# Patient Record
Sex: FEMALE | Race: WHITE | Employment: OTHER | URBAN - METROPOLITAN AREA
[De-identification: names, ages, dates, MRNs, and addresses within clinical notes are randomized per-mention and may not be internally consistent; named-entity substitution may affect disease eponyms.]

---

## 2019-03-16 ENCOUNTER — HOSPITAL ENCOUNTER (OUTPATIENT)
Dept: CT IMAGING | Age: 79
Discharge: HOME | End: 2019-03-16
Attending: INTERNAL MEDICINE
Payer: MEDICARE

## 2019-03-16 DIAGNOSIS — R91.8 LUNG MASS: ICD-10-CM

## 2019-03-16 PROCEDURE — 71250 CT THORAX DX C-: CPT

## 2019-03-18 PROBLEM — R91.8 LUNG MASS: Status: ACTIVE | Noted: 2019-03-18

## 2019-03-18 PROBLEM — K76.9 LIVER LESION: Status: ACTIVE | Noted: 2019-03-18

## 2019-03-18 PROBLEM — J43.2 CENTRILOBULAR EMPHYSEMA (HCC): Chronic | Status: ACTIVE | Noted: 2019-03-18

## 2019-03-18 PROBLEM — Z87.891 PERSONAL HISTORY OF TOBACCO USE, PRESENTING HAZARDS TO HEALTH: Status: ACTIVE | Noted: 2019-03-18

## 2019-03-18 NOTE — H&P (VIEW-ONLY)
[address] [place]. [address] [facility], [address] [place] [address] 
[phone] Patient Name:  Nelida Vogt YOB: 1940 Office Visit 3/18/2019 CHIEF COMPLAINT:   
Chief Complaint Patient presents with  Lung Mass HISTORY OF PRESENT ILLNESS:   
I had the pleasure of seeing Ms. Aiad Gates in our clinic today. Ms. Julissa Larson is a 66 y.o. female who presents with a history of tobacco abuse and COPD here for new eval of lung mass. Has chronic back pain, particularly first thing in the morning. Makes gait unsteady. 5 days ago fell going to bathroom, hit her head. History of SAH 7 years ago, coiled, so took to hospital.  
 
Smoked 45 years, 1.5 ppd, quit 14 years ago. Still using nicorette. Was a , no other exposures. No history of cancer elsewhere. Complains of fatigue and shortness of breath. Told she has emphysema x years. Not currently on any inhaled medications. Never been hospitalized due to breathing or on supplemental O2. Has some chest discomfort described as tightening in center of chest, mostly during grocery shopping. Goes away when she stops or drinks cold water. Lost weight when she moved here from [place], but gained most of it back. Past Medical History:  
Diagnosis Date  Subarachnoid hemorrhage ([facility] [address] [facility] Aneurysm Problem List  Date Reviewed: 3/18/2019 Codes Class Noted Lung mass ICD-10-CM: R91.8 ICD-9-CM: 786.6  3/18/2019 Personal history of tobacco use, presenting hazards to health ICD-10-CM: U36.857 ICD-9-CM: V15.82  3/18/2019 Liver lesion ICD-10-CM: K76.9 ICD-9-CM: 573.8  3/18/2019 Centrilobular emphysema (HCC) (Chronic) ICD-10-CM: J43.2 ICD-9-CM: 492.8  3/18/2019 History reviewed. No pertinent surgical history. No flowsheet data found. Social History Socioeconomic History  Marital status: SINGLE  
 Spouse name: Not on file  Number of children: Not on file  Years of education: Not on file  Highest education level: Not on file Social Needs  Financial resource strain: Not on file  Food insecurity - worry: Not on file  Food insecurity - inability: Not on file  Transportation needs - medical: Not on file  Transportation needs - non-medical: Not on file Occupational History  Not on file Tobacco Use  Smoking status: Former Smoker Packs/day: 1.50 Years: 45.00 Pack years: 67.50 Types: Cigarettes Last attempt to quit: 2004 Years since quitting: 15.2  Smokeless tobacco: Never Used Substance and Sexual Activity  Alcohol use: Not on file  Drug use: Not on file  Sexual activity: Not on file Other Topics Concern  Not on file Social History Narrative  Not on file History reviewed. No pertinent family history. Allergies Allergen Reactions  Penicillins Unknown (comments)  Percocet [Oxycodone-Acetaminophen] Unknown (comments) Current Outpatient Medications Medication Sig  
 calc-D3-magnes-D6-Sm-Dp-jake 250 mg-400 unit -40 mg-5 mg tab Take 1 Tab by mouth daily.  omega 3-dha-epa-fish oil (FISH OIL) 100-160-1,000 mg cap Take 1 Cap by mouth daily.  MAGNESIUM PO Take 100 mg by mouth daily.  VITAMIN B COMPLEX PO Take 1 Tab by mouth daily.  atorvastatin (LIPITOR) 10 mg tablet Take 10 mg by mouth daily.  cholecalciferol (VITAMIN D3) 1,000 unit tablet Take 1,000 Units by mouth daily.  levothyroxine (SYNTHROID) 75 mcg tablet Take 75 mcg by mouth daily.  losartan (COZAAR) 25 mg tablet Take 25 mg by mouth daily.  therapeutic multivitamin (THERAGRAN) tablet Take 1 Tab by mouth daily.  zolpidem (AMBIEN) 5 mg tablet Take 1 Tab by mouth nightly. No current facility-administered medications for this visit. REVIEW OF SYSTEMS: 
 
Review of Systems Constitutional: Positive for malaise/fatigue. Negative for chills, diaphoresis, fever and weight loss. HENT: Negative for congestion, ear discharge, ear pain, hearing loss, nosebleeds, sinus pain, sore throat and tinnitus. Eyes: Negative for blurred vision, pain and redness. Respiratory: Positive for cough. Negative for hemoptysis, sputum production, shortness of breath and wheezing. Cardiovascular: Negative for chest pain, palpitations, orthopnea, leg swelling and PND. Gastrointestinal: Positive for heartburn. Negative for abdominal pain, blood in stool, constipation, diarrhea, melena, nausea and vomiting. Genitourinary: Negative for dysuria, frequency, hematuria and urgency. Musculoskeletal: Positive for back pain. Negative for joint pain, myalgias and neck pain. Skin: Negative for itching and rash. Neurological: Negative for dizziness, tremors, focal weakness, seizures and headaches. Endo/Heme/Allergies: Positive for environmental allergies. Does not bruise/bleed easily. Psychiatric/Behavioral: Positive for depression. Negative for hallucinations, memory loss and suicidal ideas. The patient is not nervous/anxious. PHYSICAL EXAM: 
Visit Vitals /80 (BP 1 Location: Left arm, BP Patient Position: Sitting) Pulse 86 Temp 98 °F (36.7 °C) (Temporal) Resp 20 Ht 5' 8\" (1.727 m) Wt 149 lb (67.6 kg) SpO2 99% BMI 22.66 kg/m² GENERAL APPEARANCE: 
The patient is normal weight and in no respiratory distress. HEENT: 
PERRL. Conjunctivae unremarkable. Nasal mucosa is without epistaxis, exudate, or polyps. Gums and dentition are unremarkable. There is no oropharyngeal narrowing. NECK/LYMPHATIC: 
Symmetrical with no elevation of jugular venous pulsation. Trachea midline. No thyroid enlargement. No cervical adenopathy. LUNGS: 
Normal respiratory effort with symmetrical lung expansion. Breath sounds are clear to auscultation bilaterally.  
HEART: 
 There is a regular rate and rhythm. No murmur, rub, or gallop. ABDOMEN: 
Soft and non-tender. No hepatosplenomegaly. Bowel sounds are normal.   
NEURO/PSYCH: 
The patient is alert and oriented to person, place, and time. Memory appears intact and mood is normal.  No gross sensorimotor deficits are present. MS/SKIN: 
There is no edema in the lower extremities. No rashes, bruises, lesions, ulcers visible. DIAGNOSTIC TESTS: 
CT of chest:   
 
Results from Hospital Encounter encounter on 03/16/19 CT CHEST WO CONT Impression IMPRESSION:  
 
1. Large spiculated mass in the right lower lobe likely representing primary 
bronchogenic carcinoma versus metastasis. 2.  Scattered bilateral small pulmonary nodules, suspicious for metastases. 3.  Some indeterminate hypoenhancing liver lesions. Elective whole body PET/CT 
and or MRI of the liver may be helpful in further evaluation. 4.  Other incidental findings as above. Results for orders placed during the hospital encounter of 03/16/19 CT CHEST WO CONT Narrative CT THORAX WITHOUT CONTRAST HISTORY: lung mass/5 cm mass in R base on cxr-please have radiology read and 
push to Super D, 78 years Female  5 cm mass seen base right lung on CXR Hx COPD Super D 
 
COMPARISON: Chest radiograph April 12, 2011 TECHNIQUE: Noncontrast axial helical images from the thoracic inlet through 
diaphragm were obtained. Radiation dose reduction techniques were used for this 
study:  Our CT scanners use one or all of the following: Automated exposure 
control, adjustment of the mA and/or kVp according to patient's size, iterative 
reconstruction. FINDINGS: 
Partially visualized thyroid unremarkable. No evidence of significant 
mediastinal, or axillary lymphadenopathy. Mild calcific atherosclerosis of a 
normal caliber aortic arch and descending aorta. Mild biapical scarring with mild bilateral upper lobe predominant panlobular emphysema. A large spiculated 
mass is seen in the right lower lobe measuring 5.0 x 4.3 cm, suspicious for 
primary bronchogenic carcinoma versus metastasis. Small satellite nodules are 
seen. Minimal dependent subsegmental atelectasis bilateral lung bases. No 
evidence of pleural effusion. Scattered small peripheral pulmonary nodules 
measuring up to 4 mm in the left lower lobe and up to 4 mm in the right middle 
lobe are suspicious for pulmonary metastases. Visualized proximal airways 
unremarkable. There appear to be small nonobstructing calculi in the right renal upper pole 
medullary level measuring up to 2 x 3 mm. Simple appearing cysts are seen of 
the hepatic dome. However, there are some hypoenhancing hepatic lesions which 
are indeterminate here. Visualized upper abdominal viscera including the 
adrenal glands are otherwise unremarkable. Visualized osseous structures 
unremarkable. Impression IMPRESSION:  
 
1. Large spiculated mass in the right lower lobe likely representing primary 
bronchogenic carcinoma versus metastasis. 2.  Scattered bilateral small pulmonary nodules, suspicious for metastases. 3.  Some indeterminate hypoenhancing liver lesions. Elective whole body PET/CT 
and or MRI of the liver may be helpful in further evaluation. 4.  Other incidental findings as above. No results found for this or any previous visit. No results found for this or any previous visit. CXR:   
Results for orders placed during the hospital encounter of 04/12/11 XR CHEST PA AND LATERAL  
 
 
 
PET/CT:  
No results found for this or any previous visit. No results found for this or any previous visit. Exercise oximetry:   
 
Spirometry:  
Date:    03/18/2019 FVC    2.77-87% FEV1    1.84-77% FEV1/FVC    66% FEF 25-75%    0.97-55% Bronchodilator Response TLC           
RV           
 DLCO           
 
Echo: 
No results found for this or any previous visit. ASSESSMENT:  (Medical Decision Making) ICD-10-CM ICD-9-CM 1. Lung mass R91.8 786.6 AMB POC SPIROMETRY 2. Liver lesion K76.9 573.8 3. Personal history of tobacco use, presenting hazards to health Z87.891 V15.82   
4. Centrilobular emphysema (HCC) J43.2 492.8 Patient with a history of former tobacco abuse and COPD now found to have a 5 cm right lower lobe lung mass very suspicious for primary bronchogenic carcinoma. CT scan also reveals scattered bilateral pulmonary nodules which are too small to either biopsy or to characterize further. Finally also reveals suspicious liver lesions which may represent sites of metastatic disease. We spent a good deal of time discussing the likely diagnosis of this being a primary lung cancer and the potential status depending on the findings of the liver or other distant sites of disease. PLAN: 
-We will set her up for a PET scan to be performed soon as possible. If there suggestions of metastatic deposits in the liver or elsewhere outside of the chest these will be the first sites to perform biopsy. If however all of the disease seems to be confined to the chest when she would be a good candidate for navigation bronchoscopy. -Follow-up will depend on the findings of the PET scan as outlined above. -COPD with mild obstruction on today's spirometry. Minimal symptoms and does not currently require any inhaled therapies. Will hold on additional treatments for now. 
-We will refer her to medical oncology after bronchoscopy or IR guided biopsy is completed. Follow-up as above Portions of this note were created using voice recognition software. As such, error of speech recognition may have occurred. Orders Placed This Encounter  AMB POC SPIROMETRY Conrad Gorman MD 
Electronically signed

## 2019-03-28 ENCOUNTER — HOSPITAL ENCOUNTER (OUTPATIENT)
Dept: PET IMAGING | Age: 79
Discharge: HOME | End: 2019-03-28
Payer: MEDICARE

## 2019-03-28 DIAGNOSIS — R91.8 LUNG MASS: ICD-10-CM

## 2019-03-28 PROCEDURE — 74011636320 HC RX REV CODE- 636/320: Performed by: INTERNAL MEDICINE

## 2019-03-28 PROCEDURE — 78815 PET IMAGE W/CT SKULL-THIGH: CPT

## 2019-03-28 RX ORDER — SODIUM CHLORIDE 0.9 % (FLUSH) 0.9 %
5-10 SYRINGE (ML) INJECTION
Status: COMPLETED | OUTPATIENT
Start: 2019-03-28 | End: 2019-03-28

## 2019-03-28 RX ADMIN — DIATRIZOATE MEGLUMINE AND DIATRIZOATE SODIUM 10 ML: 660; 100 LIQUID ORAL; RECTAL at 16:11

## 2019-03-28 RX ADMIN — Medication 10 ML: at 16:11

## 2019-04-04 ENCOUNTER — APPOINTMENT (OUTPATIENT)
Dept: GENERAL RADIOLOGY | Age: 79
End: 2019-04-04
Attending: INTERNAL MEDICINE
Payer: MEDICARE

## 2019-04-04 ENCOUNTER — HOSPITAL ENCOUNTER (OUTPATIENT)
Age: 79
Setting detail: OUTPATIENT SURGERY
Discharge: HOME | End: 2019-04-04
Attending: INTERNAL MEDICINE | Admitting: INTERNAL MEDICINE
Payer: MEDICARE

## 2019-04-04 VITALS
WEIGHT: 149 LBS | TEMPERATURE: 97.6 F | RESPIRATION RATE: 23 BRPM | BODY MASS INDEX: 22.58 KG/M2 | HEIGHT: 68 IN | SYSTOLIC BLOOD PRESSURE: 139 MMHG | DIASTOLIC BLOOD PRESSURE: 69 MMHG | OXYGEN SATURATION: 94 % | HEART RATE: 95 BPM

## 2019-04-04 DIAGNOSIS — R91.8 LUNG MASS: ICD-10-CM

## 2019-04-04 DIAGNOSIS — C34.31 MALIGNANT NEOPLASM OF LOWER LOBE OF RIGHT LUNG (HCC): ICD-10-CM

## 2019-04-04 PROCEDURE — 88305 TISSUE EXAM BY PATHOLOGIST: CPT

## 2019-04-04 PROCEDURE — 77030012699 HC VLV SUC CNTRL OCOA -A: Performed by: INTERNAL MEDICINE

## 2019-04-04 PROCEDURE — 31628 BRONCHOSCOPY/LUNG BX EACH: CPT | Performed by: INTERNAL MEDICINE

## 2019-04-04 PROCEDURE — 77030031404 HC PTCH SENS SD DISP SPDM -A: Performed by: INTERNAL MEDICINE

## 2019-04-04 PROCEDURE — 76040000027: Performed by: INTERNAL MEDICINE

## 2019-04-04 PROCEDURE — 74011000250 HC RX REV CODE- 250: Performed by: INTERNAL MEDICINE

## 2019-04-04 PROCEDURE — 88341 IMHCHEM/IMCYTCHM EA ADD ANTB: CPT

## 2019-04-04 PROCEDURE — 31627 NAVIGATIONAL BRONCHOSCOPY: CPT | Performed by: INTERNAL MEDICINE

## 2019-04-04 PROCEDURE — 99153 MOD SED SAME PHYS/QHP EA: CPT | Performed by: INTERNAL MEDICINE

## 2019-04-04 PROCEDURE — 74011250636 HC RX REV CODE- 250/636: Performed by: INTERNAL MEDICINE

## 2019-04-04 PROCEDURE — 31654 BRONCH EBUS IVNTJ PERPH LES: CPT | Performed by: INTERNAL MEDICINE

## 2019-04-04 PROCEDURE — 77030003406 HC NDL ASPIR BIOP OCOA -C: Performed by: INTERNAL MEDICINE

## 2019-04-04 PROCEDURE — 31653 BRONCH EBUS SAMPLNG 3/> NODE: CPT | Performed by: INTERNAL MEDICINE

## 2019-04-04 PROCEDURE — 77030028571 HC CATH ENDOBRNCH DISP BIOP KT SPMD -G1: Performed by: INTERNAL MEDICINE

## 2019-04-04 PROCEDURE — 77030009046 HC CATH BRNCH BLLN OCOA -B: Performed by: INTERNAL MEDICINE

## 2019-04-04 PROCEDURE — 88173 CYTOPATH EVAL FNA REPORT: CPT

## 2019-04-04 PROCEDURE — 99152 MOD SED SAME PHYS/QHP 5/>YRS: CPT | Performed by: INTERNAL MEDICINE

## 2019-04-04 PROCEDURE — 74011250636 HC RX REV CODE- 250/636

## 2019-04-04 PROCEDURE — 88172 CYTP DX EVAL FNA 1ST EA SITE: CPT

## 2019-04-04 PROCEDURE — 88177 CYTP FNA EVAL EA ADDL: CPT

## 2019-04-04 PROCEDURE — 88342 IMHCHEM/IMCYTCHM 1ST ANTB: CPT

## 2019-04-04 RX ORDER — MIDAZOLAM HYDROCHLORIDE 1 MG/ML
.25-5 INJECTION, SOLUTION INTRAMUSCULAR; INTRAVENOUS
Status: DISCONTINUED | OUTPATIENT
Start: 2019-04-04 | End: 2019-04-04 | Stop reason: HOSPADM

## 2019-04-04 RX ORDER — LIDOCAINE HYDROCHLORIDE 20 MG/ML
JELLY TOPICAL ONCE
Status: COMPLETED | OUTPATIENT
Start: 2019-04-04 | End: 2019-04-04

## 2019-04-04 RX ORDER — FENTANYL CITRATE 50 UG/ML
50 INJECTION, SOLUTION INTRAMUSCULAR; INTRAVENOUS
Status: DISCONTINUED | OUTPATIENT
Start: 2019-04-04 | End: 2019-04-04 | Stop reason: HOSPADM

## 2019-04-04 RX ORDER — LIDOCAINE HYDROCHLORIDE 40 MG/ML
SOLUTION TOPICAL ONCE
Status: COMPLETED | OUTPATIENT
Start: 2019-04-04 | End: 2019-04-04

## 2019-04-04 RX ORDER — ONDANSETRON 2 MG/ML
4-8 INJECTION INTRAMUSCULAR; INTRAVENOUS
Status: DISCONTINUED | OUTPATIENT
Start: 2019-04-04 | End: 2019-04-04 | Stop reason: HOSPADM

## 2019-04-04 RX ORDER — FLUMAZENIL 0.1 MG/ML
0.2 INJECTION INTRAVENOUS
Status: DISCONTINUED | OUTPATIENT
Start: 2019-04-04 | End: 2019-04-04 | Stop reason: HOSPADM

## 2019-04-04 RX ORDER — NALOXONE HYDROCHLORIDE 0.4 MG/ML
0.4 INJECTION, SOLUTION INTRAMUSCULAR; INTRAVENOUS; SUBCUTANEOUS
Status: DISCONTINUED | OUTPATIENT
Start: 2019-04-04 | End: 2019-04-04 | Stop reason: HOSPADM

## 2019-04-04 RX ORDER — SODIUM CHLORIDE 9 MG/ML
50 INJECTION, SOLUTION INTRAVENOUS CONTINUOUS
Status: DISCONTINUED | OUTPATIENT
Start: 2019-04-04 | End: 2019-04-04 | Stop reason: HOSPADM

## 2019-04-04 RX ADMIN — MIDAZOLAM HYDROCHLORIDE 0.5 MG: 1 INJECTION, SOLUTION INTRAMUSCULAR; INTRAVENOUS at 10:37

## 2019-04-04 RX ADMIN — LIDOCAINE HYDROCHLORIDE: 40 SOLUTION TOPICAL at 10:20

## 2019-04-04 RX ADMIN — MIDAZOLAM HYDROCHLORIDE 0.5 MG: 1 INJECTION, SOLUTION INTRAMUSCULAR; INTRAVENOUS at 10:50

## 2019-04-04 RX ADMIN — MIDAZOLAM HYDROCHLORIDE 0.5 MG: 1 INJECTION, SOLUTION INTRAMUSCULAR; INTRAVENOUS at 11:01

## 2019-04-04 RX ADMIN — FENTANYL CITRATE 50 MCG: 50 INJECTION, SOLUTION INTRAMUSCULAR; INTRAVENOUS at 10:19

## 2019-04-04 RX ADMIN — FENTANYL CITRATE 50 MCG: 50 INJECTION, SOLUTION INTRAMUSCULAR; INTRAVENOUS at 10:22

## 2019-04-04 RX ADMIN — PROMETHAZINE HYDROCHLORIDE 6.25 MG: 25 INJECTION INTRAMUSCULAR; INTRAVENOUS at 11:07

## 2019-04-04 RX ADMIN — FENTANYL CITRATE 25 MCG: 50 INJECTION, SOLUTION INTRAMUSCULAR; INTRAVENOUS at 10:35

## 2019-04-04 RX ADMIN — FENTANYL CITRATE 25 MCG: 50 INJECTION, SOLUTION INTRAMUSCULAR; INTRAVENOUS at 10:45

## 2019-04-04 RX ADMIN — FENTANYL CITRATE 50 MCG: 50 INJECTION, SOLUTION INTRAMUSCULAR; INTRAVENOUS at 10:59

## 2019-04-04 RX ADMIN — MIDAZOLAM HYDROCHLORIDE 0.5 MG: 1 INJECTION, SOLUTION INTRAMUSCULAR; INTRAVENOUS at 10:39

## 2019-04-04 RX ADMIN — LIDOCAINE HYDROCHLORIDE: 20 JELLY TOPICAL at 10:20

## 2019-04-04 RX ADMIN — MIDAZOLAM HYDROCHLORIDE 2 MG: 1 INJECTION, SOLUTION INTRAMUSCULAR; INTRAVENOUS at 10:19

## 2019-04-04 RX ADMIN — FENTANYL CITRATE 25 MCG: 50 INJECTION, SOLUTION INTRAMUSCULAR; INTRAVENOUS at 11:06

## 2019-04-04 RX ADMIN — FENTANYL CITRATE 25 MCG: 50 INJECTION, SOLUTION INTRAMUSCULAR; INTRAVENOUS at 10:38

## 2019-04-04 RX ADMIN — FENTANYL CITRATE 25 MCG: 50 INJECTION, SOLUTION INTRAMUSCULAR; INTRAVENOUS at 10:49

## 2019-04-04 RX ADMIN — SODIUM CHLORIDE 50 ML/HR: 900 INJECTION, SOLUTION INTRAVENOUS at 09:30

## 2019-04-04 NOTE — PROGRESS NOTES
FAX sent to [facility] for patient to have complete PFTs and Tumor Board. Latrice Raman (tumor navigator) notified that patient needs Oncology referral and Brain MRI.

## 2019-04-04 NOTE — PROCEDURES
PROCEDURE  Bronchoscopy with endobronchial ultrasound guided fine needle aspiration of hilar/mediastinal lymph nodes and airway inspection and EMN aided transbronchial lung biopsy of peripheral lung mass. EQUIPEMENT  Olympus T180 bronchoscope  Super Dimension EMN system  45 deg stearable sheath  Olympus [id number] EBUS scope  UM 17/20 Radial probe  Super D forceps    INDICATION   Peripheral mass suspicious for malignancy /staging of presumed malignancy    IMAGING  CT Chest 3/16/19        POST OP DIAGNOSIS:  Super Dimension EMN system was employed to identify and biopsy the RLL mass seen on PET. 10 transbronchial lung biopsies were performed with touch preparations revealing suspicious for malignancy on JENIFFER. Histology from obtained biopsies is pending. Following EMN procedure, concave EBUS was performed for mediastinal staging. Stations 7, 4R, and 11Rs were biopsied and were negative for malignancy on JENIFFER. Based on imaging and biopsies, pt is a STAGE T3N0M0 (IIB) non-small cell lung cancer. ANESTHESIA  Concious sedation with: Fentanyl 275 mcg IV; Versed 4 mg IV; Lidocaine 200 mg to tracheo-bronchial tree and vocal cords; Phenergan 6.25 mg IV for nausea and vomiting prophylaxis. AIRWAY INSPECTION  After obtaining informed consent, using a bite block, an Olympus Q 180 video bronchoscope was introduced into the trachea through the vocal cords without complication.     RIGHT  LOCATION NORM/ABNORMAL DESCRIPTION   VOCAL CORDS NL    TRACHEA NL    LUCHO NL    RMSB NL    RUL NL    BI NL    RML NL    RLL NL    SUP SEGM RLL NL    MED BASAL NL    ANTERIOR BASAL NL    LATERAL BASAL NL    POSTERIOR BASAL NL            LEFT  LOCATION NORMAL/ABNORMAL TYPE   LMSB NL    GEORGE NL    LINGULA NL    LLL NL    SUPERIOR SEG LLL NL    ALBERT-MEDIAL LLL NL    LATERAL LLL NL    POSTERIOR LLL NL             Navigation    Olympus T 180 bronchoscope was introduced into the airways to identify and biopsy the RLL mass with the aid of Super D EMN system and radial probe US. CT images acquired with Super D protocol were used to plan the pathway to target lesion planned using Super D software. Planning data was then used to navigate to the nodule, with verification of location using radial US. Visibility with radial US was: excellent (location direction in the center of the mass)  10 transbronchial lung biopsies were obtained and evaluated via touch prep and JENIFFER. Touch preps revealed suspicious cells with a lot of crush artifact. Histology from obtained tissue is currently pending. TOUCH PREP BIOPSY# MALIGNANT ATYPIA/SUSPICIOUS DIAGNOSIS   RLL 1 - suspicious -    2 - suspicious -    3 - suspicious -    4 - suspicious -    5 formalin      6 formalin      7 formalin      8 formalin      9 formalin      10 formalin         ADDITIONAL BIOPSIES        EBUS  After completing the airway inspection an Olympus  F EBUS bronchoscope was introduced into the trachea through the vocal chords without complication. The balloon was inflated with saline and a mediastinal inspection commenced:      STATION SIZE IN CM   12R  No target   11R 0.8cm   10R No target   4R 1cm   2R No target   7 1.5cm   2L No target   4L No target   10L No target   11L No target         After identifying targets the following samples were obtained:    STATION PASS# LYMPHOCYTES ATYPIA GRANULOMA DIAGNOSIS   7 1 + - - -    2 + - - -    3 Pauci/blood - - -    4 + - - -   4R 1 Pauci - - -    2 + - - -    3 + - - -    4 + - - -   11Rs 1 blood - - -    2 + - - -    3 + - - -                    EBL: <86AP    Complications: Pt began to obstruct her airway and desaturate with the last EBUS passes. Therefore the procedure was terminated before one last pass was taken from 11Rs. Diagnosis: Likely IIB non small cell lung cancer. Plan: Will obtain cPFT's, MRI brain, refer to medical oncology, and present to tumor board to discuss treatment steps. Roxana Whitaker MD

## 2019-04-04 NOTE — ROUTINE PROCESS
VSS. Pt SPO2 94% on room air. Discharge instructions reviewed with patient and daughter and copy of instructions sent home with patient. Dr. Jose Juan Mckeon spoke with patient and daughter prior to discharge. Questions answered. Discharged via wheelchair, wheeled out by [facility] staff member. IV discontinued prior to discharge. Personal items with patient at discharge: all clothing, glasses, belongings.

## 2019-04-04 NOTE — DISCHARGE INSTRUCTIONS
RESPIRATORY CARE - BRONCHOSCOPY - DISCHARGE INSTRUCTIONS      You received a lot of numbing medication for your throat and nose, and you also received medication to make you sleepy during your procedure. Because of this and because the bronchoscopy may have irritated your airways, we ask that you follow these directions:    1. Do not eat or drink until  12:30 . After that, you may have what you please. You may want to try some liquids first, because your throat may be a little sore. 2. Medication may cause drowsiness for several hours, therefore:  · Do not drive or operate machinery for remainder of the day. · No alcohol today  · Do not make any important or legal decisions for 24 hours  · Do not sign any legal documents for 24 hours    3. You may cough up more mucus than usual and you may see some blood, but this is expected and should subside by the following day. 4. If severe throat irritation, coughing, or bleeding continue, call your doctor. 5.         If you run a fever greater than 102, call [facility] at [phone]. 6.         Dr. Thor Velazquez has asked that you:                A. Call the doctor's office at [phone] for questions or concerns from your procedure today. We will be getting you an oncology referral. The office is also arranging a Brain MRI and complete PFT (Pulmonary Function Test).          Discharge Medications: Resume medications        Instructions given to Ramon Zhang and other family members

## 2019-04-04 NOTE — INTERVAL H&P NOTE
H&P Update:  Reynaldo Smart was seen and examined. History and physical has been reviewed. The patient has been examined.  There have been no significant clinical changes since the completion of the originally dated History and Physical.    Signed By: Mirna Rios MD     April 4, 2019 10:19 AM

## 2019-04-09 ENCOUNTER — HOSPITAL ENCOUNTER (OUTPATIENT)
Dept: GENERAL RADIOLOGY | Age: 79
Discharge: HOME | DRG: 871 | End: 2019-04-09
Payer: MEDICARE

## 2019-04-09 DIAGNOSIS — R06.02 SOB (SHORTNESS OF BREATH): ICD-10-CM

## 2019-04-09 PROCEDURE — 71046 X-RAY EXAM CHEST 2 VIEWS: CPT

## 2019-04-12 ENCOUNTER — APPOINTMENT (OUTPATIENT)
Dept: GENERAL RADIOLOGY | Age: 79
DRG: 871 | End: 2019-04-12
Attending: EMERGENCY MEDICINE
Payer: MEDICARE

## 2019-04-12 ENCOUNTER — HOSPITAL ENCOUNTER (INPATIENT)
Age: 79
LOS: 4 days | Discharge: HOME HEALTH | DRG: 871 | End: 2019-04-16
Attending: EMERGENCY MEDICINE | Admitting: INTERNAL MEDICINE
Payer: MEDICARE

## 2019-04-12 DIAGNOSIS — Z87.891 PERSONAL HISTORY OF TOBACCO USE, PRESENTING HAZARDS TO HEALTH: Chronic | ICD-10-CM

## 2019-04-12 DIAGNOSIS — R65.20 SEVERE SEPSIS WITH ACUTE ORGAN DYSFUNCTION (HCC): ICD-10-CM

## 2019-04-12 DIAGNOSIS — A41.9 SEVERE SEPSIS WITH ACUTE ORGAN DYSFUNCTION (HCC): ICD-10-CM

## 2019-04-12 DIAGNOSIS — J18.9 PNEUMONIA OF RIGHT LOWER LOBE DUE TO INFECTIOUS ORGANISM: Primary | ICD-10-CM

## 2019-04-12 DIAGNOSIS — K76.9 LIVER LESION: ICD-10-CM

## 2019-04-12 DIAGNOSIS — C34.31 MALIGNANT NEOPLASM OF LOWER LOBE OF RIGHT LUNG (HCC): Chronic | ICD-10-CM

## 2019-04-12 DIAGNOSIS — J69.0 ASPIRATION PNEUMONIA OF RIGHT LOWER LOBE, UNSPECIFIED ASPIRATION PNEUMONIA TYPE (HCC): ICD-10-CM

## 2019-04-12 DIAGNOSIS — J43.2 CENTRILOBULAR EMPHYSEMA (HCC): Chronic | ICD-10-CM

## 2019-04-12 DIAGNOSIS — J96.01 ACUTE RESPIRATORY FAILURE WITH HYPOXIA (HCC): ICD-10-CM

## 2019-04-12 DIAGNOSIS — R91.8 LUNG MASS: ICD-10-CM

## 2019-04-12 PROBLEM — E03.9 ACQUIRED HYPOTHYROIDISM: Chronic | Status: ACTIVE | Noted: 2019-04-12

## 2019-04-12 PROBLEM — I10 HTN (HYPERTENSION): Chronic | Status: ACTIVE | Noted: 2019-04-12

## 2019-04-12 PROBLEM — I10 HTN (HYPERTENSION): Status: ACTIVE | Noted: 2019-04-12

## 2019-04-12 PROBLEM — E03.9 ACQUIRED HYPOTHYROIDISM: Status: ACTIVE | Noted: 2019-04-12

## 2019-04-12 LAB
ALBUMIN SERPL-MCNC: 2.5 G/DL (ref 3.2–4.6)
ALBUMIN/GLOB SERPL: 0.6 {RATIO} (ref 1.2–3.5)
ALP SERPL-CCNC: 121 U/L (ref 50–136)
ALT SERPL-CCNC: 54 U/L (ref 12–65)
ANION GAP SERPL CALC-SCNC: 8 MMOL/L (ref 7–16)
APPEARANCE UR: ABNORMAL
AST SERPL-CCNC: 40 U/L (ref 15–37)
ATRIAL RATE: 104 BPM
BACTERIA URNS QL MICRO: 0 /HPF
BASOPHILS # BLD: 0.1 K/UL (ref 0–0.2)
BASOPHILS NFR BLD: 0 % (ref 0–2)
BILIRUB SERPL-MCNC: 1.3 MG/DL (ref 0.2–1.1)
BILIRUB UR QL: ABNORMAL
BNP SERPL-MCNC: 181 PG/ML
BUN SERPL-MCNC: 20 MG/DL (ref 8–23)
CALCIUM SERPL-MCNC: 8.5 MG/DL (ref 8.3–10.4)
CALCULATED P AXIS, ECG09: 72 DEGREES
CALCULATED R AXIS, ECG10: 83 DEGREES
CALCULATED T AXIS, ECG11: 70 DEGREES
CASTS URNS QL MICRO: ABNORMAL /LPF
CHLORIDE SERPL-SCNC: 102 MMOL/L (ref 98–107)
CO2 SERPL-SCNC: 27 MMOL/L (ref 21–32)
COLOR UR: ABNORMAL
CREAT SERPL-MCNC: 0.71 MG/DL (ref 0.6–1)
DIAGNOSIS, 93000: NORMAL
DIFFERENTIAL METHOD BLD: ABNORMAL
EOSINOPHIL # BLD: 0 K/UL (ref 0–0.8)
EOSINOPHIL NFR BLD: 0 % (ref 0.5–7.8)
EPI CELLS #/AREA URNS HPF: ABNORMAL /HPF
ERYTHROCYTE [DISTWIDTH] IN BLOOD BY AUTOMATED COUNT: 13.3 % (ref 11.9–14.6)
GLOBULIN SER CALC-MCNC: 4.3 G/DL (ref 2.3–3.5)
GLUCOSE SERPL-MCNC: 101 MG/DL (ref 65–100)
GLUCOSE UR STRIP.AUTO-MCNC: NEGATIVE MG/DL
HCT VFR BLD AUTO: 40.5 % (ref 35.8–46.3)
HGB BLD-MCNC: 13.1 G/DL (ref 11.7–15.4)
HGB UR QL STRIP: ABNORMAL
IMM GRANULOCYTES # BLD AUTO: 0.2 K/UL (ref 0–0.5)
IMM GRANULOCYTES NFR BLD AUTO: 1 % (ref 0–5)
KETONES UR QL STRIP.AUTO: 15 MG/DL
LACTATE BLD-SCNC: 1.06 MMOL/L (ref 0.5–1.9)
LEUKOCYTE ESTERASE UR QL STRIP.AUTO: ABNORMAL
LYMPHOCYTES # BLD: 0.9 K/UL (ref 0.5–4.6)
LYMPHOCYTES NFR BLD: 5 % (ref 13–44)
MAGNESIUM SERPL-MCNC: 2.2 MG/DL (ref 1.8–2.4)
MCH RBC QN AUTO: 28.8 PG (ref 26.1–32.9)
MCHC RBC AUTO-ENTMCNC: 32.3 G/DL (ref 31.4–35)
MCV RBC AUTO: 89 FL (ref 79.6–97.8)
MONOCYTES # BLD: 1.9 K/UL (ref 0.1–1.3)
MONOCYTES NFR BLD: 11 % (ref 4–12)
NEUTS SEG # BLD: 14.4 K/UL (ref 1.7–8.2)
NEUTS SEG NFR BLD: 83 % (ref 43–78)
NITRITE UR QL STRIP.AUTO: NEGATIVE
NRBC # BLD: 0 K/UL (ref 0–0.2)
P-R INTERVAL, ECG05: 174 MS
PH UR STRIP: 6 [PH] (ref 5–9)
PHOSPHATE SERPL-MCNC: 3.3 MG/DL (ref 2.3–3.7)
PLATELET # BLD AUTO: 359 K/UL (ref 150–450)
PMV BLD AUTO: 9.8 FL (ref 9.4–12.3)
POTASSIUM SERPL-SCNC: 3.6 MMOL/L (ref 3.5–5.1)
PROCALCITONIN SERPL-MCNC: 0.3 NG/ML
PROT SERPL-MCNC: 6.8 G/DL (ref 6.3–8.2)
PROT UR STRIP-MCNC: 100 MG/DL
Q-T INTERVAL, ECG07: 328 MS
QRS DURATION, ECG06: 92 MS
QTC CALCULATION (BEZET), ECG08: 431 MS
RBC # BLD AUTO: 4.55 M/UL (ref 4.05–5.2)
RBC #/AREA URNS HPF: ABNORMAL /HPF
SODIUM SERPL-SCNC: 137 MMOL/L (ref 136–145)
SP GR UR REFRACTOMETRY: 1.02 (ref 1–1.02)
UROBILINOGEN UR QL STRIP.AUTO: 1 EU/DL (ref 0.2–1)
VENTRICULAR RATE, ECG03: 104 BPM
WBC # BLD AUTO: 17.3 K/UL (ref 4.3–11.1)
WBC URNS QL MICRO: ABNORMAL /HPF

## 2019-04-12 PROCEDURE — 74011000250 HC RX REV CODE- 250: Performed by: EMERGENCY MEDICINE

## 2019-04-12 PROCEDURE — 87040 BLOOD CULTURE FOR BACTERIA: CPT

## 2019-04-12 PROCEDURE — 74011250637 HC RX REV CODE- 250/637: Performed by: INTERNAL MEDICINE

## 2019-04-12 PROCEDURE — 80053 COMPREHEN METABOLIC PANEL: CPT

## 2019-04-12 PROCEDURE — 84100 ASSAY OF PHOSPHORUS: CPT

## 2019-04-12 PROCEDURE — 94760 N-INVAS EAR/PLS OXIMETRY 1: CPT

## 2019-04-12 PROCEDURE — 74011000258 HC RX REV CODE- 258: Performed by: INTERNAL MEDICINE

## 2019-04-12 PROCEDURE — 83880 ASSAY OF NATRIURETIC PEPTIDE: CPT

## 2019-04-12 PROCEDURE — 85025 COMPLETE CBC W/AUTO DIFF WBC: CPT

## 2019-04-12 PROCEDURE — 83605 ASSAY OF LACTIC ACID: CPT

## 2019-04-12 PROCEDURE — 94640 AIRWAY INHALATION TREATMENT: CPT

## 2019-04-12 PROCEDURE — 77030020263 HC SOL INJ SOD CL0.9% LFCR 1000ML

## 2019-04-12 PROCEDURE — 96365 THER/PROPH/DIAG IV INF INIT: CPT | Performed by: EMERGENCY MEDICINE

## 2019-04-12 PROCEDURE — 74011000250 HC RX REV CODE- 250: Performed by: INTERNAL MEDICINE

## 2019-04-12 PROCEDURE — 65270000029 HC RM PRIVATE

## 2019-04-12 PROCEDURE — 74011250636 HC RX REV CODE- 250/636: Performed by: EMERGENCY MEDICINE

## 2019-04-12 PROCEDURE — 99223 1ST HOSP IP/OBS HIGH 75: CPT | Performed by: INTERNAL MEDICINE

## 2019-04-12 PROCEDURE — 36415 COLL VENOUS BLD VENIPUNCTURE: CPT

## 2019-04-12 PROCEDURE — 83735 ASSAY OF MAGNESIUM: CPT

## 2019-04-12 PROCEDURE — 84145 PROCALCITONIN (PCT): CPT

## 2019-04-12 PROCEDURE — 71045 X-RAY EXAM CHEST 1 VIEW: CPT

## 2019-04-12 PROCEDURE — 74011250636 HC RX REV CODE- 250/636: Performed by: INTERNAL MEDICINE

## 2019-04-12 PROCEDURE — 99285 EMERGENCY DEPT VISIT HI MDM: CPT | Performed by: EMERGENCY MEDICINE

## 2019-04-12 PROCEDURE — 81001 URINALYSIS AUTO W/SCOPE: CPT

## 2019-04-12 PROCEDURE — 93005 ELECTROCARDIOGRAM TRACING: CPT | Performed by: EMERGENCY MEDICINE

## 2019-04-12 PROCEDURE — 74011250637 HC RX REV CODE- 250/637: Performed by: EMERGENCY MEDICINE

## 2019-04-12 RX ORDER — METRONIDAZOLE 500 MG/100ML
500 INJECTION, SOLUTION INTRAVENOUS EVERY 12 HOURS
Status: DISCONTINUED | OUTPATIENT
Start: 2019-04-12 | End: 2019-04-15 | Stop reason: DRUGHIGH

## 2019-04-12 RX ORDER — GUAIFENESIN 600 MG/1
600 TABLET, EXTENDED RELEASE ORAL EVERY 12 HOURS
Status: DISCONTINUED | OUTPATIENT
Start: 2019-04-12 | End: 2019-04-16 | Stop reason: HOSPADM

## 2019-04-12 RX ORDER — DIPHENHYDRAMINE HCL 25 MG
25 CAPSULE ORAL
Status: COMPLETED | OUTPATIENT
Start: 2019-04-12 | End: 2019-04-12

## 2019-04-12 RX ORDER — SODIUM CHLORIDE 0.9 % (FLUSH) 0.9 %
5-40 SYRINGE (ML) INJECTION EVERY 8 HOURS
Status: DISCONTINUED | OUTPATIENT
Start: 2019-04-12 | End: 2019-04-16 | Stop reason: HOSPADM

## 2019-04-12 RX ORDER — ALBUTEROL SULFATE 0.83 MG/ML
2.5 SOLUTION RESPIRATORY (INHALATION)
Status: DISCONTINUED | OUTPATIENT
Start: 2019-04-12 | End: 2019-04-13

## 2019-04-12 RX ORDER — HEPARIN SODIUM 5000 [USP'U]/ML
5000 INJECTION, SOLUTION INTRAVENOUS; SUBCUTANEOUS EVERY 12 HOURS
Status: DISCONTINUED | OUTPATIENT
Start: 2019-04-12 | End: 2019-04-16 | Stop reason: HOSPADM

## 2019-04-12 RX ORDER — SODIUM CHLORIDE 0.9 % (FLUSH) 0.9 %
5-40 SYRINGE (ML) INJECTION AS NEEDED
Status: DISCONTINUED | OUTPATIENT
Start: 2019-04-12 | End: 2019-04-16 | Stop reason: HOSPADM

## 2019-04-12 RX ORDER — ZOLPIDEM TARTRATE 5 MG/1
5 TABLET ORAL
Status: DISCONTINUED | OUTPATIENT
Start: 2019-04-12 | End: 2019-04-16 | Stop reason: HOSPADM

## 2019-04-12 RX ORDER — LEVOFLOXACIN 5 MG/ML
750 INJECTION, SOLUTION INTRAVENOUS
Status: COMPLETED | OUTPATIENT
Start: 2019-04-12 | End: 2019-04-12

## 2019-04-12 RX ORDER — IPRATROPIUM BROMIDE AND ALBUTEROL SULFATE 2.5; .5 MG/3ML; MG/3ML
3 SOLUTION RESPIRATORY (INHALATION)
Status: COMPLETED | OUTPATIENT
Start: 2019-04-12 | End: 2019-04-12

## 2019-04-12 RX ORDER — LEVOTHYROXINE SODIUM 75 UG/1
75 TABLET ORAL DAILY
Status: DISCONTINUED | OUTPATIENT
Start: 2019-04-13 | End: 2019-04-16 | Stop reason: HOSPADM

## 2019-04-12 RX ORDER — TRAMADOL HYDROCHLORIDE 50 MG/1
50 TABLET ORAL
Status: DISCONTINUED | OUTPATIENT
Start: 2019-04-12 | End: 2019-04-16 | Stop reason: HOSPADM

## 2019-04-12 RX ORDER — SODIUM CHLORIDE 9 MG/ML
125 INJECTION, SOLUTION INTRAVENOUS CONTINUOUS
Status: DISCONTINUED | OUTPATIENT
Start: 2019-04-12 | End: 2019-04-15

## 2019-04-12 RX ORDER — LOSARTAN POTASSIUM 25 MG/1
25 TABLET ORAL DAILY
Status: DISCONTINUED | OUTPATIENT
Start: 2019-04-13 | End: 2019-04-16 | Stop reason: HOSPADM

## 2019-04-12 RX ORDER — ATORVASTATIN CALCIUM 10 MG/1
10 TABLET, FILM COATED ORAL DAILY
Status: DISCONTINUED | OUTPATIENT
Start: 2019-04-13 | End: 2019-04-16 | Stop reason: HOSPADM

## 2019-04-12 RX ORDER — ALBUTEROL SULFATE 0.83 MG/ML
2.5 SOLUTION RESPIRATORY (INHALATION)
Status: DISCONTINUED | OUTPATIENT
Start: 2019-04-12 | End: 2019-04-16 | Stop reason: HOSPADM

## 2019-04-12 RX ADMIN — DIPHENHYDRAMINE HYDROCHLORIDE 25 MG: 25 CAPSULE ORAL at 11:58

## 2019-04-12 RX ADMIN — TRAMADOL HYDROCHLORIDE 50 MG: 50 TABLET, COATED ORAL at 22:19

## 2019-04-12 RX ADMIN — METRONIDAZOLE 500 MG: 500 INJECTION, SOLUTION INTRAVENOUS at 20:42

## 2019-04-12 RX ADMIN — HEPARIN SODIUM 5000 UNITS: 5000 INJECTION INTRAVENOUS; SUBCUTANEOUS at 17:05

## 2019-04-12 RX ADMIN — CEFTRIAXONE SODIUM 1 G: 1 INJECTION, POWDER, FOR SOLUTION INTRAMUSCULAR; INTRAVENOUS at 17:05

## 2019-04-12 RX ADMIN — Medication 10 ML: at 22:00

## 2019-04-12 RX ADMIN — Medication 10 ML: at 17:18

## 2019-04-12 RX ADMIN — ALBUTEROL SULFATE 2.5 MG: 2.5 SOLUTION RESPIRATORY (INHALATION) at 20:59

## 2019-04-12 RX ADMIN — IPRATROPIUM BROMIDE AND ALBUTEROL SULFATE 3 ML: .5; 3 SOLUTION RESPIRATORY (INHALATION) at 11:08

## 2019-04-12 RX ADMIN — ZOLPIDEM TARTRATE 5 MG: 5 TABLET ORAL at 22:19

## 2019-04-12 RX ADMIN — GUAIFENESIN 600 MG: 600 TABLET, EXTENDED RELEASE ORAL at 20:42

## 2019-04-12 RX ADMIN — LEVOFLOXACIN 750 MG: 5 INJECTION, SOLUTION INTRAVENOUS at 11:46

## 2019-04-12 RX ADMIN — SODIUM CHLORIDE 125 ML/HR: 900 INJECTION, SOLUTION INTRAVENOUS at 17:00

## 2019-04-12 NOTE — H&P
HOSPITALIST H&P 
 
 
NAME:  Ruby Cranker Age:  66 y.o. 
:   1940 MRN:   [id number] PCP: Nasim Durham MD 
 
Attending MD: Manuel Geller DO Treatment Team: Attending Provider: Wendy Galvin MD; Primary Nurse: Destiney Espinal RN 
 
HPI:  
 
Ruby Cranker is a 66year old CF with a PMH of HTN, smoking, and RLL lung mass for which she had a bronchoscopy on 19. Afterwards she developed a cough and some vomiting. A few days afterward the patient was SOB and had a fever so she was seen in the office of [facility] on 19 and she was diagnosed with aspiration pneumonia and started on Clindamycin and steroids. Since that time, the patient has continued to cough, feel short of breath, and have continued malaise so her daughter brought her to the ER. In the ER she was satting 86% on room air and needed 4LNC to improve. CXR confirmed RLL infiltrate. Complete ROS done and is as stated in HPI or otherwise negative Past Medical History:  
Diagnosis Date  Cancer ([facility] Lung cancer  Chronic obstructive pulmonary disease ([facility]  Hypertension  Subarachnoid hemorrhage ([facility] Past Surgical History:  
Procedure Laterality Date  HX OTHER SURGICAL    
 coil in brain  HX WRIST FRACTURE TX    
 bilat wrist  
  
 
Prior to Admission Medications Prescriptions Last Dose Informant Patient Reported? Taking? MAGNESIUM PO   Yes No  
Sig: Take 100 mg by mouth daily. VITAMIN B COMPLEX PO   Yes No  
Sig: Take 1 Tab by mouth daily. ascorbic acid, vitamin C, (VITAMIN C) 500 mg tablet   Yes No  
Sig: Take  by mouth. atorvastatin (LIPITOR) 10 mg tablet   Yes No  
Sig: Take 10 mg by mouth daily. calc-D3-magnes-L9-Th-Ea-jake 250 mg-400 unit -40 mg-5 mg tab   Yes No  
Sig: Take 1 Tab by mouth daily. cholecalciferol (VITAMIN D3) 1,000 unit tablet   Yes No  
Sig: Take 1,000 Units by mouth daily. clindamycin (CLEOCIN) 300 mg capsule   No No  
Sig: Take 1 Cap by mouth three (3) times daily. levothyroxine (SYNTHROID) 75 mcg tablet   Yes No  
Sig: Take 75 mcg by mouth daily. losartan (COZAAR) 25 mg tablet   Yes No  
Sig: Take 25 mg by mouth daily. omega 3-dha-epa-fish oil (FISH OIL) 100-160-1,000 mg cap   Yes No  
Sig: Take 1 Cap by mouth daily. predniSONE (DELTASONE) 10 mg tablet   No No  
Sig: Take 4 tabs x 3 days then 3 tabs x 3 days then 2 tabs x 3 days then 1 tab x 3 days then stop. therapeutic multivitamin [facility]) tablet   Yes No  
Sig: Take 1 Tab by mouth daily. zolpidem (AMBIEN) 5 mg tablet   Yes No  
Sig: Take 1 Tab by mouth nightly. Facility-Administered Medications: None Allergies Allergen Reactions  Penicillins Unknown (comments)  Percocet [Oxycodone-Acetaminophen] Unknown (comments) Social History Tobacco Use  Smoking status: Former Smoker Packs/day: 1.50 Years: 45.00 Pack years: 67.50 Types: Cigarettes Last attempt to quit:  Years since quitting: 15.2  Smokeless tobacco: Never Used Substance Use Topics  Alcohol use: Not Currently History reviewed. No pertinent family history. Objective:  
 
 
Visit Vitals Pulse 98 Temp 98.8 °F (37.1 °C) Resp 23 Ht 5' 8\" (1.727 m) Wt 59 kg (130 lb) SpO2 94% BMI 19.77 kg/m² Temp (24hrs), Av.8 °F (37.1 °C), Min:98.8 °F (37.1 °C), Max:98.8 °F (37.1 °C) Oxygen Therapy O2 Sat (%): 94 % (19 1115) Pulse via Oximetry: 100 beats per minute (19 1115) O2 Device: Nasal cannula (19 1206) O2 Flow Rate (L/min): 3 l/min (19 1206) Physical Exam: 
 
General:    Alert, cooperative, no distress, appears stated age. Eyes:   PERRLA EOMI Anicteric Head:   Normocephalic, without obvious abnormality, atraumatic. ENT:  Nares normal. No drainage. Lungs:   End exp wheeze + crackles on right lower side, left side clear Heart:   Tachy, reg rhythm,  no murmur, rub or gallop. No JVD. Abdomen:   Soft, non-tender. Not distended. Bowel sounds normal.  
MSK:  No edema. No clubbing or cyanosis. No deformities/lesions. Skin:     Texture, turgor normal. No rashes or lesions. No Jaundice. Neurologic: Alert and oriented x 3, no focal deficits Psychiatry:      No depression/anxiety. Mood congruent for context. Heme/Lymph/Immune: No echymoses or overt signs of bleeding. Lab/Data Review: 
Recent Results (from the past 24 hour(s)) EKG, 12 LEAD, INITIAL Collection Time: 04/12/19 10:48 AM  
Result Value Ref Range Ventricular Rate 104 BPM  
 Atrial Rate 104 BPM  
 P-R Interval 174 ms QRS Duration 92 ms Q-T Interval 328 ms QTC Calculation (Bezet) 431 ms Calculated P Axis 72 degrees Calculated R Axis 83 degrees Calculated T Axis 70 degrees Diagnosis Sinus tachycardia with occasional Premature atrial complexes Cannot rule out Septal infarct , age undetermined Abnormal ECG No previous ECGs available Confirmed by Jose Harper ([id number]) on 4/12/2019 11:17:26 AM 
  
CBC WITH AUTOMATED DIFF Collection Time: 04/12/19 10:55 AM  
Result Value Ref Range WBC 17.3 (H) 4.3 - 11.1 K/uL  
 RBC 4.55 4.05 - 5.2 M/uL  
 HGB 13.1 11.7 - 15.4 g/dL HCT 40.5 35.8 - 46.3 % MCV 89.0 79.6 - 97.8 FL  
 MCH 28.8 26.1 - 32.9 PG  
 MCHC 32.3 31.4 - 35.0 g/dL  
 RDW 13.3 11.9 - 14.6 % PLATELET 932 394 - 265 K/uL MPV 9.8 9.4 - 12.3 FL ABSOLUTE NRBC 0.00 0.0 - 0.2 K/uL  
 DF AUTOMATED NEUTROPHILS 83 (H) 43 - 78 % LYMPHOCYTES 5 (L) 13 - 44 % MONOCYTES 11 4.0 - 12.0 % EOSINOPHILS 0 (L) 0.5 - 7.8 % BASOPHILS 0 0.0 - 2.0 % IMMATURE GRANULOCYTES 1 0.0 - 5.0 %  
 ABS. NEUTROPHILS 14.4 (H) 1.7 - 8.2 K/UL  
 ABS. LYMPHOCYTES 0.9 0.5 - 4.6 K/UL  
 ABS. MONOCYTES 1.9 (H) 0.1 - 1.3 K/UL  
 ABS. EOSINOPHILS 0.0 0.0 - 0.8 K/UL  
 ABS. BASOPHILS 0.1 0.0 - 0.2 K/UL  
 ABS. IMM. GRANS. 0.2 0.0 - 0.5 K/UL METABOLIC PANEL, COMPREHENSIVE Collection Time: 04/12/19 10:55 AM  
Result Value Ref Range Sodium 137 136 - 145 mmol/L Potassium 3.6 3.5 - 5.1 mmol/L Chloride 102 98 - 107 mmol/L  
 CO2 27 21 - 32 mmol/L Anion gap 8 7 - 16 mmol/L Glucose 101 (H) 65 - 100 mg/dL BUN 20 8 - 23 MG/DL Creatinine 0.71 0.6 - 1.0 MG/DL  
 GFR est AA >60 >60 ml/min/1.73m2 GFR est non-AA >60 >60 ml/min/1.73m2 Calcium 8.5 8.3 - 10.4 MG/DL Bilirubin, total 1.3 (H) 0.2 - 1.1 MG/DL  
 ALT (SGPT) 54 12 - 65 U/L  
 AST (SGOT) 40 (H) 15 - 37 U/L Alk. phosphatase 121 50 - 136 U/L Protein, total 6.8 6.3 - 8.2 g/dL Albumin 2.5 (L) 3.2 - 4.6 g/dL Globulin 4.3 (H) 2.3 - 3.5 g/dL A-G Ratio 0.6 (L) 1.2 - 3.5 BNP Collection Time: 04/12/19 10:55 AM  
Result Value Ref Range  (H) 0 pg/mL PROCALCITONIN Collection Time: 04/12/19 10:55 AM  
Result Value Ref Range Procalcitonin 0.3 ng/mL POC LACTIC ACID Collection Time: 04/12/19 11:00 AM  
Result Value Ref Range Lactic Acid (POC) 1.06 0.5 - 1.9 mmol/L Imaging: Xr Chest Yemi Pitcher Result Date: 4/12/2019 IMPRESSION: Opacity in the lower right chest again seen, they findings but there may be some superimposed surrounding infiltrate but full inspiration PA and lateral imaging should be considered. Cultures: All Micro Results Procedure Component Value Units Date/Time CULTURE, BLOOD [[id number]] Collected:  04/12/19 1055 Order Status:  Completed Specimen:  Blood Updated:  04/12/19 1114 CULTURE, BLOOD [[id number]] Collected:  04/12/19 1055 Order Status:  Completed Specimen:  Blood Updated:  04/12/19 1114 Assessment/Plan:  
 
Principal Problem: 
  Acute respiratory failure with hypoxia ([address] (4/12/2019) - Due to #2 
- Start Albuterol TID + PRN 
- Wean oxygen as appropriate Active Problems: 
  Aspiration pneumonia ([facility] (4/12/2019) - Due to bronchoscopy - Had been on Clindamycin x 3 days as outpatient with no improvement 
- Start Ceftriaxone + Flagyl (PCN allergy) - Start Albuterol TID + PRN 
- Start Mucinex - Pulmonary consult Severe sepsis with acute organ dysfunction (HCC) (4/12/2019) 
-  + WBC 17.3 + pneumonia with resp failure - Due to #2 
- Given Levaquin in ER 
- Start Ceftriaxone + Flagyl (PCN allergy) - Start normal saline - Blood cultures checked in ER 
- UA normal 
- Lactic normal 
- Procal 0.3 Malignant neoplasm of lower lobe of right lung ([address] (4/4/2019) - S/P bronch on 4/4/19 
- Has follow up with Oncology as outpatient HTN (hypertension) (4/12/2019) - Stable - Continue Cozaar Acquired hypothyroidism (4/12/2019) 
- Continue Levothyroxine Personal history of tobacco use, presenting hazards to health (3/18/2019) Code Status: FULL CODE 
DVT Prophylaxis: Heparin Anticipated discharge: 3 days Jude Wilson DO 
2:04 PM

## 2019-04-12 NOTE — PROGRESS NOTES
Problem: Falls - Risk of 
Goal: *Absence of Falls Description Document Anil Song Fall Risk and appropriate interventions in the flowsheet. Outcome: Progressing Towards Goal 
  
Problem: Patient Education: Go to Patient Education Activity Goal: Patient/Family Education Outcome: Progressing Towards Goal 
  
Problem: Pain Goal: *Control of Pain Outcome: Progressing Towards Goal 
  
Problem: Pneumonia: Day 1 Goal: Off Pathway (Use only if patient is Off Pathway) Outcome: Progressing Towards Goal 
Goal: Activity/Safety Outcome: Progressing Towards Goal 
Goal: Consults, if ordered Outcome: Progressing Towards Goal 
Goal: Diagnostic Test/Procedures Outcome: Progressing Towards Goal 
Goal: Nutrition/Diet Outcome: Progressing Towards Goal 
Goal: Medications Outcome: Progressing Towards Goal 
Goal: Respiratory Outcome: Progressing Towards Goal 
Goal: Treatments/Interventions/Procedures Outcome: Progressing Towards Goal 
Goal: Psychosocial 
Outcome: Progressing Towards Goal 
Goal: *Oxygen saturation within defined limits Outcome: Progressing Towards Goal 
Goal: *Influenza vaccine administered (October-March) Outcome: Progressing Towards Goal 
Goal: *Pneumoccocal vaccine administered Outcome: Progressing Towards Goal 
Goal: *Hemodynamically stable Outcome: Progressing Towards Goal 
Goal: *Demonstrates progressive activity Outcome: Progressing Towards Goal 
Goal: *Tolerating diet Outcome: Progressing Towards Goal

## 2019-04-12 NOTE — CONSULTS
PULMONARY/CCM CONSULT :  4/12/2019    Date of Admission:  4/12/2019    The patient's chart has been reviewed and the chart has been discussed with nursing staff. Subjective: This patient has been seen and evaluated at the request of  .     Patient is a 66 y.o.  female presents with fever and dyspnea. Pt is a 67 yo  female with a history of emphysema, tobacco abuse, and malignant RLL lung mass with liver mets. Pt underwent navigational bronch with EBUS on 4/4/19 by Dr. Margaux Bay. She was seen by us in 4/9. She is seen for sick work in. She is unsure of wheezing. She was supposed to have CPFTs but given fever, these were cancelled. Patient reports that she is scheduled for CPFTs next week and will then be put on for thoracic conference on 4/17/19. She was given steroids and antibiotics. Her daughter called on 4/11 and she no longer had a fever. She was out of prednisone and she was given a prescription for an extended dose.     Today, she presents with hypoxemia with PNA despite recent antibiotics and prednisone. She is not wheezing. She is not on home oxygen but is requiring 2-3 lpm to keep her sats around 90%. We were asked to see her for PNA with hypoxemia. Past Medical History:   Diagnosis Date    Cancer [facility])     Lung cancer    Chronic obstructive pulmonary disease ([facility]     Hypertension     Subarachnoid hemorrhage ([facility]       Past Surgical History:   Procedure Laterality Date    HX OTHER SURGICAL      coil in brain    HX WRIST FRACTURE TX      bilat wrist      Social History     Tobacco Use    Smoking status: Former Smoker     Packs/day: 1.50     Years: 45.00     Pack years: 67.50     Types: Cigarettes     Last attempt to quit: 2004     Years since quitting: 15.2    Smokeless tobacco: Never Used   Substance Use Topics    Alcohol use: Not Currently      History reviewed. No pertinent family history.    Allergies   Allergen Reactions    Penicillins Unknown (comments)    Percocet [Oxycodone-Acetaminophen] Unknown (comments)      Prior to Admission Medications   Prescriptions Last Dose Informant Patient Reported? Taking? MAGNESIUM PO   Yes No   Sig: Take 100 mg by mouth daily. VITAMIN B COMPLEX PO   Yes No   Sig: Take 1 Tab by mouth daily. ascorbic acid, vitamin C, (VITAMIN C) 500 mg tablet   Yes No   Sig: Take  by mouth. atorvastatin (LIPITOR) 10 mg tablet   Yes No   Sig: Take 10 mg by mouth daily. calc-D3-magnes-Q0-Zr-Qg-jake 250 mg-400 unit -40 mg-5 mg tab   Yes No   Sig: Take 1 Tab by mouth daily. cholecalciferol (VITAMIN D3) 1,000 unit tablet   Yes No   Sig: Take 1,000 Units by mouth daily. clindamycin (CLEOCIN) 300 mg capsule   No No   Sig: Take 1 Cap by mouth three (3) times daily. levothyroxine (SYNTHROID) 75 mcg tablet   Yes No   Sig: Take 75 mcg by mouth daily. losartan (COZAAR) 25 mg tablet   Yes No   Sig: Take 25 mg by mouth daily. omega 3-dha-epa-fish oil (FISH OIL) 100-160-1,000 mg cap   Yes No   Sig: Take 1 Cap by mouth daily. predniSONE (DELTASONE) 10 mg tablet   No No   Sig: Take 4 tabs x 3 days then 3 tabs x 3 days then 2 tabs x 3 days then 1 tab x 3 days then stop. therapeutic multivitamin [facility]) tablet   Yes No   Sig: Take 1 Tab by mouth daily. zolpidem (AMBIEN) 5 mg tablet   Yes No   Sig: Take 1 Tab by mouth nightly. Facility-Administered Medications: None       MEDS SCHEDULED:    No current facility-administered medications for this encounter. Current Outpatient Medications   Medication Sig    ascorbic acid, vitamin C, (VITAMIN C) 500 mg tablet Take  by mouth.  predniSONE (DELTASONE) 10 mg tablet Take 4 tabs x 3 days then 3 tabs x 3 days then 2 tabs x 3 days then 1 tab x 3 days then stop.  clindamycin (CLEOCIN) 300 mg capsule Take 1 Cap by mouth three (3) times daily.  calc-D3-magnes-I8-Bo-Lo-jake 250 mg-400 unit -40 mg-5 mg tab Take 1 Tab by mouth daily.     omega 3-dha-epa-fish oil (FISH OIL) 100-160-1,000 mg cap Take 1 Cap by mouth daily.  MAGNESIUM PO Take 100 mg by mouth daily.  VITAMIN B COMPLEX PO Take 1 Tab by mouth daily.  atorvastatin (LIPITOR) 10 mg tablet Take 10 mg by mouth daily.  cholecalciferol (VITAMIN D3) 1,000 unit tablet Take 1,000 Units by mouth daily.  levothyroxine (SYNTHROID) 75 mcg tablet Take 75 mcg by mouth daily.  losartan (COZAAR) 25 mg tablet Take 25 mg by mouth daily.  therapeutic multivitamin (THERAGRAN) tablet Take 1 Tab by mouth daily.  zolpidem (AMBIEN) 5 mg tablet Take 1 Tab by mouth nightly. Review of Systems  Constitutional: positive for fevers, chills, sweats and fatigue  Respiratory: positive for cough, sputum or dyspnea on exertion  Cardiovascular: negative for chest pain, chest pressure/discomfort, palpitations, irregular heart beats, near-syncope, syncope  Gastrointestinal: positive for nausea and vomiting    Objective:     Vitals:    04/12/19 1040 04/12/19 1110 04/12/19 1115   Pulse: (!) 115  98   Resp: 26  23   Temp: 98.8 °F (37.1 °C)     SpO2: 91% 93% 94%   Weight: 130 lb (59 kg)     Height: 5' 8\" (1.727 m)       No intake/output data recorded. No intake/output data recorded. PHYSICAL EXAM     Physical Exam:   General:  Alert, cooperative, no acute distress, appears stated age. Eyes:  Conjunctivae/corneas clear. Nose: Nares patent and moist.     Mouth/Throat: Lips, mucosa, and tongue pink and intact. Neck: Supple, symmetrical.   Respiratory:   CTA to auscultation bilaterally on 3 lpm, intermittent productive cough   Cardiovascular:  Regular rate and rhythm, S1, S2, no murmur, click, rub or gallop. GI:   Abdomen soft, non-tender. Bowel sounds active X 4 Q. Musculoskeletal: Extremities symmetrical, atraumatic, no cyanosis, no edema. Pulses: 2+ and symmetric all extremities.    Skin: Skin color, texture, turgor normal. No rashes or lesions       Neurologic: 2+ strength bilateral upper and lower extremities, sensation throughout appropriate. Alert and oriented. Activity:limited   Nutrition: Regular    CHEST X-RAYS:      CULTURES:ordered    LABS    Recent Labs     04/12/19  1055   WBC 17.3*   HGB 13.1   HCT 40.5        Recent Labs     04/12/19  1055      K 3.6      *   CO2 27   BUN 20   CREA 0.71         Assessment:     Hospital Problems  Date Reviewed: 4/9/2019          Codes Class Noted POA    Aspiration pneumonia ([facility] ICD-10-CM: J69.0  ICD-9-CM: 507.0  4/12/2019 Yes        * (Principal) Acute respiratory failure with hypoxia (HCC) ICD-10-CM: J96.01  ICD-9-CM: 518.81  4/12/2019 Yes        HTN (hypertension) ICD-10-CM: I10  ICD-9-CM: 401.9  4/12/2019 Yes        Acquired hypothyroidism ICD-10-CM: E03.9  ICD-9-CM: 244.9  4/12/2019 Yes        Severe sepsis with acute organ dysfunction [facility]) ICD-10-CM: A41.9, R65.20  ICD-9-CM: 038.9, 995.92  4/12/2019 Yes        Malignant neoplasm of lower lobe of right lung [facility]) ICD-10-CM: C34.31  ICD-9-CM: 162.5  4/4/2019 Yes        Personal history of tobacco use, presenting hazards to health ICD-10-CM: Z87.891  ICD-9-CM: V15.82  3/18/2019 Yes              Plan:     Continue rocephin and flagyl  Oxygen  If fails to improve or remains hypoxic, consider CTA    Shoaib Chapin NP-TREY    More than 50% of time documented was spent in face-to-face contact with the patient and in the care of the patient on the floor/unit where the patient is located. Lungs:  Clear bilaterally, rhonchorous cough when sat up  Heart:  RRR with no Murmur/Rubs/Gallops    Additional Comments:  Clinically has pneumonia. Agree with antibiotic changes. If not improving will request CTA and broaden antibiotics. Request sputum culture given failed Clinda and has significant allergies. I have spoken with and examined the patient. I agree with the above assessment and plan as documented.     Papo Beck MD

## 2019-04-12 NOTE — PROGRESS NOTES
04/12/19 1619 Dual Skin Pressure Injury Assessment Dual Skin Pressure Injury Assessment WDL Second Care Provider (Based on [address] [phone] Skin Integumentary Skin Integumentary (WDL) WDL Wound Prevention and Protection Methods Orientation of Wound Prevention Posterior Location of Wound Prevention Sacrum/Coccyx Dressing Present  No  
Wound Offloading (Prevention Methods) Bed, pressure redistribution/air;Bed, pressure reduction mattress;Pillows;Repositioning;Turning

## 2019-04-12 NOTE — ED NOTES
Patient complains of itching at IV site with Levaquin IV. No visible signs of redness, hives, increased SOB, difficulties breathing, swelling, etc.  MD notified. PO Benadryl 25 mg ordered by MD and given. Will continue to monitor.

## 2019-04-12 NOTE — ROUTINE PROCESS
TRANSFER - OUT REPORT: 
 
Verbal report given to Asha Fitch RN on Carie Diaz  being transferred to room 633 for routine progression of care Report consisted of patients Situation, Background, Assessment and  
Recommendations(SBAR). Information from the following report(s) ED Summary was reviewed with the receiving nurse. Lines:  
Peripheral IV 04/12/19 Right Hand (Active) Site Assessment Clean, dry, & intact 4/12/2019 11:17 AM  
Phlebitis Assessment 0 4/12/2019 11:17 AM  
Infiltration Assessment 0 4/12/2019 11:17 AM  
Dressing Status Clean, dry, & intact 4/12/2019 11:17 AM  
Dressing Type 4 X 4 4/12/2019 11:17 AM  
   
Peripheral IV 04/12/19 Left Antecubital (Active) Site Assessment Clean, dry, & intact 4/12/2019 11:17 AM  
Phlebitis Assessment 0 4/12/2019 11:17 AM  
Infiltration Assessment 0 4/12/2019 11:17 AM  
Dressing Status Clean, dry, & intact 4/12/2019 11:17 AM  
Dressing Type 4 X 4 4/12/2019 11:17 AM  
  
 
Opportunity for questions and clarification was provided. Patient transported with: 
 O2 @ 4 liters

## 2019-04-12 NOTE — ED TRIAGE NOTES
Pt arrives via EMS and to triage via w/c. Per EMS, pt lung sounds clear on auscultations, but has hx of lung CA x 2 weeks ago without chemo at this time. Pt afebrile in route and EKG reads afib. Pt reports increased coughing over last few days. Pt states temp at home was 101.7. Pt denies taking tylenol or ibuprofen. Pt family member states the pt is on prednisone, but hasn't taken it today and states the pt was on 40mg and states called the doctor because it was causing labored breathing and they decreased dose to 20mg x 3 days and then 10mg x 3 days. Pt is also taking cipro. Pt is very angry with family in triage.

## 2019-04-12 NOTE — ED PROVIDER NOTES
Patient presents to the ER complaining of shortness of breath and fatigue. Patient with a remote diagnosis of lung cancer and recent bronchoscopy, presents to the ER with family reports worsening cough, shortness of breath and fatigue. Apparently at the recent bronchoscopy, patient has been treated for aspiration pneumonia/pneumonitis  . Clindamycin. Family reports no improvement in symptoms. Reports her work of breathing has worsened. She denies any vomiting. The history is provided by the patient. Shortness of Breath This is a new problem. The problem occurs frequently. The current episode started 2 days ago. Associated symptoms include cough. Pertinent negatives include no headaches, no coryza, no ear pain, no vomiting, no abdominal pain, no leg pain and no leg swelling. She has tried nothing for the symptoms. Associated medical issues include COPD. Cough Associated symptoms include shortness of breath. Pertinent negatives include no eye redness, no ear pain, no headaches, no vomiting and no confusion. Her past medical history is significant for COPD. Past Medical History:  
Diagnosis Date  Cancer ([facility] Lung cancer  Chronic obstructive pulmonary disease ([facility]  Hypertension  Subarachnoid hemorrhage ([facility] Past Surgical History:  
Procedure Laterality Date  HX OTHER SURGICAL    
 coil in brain  HX WRIST FRACTURE TX    
 bilat wrist  
 
   
History reviewed. No pertinent family history. Social History Socioeconomic History  Marital status: SINGLE Spouse name: Not on file  Number of children: Not on file  Years of education: Not on file  Highest education level: Not on file Occupational History  Not on file Social Needs  Financial resource strain: Not on file  Food insecurity:  
  Worry: Not on file Inability: Not on file  Transportation needs:  
  Medical: Not on file Non-medical: Not on file Tobacco Use  
  Smoking status: Former Smoker Packs/day: 1.50 Years: 45.00 Pack years: 67.50 Types: Cigarettes Last attempt to quit: 2004 Years since quitting: 15.2  Smokeless tobacco: Never Used Substance and Sexual Activity  Alcohol use: Not Currently  Drug use: Never  Sexual activity: Not on file Lifestyle  Physical activity:  
  Days per week: Not on file Minutes per session: Not on file  Stress: Not on file Relationships  Social connections:  
  Talks on phone: Not on file Gets together: Not on file Attends Druze service: Not on file Active member of club or organization: Not on file Attends meetings of clubs or organizations: Not on file Relationship status: Not on file  Intimate partner violence:  
  Fear of current or ex partner: Not on file Emotionally abused: Not on file Physically abused: Not on file Forced sexual activity: Not on file Other Topics Concern  Not on file Social History Narrative  Not on file ALLERGIES: Penicillins and Percocet [oxycodone-acetaminophen] Review of Systems Constitutional: Negative for fatigue and unexpected weight change. HENT: Negative for ear pain. Eyes: Negative for photophobia and redness. Respiratory: Positive for cough, chest tightness and shortness of breath. Cardiovascular: Negative for leg swelling. Gastrointestinal: Negative for abdominal pain and vomiting. Endocrine: Negative for heat intolerance, polyphagia and polyuria. Genitourinary: Negative for flank pain, frequency and urgency. Musculoskeletal: Negative for gait problem. Neurological: Negative for seizures, speech difficulty and headaches. Hematological: Negative for adenopathy. Does not bruise/bleed easily. Psychiatric/Behavioral: Negative for confusion. All other systems reviewed and are negative. Vitals:  
 04/12/19 1040 Pulse: (!) 115 Resp: 26 Temp: 98.8 °F (37.1 °C) SpO2: 91% Weight: 59 kg (130 lb) Height: 5' 8\" (1.727 m) Physical Exam  
Constitutional: She is oriented to person, place, and time. She appears well-developed and well-nourished. HENT:  
Head: Normocephalic and atraumatic. Eyes: Pupils are equal, round, and reactive to light. EOM are normal.  
Neck: Normal range of motion. Neck supple. No tracheal deviation present. No thyromegaly present. Cardiovascular: Normal rate, regular rhythm and normal heart sounds. Exam reveals no friction rub. No murmur heard. Pulmonary/Chest: Effort normal. Tachypnea noted. She has wheezes. Abdominal: Soft. Bowel sounds are normal. She exhibits no distension. There is no tenderness. Musculoskeletal: She exhibits no edema or deformity. Neurological: She is alert and oriented to person, place, and time. No cranial nerve deficit. Nursing note and vitals reviewed. MDM Number of Diagnoses or Management Options Diagnosis management comments: Differential diagnosis was pneumonia, COPD, volume overload 12:13 PM 
White count is elevated at 17,000 Chest x-ray shows what appears to be increasing opacity in right lower lobe Case discussed with hospitalist for admission Amount and/or Complexity of Data Reviewed Clinical lab tests: ordered and reviewed Tests in the radiology section of CPT®: ordered and reviewed Risk of Complications, Morbidity, and/or Mortality Presenting problems: moderate Diagnostic procedures: low Management options: low Patient Progress Patient progress: stable Procedures Results Include: 
 
Recent Results (from the past 24 hour(s)) EKG, 12 LEAD, INITIAL Collection Time: 04/12/19 10:48 AM  
Result Value Ref Range Ventricular Rate 104 BPM  
 Atrial Rate 104 BPM  
 P-R Interval 174 ms QRS Duration 92 ms Q-T Interval 328 ms QTC Calculation (Bezet) 431 ms Calculated P Axis 72 degrees Calculated R Axis 83 degrees Calculated T Axis 70 degrees Diagnosis Sinus tachycardia with occasional Premature atrial complexes Cannot rule out Septal infarct , age undetermined Abnormal ECG No previous ECGs available Confirmed by Camila Sanchez ([id number]) on 4/12/2019 11:17:26 AM 
  
CBC WITH AUTOMATED DIFF Collection Time: 04/12/19 10:55 AM  
Result Value Ref Range WBC 17.3 (H) 4.3 - 11.1 K/uL  
 RBC 4.55 4.05 - 5.2 M/uL  
 HGB 13.1 11.7 - 15.4 g/dL HCT 40.5 35.8 - 46.3 % MCV 89.0 79.6 - 97.8 FL  
 MCH 28.8 26.1 - 32.9 PG  
 MCHC 32.3 31.4 - 35.0 g/dL  
 RDW 13.3 11.9 - 14.6 % PLATELET 888 138 - 608 K/uL MPV 9.8 9.4 - 12.3 FL ABSOLUTE NRBC 0.00 0.0 - 0.2 K/uL  
 DF AUTOMATED NEUTROPHILS 83 (H) 43 - 78 % LYMPHOCYTES 5 (L) 13 - 44 % MONOCYTES 11 4.0 - 12.0 % EOSINOPHILS 0 (L) 0.5 - 7.8 % BASOPHILS 0 0.0 - 2.0 % IMMATURE GRANULOCYTES 1 0.0 - 5.0 %  
 ABS. NEUTROPHILS 14.4 (H) 1.7 - 8.2 K/UL  
 ABS. LYMPHOCYTES 0.9 0.5 - 4.6 K/UL  
 ABS. MONOCYTES 1.9 (H) 0.1 - 1.3 K/UL  
 ABS. EOSINOPHILS 0.0 0.0 - 0.8 K/UL  
 ABS. BASOPHILS 0.1 0.0 - 0.2 K/UL  
 ABS. IMM. GRANS. 0.2 0.0 - 0.5 K/UL METABOLIC PANEL, COMPREHENSIVE Collection Time: 04/12/19 10:55 AM  
Result Value Ref Range Sodium 137 136 - 145 mmol/L Potassium 3.6 3.5 - 5.1 mmol/L Chloride 102 98 - 107 mmol/L  
 CO2 27 21 - 32 mmol/L Anion gap 8 7 - 16 mmol/L Glucose 101 (H) 65 - 100 mg/dL BUN 20 8 - 23 MG/DL Creatinine 0.71 0.6 - 1.0 MG/DL  
 GFR est AA >60 >60 ml/min/1.73m2 GFR est non-AA >60 >60 ml/min/1.73m2 Calcium 8.5 8.3 - 10.4 MG/DL Bilirubin, total 1.3 (H) 0.2 - 1.1 MG/DL  
 ALT (SGPT) 54 12 - 65 U/L  
 AST (SGOT) 40 (H) 15 - 37 U/L Alk. phosphatase 121 50 - 136 U/L Protein, total 6.8 6.3 - 8.2 g/dL Albumin 2.5 (L) 3.2 - 4.6 g/dL Globulin 4.3 (H) 2.3 - 3.5 g/dL A-G Ratio 0.6 (L) 1.2 - 3.5 BNP Collection Time: 04/12/19 10:55 AM  
Result Value Ref Range  (H) 0 pg/mL PROCALCITONIN Collection Time: 04/12/19 10:55 AM  
Result Value Ref Range Procalcitonin 0.3 ng/mL POC LACTIC ACID Collection Time: 04/12/19 11:00 AM  
Result Value Ref Range Lactic Acid (POC) 1.06 0.5 - 1.9 mmol/L Voice dictation software was used during the making of this note. This software is not perfect and grammatical and other typographical errors may be present. This note has been proofread, but may still contain errors.  
Paul Barrientos MD; 4/12/2019 @12:17 PM  
===================================================================

## 2019-04-12 NOTE — PROGRESS NOTES
TRANSFER - IN REPORT: 
 
Verbal report received from BRET Hou(name) on Maggie Corona  being received from ED(unit) for routine progression of care Report consisted of patients Situation, Background, Assessment and  
Recommendations(SBAR). Information from the following report(s) SBAR, Kardex, Intake/Output, MAR and Recent Results was reviewed with the receiving nurse. Opportunity for questions and clarification was provided. Assessment completed upon patients arrival to unit and care assumed.

## 2019-04-13 LAB
ANION GAP SERPL CALC-SCNC: 8 MMOL/L (ref 7–16)
BUN SERPL-MCNC: 13 MG/DL (ref 8–23)
CALCIUM SERPL-MCNC: 7.8 MG/DL (ref 8.3–10.4)
CHLORIDE SERPL-SCNC: 103 MMOL/L (ref 98–107)
CO2 SERPL-SCNC: 27 MMOL/L (ref 21–32)
CREAT SERPL-MCNC: 0.63 MG/DL (ref 0.6–1)
ERYTHROCYTE [DISTWIDTH] IN BLOOD BY AUTOMATED COUNT: 13.4 % (ref 11.9–14.6)
GLUCOSE SERPL-MCNC: 111 MG/DL (ref 65–100)
HCT VFR BLD AUTO: 36.6 % (ref 35.8–46.3)
HGB BLD-MCNC: 11.7 G/DL (ref 11.7–15.4)
MCH RBC QN AUTO: 28.7 PG (ref 26.1–32.9)
MCHC RBC AUTO-ENTMCNC: 32 G/DL (ref 31.4–35)
MCV RBC AUTO: 89.7 FL (ref 79.6–97.8)
NRBC # BLD: 0 K/UL (ref 0–0.2)
PLATELET # BLD AUTO: 303 K/UL (ref 150–450)
PMV BLD AUTO: 10.1 FL (ref 9.4–12.3)
POTASSIUM SERPL-SCNC: 3 MMOL/L (ref 3.5–5.1)
RBC # BLD AUTO: 4.08 M/UL (ref 4.05–5.2)
SODIUM SERPL-SCNC: 138 MMOL/L (ref 136–145)
WBC # BLD AUTO: 15.5 K/UL (ref 4.3–11.1)

## 2019-04-13 PROCEDURE — 80048 BASIC METABOLIC PNL TOTAL CA: CPT

## 2019-04-13 PROCEDURE — 74011000250 HC RX REV CODE- 250: Performed by: NURSE PRACTITIONER

## 2019-04-13 PROCEDURE — 74011250636 HC RX REV CODE- 250/636: Performed by: INTERNAL MEDICINE

## 2019-04-13 PROCEDURE — 36415 COLL VENOUS BLD VENIPUNCTURE: CPT

## 2019-04-13 PROCEDURE — 77030020263 HC SOL INJ SOD CL0.9% LFCR 1000ML

## 2019-04-13 PROCEDURE — 87070 CULTURE OTHR SPECIMN AEROBIC: CPT

## 2019-04-13 PROCEDURE — 74011000258 HC RX REV CODE- 258: Performed by: INTERNAL MEDICINE

## 2019-04-13 PROCEDURE — 94640 AIRWAY INHALATION TREATMENT: CPT

## 2019-04-13 PROCEDURE — 94664 DEMO&/EVAL PT USE INHALER: CPT

## 2019-04-13 PROCEDURE — 94760 N-INVAS EAR/PLS OXIMETRY 1: CPT

## 2019-04-13 PROCEDURE — 65270000029 HC RM PRIVATE

## 2019-04-13 PROCEDURE — 74011250637 HC RX REV CODE- 250/637: Performed by: INTERNAL MEDICINE

## 2019-04-13 PROCEDURE — 77030020120 HC VLV RESP PEP HI -B

## 2019-04-13 PROCEDURE — 77010033678 HC OXYGEN DAILY

## 2019-04-13 PROCEDURE — 85027 COMPLETE CBC AUTOMATED: CPT

## 2019-04-13 PROCEDURE — 99232 SBSQ HOSP IP/OBS MODERATE 35: CPT | Performed by: INTERNAL MEDICINE

## 2019-04-13 PROCEDURE — 74011000250 HC RX REV CODE- 250: Performed by: INTERNAL MEDICINE

## 2019-04-13 RX ORDER — POTASSIUM CHLORIDE 14.9 MG/ML
20 INJECTION INTRAVENOUS ONCE
Status: COMPLETED | OUTPATIENT
Start: 2019-04-13 | End: 2019-04-13

## 2019-04-13 RX ORDER — ALBUTEROL SULFATE 0.83 MG/ML
2.5 SOLUTION RESPIRATORY (INHALATION)
Status: DISCONTINUED | OUTPATIENT
Start: 2019-04-13 | End: 2019-04-16 | Stop reason: HOSPADM

## 2019-04-13 RX ORDER — POTASSIUM CHLORIDE 14.9 MG/ML
20 INJECTION INTRAVENOUS
Status: DISCONTINUED | OUTPATIENT
Start: 2019-04-13 | End: 2019-04-13

## 2019-04-13 RX ADMIN — METRONIDAZOLE 500 MG: 500 INJECTION, SOLUTION INTRAVENOUS at 22:13

## 2019-04-13 RX ADMIN — LEVOTHYROXINE SODIUM 75 MCG: 75 TABLET ORAL at 06:49

## 2019-04-13 RX ADMIN — LOSARTAN POTASSIUM 25 MG: 25 TABLET, FILM COATED ORAL at 09:28

## 2019-04-13 RX ADMIN — GUAIFENESIN 600 MG: 600 TABLET, EXTENDED RELEASE ORAL at 09:28

## 2019-04-13 RX ADMIN — Medication 10 ML: at 06:50

## 2019-04-13 RX ADMIN — GUAIFENESIN 600 MG: 600 TABLET, EXTENDED RELEASE ORAL at 22:13

## 2019-04-13 RX ADMIN — POTASSIUM CHLORIDE 20 MEQ: 200 INJECTION, SOLUTION INTRAVENOUS at 16:20

## 2019-04-13 RX ADMIN — CEFTRIAXONE SODIUM 1 G: 1 INJECTION, POWDER, FOR SOLUTION INTRAMUSCULAR; INTRAVENOUS at 17:48

## 2019-04-13 RX ADMIN — Medication 10 ML: at 22:39

## 2019-04-13 RX ADMIN — ATORVASTATIN CALCIUM 10 MG: 10 TABLET, FILM COATED ORAL at 09:28

## 2019-04-13 RX ADMIN — ALBUTEROL SULFATE 2.5 MG: 2.5 SOLUTION RESPIRATORY (INHALATION) at 20:21

## 2019-04-13 RX ADMIN — ZOLPIDEM TARTRATE 5 MG: 5 TABLET ORAL at 22:13

## 2019-04-13 RX ADMIN — ALBUTEROL SULFATE 2.5 MG: 2.5 SOLUTION RESPIRATORY (INHALATION) at 08:42

## 2019-04-13 RX ADMIN — HEPARIN SODIUM 5000 UNITS: 5000 INJECTION INTRAVENOUS; SUBCUTANEOUS at 17:48

## 2019-04-13 RX ADMIN — POTASSIUM CHLORIDE 20 MEQ: 200 INJECTION, SOLUTION INTRAVENOUS at 12:35

## 2019-04-13 RX ADMIN — ALBUTEROL SULFATE 2.5 MG: 2.5 SOLUTION RESPIRATORY (INHALATION) at 15:37

## 2019-04-13 RX ADMIN — SODIUM CHLORIDE 125 ML/HR: 900 INJECTION, SOLUTION INTRAVENOUS at 09:24

## 2019-04-13 RX ADMIN — METRONIDAZOLE 500 MG: 500 INJECTION, SOLUTION INTRAVENOUS at 09:25

## 2019-04-13 RX ADMIN — Medication 10 ML: at 17:51

## 2019-04-13 RX ADMIN — HEPARIN SODIUM 5000 UNITS: 5000 INJECTION INTRAVENOUS; SUBCUTANEOUS at 06:48

## 2019-04-13 NOTE — PROGRESS NOTES
Patient resting in bed, IV K+ infusing without difficulty. No distress noted, hourly rounds completed this shift.

## 2019-04-13 NOTE — PROGRESS NOTES
Hourly rounds completed. All needs met. Pt alert and oriented x 3. Pt c/o back pain during the shift. Interventions per MAR. Pt rested well during the night. After the sputum was collected, pt had a non-productive consistent cough. Resp. Came by to assess her cough and suggested she receive a cough suppressant. Pt is lying in bed in a low, locked position, side rails up x 2, with the call light within reach. Will report to the oncoming night shift nurse.

## 2019-04-13 NOTE — PROGRESS NOTES
Problem: Falls - Risk of 
Goal: *Absence of Falls Description Document Oliver Darden Fall Risk and appropriate interventions in the flowsheet. Outcome: Progressing Towards Goal 
  
Problem: Patient Education: Go to Patient Education Activity Goal: Patient/Family Education Outcome: Progressing Towards Goal 
  
Problem: Pain Goal: *Control of Pain Outcome: Progressing Towards Goal 
  
Problem: Pneumonia: Day 1 Goal: Off Pathway (Use only if patient is Off Pathway) Outcome: Progressing Towards Goal 
Goal: Activity/Safety Outcome: Progressing Towards Goal 
Goal: Diagnostic Test/Procedures Outcome: Progressing Towards Goal 
Goal: Nutrition/Diet Outcome: Progressing Towards Goal 
Goal: Medications Outcome: Progressing Towards Goal 
Goal: Respiratory Outcome: Progressing Towards Goal 
Goal: Treatments/Interventions/Procedures Outcome: Progressing Towards Goal 
Goal: Psychosocial 
Outcome: Progressing Towards Goal 
Goal: *Oxygen saturation within defined limits Outcome: Progressing Towards Goal 
Goal: *Hemodynamically stable Outcome: Progressing Towards Goal 
Goal: *Demonstrates progressive activity Outcome: Progressing Towards Goal 
Goal: *Tolerating diet Outcome: Progressing Towards Goal

## 2019-04-13 NOTE — PROGRESS NOTES
Nelida Riff Admission Date: 4/12/2019 Daily Progress Note: 4/13/2019 The patient's chart is reviewed and the patient is discussed with the staff. 67 yo female with a history of emphysema, tobacco abuse, and malignant RLL lung mass with liver mets. Pt underwent navigational bronch with EBUS on 4/4/19 by Dr. Lluu Islas - final path with poorly differentiated carcinoma. Plan was for outpatient CPFTs with presentation at thoracic conference 4/17/19. Post procedure she developed cough and vomiting followed by fever and chills. She was seen in our office 4/9 as a sick work-in with temp up to 101.6 with some R sided pain and sob. Concern was for RLL aspiration PNA and she was given clindamycin (PCN allergy) and steroids. Patients daughter called the office on 4/11 with resolution of fever but patient anxious and feeling palpitation  Prednisone dose reduced to 20 mg with continued taper planned and instructed to go to the ED if she worsened. She presented to the ER 4/12 with ongoing sob and malaise and was noted to be hyopxic with RA sat 86% requiring 4lpm for improvement in sats. She was admitted and abx coverage was broadened, supplemental o2 given and we were consulted to assist with her care. Subjective: Afebrile. Currently on o2 at 4 lpm with o2 sat 98%. + cough which is now only occasionally productive of light colored mucus. Has some discomfort in her R flank area. Denies palpitations. Current Facility-Administered Medications Medication Dose Route Frequency  potassium chloride 20 mEq in 100 ml IVPB  20 mEq IntraVENous Q2H  
 atorvastatin (LIPITOR) tablet 10 mg  10 mg Oral DAILY  levothyroxine (SYNTHROID) tablet 75 mcg  75 mcg Oral DAILY  losartan (COZAAR) tablet 25 mg  25 mg Oral DAILY  zolpidem (AMBIEN) tablet 5 mg  5 mg Oral QHS  sodium chloride (NS) flush 5-40 mL  5-40 mL IntraVENous Q8H  
  sodium chloride (NS) flush 5-40 mL  5-40 mL IntraVENous PRN  
 0.9% sodium chloride infusion  125 mL/hr IntraVENous CONTINUOUS  
 heparin (porcine) injection 5,000 Units  5,000 Units SubCUTAneous Q12H  cefTRIAXone (ROCEPHIN) 1 g in 0.9% sodium chloride (MBP/ADV) 50 mL  1 g IntraVENous Q24H  
 metroNIDAZOLE (FLAGYL) IVPB premix 500 mg  500 mg IntraVENous Q12H  
 albuterol (PROVENTIL VENTOLIN) nebulizer solution 2.5 mg  2.5 mg Nebulization TID RT  
 guaiFENesin ER (MUCINEX) tablet 600 mg  600 mg Oral Q12H  
 albuterol (PROVENTIL VENTOLIN) nebulizer solution 2.5 mg  2.5 mg Nebulization Q4H PRN  
 traMADol (ULTRAM) tablet 50 mg  50 mg Oral Q6H PRN Review of Systems Constitutional: negative for fever, chills, sweats Cardiovascular: negative for chest pain, palpitations, syncope, edema Gastrointestinal:  negative for dysphagia, reflux, vomiting, diarrhea, abdominal pain, or melena Neurologic:  negative for focal weakness, numbness, headache Objective:  
 
Vitals:  
 04/12/19 2329 04/13/19 9374 04/13/19 0801 04/13/19 6801 BP: 109/63 99/58 122/88 Pulse: 96 92 (!) 102 Resp: 20 20 21 Temp: 97.4 °F (36.3 °C) 98 °F (36.7 °C) 98.7 °F (37.1 °C) SpO2: 91% 93% 94% 98% Weight:      
Height:      
 
Intake and Output:  
No intake/output data recorded. 04/13 0701 - 04/13 1900 In: 240 [P.O.:240] Out: - Physical Exam:  
Constitution:  the patient is well developed and in no acute distress EENMT:  Sclera clear, pupils equal, oral mucosa moist 
Respiratory: crackles / wheezing R, o2 at 4lpm 
Cardiovascular:  RRR without M,G,R 
Gastrointestinal: soft and non-tender; with positive bowel sounds. Musculoskeletal: warm without cyanosis. There is no lower extremity edema. Skin:  no jaundice or rashes, no open wounds Neurologic: no gross neuro deficits Psychiatric:  alert and oriented x 3 CXR:  
3/16/19 
 
 
4/12 LAB Recent Labs 04/13/19 
0341 04/12/19 
1055 WBC 15.5* 17.3* HGB 11.7 13.1 HCT 36.6 40.5  359 Recent Labs 04/13/19 
0341 04/12/19 
1657 04/12/19 
1055   --  137  
K 3.0*  --  3.6   --  102 CO2 27  --  27 *  --  101* BUN 13  --  20  
CREA 0.63  --  0.71  
MG  --  2.2  --   
CA 7.8*  --  8.5 PHOS  --  3.3  --   
ALB  --   --  2.5* TBILI  --   --  1.3* ALT  --   --  54 SGOT  --   --  40* No results for input(s): PH, PCO2, PO2, HCO3, PHI, PCO2I, PO2I, HCO3I in the last 72 hours. No results for input(s): LCAD, LAC in the last 72 hours. Assessment:  (Medical Decision Making) Hospital Problems  Date Reviewed: 4/13/2019 Codes Class Noted POA Aspiration pneumonia ([facility] ICD-10-CM: J69.0 ICD-9-CM: 507.0  4/12/2019 Yes Not improved with outpatient Clindamycin Admitted and abx therapy adjusted * (Principal) Acute respiratory failure with hypoxia ([facility] ICD-10-CM: J96.01 
ICD-9-CM: 518.81  4/12/2019 Yes Currently on o2 at 4lpm 
Wean as tolerated Did not require outpatient o2 prior to current episode HTN (hypertension) (Chronic) ICD-10-CM: I10 
ICD-9-CM: 401.9  4/12/2019 Yes Acquired hypothyroidism (Chronic) ICD-10-CM: E03.9 ICD-9-CM: 244.9  4/12/2019 Yes On supplement Severe sepsis with acute organ dysfunction (HCC) ICD-10-CM: A41.9, R65.20 ICD-9-CM: 038.9, 995.92  4/12/2019 Yes Malignant neoplasm of lower lobe of right lung (HCC) (Chronic) ICD-10-CM: C34.31 
ICD-9-CM: 162.5  4/4/2019 Yes S/P navigational Bronch / EBUS Final path poorly differentiated carcinoma To be presented to thoracic conference 4/17 following PFTs for therapy recommendations. Personal history of tobacco use, presenting hazards to health (Chronic) ICD-10-CM: N11.282 ICD-9-CM: V15.82  3/18/2019 Yes 3/18/19 PET 1. Moderate to intense activity within the patient's known right lower lobe mass measuring 5.4 cm x 4.4 cm.  Hypermetabolic tumor appears to extend to the right 
inferior hilum. No evidence for metastatic disease is seen in the neck, chest, abdomen, or pelvis, however. Focal activity is seen in the right posterior 10th rib which is felt to be due to a rib fracture without aggressive features 
appreciated.  
2. Additional chronic and benign appearing changes as described above. Plan:  (Medical Decision Making) --Albuterol 
--mucinex 
--NS at 125 ml/hr 
--Rocephin / Flagyl (PCN allergy) WBC 17.3 >>>15.5 PCT 0.3 
--sputum cx pending / BC NGTD x 2 
--if fails to clinically improve plan for CTA and further broaden abx coverage. --K+ being supplemented More than 50% of the time documented was spent in face-to-face contact with the patient and in the care of the patient on the floor/unit where the patient is located. Lizzette Gordon, NP Lungs:  Crackles right base, down to 2L NC Heart:  RRR with no Murmur/Rubs/Gallops Additional Comments:  Feels better, less nausea, still no appetite. Less sputum and cough is improved. Overall, she states she feels better. Daughter states she is more awake. She is very frustrated by her situation including pneumonia and cancer. Continue current care. Will repeat CXR tomorrow. If continues to progress may be able to assess off O2. I have spoken with and examined the patient. I agree with the above assessment and plan as documented.  
 
Lai Atkins MD

## 2019-04-13 NOTE — PROGRESS NOTES
Hospitalist Progress Note 2019 Admit Date: 2019 10:38 AM  
NAME: Iban Fiore :  1940 MRN:  [id number] Attending: Madhavi Otto DO 
PCP:  Jacky Kincaid MD 
 
SUBJECTIVE:  
Patient 66year old CF with a PMH of HTN, smoking, and RLL lung mass for which she had a bronchoscopy on 19. Afterwards developed cough and vomiting with cxr on  showing aspiration pna, started on clindamycin and steroids. She continued to cough and presented to ED on  with this complaint. Found to have o2 sat of 86% needing 4LNC to improve. CXR again with RLL infiltrate. Admitted for pneumonia with failure of outpatient antibiotics. Pulm is following.  - pt reports she is feeling better, dyspnea improved and cough slightly better. Review of Systems negative with exception of pertinent positives noted above PHYSICAL EXAM  
 
Visit Vitals /77 Pulse 97 Temp 98.6 °F (37 °C) Resp 20 Ht 5' 8\" (1.727 m) Wt 59 kg (130 lb) SpO2 90% BMI 19.77 kg/m² Temp (24hrs), Av.2 °F (36.8 °C), Min:97.4 °F (36.3 °C), Max:98.7 °F (37.1 °C) Oxygen Therapy O2 Sat (%): 90 % (19 153) Pulse via Oximetry: 79 beats per minute (19 153) O2 Device: Nasal cannula (19) O2 Flow Rate (L/min): 2 l/min (19 153) FIO2 (%): 36 % (19) Intake/Output Summary (Last 24 hours) at 2019 1621 Last data filed at 2019 9523 Gross per 24 hour Intake 240 ml Output  Net 240 ml General: No acute distress   
Lungs:  Course breath sounds bilaterally Heart:  Regular rate and rhythm,  No murmur, rub, or gallop Abdomen: Soft, Non distended, Non tender, Positive bowel sounds Extremities: No cyanosis, clubbing or edema Neurologic:  No focal deficits ASSESSMENT Active Hospital Problems Diagnosis Date Noted  Aspiration pneumonia ([address] 2019  Acute respiratory failure with hypoxia ([facility] 2019  HTN (hypertension) 04/12/2019  Acquired hypothyroidism 04/12/2019  Severe sepsis with acute organ dysfunction ([facility] 04/12/2019  Malignant neoplasm of lower lobe of right lung ([address] 04/04/2019  Personal history of tobacco use, presenting hazards to health 03/18/2019 A/P Acute respiratory failure with hypoxia ([address] (4/12/2019) - Due to #2 
- continue albuterol, mucinex - Wean oxygen as appropriate 
  
Active Problems: 
  Aspiration pneumonia ([address] (4/12/2019) - Due to bronchoscopy - s/p clinda x 3days outpatient failed to improved 
- cont Ceftriaxone + Flagyl (PCN allergy) - pulm following 
  
  Severe sepsis with acute organ dysfunction ([facility] (4/12/2019) - WBC ct 17-->15 today. - Due to #2 
- cont IV atbx and IV fluids.  
- Follow blood and sputum cultures - Lactic normal 
- Procal 0.3 
  
  Malignant neoplasm of lower lobe of right lung ([address] (4/4/2019) - S/P bronch on 4/4/19 
- Has follow up with Oncology as outpatient 
- will be presented at thoracic conference on 4/17 
  
  HTN (hypertension) (4/12/2019) - Stable - Continue Cozaar 
  
  Acquired hypothyroidism (4/12/2019) 
- Continue Levothyroxine 
  
  Personal history of tobacco use, presenting hazards to health (3/18/2019) - Dipso - likely home when medically stable. Add PT eval to assess DVT Prophylaxis: lovenox sq Signed By: Marcella Pitt DO April 13, 2019

## 2019-04-14 ENCOUNTER — APPOINTMENT (OUTPATIENT)
Dept: GENERAL RADIOLOGY | Age: 79
DRG: 871 | End: 2019-04-14
Attending: INTERNAL MEDICINE
Payer: MEDICARE

## 2019-04-14 LAB
ANION GAP SERPL CALC-SCNC: 10 MMOL/L (ref 7–16)
BUN SERPL-MCNC: 7 MG/DL (ref 8–23)
CALCIUM SERPL-MCNC: 7.8 MG/DL (ref 8.3–10.4)
CHLORIDE SERPL-SCNC: 105 MMOL/L (ref 98–107)
CO2 SERPL-SCNC: 24 MMOL/L (ref 21–32)
CREAT SERPL-MCNC: 0.53 MG/DL (ref 0.6–1)
ERYTHROCYTE [DISTWIDTH] IN BLOOD BY AUTOMATED COUNT: 13.6 % (ref 11.9–14.6)
GLUCOSE SERPL-MCNC: 89 MG/DL (ref 65–100)
HCT VFR BLD AUTO: 37.4 % (ref 35.8–46.3)
HGB BLD-MCNC: 11.8 G/DL (ref 11.7–15.4)
MCH RBC QN AUTO: 28.4 PG (ref 26.1–32.9)
MCHC RBC AUTO-ENTMCNC: 31.6 G/DL (ref 31.4–35)
MCV RBC AUTO: 90.1 FL (ref 79.6–97.8)
NRBC # BLD: 0 K/UL (ref 0–0.2)
PLATELET # BLD AUTO: 317 K/UL (ref 150–450)
PMV BLD AUTO: 10.3 FL (ref 9.4–12.3)
POTASSIUM SERPL-SCNC: 3.1 MMOL/L (ref 3.5–5.1)
RBC # BLD AUTO: 4.15 M/UL (ref 4.05–5.2)
SODIUM SERPL-SCNC: 139 MMOL/L (ref 136–145)
WBC # BLD AUTO: 15.8 K/UL (ref 4.3–11.1)

## 2019-04-14 PROCEDURE — 74011250636 HC RX REV CODE- 250/636: Performed by: INTERNAL MEDICINE

## 2019-04-14 PROCEDURE — 77010033678 HC OXYGEN DAILY

## 2019-04-14 PROCEDURE — 99232 SBSQ HOSP IP/OBS MODERATE 35: CPT | Performed by: INTERNAL MEDICINE

## 2019-04-14 PROCEDURE — 74011250637 HC RX REV CODE- 250/637: Performed by: INTERNAL MEDICINE

## 2019-04-14 PROCEDURE — 94760 N-INVAS EAR/PLS OXIMETRY 1: CPT

## 2019-04-14 PROCEDURE — 85027 COMPLETE CBC AUTOMATED: CPT

## 2019-04-14 PROCEDURE — 74011250637 HC RX REV CODE- 250/637: Performed by: PHYSICIAN ASSISTANT

## 2019-04-14 PROCEDURE — 65270000029 HC RM PRIVATE

## 2019-04-14 PROCEDURE — 36415 COLL VENOUS BLD VENIPUNCTURE: CPT

## 2019-04-14 PROCEDURE — 94640 AIRWAY INHALATION TREATMENT: CPT

## 2019-04-14 PROCEDURE — 74011000250 HC RX REV CODE- 250: Performed by: NURSE PRACTITIONER

## 2019-04-14 PROCEDURE — 74011000250 HC RX REV CODE- 250: Performed by: INTERNAL MEDICINE

## 2019-04-14 PROCEDURE — 77030020263 HC SOL INJ SOD CL0.9% LFCR 1000ML

## 2019-04-14 PROCEDURE — 74011000258 HC RX REV CODE- 258: Performed by: INTERNAL MEDICINE

## 2019-04-14 PROCEDURE — 71045 X-RAY EXAM CHEST 1 VIEW: CPT

## 2019-04-14 PROCEDURE — 87106 FUNGI IDENTIFICATION YEAST: CPT

## 2019-04-14 PROCEDURE — 87070 CULTURE OTHR SPECIMN AEROBIC: CPT

## 2019-04-14 PROCEDURE — 80048 BASIC METABOLIC PNL TOTAL CA: CPT

## 2019-04-14 RX ORDER — ONDANSETRON 4 MG/1
4 TABLET, ORALLY DISINTEGRATING ORAL
Status: DISCONTINUED | OUTPATIENT
Start: 2019-04-14 | End: 2019-04-16 | Stop reason: HOSPADM

## 2019-04-14 RX ADMIN — ZOLPIDEM TARTRATE 5 MG: 5 TABLET ORAL at 21:34

## 2019-04-14 RX ADMIN — ALBUTEROL SULFATE 2.5 MG: 2.5 SOLUTION RESPIRATORY (INHALATION) at 21:30

## 2019-04-14 RX ADMIN — Medication 10 ML: at 05:45

## 2019-04-14 RX ADMIN — ALBUTEROL SULFATE 2.5 MG: 2.5 SOLUTION RESPIRATORY (INHALATION) at 08:31

## 2019-04-14 RX ADMIN — METRONIDAZOLE 500 MG: 500 INJECTION, SOLUTION INTRAVENOUS at 09:00

## 2019-04-14 RX ADMIN — HEPARIN SODIUM 5000 UNITS: 5000 INJECTION INTRAVENOUS; SUBCUTANEOUS at 05:44

## 2019-04-14 RX ADMIN — LEVOTHYROXINE SODIUM 75 MCG: 75 TABLET ORAL at 11:03

## 2019-04-14 RX ADMIN — LOSARTAN POTASSIUM 25 MG: 25 TABLET, FILM COATED ORAL at 11:04

## 2019-04-14 RX ADMIN — ALBUTEROL SULFATE 2.5 MG: 2.5 SOLUTION RESPIRATORY (INHALATION) at 16:32

## 2019-04-14 RX ADMIN — ALBUTEROL SULFATE 2.5 MG: 2.5 SOLUTION RESPIRATORY (INHALATION) at 11:43

## 2019-04-14 RX ADMIN — METRONIDAZOLE 500 MG: 500 INJECTION, SOLUTION INTRAVENOUS at 21:33

## 2019-04-14 RX ADMIN — ALBUTEROL SULFATE 2.5 MG: 2.5 SOLUTION RESPIRATORY (INHALATION) at 04:53

## 2019-04-14 RX ADMIN — Medication 10 ML: at 21:34

## 2019-04-14 RX ADMIN — GUAIFENESIN 600 MG: 600 TABLET, EXTENDED RELEASE ORAL at 21:34

## 2019-04-14 RX ADMIN — ATORVASTATIN CALCIUM 10 MG: 10 TABLET, FILM COATED ORAL at 11:03

## 2019-04-14 RX ADMIN — ONDANSETRON 4 MG: 4 TABLET, ORALLY DISINTEGRATING ORAL at 09:28

## 2019-04-14 RX ADMIN — HEPARIN SODIUM 5000 UNITS: 5000 INJECTION INTRAVENOUS; SUBCUTANEOUS at 16:47

## 2019-04-14 RX ADMIN — CEFTRIAXONE SODIUM 1 G: 1 INJECTION, POWDER, FOR SOLUTION INTRAMUSCULAR; INTRAVENOUS at 16:47

## 2019-04-14 RX ADMIN — TRAMADOL HYDROCHLORIDE 50 MG: 50 TABLET, COATED ORAL at 11:06

## 2019-04-14 NOTE — PROGRESS NOTES
PT c/o SOB. Vital signs WNL O2sat 91% but pt still c/o sob. Called resp team for a treatment. After nebulizer PT was more relax and less anxious. Will continum to monitor. Pt has a large BM X 1 END OF SHIFT NOTE: 
 
INTAKE/OUTPUT 
04/13 0701 - 04/14 0700 In: 480 [P.O.:480] Out: -  
Voiding: YES Catheter: NO 
Color: clear Drain: DIET 
regular Flatus: Patient does have flatus present. Stool:  1 occurrences. Characteristics: 
  
 
Ambulating Yes Emesis: 1 occurrences. Characteristics: VITAL SIGNS Patient Vitals for the past 12 hrs: 
 Temp Pulse Resp BP SpO2  
04/14/19 0456 98.2 °F (36.8 °C) 100 18 142/76 90 % 04/14/19 0034 98.5 °F (36.9 °C) (!) 101 18 134/74 92 % 04/13/19 2021     90 % 04/13/19 1955 98.8 °F (37.1 °C) (!) 101 18 126/75 (!) 89 % Pain Assessment Pain Intensity 1: 0 (04/14/19 0225) Pain Location 1: Back, Neck Pain Intervention(s) 1: Medication (see MAR) Patient Stated Pain Goal: 0 Xochilt Sandoval

## 2019-04-14 NOTE — PROGRESS NOTES
Hourly rounds completed. Patient calm, resting in bed, watching TV. Patient says that she feels much better this afternoon. Patient was anxious and agitated this AM, but the remainder of the shift patient has been very pleasant. Ambulating to bathroom with assistance. Ate small amounts from meal trays. No needs at this time. Will continue to monitor and report to oncoming RN.

## 2019-04-14 NOTE — PROGRESS NOTES
Problem: Falls - Risk of 
Goal: *Absence of Falls Description Document Tia Manus Fall Risk and appropriate interventions in the flowsheet. Outcome: Progressing Towards Goal 
  
Problem: Patient Education: Go to Patient Education Activity Goal: Patient/Family Education Outcome: Progressing Towards Goal 
  
Problem: Pain Goal: *Control of Pain Outcome: Progressing Towards Goal 
  
Problem: Pneumonia: Day 1 Goal: Off Pathway (Use only if patient is Off Pathway) Outcome: Progressing Towards Goal 
Goal: Activity/Safety Outcome: Progressing Towards Goal 
Goal: Consults, if ordered Outcome: Progressing Towards Goal 
Goal: Diagnostic Test/Procedures Outcome: Progressing Towards Goal 
Goal: Nutrition/Diet Outcome: Progressing Towards Goal 
Goal: Medications Outcome: Progressing Towards Goal 
Goal: Respiratory Outcome: Progressing Towards Goal 
Goal: Treatments/Interventions/Procedures Outcome: Progressing Towards Goal 
Goal: Psychosocial 
Outcome: Progressing Towards Goal 
Goal: *Oxygen saturation within defined limits Outcome: Progressing Towards Goal 
Goal: *Influenza vaccine administered (October-March) Outcome: Progressing Towards Goal 
Goal: *Pneumoccocal vaccine administered Outcome: Progressing Towards Goal 
Goal: *Hemodynamically stable Outcome: Progressing Towards Goal 
Goal: *Demonstrates progressive activity Outcome: Progressing Towards Goal 
Goal: *Tolerating diet Outcome: Progressing Towards Goal

## 2019-04-14 NOTE — PROGRESS NOTES
Problem: Falls - Risk of 
Goal: *Absence of Falls Description Document Cyndi Stewart Fall Risk and appropriate interventions in the flowsheet. Outcome: Progressing Towards Goal 
  
Problem: Patient Education: Go to Patient Education Activity Goal: Patient/Family Education Outcome: Progressing Towards Goal 
  
Problem: Pain Goal: *Control of Pain Outcome: Progressing Towards Goal 
  
Problem: Pneumonia: Day 1 Goal: Off Pathway (Use only if patient is Off Pathway) Outcome: Progressing Towards Goal 
Goal: Activity/Safety Outcome: Progressing Towards Goal 
Goal: Consults, if ordered Outcome: Progressing Towards Goal 
Goal: Diagnostic Test/Procedures Outcome: Progressing Towards Goal 
Goal: Nutrition/Diet Outcome: Progressing Towards Goal 
Goal: Medications Outcome: Progressing Towards Goal 
Goal: Respiratory Outcome: Progressing Towards Goal 
Goal: Treatments/Interventions/Procedures Outcome: Progressing Towards Goal 
Goal: Psychosocial 
Outcome: Progressing Towards Goal 
Goal: *Oxygen saturation within defined limits Outcome: Progressing Towards Goal 
Goal: *Influenza vaccine administered (October-March) Outcome: Progressing Towards Goal 
Goal: *Pneumoccocal vaccine administered Outcome: Progressing Towards Goal 
Goal: *Hemodynamically stable Outcome: Progressing Towards Goal 
Goal: *Demonstrates progressive activity Outcome: Progressing Towards Goal 
Goal: *Tolerating diet Outcome: Progressing Towards Goal

## 2019-04-14 NOTE — PROGRESS NOTES
Hospitalist Progress Note 2019 Admit Date: 2019 10:38 AM  
NAME: Elena Sams :  1940 MRN:  [id number] Attending: Paulette Felipe DO 
PCP:  Reyes Baseman, MD 
 
SUBJECTIVE:  
Patient is a 66year old CF with a PMH of HTN, smoking, and RLL lung mass for which she had a bronchoscopy on 19. Afterwards developed cough and vomiting with cxr on  showing aspiration pna, started on clindamycin and steroids. She continued to cough and presented to ED on . Found to have o2 sat of 86% needing 4LNC to improve. CXR again with RLL infiltrate. Admitted for pneumonia with failure of outpatient antibiotics. Pulm is following. 19: Patient agitated and states she is still SOB, Cough with no improvement. Productive white sputum. Denies fever, chills. Admits to anorexia. After seen at bedside early this am, patient had an episode of n/v. Pt states she feels like this hospitalization is just preventing her from getting her lung cancer addressed and that she wants to leave against medical advise. Advised that is not recommended and petitioned her to stay. Review of Systems negative with exception of pertinent positives noted above PHYSICAL EXAM  
 
Visit Vitals /74 Pulse 100 Temp 98.5 °F (36.9 °C) Resp 20 Ht 5' 8\" (1.727 m) Wt 59 kg (130 lb) SpO2 (P) 93% BMI 19.77 kg/m² Temp (24hrs), Av.7 °F (37.1 °C), Min:98.2 °F (36.8 °C), Max:99.3 °F (37.4 °C) Oxygen Therapy O2 Sat (%): (P) 93 % (19 1143) Pulse via Oximetry: (P) 96 beats per minute (19 1143) O2 Device: (P) Nasal cannula (19 1143) O2 Flow Rate (L/min): 3 l/min (19 5526) FIO2 (%): 28 % (19) Intake/Output Summary (Last 24 hours) at 2019 1143 Last data filed at 2019 1229 Gross per 24 hour Intake 240 ml Output  Net 240 ml General: Alert and oriented, Agitated Eye: EOMI, Normal conjunctiva HENT: Normocephalic, atraumatic, Normal hearing, dry mucous membranes. Neck: Supple, Non-tender. Respiratory: Respirations are non-labored. RLL crackles. O2 via NC 4L Cardiovascular: Normal rate, Regular rhythm, No murmur. Gastrointestinal: Soft, Non-tender, nondistended, positive bowel sounds Musculoskeletal: No cyanosis, clubbing or edema. Integumentary: Warm, Dry, Pink, no rash. Neurologic: Alert, Oriented, No focal deficits. Cognition and Speech: Oriented, Speech clear and coherent. Psychiatric: Agitated. ASSESSMENT Active Hospital Problems Diagnosis Date Noted  Aspiration pneumonia ([facility] [address] 04/12/2019  Acute respiratory failure with hypoxia ([facility] 04/12/2019  
 HTN (hypertension) 04/12/2019  Acquired hypothyroidism 04/12/2019  Severe sepsis with acute organ dysfunction ([facility] 04/12/2019  Malignant neoplasm of lower lobe of right lung ([facility] 04/04/2019  Personal history of tobacco use, presenting hazards to health 03/18/2019 Acute respiratory failure with hypoxia - Due to #2 
- continue albuterol, mucinex - Wean oxygen as appropriate 
  
Aspiration pneumonia - Due to bronchoscopy - s/p clinda x 3days outpatient failed to improved 
- cont Ceftriaxone + Flagyl (PCN allergy) - pulm following 
  
Severe sepsis with PNE/Resp Failure - Following leukocytosis 
- cont IV atbx and IV fluids. - Sputum culture: MODERATE NORMAL RESPIRATORY ZHANNA Final pending - Blood Cx: NGTD 
- Lactic Acid normal 
- Procal 0.3 
  
Malignant neoplasm of lower lobe of right lung - S/P navigational Bronch / EBUS 4/4/19 
- Final path poorly differentiated carcinoma  
- Has follow up with Oncology as outpatient 
- will be presented at thoracic conference on 4/17 
- OP PFTs for therapy recommendations.  
  
Hypokalemia - Replace K 
- Follow K levels HTN  
- Stable - Continue Cozaar 
  
Hypothyroidism  
- Continue Levothyroxine HLD 
- Continue Statin 
  
Tobacco abuse - offer nicotine patch and encourage complete cessation of tobacco products. - Provide information on Stop Smoking resources. DVT prophylaxis: heparin Full Code Discussed with Dr. Kaitlyn Ferrell Dispo: to home when stable pending PT eval 
 
Total Time spent: 32 minutes. Counseling & coordinating care dominated the encounter >55%. Counseled patient regarding dx, rx, tx. Reviewed prior records, labs, vitals, diagnostic tests, flow sheet, home medications, inpatient medications, nursing and provider notes.  
 
Kameron Martinez PA-C, MPAS

## 2019-04-14 NOTE — PROGRESS NOTES
PT feel upset and wants to go home. Needs to talk with the MD in the morning. Will pass on to  day shift nurse.

## 2019-04-14 NOTE — PROGRESS NOTES
Pt called her daughter saying that something is wrong and she wants to talk with hosp. Daughter called primary nurse saying the pt is SOB and needs to be seeing. Explained the daughter that the pt had breathing treat and will be seeing Dr Liana Strickland this morning. Called Dr Rob Cruz to explained the situation and ask the possibility to see the Pt. Dr Rob Cruz is at ER right now and will take a while for her to come here, but  Dr Rob Cruz will ask Dr Liana Strickland to see the PT first thing in the morning. VITALS: 
/79  
O2SAT 90%

## 2019-04-14 NOTE — PROGRESS NOTES
Pt remains on 3 lpm nasal cannula. Pt received all treatments with no issues. Breath sounds diminished and expiratory wheezing. Cough was nonproductive.

## 2019-04-14 NOTE — PROGRESS NOTES
Emily Hensley Admission Date: 4/12/2019 Daily Progress Note: 4/14/2019 The patient's chart is reviewed and the patient is discussed with the staff. 65 yo female with a history of emphysema, tobacco abuse, and malignant RLL lung mass with liver mets. Pt underwent navigational bronch with EBUS on 4/4/19 by Dr. Geena Rob - final path with poorly differentiated carcinoma. Plan was for outpatient CPFTs with presentation at thoracic conference 4/17/19. Post procedure she developed cough and vomiting followed by fever and chills. She was seen in our office 4/9 as a sick work-in with temp up to 101.6 with some R sided pain and sob. Concern was for RLL aspiration PNA and she was given clindamycin (PCN allergy) and steroids. Patients daughter called the office on 4/11 with resolution of fever but patient anxious and feeling palpitation  Prednisone dose reduced to 20 mg with continued taper planned and instructed to go to the ED if she worsened. She presented to the ER 4/12 with ongoing sob and malaise and was noted to be hyopxic with RA sat 86% requiring 4lpm for improvement in sats. She was admitted and abx coverage was broadened, supplemental o2 given and we were consulted to assist with her care. Subjective:  
 
Currently on O2 at 3 LPM. Less sob Current Facility-Administered Medications Medication Dose Route Frequency  ondansetron (ZOFRAN ODT) tablet 4 mg  4 mg Oral Q6H PRN  
 albuterol (PROVENTIL VENTOLIN) nebulizer solution 2.5 mg  2.5 mg Nebulization QID RT  
 atorvastatin (LIPITOR) tablet 10 mg  10 mg Oral DAILY  levothyroxine (SYNTHROID) tablet 75 mcg  75 mcg Oral DAILY  losartan (COZAAR) tablet 25 mg  25 mg Oral DAILY  zolpidem (AMBIEN) tablet 5 mg  5 mg Oral QHS  sodium chloride (NS) flush 5-40 mL  5-40 mL IntraVENous Q8H  
 sodium chloride (NS) flush 5-40 mL  5-40 mL IntraVENous PRN  
  0.9% sodium chloride infusion  125 mL/hr IntraVENous CONTINUOUS  
 heparin (porcine) injection 5,000 Units  5,000 Units SubCUTAneous Q12H  cefTRIAXone (ROCEPHIN) 1 g in 0.9% sodium chloride (MBP/ADV) 50 mL  1 g IntraVENous Q24H  
 metroNIDAZOLE (FLAGYL) IVPB premix 500 mg  500 mg IntraVENous Q12H  
 guaiFENesin ER (MUCINEX) tablet 600 mg  600 mg Oral Q12H  
 albuterol (PROVENTIL VENTOLIN) nebulizer solution 2.5 mg  2.5 mg Nebulization Q4H PRN  
 traMADol (ULTRAM) tablet 50 mg  50 mg Oral Q6H PRN Review of Systems Constitutional: negative for fever, chills, sweats Cardiovascular: negative for chest pain, palpitations, syncope, edema Gastrointestinal:  negative for dysphagia, reflux, vomiting, diarrhea, abdominal pain, or melena Neurologic:  negative for focal weakness, numbness, headache Objective:  
 
Vitals:  
 04/14/19 0832 04/14/19 1143 04/14/19 1144 04/14/19 1230 BP:    116/66 Pulse:    95 Resp:    18 Temp:    98.5 °F (36.9 °C) SpO2: 92% 93% 93% 90% Weight:      
Height:      
 
Intake and Output:  
04/12 1901 - 04/14 0700 In: 480 [P.O.:480] Out: - No intake/output data recorded. Physical Exam:  
Constitution:  the patient is well developed and in no acute distress EENMT:  Sclera clear, pupils equal, oral mucosa moist 
Respiratory: crackles / wheezing Cardiovascular:  RRR without M,G,R 
Gastrointestinal: soft and non-tender; with positive bowel sounds. Musculoskeletal: warm without cyanosis. There is no lower extremity edema. Skin:  no jaundice or rashes, no open wounds Neurologic: no gross neuro deficits Psychiatric:  alert and oriented x 3 CXR:  
3/16/19 
 
 
4/12 
 
 
4/14/19 Study Result Portable chest xray   
  
COMPARISON: April 12, 2019. 
  
CLINICAL HISTORY: Respiratory failure. 
  
FINDINGS: 
  
There is no right lower lobe mass. There is suggestion of superimposed airspace disease. There is small right pleural effusion. No pneumothorax. Cardiac 
mediastinal contour is within normal limits. Surrounding bones are stable. 
  
IMPRESSION IMPRESSION: 
  
1. Known right lower lobe mass. Suggestion of superimposed pneumonia. 
  
2. No significant change compared to prior exam.  
 
 
 
LAB Recent Labs 04/14/19 
0532 04/13/19 
0341 04/12/19 
1055 WBC 15.8* 15.5* 17.3* HGB 11.8 11.7 13.1 HCT 37.4 36.6 40.5  303 359 Recent Labs 04/14/19 
0532 04/13/19 
0341 04/12/19 
1657 04/12/19 
1055  138  --  137  
K 3.1* 3.0*  --  3.6  103  --  102 CO2 24 27  --  27  
GLU 89 111*  --  101* BUN 7* 13  --  20  
CREA 0.53* 0.63  --  0.71  
MG  --   --  2.2  --   
CA 7.8* 7.8*  --  8.5 PHOS  --   --  3.3  --   
ALB  --   --   --  2.5* TBILI  --   --   --  1.3* ALT  --   --   --  54 SGOT  --   --   --  40* No results for input(s): PH, PCO2, PO2, HCO3, PHI, PCO2I, PO2I, HCO3I in the last 72 hours. No results for input(s): LCAD, LAC in the last 72 hours. Assessment:  (Medical Decision Making) Hospital Problems  Date Reviewed: 4/13/2019 Codes Class Noted POA Aspiration pneumonia ([facility] ICD-10-CM: J69.0 ICD-9-CM: 507.0  4/12/2019 Yes Not improved with outpatient Clindamycin Admitted and abx therapy adjusted * (Principal) Acute respiratory failure with hypoxia ([facility] ICD-10-CM: J96.01 
ICD-9-CM: 518.81  4/12/2019 Yes Currently on 3 LPM O2 Wean as tolerated Did not require outpatient o2 prior to current episode HTN (hypertension) (Chronic) ICD-10-CM: I10 
ICD-9-CM: 401.9  4/12/2019 Yes Acquired hypothyroidism (Chronic) ICD-10-CM: E03.9 ICD-9-CM: 244.9  4/12/2019 Yes On supplement Severe sepsis with acute organ dysfunction (HCC) ICD-10-CM: A41.9, R65.20 ICD-9-CM: 038.9, 995.92  4/12/2019 Yes  Malignant neoplasm of lower lobe of right lung (HCC) (Chronic) ICD-10-CM: C34.31 
 ICD-9-CM: 162.5  4/4/2019 Yes S/P navigational Bronch / EBUS Final path poorly differentiated carcinoma To be presented to thoracic conference 4/17 following PFTs for therapy recommendations. Personal history of tobacco use, presenting hazards to health (Chronic) ICD-10-CM: W30.381 ICD-9-CM: V15.82  3/18/2019 Yes 3/18/19 PET 1. Moderate to intense activity within the patient's known right lower lobe mass measuring 5.4 cm x 4.4 cm. Hypermetabolic tumor appears to extend to the right 
inferior hilum. No evidence for metastatic disease is seen in the neck, chest, abdomen, or pelvis, however. Focal activity is seen in the right posterior 10th rib which is felt to be due to a rib fracture without aggressive features 
appreciated.  
2. Additional chronic and benign appearing changes as described above. Plan:  (Medical Decision Making) --Albuterol 
--mucinex 
--Rocephin / Flagyl (PCN allergy) --sputum cx pending / BC NGTD x 2 
--if fails to clinically improve plan for CTA and further broaden abx coverage. --K+ being supplemented More than 50% of the time documented was spent in face-to-face contact with the patient and in the care of the patient on the floor/unit where the patient is located.  
  
 
Selena Richardson MD

## 2019-04-15 ENCOUNTER — HOME HEALTH ADMISSION (OUTPATIENT)
Dept: HOME HEALTH SERVICES | Facility: HOME HEALTH | Age: 79
End: 2019-04-15
Payer: MEDICARE

## 2019-04-15 LAB
ANION GAP SERPL CALC-SCNC: 7 MMOL/L (ref 7–16)
BACTERIA SPEC CULT: NORMAL
BUN SERPL-MCNC: 7 MG/DL (ref 8–23)
CALCIUM SERPL-MCNC: 7.7 MG/DL (ref 8.3–10.4)
CHLORIDE SERPL-SCNC: 107 MMOL/L (ref 98–107)
CO2 SERPL-SCNC: 25 MMOL/L (ref 21–32)
CREAT SERPL-MCNC: 0.5 MG/DL (ref 0.6–1)
ERYTHROCYTE [DISTWIDTH] IN BLOOD BY AUTOMATED COUNT: 13.5 % (ref 11.9–14.6)
GLUCOSE SERPL-MCNC: 92 MG/DL (ref 65–100)
GRAM STN SPEC: NORMAL
HCT VFR BLD AUTO: 35.4 % (ref 35.8–46.3)
HGB BLD-MCNC: 11.2 G/DL (ref 11.7–15.4)
MCH RBC QN AUTO: 28.5 PG (ref 26.1–32.9)
MCHC RBC AUTO-ENTMCNC: 31.6 G/DL (ref 31.4–35)
MCV RBC AUTO: 90.1 FL (ref 79.6–97.8)
NRBC # BLD: 0 K/UL (ref 0–0.2)
PLATELET # BLD AUTO: 342 K/UL (ref 150–450)
PMV BLD AUTO: 9.6 FL (ref 9.4–12.3)
POTASSIUM SERPL-SCNC: 3 MMOL/L (ref 3.5–5.1)
RBC # BLD AUTO: 3.93 M/UL (ref 4.05–5.2)
SERVICE CMNT-IMP: NORMAL
SODIUM SERPL-SCNC: 139 MMOL/L (ref 136–145)
WBC # BLD AUTO: 12.4 K/UL (ref 4.3–11.1)

## 2019-04-15 PROCEDURE — 77010033678 HC OXYGEN DAILY

## 2019-04-15 PROCEDURE — 99232 SBSQ HOSP IP/OBS MODERATE 35: CPT | Performed by: INTERNAL MEDICINE

## 2019-04-15 PROCEDURE — 85027 COMPLETE CBC AUTOMATED: CPT

## 2019-04-15 PROCEDURE — 74011250637 HC RX REV CODE- 250/637: Performed by: INTERNAL MEDICINE

## 2019-04-15 PROCEDURE — 94640 AIRWAY INHALATION TREATMENT: CPT

## 2019-04-15 PROCEDURE — 94760 N-INVAS EAR/PLS OXIMETRY 1: CPT

## 2019-04-15 PROCEDURE — 74011250637 HC RX REV CODE- 250/637: Performed by: PHYSICIAN ASSISTANT

## 2019-04-15 PROCEDURE — 74011000258 HC RX REV CODE- 258: Performed by: INTERNAL MEDICINE

## 2019-04-15 PROCEDURE — 80048 BASIC METABOLIC PNL TOTAL CA: CPT

## 2019-04-15 PROCEDURE — 74011000250 HC RX REV CODE- 250: Performed by: NURSE PRACTITIONER

## 2019-04-15 PROCEDURE — 77030020263 HC SOL INJ SOD CL0.9% LFCR 1000ML

## 2019-04-15 PROCEDURE — 74011250636 HC RX REV CODE- 250/636: Performed by: INTERNAL MEDICINE

## 2019-04-15 PROCEDURE — 36415 COLL VENOUS BLD VENIPUNCTURE: CPT

## 2019-04-15 PROCEDURE — 65270000029 HC RM PRIVATE

## 2019-04-15 RX ORDER — POTASSIUM CHLORIDE 1.5 G/1.77G
20 POWDER, FOR SOLUTION ORAL 2 TIMES DAILY WITH MEALS
Status: DISCONTINUED | OUTPATIENT
Start: 2019-04-15 | End: 2019-04-16 | Stop reason: HOSPADM

## 2019-04-15 RX ORDER — METRONIDAZOLE 500 MG/1
500 TABLET ORAL EVERY 12 HOURS
Status: DISCONTINUED | OUTPATIENT
Start: 2019-04-15 | End: 2019-04-16 | Stop reason: HOSPADM

## 2019-04-15 RX ADMIN — ALBUTEROL SULFATE 2.5 MG: 2.5 SOLUTION RESPIRATORY (INHALATION) at 07:48

## 2019-04-15 RX ADMIN — METRONIDAZOLE 500 MG: 500 INJECTION, SOLUTION INTRAVENOUS at 08:56

## 2019-04-15 RX ADMIN — CEFTRIAXONE SODIUM 1 G: 1 INJECTION, POWDER, FOR SOLUTION INTRAMUSCULAR; INTRAVENOUS at 17:00

## 2019-04-15 RX ADMIN — POTASSIUM CHLORIDE 20 MEQ: 1.5 POWDER, FOR SOLUTION ORAL at 17:52

## 2019-04-15 RX ADMIN — ALBUTEROL SULFATE 2.5 MG: 2.5 SOLUTION RESPIRATORY (INHALATION) at 19:49

## 2019-04-15 RX ADMIN — HEPARIN SODIUM 5000 UNITS: 5000 INJECTION INTRAVENOUS; SUBCUTANEOUS at 17:52

## 2019-04-15 RX ADMIN — Medication 10 ML: at 05:02

## 2019-04-15 RX ADMIN — ZOLPIDEM TARTRATE 5 MG: 5 TABLET ORAL at 21:38

## 2019-04-15 RX ADMIN — LOSARTAN POTASSIUM 25 MG: 25 TABLET, FILM COATED ORAL at 08:56

## 2019-04-15 RX ADMIN — ATORVASTATIN CALCIUM 10 MG: 10 TABLET, FILM COATED ORAL at 08:56

## 2019-04-15 RX ADMIN — METRONIDAZOLE 500 MG: 500 TABLET ORAL at 21:38

## 2019-04-15 RX ADMIN — HEPARIN SODIUM 5000 UNITS: 5000 INJECTION INTRAVENOUS; SUBCUTANEOUS at 05:02

## 2019-04-15 RX ADMIN — Medication 10 ML: at 21:38

## 2019-04-15 RX ADMIN — POTASSIUM CHLORIDE 20 MEQ: 1.5 POWDER, FOR SOLUTION ORAL at 08:00

## 2019-04-15 RX ADMIN — GUAIFENESIN 600 MG: 600 TABLET, EXTENDED RELEASE ORAL at 08:56

## 2019-04-15 RX ADMIN — LEVOTHYROXINE SODIUM 75 MCG: 75 TABLET ORAL at 08:56

## 2019-04-15 RX ADMIN — ALBUTEROL SULFATE 2.5 MG: 2.5 SOLUTION RESPIRATORY (INHALATION) at 11:19

## 2019-04-15 RX ADMIN — ALBUTEROL SULFATE 2.5 MG: 2.5 SOLUTION RESPIRATORY (INHALATION) at 15:54

## 2019-04-15 RX ADMIN — TRAMADOL HYDROCHLORIDE 50 MG: 50 TABLET, COATED ORAL at 21:38

## 2019-04-15 NOTE — PROGRESS NOTES
Pt still with heavy cough throughout the night. Pt's O2sat 89% called resp team to give a breath treat. Now pt is on 4L nc Pt wants to talk with oncology this morning. Hourly round completed throughout the shift. Bed in lower position and call light/ personal items within reach. Will continue to monitor and give bed side shift report to on coming day shift nurse.

## 2019-04-15 NOTE — PROGRESS NOTES
Abner Stapleton Admission Date: 4/12/2019 Daily Progress Note: 4/15/2019 The patient's chart is reviewed and the patient is discussed with the staff. 65 yo female with a history of emphysema, tobacco abuse, and malignant RLL lung mass with liver mets. Pt underwent navigational bronch with EBUS on 4/4/19 by Dr. Margaux Bay - final path with poorly differentiated carcinoma. Plan was for outpatient CPFTs with presentation at thoracic conference 4/17/19. Post procedure she developed cough and vomiting followed by fever and chills. She was seen in our office 4/9 as a sick work-in with temp up to 101.6 with some R sided pain and sob. Concern was for RLL aspiration PNA and she was given clindamycin (PCN allergy) and steroids. Patients daughter called the office on 4/11 with resolution of fever but patient anxious and feeling palpitation  Prednisone dose reduced to 20 mg with continued taper planned and instructed to go to the ED if she worsened. She presented to the ER 4/12 with ongoing sob and malaise and was noted to be hyopxic with RA sat 86% requiring 4lpm for improvement in sats. She was admitted and abx coverage was broadened, supplemental o2 given and we were consulted to assist with her care. Subjective:  
 
Currently on O2 at 3 LPM. Less sob No events overnight Current Facility-Administered Medications Medication Dose Route Frequency  potassium chloride (KLOR-CON) packet for solution 20 mEq  20 mEq Oral BID WITH MEALS  metroNIDAZOLE (FLAGYL) tablet 500 mg  500 mg Oral Q12H  
 ondansetron (ZOFRAN ODT) tablet 4 mg  4 mg Oral Q6H PRN  
 albuterol (PROVENTIL VENTOLIN) nebulizer solution 2.5 mg  2.5 mg Nebulization QID RT  
 atorvastatin (LIPITOR) tablet 10 mg  10 mg Oral DAILY  levothyroxine (SYNTHROID) tablet 75 mcg  75 mcg Oral DAILY  losartan (COZAAR) tablet 25 mg  25 mg Oral DAILY  zolpidem (AMBIEN) tablet 5 mg  5 mg Oral QHS  sodium chloride (NS) flush 5-40 mL  5-40 mL IntraVENous Q8H  
 sodium chloride (NS) flush 5-40 mL  5-40 mL IntraVENous PRN  
 0.9% sodium chloride infusion  125 mL/hr IntraVENous CONTINUOUS  
 heparin (porcine) injection 5,000 Units  5,000 Units SubCUTAneous Q12H  cefTRIAXone (ROCEPHIN) 1 g in 0.9% sodium chloride (MBP/ADV) 50 mL  1 g IntraVENous Q24H  
 guaiFENesin ER (MUCINEX) tablet 600 mg  600 mg Oral Q12H  
 albuterol (PROVENTIL VENTOLIN) nebulizer solution 2.5 mg  2.5 mg Nebulization Q4H PRN  
 traMADol (ULTRAM) tablet 50 mg  50 mg Oral Q6H PRN Review of Systems Constitutional: negative for fever, chills, sweats Cardiovascular: negative for chest pain, palpitations, syncope, edema Gastrointestinal:  negative for dysphagia, reflux, vomiting, diarrhea, abdominal pain, or melena Neurologic:  negative for focal weakness, numbness, headache Objective:  
 
Vitals:  
 04/14/19 2308 04/15/19 9645 04/15/19 0736 04/15/19 6363 BP: 104/68 134/61 126/66 Pulse: 89 91 90 Resp: 18 20 20 Temp: 98 °F (36.7 °C) 97.8 °F (36.6 °C) 97.9 °F (36.6 °C) SpO2: 91% 92% 91% 93% Weight:      
Height:      
 
Intake and Output:  
04/13 1901 - 04/15 0700 In: 360 [P.O.:360] Out: - No intake/output data recorded. Physical Exam:  
Constitution:  the patient is well developed and in no acute distress EENMT:  Sclera clear, pupils equal, oral mucosa moist 
Respiratory: CTA Cardiovascular:  RRR without M,G,R 
Gastrointestinal: soft and non-tender; with positive bowel sounds. Musculoskeletal: warm without cyanosis. There is no lower extremity edema. Skin:  no jaundice or rashes, no open wounds Neurologic: no gross neuro deficits Psychiatric:  alert and oriented x 3 CXR:  
3/16/19 
 
 
4/12 
 
 
4/14/19 Study Result Portable chest xray   
  
COMPARISON: April 12, 2019. 
  
CLINICAL HISTORY: Respiratory failure. 
  
FINDINGS: 
  
 There is no right lower lobe mass. There is suggestion of superimposed airspace 
disease. There is small right pleural effusion. No pneumothorax. Cardiac 
mediastinal contour is within normal limits. Surrounding bones are stable. 
  
IMPRESSION IMPRESSION: 
  
1. Known right lower lobe mass. Suggestion of superimposed pneumonia. 
  
2. No significant change compared to prior exam.  
 
 
 
LAB Recent Labs 04/15/19 
3856 04/14/19 
0532 04/13/19 
0341 04/12/19 
1055 WBC 12.4* 15.8* 15.5* 17.3* HGB 11.2* 11.8 11.7 13.1 HCT 35.4* 37.4 36.6 40.5  317 303 359 Recent Labs 04/15/19 
0850 04/14/19 
0532 04/13/19 
0341 04/12/19 
1657 04/12/19 
1055  139 138  --  137  
K 3.0* 3.1* 3.0*  --  3.6  105 103  --  102 CO2 25 24 27  --  27  
GLU 92 89 111*  --  101* BUN 7* 7* 13  --  20  
CREA 0.50* 0.53* 0.63  --  0.71  
MG  --   --   --  2.2  --   
CA 7.7* 7.8* 7.8*  --  8.5 PHOS  --   --   --  3.3  --   
ALB  --   --   --   --  2.5* TBILI  --   --   --   --  1.3* ALT  --   --   --   --  54 SGOT  --   --   --   --  40* No results for input(s): PH, PCO2, PO2, HCO3, PHI, PCO2I, PO2I, HCO3I in the last 72 hours. No results for input(s): LCAD, LAC in the last 72 hours. Assessment:  (Medical Decision Making) Hospital Problems  Date Reviewed: 4/13/2019 Codes Class Noted POA Aspiration pneumonia ([facility] ICD-10-CM: J69.0 ICD-9-CM: 507.0  4/12/2019 Yes Not improved with outpatient Clindamycin Admitted and abx therapy adjusted * (Principal) Acute respiratory failure with hypoxia ([facility] ICD-10-CM: J96.01 
ICD-9-CM: 518.81  4/12/2019 Yes Currently on 3 LPM O2 Wean as tolerated Did not require outpatient o2 prior to current episode HTN (hypertension) (Chronic) ICD-10-CM: I10 
ICD-9-CM: 401.9  4/12/2019 Yes Acquired hypothyroidism (Chronic) ICD-10-CM: E03.9 ICD-9-CM: 244.9  4/12/2019 Yes On supplement Severe sepsis with acute organ dysfunction (HCC) ICD-10-CM: A41.9, R65.20 ICD-9-CM: 038.9, 995.92  4/12/2019 Yes Malignant neoplasm of lower lobe of right lung (HCC) (Chronic) ICD-10-CM: C34.31 
ICD-9-CM: 162.5  4/4/2019 Yes S/P navigational Bronch / EBUS Final path poorly differentiated carcinoma To be presented to thoracic conference 4/17 following PFTs for therapy recommendations. Personal history of tobacco use, presenting hazards to health (Chronic) ICD-10-CM: C28.103 ICD-9-CM: V15.82  3/18/2019 Yes 3/18/19 PET 1. Moderate to intense activity within the patient's known right lower lobe mass measuring 5.4 cm x 4.4 cm. Hypermetabolic tumor appears to extend to the right 
inferior hilum. No evidence for metastatic disease is seen in the neck, chest, abdomen, or pelvis, however. Focal activity is seen in the right posterior 10th rib which is felt to be due to a rib fracture without aggressive features 
appreciated.  
2. Additional chronic and benign appearing changes as described above. Plan:  (Medical Decision Making) --Albuterol 
--mucinex 
--Rocephin / Flagyl (PCN allergy)- overall patient is improving day 4/7- likely able to switch to PO clindamycin to complete Rx  
--sputum cx pending / BC NGTD x 3 except for yeast[contaminant] More than 50% of the time documented was spent in face-to-face contact with the patient and in the care of the patient on the floor/unit where the patient is located.  
  
 
Bernard Lam MD

## 2019-04-15 NOTE — PROGRESS NOTES
Initial visit to assess pt's spiritual needs. Feeling today? poorly Receiving good care?  yes Needs from Spiritual Care:  Prayer Pt is not closely connected to her [facility] community at this time Ministry of presence & prayer to demonstrate caring & concern, convey emotional & spiritual support.  
 
candy Kim MDiv,[facility],PhD

## 2019-04-15 NOTE — PROGRESS NOTES
Patient agreeable to have CM speak with her granddaughter Josy Banda. Called and spoke with Josy Banda and she states that patient was living alone before admission. Josy Banda states plan is for patient to discharge to her mother's house at [address] Prior to admission, patient was independent with adls. Has no DME at home. Patient is currently on O2 which is new for patient and will require referral at discharge if needed. Family is agreeable to have [facility] services. Family would like referral sent to [facility]. Referral sent. liaisons notified. No other needs voiced. Cm will continue to follow. Care Management Interventions PCP Verified by CM: Yes Transition of Care Consult (CM Consult): Discharge Planning, Home Health [address]: Yes Discharge Durable Medical Equipment: No 
Physical Therapy Consult: Yes Occupational Therapy Consult: Yes Current Support Network: Own Home Confirm Follow Up Transport: Family Plan discussed with Pt/Family/Caregiver: Yes Freedom of Choice Offered: Yes Discharge Location Discharge Placement: Home with home health

## 2019-04-15 NOTE — PROGRESS NOTES
[facility] Face to Face Encounter Patients Name: Mukul Correa    YOB: 1940 Ordering Physician: Dr. Shea Clements Primary Diagnosis: Acute respiratory failure with hypoxia ([facility] [address] [J96.01] [facility] [J69.0] Date of Face to Face:   4/15/2019 Face to Face Encounter findings are related to primary reason for home care:   yes. 1. I certify that the patient needs intermittent care as follows: skilled nursing care:  skilled observation/assessment, patient education and rehabilitative nursing 
physical therapy: strengthening, stretching/ROM, transfer training, gait/stair training, balance training and pt/caregiver education 
occupational therapy:  ADL safety (ie. cooking, bathing, dressing), ROM and pt/caregiver education 2. I certify that this patient is homebound, that is: 1) patient requires the use of a walker device, special transportation, or assistance of another to leave the home; or 2) patient's condition makes leaving the home medically contraindicated; and 3) patient has a normal inability to leave the home and leaving the home requires considerable and taxing effort. Patient may leave the home for infrequent and short duration for medical reasons, and occasional absences for non-medical reasons. Homebound status is due to the following functional limitations: Patient with strength deficits limiting the performance of all ADL's without caregiver assistance or the use of an assistive device. Patient with poor safety awareness and is at risk for falls without assistance of another person and the use of an assistive device. Patient with poor ambulation endurance limiting their safe ability to ascend/descend the required number of steps to leave the home.  
 
3. I certify that this patient is under my care and that I, or a nurse practitioner or [phone], or clinical nurse specialist, or certified nurse midwife, working with me, had a Face-to-Face Encounter that meets the physician Face-to-Face Encounter requirements. The following are the clinical findings from the [address] encounter that support the need for skilled services and is a summary of the encounter: see hospital chart See hospital chart David Camara RN 
4/15/2019 THE FOLLOWING TO BE COMPLETED BY THE COMMUNITY PHYSICIAN: 
 
I concur with the findings described above from the F2F encounter that this patient is homebound and in need of a skilled service. Certifying Physician: _____________________________________ Printed Certifying Physician Name: _____________________________________ Date: _________________

## 2019-04-15 NOTE — PROGRESS NOTES
04/15/19 1556 Oxygen Therapy O2 Sat (%) 94 % Pulse via Oximetry 75 beats per minute O2 Device Nasal cannula O2 Flow Rate (L/min) 4 l/min

## 2019-04-15 NOTE — PROGRESS NOTES
Hospitalist Progress Note 4/15/2019 Admit Date: 2019 10:38 AM  
NAME: Hans Hollingsworth :  1940 MRN:  [id number] Attending: Jamal Fontana DO 
PCP:  Joby Garcia MD 
 
SUBJECTIVE:  
Patient is a 66year old CF with a PMH of HTN, smoking, and RLL lung mass for which she had a navigational Bronch / EBUS on 19. Final path poorly differentiated carcinoma and she will bepresented at thoracic conference on 19. Afterwards developed cough and vomiting with cxr on  showing aspiration pna, started on clindamycin and steroids. She continued to cough and presented to ED on . Found to have o2 sat of 86% needing 4LNC to improve. CXR again with RLL infiltrate. Admitted for pneumonia with failure of outpatient antibiotics. Pulmonology consulted. Given albuterol, mucinex, Ceftriaxone + Flagyl (PCN allergy). Sputum Culture final pending. BC NGTD x 3 except for yeast[contaminant]. Pulm recs OP PFTs for therapy recommendations of Lung cancer. 19: Patient no longer agitated. SOB improved. O2 weaning. + NP Cough. Denies fever, chills. Denies abd pain, n/v/d. Review of Systems negative with exception of pertinent positives noted above PHYSICAL EXAM  
 
Visit Vitals /61 Pulse 92 Temp 97.5 °F (36.4 °C) Resp 22 Ht 5' 8\" (1.727 m) Wt 59 kg (130 lb) SpO2 91% BMI 19.77 kg/m² Temp (24hrs), Av.9 °F (36.6 °C), Min:97.5 °F (36.4 °C), Max:98.3 °F (36.8 °C) Oxygen Therapy O2 Sat (%): 91 % (04/15/19 1443) Pulse via Oximetry: 80 beats per minute (04/15/19 1122) O2 Device: Nasal cannula (04/15/19 1122) O2 Flow Rate (L/min): 5 l/min (04/15/19 1122) FIO2 (%): 32 % (19 2132) Intake/Output Summary (Last 24 hours) at 4/15/2019 1510 Last data filed at 4/15/2019 5211 Gross per 24 hour Intake 240 ml Output  Net 240 ml AM EXAM 
General: Alert and oriented, No apparent distress. Eye: EOMI, Normal conjunctiva HENT: Normocephalic, atraumatic, Normal hearing, dry mucous membranes. Neck: Supple, Non-tender. Respiratory: Respirations are non-labored. Diminished at bases, scattered rhonci R lung. + cough. O2 via NC 5L Cardiovascular: Normal rate, Regular rhythm, No murmur. Gastrointestinal: Soft, Non-tender, nondistended, positive bowel sounds Musculoskeletal: No cyanosis, clubbing or edema. Integumentary: Warm, Dry, Pink, no rash. Neurologic: Alert, Oriented, No focal deficits. Cognition and Speech: Oriented, Speech clear and coherent. Psychiatric: normal mood and affect. REPEAT EXAM in afternoon: 
unchanged from above except Respiratory: Respirations are non-labored. CTA. O2 via NC 5L 
 
ASSESSMENT Acute respiratory failure with hypoxia - Due to #2 
- continue albuterol, mucinex - Wean oxygen as appropriate 
  
Aspiration pneumonia s/p bronchoscopy - s/p clinda x 3days outpatient failed to improved 
- cont Ceftriaxone + Flagyl (PCN allergy) day 4/7 
- pulm following - plan to dc in am on po clinda per pulm recs 
  Severe sepsis with PNE/Resp Failure 
- resolving 
- leukocytosis trending down 
- cont IV atbx - tolerating po - dc IV fluids in anticipation for dc in am 
- Sputum culture: MODERATE NORMAL RESPIRATORY ZHANNA Final pending - Blood Cx: NGTD 
- Lactic Acid normal 
- Procal 0.3 
  
Malignant neoplasm of lower lobe of right lung - S/P navigational Bronch / EBUS 4/4/19 
- Final path poorly differentiated carcinoma  
- Has follow up with Oncology as outpatient 
- will be presented at thoracic conference on 4/17 
- OP PFTs for therapy recommendations.  
  
Hypokalemia - Replace K 
- Follow K levels HTN  
- Stable - Continue Cozaar 
  
Hypothyroidism  
- Continue Levothyroxine HLD 
- Continue Statin 
  
Tobacco abuse 
- offer nicotine patch and encourage complete cessation of tobacco products. - Provide information on Stop Smoking resources. DME needs: 
Hospital Bed: Patient requires frequent changes in body position AND Requires the head of the bed to be elevated more than 30 degrees most of the time due to chronic pulmonary disease and problems with aspiration. DVT prophylaxis: heparin Full Code Discussed with Dr. Kanwal Aviles Dispo: to home in am with PT Total Time spent: 36 minutes. Counseling & coordinating care dominated the encounter >55%. Counseled patient regarding dx, rx, tx. Reviewed prior records, labs, vitals, diagnostic tests, flow sheet, inpatient medications, nursing and provider notes.  
 
Merly Anne PA-C, MPAS

## 2019-04-15 NOTE — PROGRESS NOTES
Cm met with pt and pt's dx Christa Rosales [phone]. Per pt request, referral sent to [facility] for [facility]. Family aware of $50 copay. Pt also requested hospital bed. CM will speak with NP about what needs to be documented in order for order to be sent. Pts information updated on Face Sheet as well.

## 2019-04-15 NOTE — PROGRESS NOTES
Chart screened by  for discharge planning. No needs identified at this time but CM will follow therapy and O2 for any home needs at DC. Please consult  if any new issues arise. Care Management Interventions PCP Verified by CM: Yes Transition of Care Consult (CM Consult): Discharge Planning Discharge Durable Medical Equipment: No 
Physical Therapy Consult: Yes Occupational Therapy Consult: Yes Current Support Network: Own Home Confirm Follow Up Transport: Family Plan discussed with Pt/Family/Caregiver: Yes Freedom of Choice Offered: Yes Discharge Location Discharge Placement: Home

## 2019-04-15 NOTE — PROGRESS NOTES
Hourly rounds completed. Patient resting in bed. Calm this shift, ready to go home. No needs at this time. Will continue to monitor and report to oncoming RN.

## 2019-04-16 VITALS
WEIGHT: 130 LBS | RESPIRATION RATE: 19 BRPM | BODY MASS INDEX: 19.7 KG/M2 | DIASTOLIC BLOOD PRESSURE: 66 MMHG | TEMPERATURE: 98.2 F | HEART RATE: 93 BPM | HEIGHT: 68 IN | SYSTOLIC BLOOD PRESSURE: 126 MMHG | OXYGEN SATURATION: 88 %

## 2019-04-16 LAB
ANION GAP SERPL CALC-SCNC: 8 MMOL/L (ref 7–16)
BUN SERPL-MCNC: 5 MG/DL (ref 8–23)
CALCIUM SERPL-MCNC: 8.3 MG/DL (ref 8.3–10.4)
CHLORIDE SERPL-SCNC: 106 MMOL/L (ref 98–107)
CO2 SERPL-SCNC: 27 MMOL/L (ref 21–32)
CREAT SERPL-MCNC: 0.51 MG/DL (ref 0.6–1)
GLUCOSE SERPL-MCNC: 88 MG/DL (ref 65–100)
POTASSIUM SERPL-SCNC: 3.2 MMOL/L (ref 3.5–5.1)
SODIUM SERPL-SCNC: 141 MMOL/L (ref 136–145)

## 2019-04-16 PROCEDURE — 77010033678 HC OXYGEN DAILY

## 2019-04-16 PROCEDURE — 74011250637 HC RX REV CODE- 250/637: Performed by: INTERNAL MEDICINE

## 2019-04-16 PROCEDURE — 74011250637 HC RX REV CODE- 250/637: Performed by: PHYSICIAN ASSISTANT

## 2019-04-16 PROCEDURE — 94760 N-INVAS EAR/PLS OXIMETRY 1: CPT

## 2019-04-16 PROCEDURE — 74011250636 HC RX REV CODE- 250/636: Performed by: INTERNAL MEDICINE

## 2019-04-16 PROCEDURE — 36415 COLL VENOUS BLD VENIPUNCTURE: CPT

## 2019-04-16 PROCEDURE — 80048 BASIC METABOLIC PNL TOTAL CA: CPT

## 2019-04-16 PROCEDURE — 94640 AIRWAY INHALATION TREATMENT: CPT

## 2019-04-16 PROCEDURE — 74011000250 HC RX REV CODE- 250: Performed by: NURSE PRACTITIONER

## 2019-04-16 RX ORDER — ALBUTEROL SULFATE 90 UG/1
1 AEROSOL, METERED RESPIRATORY (INHALATION)
Qty: 1 INHALER | Refills: 0 | Status: SHIPPED | OUTPATIENT
Start: 2019-04-16

## 2019-04-16 RX ORDER — CLINDAMYCIN HYDROCHLORIDE 300 MG/1
300 CAPSULE ORAL 3 TIMES DAILY
Qty: 6 CAP | Refills: 0 | Status: SHIPPED | OUTPATIENT
Start: 2019-04-16 | End: 2019-04-18

## 2019-04-16 RX ORDER — GUAIFENESIN 600 MG/1
600 TABLET, EXTENDED RELEASE ORAL
Qty: 24 TAB | Refills: 0 | Status: SHIPPED | OUTPATIENT
Start: 2019-04-16 | End: 2019-05-15

## 2019-04-16 RX ADMIN — Medication 10 ML: at 06:00

## 2019-04-16 RX ADMIN — ALBUTEROL SULFATE 2.5 MG: 2.5 SOLUTION RESPIRATORY (INHALATION) at 08:47

## 2019-04-16 RX ADMIN — METRONIDAZOLE 500 MG: 500 TABLET ORAL at 09:02

## 2019-04-16 RX ADMIN — POTASSIUM CHLORIDE 20 MEQ: 1.5 POWDER, FOR SOLUTION ORAL at 09:02

## 2019-04-16 RX ADMIN — ATORVASTATIN CALCIUM 10 MG: 10 TABLET, FILM COATED ORAL at 09:02

## 2019-04-16 RX ADMIN — HEPARIN SODIUM 5000 UNITS: 5000 INJECTION INTRAVENOUS; SUBCUTANEOUS at 05:41

## 2019-04-16 RX ADMIN — ALBUTEROL SULFATE 2.5 MG: 2.5 SOLUTION RESPIRATORY (INHALATION) at 11:16

## 2019-04-16 RX ADMIN — LOSARTAN POTASSIUM 25 MG: 25 TABLET, FILM COATED ORAL at 09:02

## 2019-04-16 RX ADMIN — LEVOTHYROXINE SODIUM 75 MCG: 75 TABLET ORAL at 09:02

## 2019-04-16 NOTE — PROGRESS NOTES
Discharge orders noted. Pt wearing O2 which she currently does not wear at home. O2 86% on RA. Order for home oxygen qualifier placed. RT called. Will follow for discharge instructions.

## 2019-04-16 NOTE — DISCHARGE SUMMARY
Hospitalist Discharge Summary     Patient ID:  Bruno Pablo  [id number]  35 y.o.  1940  Admit date: 4/12/2019 10:38 AM  Discharge date and time: 4/16/2019  Attending: Arbaella David DO  PCP:  Aliya Leavitt MD  Treatment Team: Attending Provider: Arabella David DO; Consulting Provider: Ngoc Pressley MD; Consulting Provider: Bobby Sanchez MD; Care Manager: Carissa Mccullough [facility]; Physical Therapist: Diane Montiel PT; Occupational Therapist: Moody Smith OT    Principal Diagnosis Acute respiratory failure with hypoxia [facility])   Principal Problem:    Acute respiratory failure with hypoxia ([facility] [address] (4/12/2019)    Active Problems:    Personal history of tobacco use, presenting hazards to health (3/18/2019)      Malignant neoplasm of lower lobe of right lung ([facility] [address] (4/4/2019)      Aspiration pneumonia ([facility] [address] (4/12/2019)      HTN (hypertension) (4/12/2019)      Acquired hypothyroidism (4/12/2019)      Severe sepsis with acute organ dysfunction ([facility] [address] (4/12/2019)       Hospital Course:  Please refer to the admission H&P for details of presentation. In summary, the patient is a 66year old CF with a PMH of HTN, smoking, and RLL lung mass for which she had a navigational Bronch / EBUS on 4/4/19. Final path poorly differentiated carcinoma and she will be presented at thoracic conference on 4/17/19. Afterwards developed cough and vomiting with cxr on 4/9 showing aspiration pna, started on clindamycin and steroids. She continued to cough and presented to ED on 4/11. Found to have o2 sat of 86% needing 4LNC to improve. CXR again with RLL infiltrate. Admitted for sepsis, aspiration pneumonia with failure of outpatient antibiotics and acute respiratory failure with hypoxia. Pulmonology consulted. Given albuterol, mucinex, Ceftriaxone + Flagyl (PCN allergy) day 5/7. DC on clinda to complete course.  Sputum Culture final pending, prelim SCANT NORMAL RESPIRATORY ZHANNA, yeast. BC NGTD x 3 except for yeast[contaminant]. Lactic Acid normal. Procal 0.3. Leukocytosis trending down. Pulm recs OP PFTs for therapy recommendations of Lung cancer. Hospital Bed:  Patient requires frequent changes in body position AND   Requires the head of the bed to be elevated more than 30 degrees most of the time due to chronic pulmonary disease and problems with aspiration. Significant Diagnostic Studies:   Labs: Results:       Chemistry Recent Labs     04/16/19  0510 04/15/19  0512 04/14/19  0532   GLU 88 92 89    139 139   K 3.2* 3.0* 3.1*    107 105   CO2 27 25 24   BUN 5* 7* 7*   CREA 0.51* 0.50* 0.53*   CA 8.3 7.7* 7.8*   AGAP 8 7 10      CBC w/Diff Recent Labs     04/15/19  0512 04/14/19  0532   WBC 12.4* 15.8*   RBC 3.93* 4.15   HGB 11.2* 11.8   HCT 35.4* 37.4    317      Cardiac Enzymes No results for input(s): CPK, CKND1, ADAMA in the last 72 hours. No lab exists for component: CKRMB, TROIP   Coagulation No results for input(s): PTP, INR, APTT in the last 72 hours. No lab exists for component: INREXT    Lipid Panel No results found for: CHOL, CHOLPOCT, CHOLX, CHLST, CHOLV, 164874, HDL, LDL, LDLC, DLDLP, 123924, VLDLC, VLDL, TGLX, TRIGL, TRIGP, TGLPOCT, CHHD, CHHDX   BNP No results for input(s): BNPP in the last 72 hours. Liver Enzymes No results for input(s): TP, ALB, TBIL, AP, SGOT, GPT in the last 72 hours. No lab exists for component: DBIL   Thyroid Studies No results found for: T4, T3U, TSH, TSHEXT       IMAGING/PROCEDURES:  4/14 cxr: Known right lower lobe mass. Suggestion of superimposed pneumonia. 3/18/19 PET   1. Moderate to intense activity within the patient's known right lower lobe mass measuring 5.4 cm x 4.4 cm. Hypermetabolic tumor appears to extend to the right  inferior hilum. No evidence for metastatic disease is seen in the neck, chest, abdomen, or pelvis, however.  Focal activity is seen in the right posterior 10th rib which is felt to be due to a rib fracture without aggressive features  appreciated.   2. Additional chronic and benign appearing changes as described above. Discharge Exam:  Visit Vitals  /71 (BP 1 Location: Right arm, BP Patient Position: At rest)   Pulse 87   Temp 98.1 °F (36.7 °C)   Resp 19   Ht 5' 8\" (1.727 m)   Wt 59 kg (130 lb)   SpO2 (!) 89% Comment: replaced 02 at 2   BMI 19.77 kg/m²     General: Alert and oriented. No acute distress. Eye: EOMI, Normal conjunctiva. HENT: Normocephalic, atraumatic, Normal hearing, moist mucous membranes. Respiratory: R diminished. No w/r/r. Respirations are non-labored. 2 L O2  Cardiovascular: Normal rate, Regular rhythm, No murmur. Gastrointestinal: Soft, Non-tender, nondistended, positive bowel sounds. Musculoskeletal: Normal range of motion, Normal strength, No tenderness, no edema. Integumentary: Warm, Dry, Pink, no rash. Neurologic: Alert, Oriented, No focal deficits. Psychiatric: Cooperative, Appropriate mood & affect. Disposition: home  Discharge Condition: stable  Patient Instructions:   Discharge Medication List as of 4/16/2019 12:01 PM      START taking these medications    Details   albuterol (PROVENTIL HFA, VENTOLIN HFA, PROAIR HFA) 90 mcg/actuation inhaler Take 1 Puff by inhalation every six (6) hours as needed for Wheezing or Shortness of Breath., Normal, Disp-1 Inhaler, R-0      guaiFENesin ER (MUCINEX) 600 mg ER tablet Take 1 Tab by mouth every twelve (12) hours as needed for Congestion. , Normal, Disp-24 Tab, R-0         CONTINUE these medications which have CHANGED    Details   clindamycin (CLEOCIN) 300 mg capsule Take 1 Cap by mouth three (3) times daily for 2 days. , Normal, Disp-6 Cap, R-0         CONTINUE these medications which have NOT CHANGED    Details   ascorbic acid, vitamin C, (VITAMIN C) 500 mg tablet Take  by mouth., Historical Med      calc-D3-magnes-W5-Xo-Re-jake 250 mg-400 unit -40 mg-5 mg tab Take 1 Tab by mouth daily. , Historical Med      omega 3-dha-epa-fish oil (FISH OIL) 100-160-1,000 mg cap Take 1 Cap by mouth daily. , Historical Med      MAGNESIUM PO Take 100 mg by mouth daily. , Historical Med      VITAMIN B COMPLEX PO Take 1 Tab by mouth daily. , Historical Med      atorvastatin (LIPITOR) 10 mg tablet Take 10 mg by mouth daily. , Historical Med      cholecalciferol (VITAMIN D3) 1,000 unit tablet Take 1,000 Units by mouth daily. , Historical Med      levothyroxine (SYNTHROID) 75 mcg tablet Take 75 mcg by mouth daily. , Historical Med      losartan (COZAAR) 25 mg tablet Take 25 mg by mouth daily. , Historical Med      therapeutic multivitamin (THERAGRAN) tablet Take 1 Tab by mouth daily. , Historical Med      zolpidem (AMBIEN) 5 mg tablet Take 1 Tab by mouth nightly., Historical Med         STOP taking these medications       predniSONE (DELTASONE) 10 mg tablet Comments:   Reason for Stopping:             Discharge Instructions  - Follow up with primary care physician, Oncology, pulmonology  - Call with any problems or questions. - Take the listed and prescribed medications as directed. - Take antibiotics as prescribed and OTC probiotics to help prevent antibiotic associated diarrhea. - Return for worsening symptoms   - Activity: Activity as tolerated  - Diet: Cardiac Diet  - Wound Care: None needed    Patient was seen, examined and stable prior to discharge. 35 minutes was spent in the discharge of this patient; at bedside, explaining the instructions, reviewing notes, discussing the case. Patient verbalizes understanding of the instructions and questions were answered to complete satisfaction. Patient is aware of the need to follow up and take medications as prescribed.     Signed:  Janes Bah PA-C, MPAS  4/16/2019  10:50 AM

## 2019-04-16 NOTE — PROGRESS NOTES
Hourly rounds completed. All needs met. Pt alert and oriented x 3. Pt rested off and on during the shift. Pt kept stating she is ready to go home. Pt is now resting quietly in bed in a low, locked position with the side rails up x 2, call light within reach. Will give report to the oncoming day shift nurse.

## 2019-04-16 NOTE — DISCHARGE INSTRUCTIONS
Discharge Instructions  - Follow up with primary care physician, Oncology, pulmonology  - Call with any problems or questions. - Take the listed and prescribed medications as directed. - Take antibiotics as prescribed and OTC probiotics to help prevent antibiotic associated diarrhea. - Return for worsening symptoms   - Activity: Activity as tolerated      DISCHARGE SUMMARY from Nurse    PATIENT INSTRUCTIONS:    After general anesthesia or intravenous sedation, for 24 hours or while taking prescription Narcotics:  · Limit your activities  · Do not drive and operate hazardous machinery  · Do not make important personal or business decisions  · Do  not drink alcoholic beverages  · If you have not urinated within 8 hours after discharge, please contact your surgeon on call. Report the following to your surgeon:  · Excessive pain, swelling, redness or odor of or around the surgical area  · Temperature over 100.5  · Nausea and vomiting lasting longer than 4 hours or if unable to take medications  · Any signs of decreased circulation or nerve impairment to extremity: change in color, persistent  numbness, tingling, coldness or increase pain  · Any questions    What to do at Home:  Recommended activity: Activity as tolerated,     If you experience any of the following symptoms fever, chills, new or unrelieved pain, persistent nausea or vomiting, shortness of breath not relieved by rest or any other worrisome symptoms, please follow up with PCP. *  Please give a list of your current medications to your Primary Care Provider. *  Please update this list whenever your medications are discontinued, doses are      changed, or new medications (including over-the-counter products) are added. *  Please carry medication information at all times in case of emergency situations.     These are general instructions for a healthy lifestyle:    No smoking/ No tobacco products/ Avoid exposure to second hand smoke  Surgeon General's Warning:  Quitting smoking now greatly reduces serious risk to your health. Obesity, smoking, and sedentary lifestyle greatly increases your risk for illness    A healthy diet, regular physical exercise & weight monitoring are important for maintaining a healthy lifestyle    You may be retaining fluid if you have a history of heart failure or if you experience any of the following symptoms:  Weight gain of 3 pounds or more overnight or 5 pounds in a week, increased swelling in our hands or feet or shortness of breath while lying flat in bed. Please call your doctor as soon as you notice any of these symptoms; do not wait until your next office visit. Recognize signs and symptoms of STROKE:    F-face looks uneven    A-arms unable to move or move unevenly    S-speech slurred or non-existent    T-time-call 911 as soon as signs and symptoms begin-DO NOT go       Back to bed or wait to see if you get better-TIME IS BRAIN. Warning Signs of HEART ATTACK     Call 911 if you have these symptoms:   Chest discomfort. Most heart attacks involve discomfort in the center of the chest that lasts more than a few minutes, or that goes away and comes back. It can feel like uncomfortable pressure, squeezing, fullness, or pain.  Discomfort in other areas of the upper body. Symptoms can include pain or discomfort in one or both arms, the back, neck, jaw, or stomach.  Shortness of breath with or without chest discomfort.  Other signs may include breaking out in a cold sweat, nausea, or lightheadedness. Don't wait more than five minutes to call 911 - MINUTES MATTER! Fast action can save your life. Calling 911 is almost always the fastest way to get lifesaving treatment. Emergency Medical Services staff can begin treatment when they arrive -- up to an hour sooner than if someone gets to the hospital by car. The discharge information has been reviewed with the patient.   The patient verbalized understanding. Discharge medications reviewed with the patient and appropriate educational materials and side effects teaching were provided. ___________________________________________________________________________________________________________________________________    Patient Education        Shortness of Breath: Care Instructions  Your Care Instructions  Shortness of breath has many causes. Sometimes conditions such as anxiety can lead to shortness of breath. Some people get mild shortness of breath when they exercise. Trouble breathing also can be a symptom of a serious problem, such as asthma, lung disease, emphysema, heart problems, and pneumonia. If your shortness of breath continues, you may need tests and treatment. Watch for any changes in your breathing and other symptoms. Follow-up care is a key part of your treatment and safety. Be sure to make and go to all appointments, and call your doctor if you are having problems. It's also a good idea to know your test results and keep a list of the medicines you take. How can you care for yourself at home? · Do not smoke or allow others to smoke around you. If you need help quitting, talk to your doctor about stop-smoking programs and medicines. These can increase your chances of quitting for good. · Get plenty of rest and sleep. · Take your medicines exactly as prescribed. Call your doctor if you think you are having a problem with your medicine. · Find healthy ways to deal with stress. ? Exercise daily. ? Get plenty of sleep. ? Eat regularly and well. When should you call for help? Call 911 anytime you think you may need emergency care. For example, call if:    · You have severe shortness of breath.     · You have symptoms of a heart attack. These may include:  ? Chest pain or pressure, or a strange feeling in the chest.  ? Sweating. ? Shortness of breath. ? Nausea or vomiting.   ? Pain, pressure, or a strange feeling in the back, neck, jaw, or upper belly or in one or both shoulders or arms. ? Lightheadedness or sudden weakness. ? A fast or irregular heartbeat. After you call 911, the  may tell you to chew 1 adult-strength or 2 to 4 low-dose aspirin. Wait for an ambulance. Do not try to drive yourself.    Call your doctor now or seek immediate medical care if:    · Your shortness of breath gets worse or you start to wheeze. Wheezing is a high-pitched sound when you breathe.     · You wake up at night out of breath or have to prop your head up on several pillows to breathe.     · You are short of breath after only light activity or while at rest.    Watch closely for changes in your health, and be sure to contact your doctor if:    · You do not get better over the next 1 to 2 days. Where can you learn more? Go to http://isha-juan carlos.info/. Enter S780 in the search box to learn more about \"Shortness of Breath: Care Instructions. \"  Current as of: September 5, 2018  Content Version: 11.9  © 0059-6007 [employer]. Care instructions adapted under license by [employer] (which disclaims liability or warranty for this information). If you have questions about a medical condition or this instruction, always ask your healthcare professional. [place] [address] any warranty or liability for your use of this information.

## 2019-04-16 NOTE — PROGRESS NOTES
Problem: Falls - Risk of 
Goal: *Absence of Falls Description Document Yulisa Hare Fall Risk and appropriate interventions in the flowsheet. Outcome: Progressing Towards Goal 
  
Problem: Pain Goal: *Control of Pain Outcome: Progressing Towards Goal

## 2019-04-16 NOTE — PROGRESS NOTES
Pt to DC home today with [facility]. O2, hospital bed and rollator ordered through [facility] aware of DC. [facility] liaisons notified as well. No other needs voiced. Care Management Interventions PCP Verified by CM: Yes Transition of Care Consult (CM Consult): Discharge Planning, Home Health [address]: Yes Discharge Durable Medical Equipment: No 
Physical Therapy Consult: Yes Occupational Therapy Consult: Yes Current Support Network: Own Home Confirm Follow Up Transport: Family Plan discussed with Pt/Family/Caregiver: Yes Freedom of Choice Offered: Yes Discharge Location Discharge Placement: Home with home health

## 2019-04-16 NOTE — PROGRESS NOTES
Oxygen Qualifier Room air: SpO2 with O2 and liter flow Resting SpO2  87%  92% on 2L Ambulating SpO2  82% 84% on 2L,87% on 3L, 91% on 4L Completed by: 
 
Silva Nyhan, RT

## 2019-04-17 ENCOUNTER — PATIENT OUTREACH (OUTPATIENT)
Dept: CASE MANAGEMENT | Age: 79
End: 2019-04-17

## 2019-04-17 LAB
BACTERIA SPEC CULT: ABNORMAL
BACTERIA SPEC CULT: ABNORMAL
BACTERIA SPEC CULT: NORMAL
BACTERIA SPEC CULT: NORMAL
GRAM STN SPEC: ABNORMAL
SERVICE CMNT-IMP: ABNORMAL
SERVICE CMNT-IMP: NORMAL
SERVICE CMNT-IMP: NORMAL

## 2019-04-17 NOTE — PROGRESS NOTES
This note will not be viewable in [address] Initial MARIELA outreach attempt to patient's home/cell number was unsuccessful. Left message to return call. Will attempt second outreach within 24 hours.

## 2019-04-17 NOTE — PROGRESS NOTES
This note will not be viewable in [address] Second MARIELA outreach attempt made to patient's cell number was unsuccessful. Left message to return call. Will attempt third outreach within 5 business days.

## 2019-04-18 ENCOUNTER — HOME CARE VISIT (OUTPATIENT)
Dept: SCHEDULING | Facility: HOME HEALTH | Age: 79
End: 2019-04-18
Payer: MEDICARE

## 2019-04-18 ENCOUNTER — PATIENT OUTREACH (OUTPATIENT)
Dept: CASE MANAGEMENT | Age: 79
End: 2019-04-18

## 2019-04-18 VITALS
HEIGHT: 68 IN | DIASTOLIC BLOOD PRESSURE: 76 MMHG | WEIGHT: 151.8 LBS | HEART RATE: 70 BPM | OXYGEN SATURATION: 93 % | SYSTOLIC BLOOD PRESSURE: 128 MMHG | RESPIRATION RATE: 20 BRPM | TEMPERATURE: 98.8 F | BODY MASS INDEX: 23.01 KG/M2

## 2019-04-18 PROCEDURE — G0299 HHS/HOSPICE OF RN EA 15 MIN: HCPCS

## 2019-04-18 PROCEDURE — 400013 HH SOC

## 2019-04-18 PROCEDURE — 3331090001 HH PPS REVENUE CREDIT

## 2019-04-18 PROCEDURE — 3331090002 HH PPS REVENUE DEBIT

## 2019-04-18 NOTE — PROGRESS NOTES
This note will not be viewable in [address] Transition of Care Discharge Follow-up Questionnaire Date/Time of Call: 
 4/18/19 
 1200pm  
What was the patient hospitalized for? Acute respiratory failure with hypoxia Does the patient understand his/her diagnosis and/or treatment and what happened during the hospitalization? Yes, spoke with patients daughter, Heriberto Nava,  she states understanding of diagnosis and treatment; and is agreeable to call. Heriberto Nava states patient is doing well, and care is lining up nicely Did the patient receive discharge instructions? Yes   
CM Assessed Risk for Readmission:  
 
 
Patient stated Risk for Readmission:  
 
 moderate r/t diagnosis, comorbidities and treatment plan 
  
none stated Review any discharge instructions (see discharge instructions/AVS in [facility]). Ask patient if they understand these. Do they have any questions? Reviewed, understanding is stated, no questions at this time Were home services ordered (nursing, PT, OT, ST, etc.)? Yes, [facility] SN/PT/OT If so, has the first visit occurred? If not, why? (Assist with coordination of services if necessary.) Scheduled for today Was any DME ordered? Yes, O2, rollator & hospital bed ([facility]) If so, has it been received? If not, why?  (Assist patient in obtaining DME orders &/or equipment if necessary.) Yes Complete a review of all medications (new, continued and discontinued meds per the D/C instructions and medication tab in [address] [facility]). Completed START taking: 
albuterol 90 mcg/actuation inhaler (PROVENTIL HFA, VENTOLIN HFA, PROAIR HFA) guaiFENesin  mg ER tablet (MUCINEX) STOP taking: 
predniSONE 10 mg tablet (DELTASONE) Were all new prescriptions filled? If not, why?  (Assist patient in obtaining medications if necessary  escalate for CCM &/or SW if ongoing issues are verbalized by pt or anticipated) Yes Does the patient understand the purpose and dosing instructions for all medications? (If patient has questions, provide explanation and education.) Yes Does the patient have any problems in performing ADLs? (If patient is unable to perform ADLs  what is the limiting factor(s)? Do they have a support system that can assist? If no support system is present, discuss possible assistance that they may be able to obtain. Escalate for CCM/SW if ongoing issues are verbalized by pt or anticipated) Independent with ADLs at baseline, Avery Mcneal is primary caregiver and strong support for patient Does the patient have all follow-up appointments scheduled? 7 day f/up with PCP?  
(f/up with PCP may be w/in 14 days if patient has a f/up with their specialist w/in 7 days) 7-14 day f/up with specialist?  
(or per discharge instructions) If f/up has not been made  what actions has the care coordinator made to accomplish this? Has transportation been arranged? Yes Dr. Ronel Duenas 4/18/19 JONNY [place] 4/30/19 Dr. Simin Flores  oncology 5/6/19 Dr. Librado Nunn general surgery 5/3/19 Yes, no transportation needs identified at this time Any other questions or concerns expressed by the patient? No other needs or concerns identified. Avery Mcneal states her gratitude for follow up. Contact information for [facility] was given, instructed to call with new questions or concerns. Schedule next appointment with SUSAN ALLEN Coordinator or refer to RN Case Manager/ per the workflow guidelines. When is care coordinators next follow-up call scheduled? If referred for CCM  what RN care manager was the referral assigned? Community Care Coordinator will follow per workflow guidelines. Within 30 days MARIELA Call Completed By: Kia Pedersen LPN Community Care Coordinator

## 2019-04-19 PROCEDURE — 3331090001 HH PPS REVENUE CREDIT

## 2019-04-19 PROCEDURE — 3331090002 HH PPS REVENUE DEBIT

## 2019-04-20 PROCEDURE — 3331090001 HH PPS REVENUE CREDIT

## 2019-04-20 PROCEDURE — 3331090002 HH PPS REVENUE DEBIT

## 2019-04-21 PROCEDURE — 3331090001 HH PPS REVENUE CREDIT

## 2019-04-21 PROCEDURE — 3331090002 HH PPS REVENUE DEBIT

## 2019-04-22 ENCOUNTER — HOME CARE VISIT (OUTPATIENT)
Dept: SCHEDULING | Facility: HOME HEALTH | Age: 79
End: 2019-04-22
Payer: MEDICARE

## 2019-04-22 ENCOUNTER — HOME CARE VISIT (OUTPATIENT)
Dept: HOME HEALTH SERVICES | Facility: HOME HEALTH | Age: 79
End: 2019-04-22
Payer: MEDICARE

## 2019-04-22 VITALS
RESPIRATION RATE: 18 BRPM | HEART RATE: 62 BPM | OXYGEN SATURATION: 95 % | TEMPERATURE: 98.3 F | SYSTOLIC BLOOD PRESSURE: 118 MMHG | DIASTOLIC BLOOD PRESSURE: 70 MMHG

## 2019-04-22 PROCEDURE — 3331090001 HH PPS REVENUE CREDIT

## 2019-04-22 PROCEDURE — G0152 HHCP-SERV OF OT,EA 15 MIN: HCPCS

## 2019-04-22 PROCEDURE — 3331090002 HH PPS REVENUE DEBIT

## 2019-04-23 ENCOUNTER — HOME CARE VISIT (OUTPATIENT)
Dept: SCHEDULING | Facility: HOME HEALTH | Age: 79
End: 2019-04-23
Payer: MEDICARE

## 2019-04-23 VITALS
HEART RATE: 98 BPM | SYSTOLIC BLOOD PRESSURE: 132 MMHG | RESPIRATION RATE: 20 BRPM | OXYGEN SATURATION: 94 % | TEMPERATURE: 98.9 F | DIASTOLIC BLOOD PRESSURE: 78 MMHG

## 2019-04-23 PROCEDURE — 3331090001 HH PPS REVENUE CREDIT

## 2019-04-23 PROCEDURE — 3331090002 HH PPS REVENUE DEBIT

## 2019-04-23 PROCEDURE — G0299 HHS/HOSPICE OF RN EA 15 MIN: HCPCS

## 2019-04-24 PROCEDURE — 3331090001 HH PPS REVENUE CREDIT

## 2019-04-24 PROCEDURE — 3331090002 HH PPS REVENUE DEBIT

## 2019-04-25 ENCOUNTER — HOSPITAL ENCOUNTER (EMERGENCY)
Age: 79
Discharge: HOME | End: 2019-04-25
Attending: EMERGENCY MEDICINE
Payer: MEDICARE

## 2019-04-25 ENCOUNTER — APPOINTMENT (OUTPATIENT)
Dept: GENERAL RADIOLOGY | Age: 79
End: 2019-04-25
Attending: EMERGENCY MEDICINE
Payer: MEDICARE

## 2019-04-25 ENCOUNTER — HOME CARE VISIT (OUTPATIENT)
Dept: HOME HEALTH SERVICES | Facility: HOME HEALTH | Age: 79
End: 2019-04-25
Payer: MEDICARE

## 2019-04-25 VITALS
WEIGHT: 151 LBS | DIASTOLIC BLOOD PRESSURE: 63 MMHG | RESPIRATION RATE: 27 BRPM | HEIGHT: 68 IN | SYSTOLIC BLOOD PRESSURE: 100 MMHG | HEART RATE: 108 BPM | TEMPERATURE: 99.1 F | BODY MASS INDEX: 22.88 KG/M2 | OXYGEN SATURATION: 91 %

## 2019-04-25 DIAGNOSIS — R05.9 COUGH: Primary | ICD-10-CM

## 2019-04-25 DIAGNOSIS — J18.9 CHRONIC PNEUMONIA: ICD-10-CM

## 2019-04-25 LAB
ALBUMIN SERPL-MCNC: 2.3 G/DL (ref 3.2–4.6)
ALBUMIN/GLOB SERPL: 0.5 {RATIO} (ref 1.2–3.5)
ALP SERPL-CCNC: 60 U/L (ref 50–136)
ALT SERPL-CCNC: 12 U/L (ref 12–65)
ANION GAP SERPL CALC-SCNC: 6 MMOL/L (ref 7–16)
AST SERPL-CCNC: 14 U/L (ref 15–37)
ATRIAL RATE: 104 BPM
BASOPHILS # BLD: 0.1 K/UL (ref 0–0.2)
BASOPHILS NFR BLD: 1 % (ref 0–2)
BILIRUB SERPL-MCNC: 0.6 MG/DL (ref 0.2–1.1)
BUN SERPL-MCNC: 8 MG/DL (ref 8–23)
CALCIUM SERPL-MCNC: 8.2 MG/DL (ref 8.3–10.4)
CALCULATED P AXIS, ECG09: 62 DEGREES
CALCULATED R AXIS, ECG10: 83 DEGREES
CALCULATED T AXIS, ECG11: 47 DEGREES
CHLORIDE SERPL-SCNC: 103 MMOL/L (ref 98–107)
CO2 SERPL-SCNC: 29 MMOL/L (ref 21–32)
CREAT SERPL-MCNC: 0.6 MG/DL (ref 0.6–1)
DIAGNOSIS, 93000: NORMAL
DIFFERENTIAL METHOD BLD: ABNORMAL
EOSINOPHIL # BLD: 0 K/UL (ref 0–0.8)
EOSINOPHIL NFR BLD: 0 % (ref 0.5–7.8)
ERYTHROCYTE [DISTWIDTH] IN BLOOD BY AUTOMATED COUNT: 14.6 % (ref 11.9–14.6)
GLOBULIN SER CALC-MCNC: 4.7 G/DL (ref 2.3–3.5)
GLUCOSE SERPL-MCNC: 129 MG/DL (ref 65–100)
HCT VFR BLD AUTO: 38 % (ref 35.8–46.3)
HGB BLD-MCNC: 12.2 G/DL (ref 11.7–15.4)
IMM GRANULOCYTES # BLD AUTO: 0.1 K/UL (ref 0–0.5)
IMM GRANULOCYTES NFR BLD AUTO: 1 % (ref 0–5)
LACTATE BLD-SCNC: 0.87 MMOL/L (ref 0.5–1.9)
LYMPHOCYTES # BLD: 1.2 K/UL (ref 0.5–4.6)
LYMPHOCYTES NFR BLD: 7 % (ref 13–44)
MCH RBC QN AUTO: 29.4 PG (ref 26.1–32.9)
MCHC RBC AUTO-ENTMCNC: 32.1 G/DL (ref 31.4–35)
MCV RBC AUTO: 91.6 FL (ref 79.6–97.8)
MONOCYTES # BLD: 1.1 K/UL (ref 0.1–1.3)
MONOCYTES NFR BLD: 7 % (ref 4–12)
NEUTS SEG # BLD: 14.1 K/UL (ref 1.7–8.2)
NEUTS SEG NFR BLD: 85 % (ref 43–78)
NRBC # BLD: 0 K/UL (ref 0–0.2)
P-R INTERVAL, ECG05: 182 MS
PLATELET # BLD AUTO: 384 K/UL (ref 150–450)
PMV BLD AUTO: 9.5 FL (ref 9.4–12.3)
POTASSIUM SERPL-SCNC: 3.6 MMOL/L (ref 3.5–5.1)
PROT SERPL-MCNC: 7 G/DL (ref 6.3–8.2)
Q-T INTERVAL, ECG07: 350 MS
QRS DURATION, ECG06: 90 MS
QTC CALCULATION (BEZET), ECG08: 460 MS
RBC # BLD AUTO: 4.15 M/UL (ref 4.05–5.2)
SODIUM SERPL-SCNC: 138 MMOL/L (ref 136–145)
TROPONIN I BLD-MCNC: 0.01 NG/ML (ref 0.02–0.05)
VENTRICULAR RATE, ECG03: 104 BPM
WBC # BLD AUTO: 16.6 K/UL (ref 4.3–11.1)

## 2019-04-25 PROCEDURE — 74011000250 HC RX REV CODE- 250: Performed by: EMERGENCY MEDICINE

## 2019-04-25 PROCEDURE — 84484 ASSAY OF TROPONIN QUANT: CPT

## 2019-04-25 PROCEDURE — 80053 COMPREHEN METABOLIC PANEL: CPT

## 2019-04-25 PROCEDURE — 83605 ASSAY OF LACTIC ACID: CPT

## 2019-04-25 PROCEDURE — 3331090001 HH PPS REVENUE CREDIT

## 2019-04-25 PROCEDURE — 71045 X-RAY EXAM CHEST 1 VIEW: CPT

## 2019-04-25 PROCEDURE — 93005 ELECTROCARDIOGRAM TRACING: CPT | Performed by: EMERGENCY MEDICINE

## 2019-04-25 PROCEDURE — 99285 EMERGENCY DEPT VISIT HI MDM: CPT | Performed by: EMERGENCY MEDICINE

## 2019-04-25 PROCEDURE — 85025 COMPLETE CBC W/AUTO DIFF WBC: CPT

## 2019-04-25 PROCEDURE — 3331090002 HH PPS REVENUE DEBIT

## 2019-04-25 PROCEDURE — 94640 AIRWAY INHALATION TREATMENT: CPT

## 2019-04-25 RX ORDER — LEVOFLOXACIN 750 MG/1
750 TABLET ORAL DAILY
Qty: 10 TAB | Refills: 0 | Status: SHIPPED | OUTPATIENT
Start: 2019-04-25 | End: 2019-05-05

## 2019-04-25 RX ORDER — LEVOFLOXACIN 750 MG/1
750 TABLET ORAL DAILY
Qty: 10 TAB | Refills: 0 | Status: SHIPPED | OUTPATIENT
Start: 2019-04-25 | End: 2019-04-25

## 2019-04-25 RX ORDER — IPRATROPIUM BROMIDE AND ALBUTEROL SULFATE 2.5; .5 MG/3ML; MG/3ML
3 SOLUTION RESPIRATORY (INHALATION)
Status: COMPLETED | OUTPATIENT
Start: 2019-04-25 | End: 2019-04-25

## 2019-04-25 RX ADMIN — IPRATROPIUM BROMIDE AND ALBUTEROL SULFATE 3 ML: .5; 3 SOLUTION RESPIRATORY (INHALATION) at 06:35

## 2019-04-25 NOTE — ED TRIAGE NOTES
Patient arrives via EMS from home with cough, shortness of breath. patient has lung cancer, states she finished antibiotics for pneumonia 2 days ago. Patient wears 2L oxygen at home. Patient states that she was feeling better but has gotten worse in the past 2 days.

## 2019-04-25 NOTE — ED NOTES
I have reviewed discharge instructions with the patient. The patient verbalized understanding. Patient left ED via Discharge Method: stretcher to Home with [facility]. Opportunity for questions and clarification provided. Patient given 1 scripts. To continue your aftercare when you leave the hospital, you may receive an automated call from our care team to check in on how you are doing. This is a free service and part of our promise to provide the best care and service to meet your aftercare needs.  If you have questions, or wish to unsubscribe from this service please call [phone]. Thank you for Choosing our [facility] Emergency Department.

## 2019-04-25 NOTE — ED PROVIDER NOTES
Elvia Gunter is a 66 y.o. female who presents to the ED with a chief complaint of shortness of breath and cough. She has not felt well all month. She does have a history of lung cancer and has been treated for a pneumonia earlier this week. She was admitted for this and has been on several courses of antibiotics as well. She just was discharged on 2 liters of oxygen. All antibiotics were finished 2 days ago. She states that she has gotten worse over the last 2 days cough. She also has history of COPD and tobacco use. Past Medical History:  
Diagnosis Date  Cancer ([facility] Lung cancer  Chronic obstructive pulmonary disease ([facility]  Hypertension  [facility] Past Surgical History:  
Procedure Laterality Date  HX OTHER SURGICAL    
 coil in brain  HX WRIST FRACTURE TX    
 bilat wrist  
 
   
No family history on file. Social History Socioeconomic History  Marital status: SINGLE Spouse name: Not on file  Number of children: Not on file  Years of education: Not on file  Highest education level: Not on file Occupational History  Not on file Social Needs  Financial resource strain: Not on file  Food insecurity:  
  Worry: Not on file Inability: Not on file  Transportation needs:  
  Medical: Not on file Non-medical: Not on file Tobacco Use  Smoking status: Former Smoker Packs/day: 1.50 Years: 45.00 Pack years: 67.50 Types: Cigarettes Last attempt to quit: 2004 Years since quitting: 15.3  Smokeless tobacco: Never Used Substance and Sexual Activity  Alcohol use: Not Currently  Drug use: Never  Sexual activity: Not on file Lifestyle  Physical activity:  
  Days per week: Not on file Minutes per session: Not on file  Stress: Not on file Relationships  Social connections:  
  Talks on phone: Not on file Gets together: Not on file Attends Congregational service: Not on file Active member of club or organization: Not on file Attends meetings of clubs or organizations: Not on file Relationship status: Not on file  Intimate partner violence:  
  Fear of current or ex partner: Not on file Emotionally abused: Not on file Physically abused: Not on file Forced sexual activity: Not on file Other Topics Concern  Not on file Social History Narrative  Not on file ALLERGIES: Penicillins; Percocet [oxycodone-acetaminophen]; and Prednisone Review of Systems Constitutional: Positive for appetite change and fatigue. Negative for chills and fever. Respiratory: Positive for cough and shortness of breath. Negative for wheezing and stridor. Cardiovascular: Negative for chest pain. All other systems reviewed and are negative. Vitals:  
 04/25/19 2325 04/25/19 0501 04/25/19 5830 BP: 117/74 113/75 Pulse: (!) 104 (!) 101 Resp: 20 Temp: 99.1 °F (37.3 °C) SpO2: 93% 91% 92% Weight: 68.5 kg (151 lb) Height: 5' 8\" (1.727 m) Physical Exam  
Constitutional: She is oriented to person, place, and time. She appears well-developed and well-nourished. No distress. HENT:  
Head: Normocephalic and atraumatic. Eyes: Conjunctivae are normal. Right eye exhibits no discharge. Left eye exhibits no discharge. Neck: Neck supple. Pulmonary/Chest: Effort normal. No stridor. Tachypnea noted. No respiratory distress. She has no decreased breath sounds. She has no wheezes. She has no rhonchi. She has no rales. Abdominal: Soft. There is no tenderness. Neurological: She is alert and oriented to person, place, and time. No focal weakness speech normal  
Skin: Skin is warm and dry. Psychiatric: She has a normal mood and affect. Her behavior is normal.  
Nursing note and vitals reviewed. MDM Number of Diagnoses or Management Options Chronic pneumonia:  
Cough:  
 Diagnosis management comments: Patient has worsening of her coughing symptoms. Still has persistent infiltrate on chest x-ray. Labs are normal.  Given that her symptoms got worse after stopping and culture her and continued antibiotics. She is given return precautions. They wished to go home instead of pursuing additional workup here. I have also advised that they follow-up with their pulmonologist this is a very complicated case with underlying cancer. Rae English MD; 4/25/2019 @8:03 AM Voice dictation software was used during the making of this note. This software is not perfect and grammatical and other typographical errors may be present. This note has not been proofread for errors. 
=================================================================== Amount and/or Complexity of Data Reviewed Clinical lab tests: ordered and reviewed (Results for orders placed or performed during the hospital encounter of 04/25/19 
-CBC WITH AUTOMATED DIFF Result                      Value             Ref Range WBC                         16.6 (H)          4.3 - 11.1 K* 
     RBC                         4.15              4.05 - 5.2 M* HGB                         12.2              11.7 - 15.4 * HCT                         38.0              35.8 - 46.3 % MCV                         91.6              79.6 - 97.8 * MCH                         29.4              26.1 - 32.9 * MCHC                        32.1              31.4 - 35.0 * RDW                         14.6              11.9 - 14.6 % PLATELET                    384               150 - 450 K/* MPV                         9.5               9.4 - 12.3 FL ABSOLUTE NRBC               0.00              0.0 - 0.2 K/* DF                          AUTOMATED NEUTROPHILS                 85 (H)            43 - 78 % LYMPHOCYTES                 7 (L)             13 - 44 % MONOCYTES                   7                 4.0 - 12.0 % EOSINOPHILS                 0 (L)             0.5 - 7.8 % BASOPHILS                   1                 0.0 - 2.0 % IMMATURE GRANULOCYTES       1                 0.0 - 5.0 %   
     ABS. NEUTROPHILS            14.1 (H)          1.7 - 8.2 K/* ABS. LYMPHOCYTES            1.2               0.5 - 4.6 K/* ABS. MONOCYTES              1.1               0.1 - 1.3 K/* ABS. EOSINOPHILS            0.0               0.0 - 0.8 K/* ABS. BASOPHILS              0.1               0.0 - 0.2 K/* ABS. IMM. GRANS.            0.1               0.0 - 0.5 K/* 
-METABOLIC PANEL, COMPREHENSIVE Result                      Value             Ref Range Sodium                      138               136 - 145 mm* Potassium                   3.6               3.5 - 5.1 mm* Chloride                    103               98 - 107 mmo* CO2                         29                21 - 32 mmol* Anion gap                   6 (L)             7 - 16 mmol/L Glucose                     129 (H)           65 - 100 mg/* BUN                         8                 8 - 23 MG/DL Creatinine                  0.60              0.6 - 1.0 MG* 
     GFR est AA                  >60               >60 ml/min/1* GFR est non-AA              >60               >60 ml/min/1* Calcium                     8.2 (L)           8.3 - 10.4 M* Bilirubin, total            0.6               0.2 - 1.1 MG* ALT (SGPT)                  12                12 - 65 U/L   
     AST (SGOT)                  14 (L)            15 - 37 U/L Alk. phosphatase            60                50 - 136 U/L Protein, total              7.0               6.3 - 8.2 g/*      Albumin                     2.3 (L)           3.2 - 4.6 g/* 
 Globulin                    4.7 (H)           2.3 - 3.5 g/* A-G Ratio                   0.5 (L)           1.2 - 3.5     
-POC LACTIC ACID Result                      Value             Ref Range Lactic Acid (POC)           0.87              0.5 - 1.9 mm* 
-POC TROPONIN-I Result                      Value             Ref Range Troponin-I (POC)            0.01 (L)          0.02 - 0.05 * 
-EKG, 12 LEAD, INITIAL Result                      Value             Ref Range Ventricular Rate            104               BPM           
     Atrial Rate                 104               BPM           
     P-R Interval                182               ms            
     QRS Duration                90                ms Q-T Interval                350               ms            
     QTC Calculation (Bezet)     460               ms            
     Calculated P Axis           62                degrees Calculated R Axis           83                degrees Calculated T Axis           47                degrees Diagnosis                                                   
 !! AGE AND GENDER SPECIFIC ECG ANALYSIS !! Sinus tachycardia Otherwise normal ECG When compared with ECG of 12-APR-2019 10:48, Premature atrial complexes are no longer Present 
  ) Tests in the radiology section of CPT®: reviewed and ordered (Xr Chest [facility] Result Date: 4/25/2019 EXAM: TEMPORARY INDICATION: Respiratory failure COMPARISON: 4/14/2019 FINDINGS: A portable AP radiograph of the chest was obtained at 0531 hours. The patient is on a cardiac monitor. Right lower lobe airspace disease and right pleural effusion unchanged. The cardiac and mediastinal contours and pulmonary vascularity are normal.  The bones and soft tissues are grossly within normal limits. IMPRESSION: Right lower lobe pneumonia and right pleural effusion unchanged. ) 
Independent visualization of images, tracings, or specimens: yes Procedures

## 2019-04-25 NOTE — DISCHARGE INSTRUCTIONS
Patient Education        Cough: Care Instructions  Your Care Instructions    A cough is your body's response to something that bothers your throat or airways. Many things can cause a cough. You might cough because of a cold or the flu, bronchitis, or asthma. Smoking, postnasal drip, allergies, and stomach acid that backs up into your throat also can cause coughs. A cough is a symptom, not a disease. Most coughs stop when the cause, such as a cold, goes away. You can take a few steps at home to cough less and feel better. Follow-up care is a key part of your treatment and safety. Be sure to make and go to all appointments, and call your doctor if you are having problems. It's also a good idea to know your test results and keep a list of the medicines you take. How can you care for yourself at home? · Drink lots of water and other fluids. This helps thin the mucus and soothes a dry or sore throat. Honey or lemon juice in hot water or tea may ease a dry cough. · Take cough medicine as directed by your doctor. · Prop up your head on pillows to help you breathe and ease a dry cough. · Try cough drops to soothe a dry or sore throat. Cough drops don't stop a cough. Medicine-flavored cough drops are no better than candy-flavored drops or hard candy. · Do not smoke. Avoid secondhand smoke. If you need help quitting, talk to your doctor about stop-smoking programs and medicines. These can increase your chances of quitting for good. When should you call for help? Call 911 anytime you think you may need emergency care.  For example, call if:    · You have severe trouble breathing.    Call your doctor now or seek immediate medical care if:    · You cough up blood.     · You have new or worse trouble breathing.     · You have a new or higher fever.     · You have a new rash.    Watch closely for changes in your health, and be sure to contact your doctor if:    · You cough more deeply or more often, especially if you notice more mucus or a change in the color of your mucus.     · You have new symptoms, such as a sore throat, an earache, or sinus pain.     · You do not get better as expected. Where can you learn more? Go to http://isha-juan carlos.info/. Enter D279 in the search box to learn more about \"Cough: Care Instructions. \"  Current as of: September 5, 2018  Content Version: 11.9  © 2006-2018 [employer]. Care instructions adapted under license by [employer] (which disclaims liability or warranty for this information). If you have questions about a medical condition or this instruction, always ask your healthcare professional. [place] [address] any warranty or liability for your use of this information. Patient Education        Pneumonia: Care Instructions  Your Care Instructions    Pneumonia is an infection of the lungs. Most cases are caused by infections from bacteria or viruses. Pneumonia may be mild or very severe. If it is caused by bacteria, you will be treated with antibiotics. It may take a few weeks to a few months to recover fully from pneumonia, depending on how sick you were and whether your overall health is good. Follow-up care is a key part of your treatment and safety. Be sure to make and go to all appointments, and call your doctor if you are having problems. It's also a good idea to know your test results and keep a list of the medicines you take. How can you care for yourself at home? · Take your antibiotics exactly as directed. Do not stop taking the medicine just because you are feeling better. You need to take the full course of antibiotics. · Take your medicines exactly as prescribed. Call your doctor if you think you are having a problem with your medicine. · Get plenty of rest and sleep. You may feel weak and tired for a while, but your energy level will improve with time.   · To prevent dehydration, drink plenty of fluids, enough so that your urine is light yellow or clear like water. Choose water and other caffeine-free clear liquids until you feel better. If you have kidney, heart, or liver disease and have to limit fluids, talk with your doctor before you increase the amount of fluids you drink. · Take care of your cough so you can rest. A cough that brings up mucus from your lungs is common with pneumonia. It is one way your body gets rid of the infection. But if coughing keeps you from resting or causes severe fatigue and chest-wall pain, talk to your doctor. He or she may suggest that you take a medicine to reduce the cough. · Use a vaporizer or humidifier to add moisture to your bedroom. Follow the directions for cleaning the machine. · Do not smoke or allow others to smoke around you. Smoke will make your cough last longer. If you need help quitting, talk to your doctor about stop-smoking programs and medicines. These can increase your chances of quitting for good. · Take an over-the-counter pain medicine, such as acetaminophen (Tylenol), ibuprofen (Advil, Motrin), or naproxen (Aleve). Read and follow all instructions on the label. · Do not take two or more pain medicines at the same time unless the doctor told you to. Many pain medicines have acetaminophen, which is Tylenol. Too much acetaminophen (Tylenol) can be harmful. · If you were given a spirometer to measure how well your lungs are working, use it as instructed. This can help your doctor tell how your recovery is going. · To prevent pneumonia in the future, talk to your doctor about getting a flu vaccine (once a year) and a pneumococcal vaccine (one time only for most people). When should you call for help? Call 911 anytime you think you may need emergency care.  For example, call if:    · You have severe trouble breathing.    Call your doctor now or seek immediate medical care if:    · You cough up dark brown or bloody mucus (sputum).     · You have new or worse trouble breathing.     · You are dizzy or lightheaded, or you feel like you may faint.    Watch closely for changes in your health, and be sure to contact your doctor if:    · You have a new or higher fever.     · You are coughing more deeply or more often.     · You are not getting better after 2 days (48 hours).     · You do not get better as expected. Where can you learn more? Go to http://isha-juan carlos.info/. Enter [id number] in the search box to learn more about \"Pneumonia: Care Instructions. \"  Current as of: September 5, 2018  Content Version: 11.9  © 9[address], [employer]. Care instructions adapted under license by [employer] (which disclaims liability or warranty for this information). If you have questions about a medical condition or this instruction, always ask your healthcare professional. [address] any warranty or liability for your use of this information.

## 2019-04-26 PROCEDURE — 3331090001 HH PPS REVENUE CREDIT

## 2019-04-26 PROCEDURE — 3331090002 HH PPS REVENUE DEBIT

## 2019-04-27 PROCEDURE — 3331090002 HH PPS REVENUE DEBIT

## 2019-04-27 PROCEDURE — 3331090001 HH PPS REVENUE CREDIT

## 2019-04-28 PROCEDURE — 3331090002 HH PPS REVENUE DEBIT

## 2019-04-28 PROCEDURE — 3331090001 HH PPS REVENUE CREDIT

## 2019-04-29 PROCEDURE — 3331090002 HH PPS REVENUE DEBIT

## 2019-04-29 PROCEDURE — 3331090001 HH PPS REVENUE CREDIT

## 2019-04-30 ENCOUNTER — HOME CARE VISIT (OUTPATIENT)
Dept: HOME HEALTH SERVICES | Facility: HOME HEALTH | Age: 79
End: 2019-04-30
Payer: MEDICARE

## 2019-04-30 PROCEDURE — 3331090002 HH PPS REVENUE DEBIT

## 2019-04-30 PROCEDURE — 3331090001 HH PPS REVENUE CREDIT

## 2019-05-01 PROCEDURE — 3331090001 HH PPS REVENUE CREDIT

## 2019-05-01 PROCEDURE — 3331090002 HH PPS REVENUE DEBIT

## 2019-05-02 ENCOUNTER — HOME CARE VISIT (OUTPATIENT)
Dept: HOME HEALTH SERVICES | Facility: HOME HEALTH | Age: 79
End: 2019-05-02
Payer: MEDICARE

## 2019-05-02 PROCEDURE — 3331090002 HH PPS REVENUE DEBIT

## 2019-05-02 PROCEDURE — 3331090001 HH PPS REVENUE CREDIT

## 2019-05-03 ENCOUNTER — HOME CARE VISIT (OUTPATIENT)
Dept: HOME HEALTH SERVICES | Facility: HOME HEALTH | Age: 79
End: 2019-05-03
Payer: MEDICARE

## 2019-05-03 PROCEDURE — 3331090003 HH PPS REVENUE ADJ

## 2019-05-03 PROCEDURE — 3331090001 HH PPS REVENUE CREDIT

## 2019-05-03 PROCEDURE — 3331090002 HH PPS REVENUE DEBIT

## 2019-05-04 PROCEDURE — 3331090002 HH PPS REVENUE DEBIT

## 2019-05-04 PROCEDURE — 3331090001 HH PPS REVENUE CREDIT

## 2019-05-05 PROCEDURE — 3331090002 HH PPS REVENUE DEBIT

## 2019-05-05 PROCEDURE — 3331090001 HH PPS REVENUE CREDIT

## 2019-05-06 ENCOUNTER — HOSPITAL ENCOUNTER (OUTPATIENT)
Dept: LAB | Age: 79
Discharge: HOME | End: 2019-05-06
Payer: MEDICARE

## 2019-05-06 DIAGNOSIS — R91.8 LUNG MASS: ICD-10-CM

## 2019-05-06 LAB
ALBUMIN SERPL-MCNC: 2.7 G/DL (ref 3.2–4.6)
ALBUMIN/GLOB SERPL: 0.6 {RATIO} (ref 1.2–3.5)
ALP SERPL-CCNC: 62 U/L (ref 50–136)
ALT SERPL-CCNC: 21 U/L (ref 12–65)
ANION GAP SERPL CALC-SCNC: 7 MMOL/L (ref 7–16)
AST SERPL-CCNC: 18 U/L (ref 15–37)
BASOPHILS # BLD: 0.1 K/UL (ref 0–0.2)
BASOPHILS NFR BLD: 1 % (ref 0–2)
BILIRUB SERPL-MCNC: 0.3 MG/DL (ref 0.2–1.1)
BUN SERPL-MCNC: 11 MG/DL (ref 8–23)
CALCIUM SERPL-MCNC: 8.8 MG/DL (ref 8.3–10.4)
CHLORIDE SERPL-SCNC: 105 MMOL/L (ref 98–107)
CO2 SERPL-SCNC: 27 MMOL/L (ref 21–32)
CREAT SERPL-MCNC: 0.64 MG/DL (ref 0.6–1)
DIFFERENTIAL METHOD BLD: ABNORMAL
EOSINOPHIL # BLD: 0.3 K/UL (ref 0–0.8)
EOSINOPHIL NFR BLD: 4 % (ref 0.5–7.8)
ERYTHROCYTE [DISTWIDTH] IN BLOOD BY AUTOMATED COUNT: 15.4 % (ref 11.9–14.6)
GLOBULIN SER CALC-MCNC: 4.7 G/DL (ref 2.3–3.5)
GLUCOSE SERPL-MCNC: 122 MG/DL (ref 65–100)
HCT VFR BLD AUTO: 39.4 % (ref 35.8–46.3)
HGB BLD-MCNC: 12.4 G/DL (ref 11.7–15.4)
IMM GRANULOCYTES # BLD AUTO: 0.1 K/UL (ref 0–0.5)
IMM GRANULOCYTES NFR BLD AUTO: 1 % (ref 0–5)
LYMPHOCYTES # BLD: 1.4 K/UL (ref 0.5–4.6)
LYMPHOCYTES NFR BLD: 14 % (ref 13–44)
MCH RBC QN AUTO: 29 PG (ref 26.1–32.9)
MCHC RBC AUTO-ENTMCNC: 31.5 G/DL (ref 31.4–35)
MCV RBC AUTO: 92.3 FL (ref 79.6–97.8)
MONOCYTES # BLD: 0.8 K/UL (ref 0.1–1.3)
MONOCYTES NFR BLD: 8 % (ref 4–12)
NEUTS SEG # BLD: 6.8 K/UL (ref 1.7–8.2)
NEUTS SEG NFR BLD: 72 % (ref 43–78)
NRBC # BLD: 0 K/UL (ref 0–0.2)
PLATELET # BLD AUTO: 313 K/UL (ref 150–450)
PMV BLD AUTO: 9.2 FL (ref 9.4–12.3)
POTASSIUM SERPL-SCNC: 3.7 MMOL/L (ref 3.5–5.1)
PROT SERPL-MCNC: 7.4 G/DL (ref 6.3–8.2)
RBC # BLD AUTO: 4.27 M/UL (ref 4.05–5.25)
SODIUM SERPL-SCNC: 139 MMOL/L (ref 136–145)
WBC # BLD AUTO: 9.4 K/UL (ref 4.3–11.1)

## 2019-05-06 PROCEDURE — 85025 COMPLETE CBC W/AUTO DIFF WBC: CPT

## 2019-05-06 PROCEDURE — 80053 COMPREHEN METABOLIC PANEL: CPT

## 2019-05-06 PROCEDURE — 36415 COLL VENOUS BLD VENIPUNCTURE: CPT

## 2019-05-08 RX ORDER — SODIUM CHLORIDE 0.9 % (FLUSH) 0.9 %
5-40 SYRINGE (ML) INJECTION EVERY 8 HOURS
Status: CANCELLED | OUTPATIENT
Start: 2019-05-08

## 2019-05-08 RX ORDER — SODIUM CHLORIDE 0.9 % (FLUSH) 0.9 %
5-40 SYRINGE (ML) INJECTION AS NEEDED
Status: CANCELLED | OUTPATIENT
Start: 2019-05-08

## 2019-05-15 ENCOUNTER — PATIENT OUTREACH (OUTPATIENT)
Dept: CASE MANAGEMENT | Age: 79
End: 2019-05-15

## 2019-05-15 ENCOUNTER — ANESTHESIA EVENT (OUTPATIENT)
Dept: SURGERY | Age: 79
DRG: 164 | End: 2019-05-15
Payer: MEDICARE

## 2019-05-15 ENCOUNTER — HOSPITAL ENCOUNTER (OUTPATIENT)
Dept: GENERAL RADIOLOGY | Age: 79
Discharge: HOME | End: 2019-05-15
Attending: SURGERY
Payer: MEDICARE

## 2019-05-15 ENCOUNTER — HOSPITAL ENCOUNTER (OUTPATIENT)
Dept: SURGERY | Age: 79
Discharge: HOME | End: 2019-05-15
Payer: MEDICARE

## 2019-05-15 VITALS
OXYGEN SATURATION: 98 % | RESPIRATION RATE: 18 BRPM | TEMPERATURE: 98.3 F | BODY MASS INDEX: 20.83 KG/M2 | WEIGHT: 137.44 LBS | HEART RATE: 98 BPM | DIASTOLIC BLOOD PRESSURE: 78 MMHG | SYSTOLIC BLOOD PRESSURE: 134 MMHG | HEIGHT: 68 IN

## 2019-05-15 LAB
ALBUMIN SERPL-MCNC: 3.1 G/DL (ref 3.2–4.6)
ALBUMIN/GLOB SERPL: 0.6 {RATIO} (ref 1.2–3.5)
ALP SERPL-CCNC: 70 U/L (ref 50–136)
ALT SERPL-CCNC: 14 U/L (ref 12–65)
ANION GAP SERPL CALC-SCNC: 6 MMOL/L (ref 7–16)
APPEARANCE UR: CLEAR
AST SERPL-CCNC: 13 U/L (ref 15–37)
ATRIAL RATE: 100 BPM
BACTERIA URNS QL MICRO: 0 /HPF
BASOPHILS # BLD: 0.1 K/UL (ref 0–0.2)
BASOPHILS NFR BLD: 1 % (ref 0–2)
BILIRUB SERPL-MCNC: 1.4 MG/DL (ref 0.2–1.1)
BILIRUB UR QL: NEGATIVE
BUN SERPL-MCNC: 10 MG/DL (ref 8–23)
CALCIUM SERPL-MCNC: 9.3 MG/DL (ref 8.3–10.4)
CALCULATED P AXIS, ECG09: 65 DEGREES
CALCULATED R AXIS, ECG10: 82 DEGREES
CALCULATED T AXIS, ECG11: 67 DEGREES
CASTS URNS QL MICRO: ABNORMAL /LPF
CHLORIDE SERPL-SCNC: 103 MMOL/L (ref 98–107)
CO2 SERPL-SCNC: 30 MMOL/L (ref 21–32)
COLOR UR: ABNORMAL
CREAT SERPL-MCNC: 0.68 MG/DL (ref 0.6–1)
DIAGNOSIS, 93000: NORMAL
DIFFERENTIAL METHOD BLD: ABNORMAL
EOSINOPHIL # BLD: 0.1 K/UL (ref 0–0.8)
EOSINOPHIL NFR BLD: 1 % (ref 0.5–7.8)
EPI CELLS #/AREA URNS HPF: ABNORMAL /HPF
ERYTHROCYTE [DISTWIDTH] IN BLOOD BY AUTOMATED COUNT: 15.2 % (ref 11.9–14.6)
GLOBULIN SER CALC-MCNC: 5.4 G/DL (ref 2.3–3.5)
GLUCOSE SERPL-MCNC: 105 MG/DL (ref 65–100)
GLUCOSE UR STRIP.AUTO-MCNC: NEGATIVE MG/DL
HCT VFR BLD AUTO: 43.1 % (ref 35.8–46.3)
HGB BLD-MCNC: 13.4 G/DL (ref 11.7–15.4)
HGB UR QL STRIP: NEGATIVE
IMM GRANULOCYTES # BLD AUTO: 0.1 K/UL (ref 0–0.5)
IMM GRANULOCYTES NFR BLD AUTO: 1 % (ref 0–5)
INR PPP: 1.1
KETONES UR QL STRIP.AUTO: NEGATIVE MG/DL
LEUKOCYTE ESTERASE UR QL STRIP.AUTO: ABNORMAL
LYMPHOCYTES # BLD: 1.6 K/UL (ref 0.5–4.6)
LYMPHOCYTES NFR BLD: 15 % (ref 13–44)
MCH RBC QN AUTO: 28.7 PG (ref 26.1–32.9)
MCHC RBC AUTO-ENTMCNC: 31.1 G/DL (ref 31.4–35)
MCV RBC AUTO: 92.3 FL (ref 79.6–97.8)
MONOCYTES # BLD: 1 K/UL (ref 0.1–1.3)
MONOCYTES NFR BLD: 9 % (ref 4–12)
NEUTS SEG # BLD: 7.8 K/UL (ref 1.7–8.2)
NEUTS SEG NFR BLD: 74 % (ref 43–78)
NITRITE UR QL STRIP.AUTO: NEGATIVE
NRBC # BLD: 0 K/UL (ref 0–0.2)
P-R INTERVAL, ECG05: 160 MS
PH UR STRIP: 5.5 [PH] (ref 5–9)
PLATELET # BLD AUTO: 409 K/UL (ref 150–450)
PMV BLD AUTO: 9.5 FL (ref 9.4–12.3)
POTASSIUM SERPL-SCNC: 3.9 MMOL/L (ref 3.5–5.1)
PROT SERPL-MCNC: 8.5 G/DL (ref 6.3–8.2)
PROT UR STRIP-MCNC: ABNORMAL MG/DL
PROTHROMBIN TIME: 13.8 SEC (ref 11.7–14.5)
Q-T INTERVAL, ECG07: 354 MS
QRS DURATION, ECG06: 90 MS
QTC CALCULATION (BEZET), ECG08: 456 MS
RBC # BLD AUTO: 4.67 M/UL (ref 4.05–5.2)
RBC #/AREA URNS HPF: ABNORMAL /HPF
SODIUM SERPL-SCNC: 139 MMOL/L (ref 136–145)
SP GR UR REFRACTOMETRY: 1.02 (ref 1–1.02)
UROBILINOGEN UR QL STRIP.AUTO: 1 EU/DL (ref 0.2–1)
VENTRICULAR RATE, ECG03: 100 BPM
WBC # BLD AUTO: 10.6 K/UL (ref 4.3–11.1)
WBC URNS QL MICRO: ABNORMAL /HPF

## 2019-05-15 PROCEDURE — 80053 COMPREHEN METABOLIC PANEL: CPT

## 2019-05-15 PROCEDURE — 85610 PROTHROMBIN TIME: CPT

## 2019-05-15 PROCEDURE — 81001 URINALYSIS AUTO W/SCOPE: CPT

## 2019-05-15 PROCEDURE — 71046 X-RAY EXAM CHEST 2 VIEWS: CPT

## 2019-05-15 PROCEDURE — 93005 ELECTROCARDIOGRAM TRACING: CPT | Performed by: SURGERY

## 2019-05-15 PROCEDURE — 85025 COMPLETE CBC W/AUTO DIFF WBC: CPT

## 2019-05-15 NOTE — ANESTHESIA PREPROCEDURE EVALUATION
Relevant Problems   No relevant active problems       Anesthetic History               Review of Systems / Medical History  Patient summary reviewed, nursing notes reviewed and pertinent labs reviewed    Pulmonary    COPD: severe      Shortness of breath and smoker (67 pk years and quit 2004)      Comments: Lung CA  DLCO 38%   Neuro/Psych   Within defined limits           Cardiovascular    Hypertension: well controlled              Exercise tolerance: <4 METS  Comments: Denies CP  Nml EF   GI/Hepatic/Renal  Within defined limits              Endo/Other      Hypothyroidism: well controlled       Other Findings            Physical Exam    Airway  Mallampati: II  TM Distance: 4 - 6 cm  Neck ROM: decreased range of motion   Mouth opening: Normal     Cardiovascular  Regular rate and rhythm,  S1 and S2 normal,  no murmur, click, rub, or gallop  Rhythm: regular  Rate: normal         Dental    Dentition: Bridges     Pulmonary      Decreased breath sounds: bilateral           Abdominal  GI exam deferred       Other Findings            Anesthetic Plan    ASA: 3  Anesthesia type: general    Monitoring Plan: Arterial line      Induction: Intravenous  Anesthetic plan and risks discussed with: Patient and Son / Daughter      Discussed risks. Pt agrees to proceed.  YUNG 37

## 2019-05-15 NOTE — PERIOP NOTES
Patient verified name and . Patient provided medical/health information and PTA medications to the best of their ability. TYPE  CASE:3 
Order for consent  found in EHR and matches case posting. Labs per surgeon:cbc,bmp,ua,cxr,pt/inr. Results: pending Labs per anesthesia protocol: cbc,bmp,T/s DOS. Results pending EKG  :  5/15/19 Pt had pulmonary testing 19. Reviewed in care everywhere . Pulmonary suggest Cardiac evaluation. Dr Lino White reviewed chart and in to see pt. Cardiac Clearance requested before surgery, Appointment made at [facility] 19 at 4 pm. Ernst at Dr Aki Up office notified of Dr. Stephanie Pizarro request for Cardiac Clearance and pt scheduled appointment. Pt and family informed of appointment time with [facility] cardiology. Address and phone number to office provided. Patient provided with and instructed on education handouts including Guide to Surgery, blood transfusions, pain management, and hand hygiene for the family and community, and [facility] brochure. Hibiclens and instructions given per hospital policy. Instructed patient to continue previous medications as prescribed prior to surgery unless otherwise directed and to take the following medications the day of surgery according to anesthesia guidelines : Albuterol, Lipitor, Levothyroxine . Instructed patient to hold  the following medications: Vitamin C, Calcium/ magnesium, Vitamin D, magnesium, Multivitamin, Vitamin B.. Original medication prescription bottles not visualized during patient appointment. Patient teach back successful and patient demonstrates knowledge of instruction.

## 2019-05-15 NOTE — PROGRESS NOTES
This note will not be viewable in [address] Transitions of Care  Follow up Outreach Note Outreach type Phone call: spoke with patients daughter, Nayla Abdullahi Home visit:  
Date/Time of Outreach: 5/15/19  201pm  
 
Has patient attended PCP or specialist follow-up appointments since last contact? What was outcome of appointment? When is next follow-up scheduled? Nayla Vargascheco states patient has attended all follow ups and has been scheduled for cardiac clearance at [facility] 5/21/19. Patient is scheduled to readmit 5/22/19 for surgery with Dr. Army Zuniga (right lower lobectomy) Review medications. Any medication changes since last outreach? Does patient have any questions or issues related to their medications? None stated No   
 
Home health active? If yes  any issue? Progress? No  
 
Referrals needed? 
(CM, SW, HH, etc. ) No 
Patient is followed by oncology nurse navigator Other issues/Miscellaneous? (Transportation, access to meals, ability to perform ADLs, adequate caregiver support, etc.) No other needs or concerns at this time. Nayla Vargascheco states her gratitude for follow up. Next Outreach Scheduled? Graduation from program? 
 N/A Yes Next Steps/Goals (if applicable): N/A Outreach completed by: 
 Eagle Ramirez LPN Community Care Coordinator

## 2019-05-15 NOTE — PERIOP NOTES
Recent Results (from the past 12 hour(s)) CBC WITH AUTOMATED DIFF Collection Time: 05/15/19 12:06 PM  
Result Value Ref Range WBC 10.6 4.3 - 11.1 K/uL  
 RBC 4.67 4.05 - 5.2 M/uL  
 HGB 13.4 11.7 - 15.4 g/dL HCT 43.1 35.8 - 46.3 % MCV 92.3 79.6 - 97.8 FL  
 MCH 28.7 26.1 - 32.9 PG  
 MCHC 31.1 (L) 31.4 - 35.0 g/dL  
 RDW 15.2 (H) 11.9 - 14.6 % PLATELET 185 671 - 834 K/uL MPV 9.5 9.4 - 12.3 FL ABSOLUTE NRBC 0.00 0.0 - 0.2 K/uL  
 DF AUTOMATED NEUTROPHILS 74 43 - 78 % LYMPHOCYTES 15 13 - 44 % MONOCYTES 9 4.0 - 12.0 % EOSINOPHILS 1 0.5 - 7.8 % BASOPHILS 1 0.0 - 2.0 % IMMATURE GRANULOCYTES 1 0.0 - 5.0 %  
 ABS. NEUTROPHILS 7.8 1.7 - 8.2 K/UL  
 ABS. LYMPHOCYTES 1.6 0.5 - 4.6 K/UL  
 ABS. MONOCYTES 1.0 0.1 - 1.3 K/UL  
 ABS. EOSINOPHILS 0.1 0.0 - 0.8 K/UL  
 ABS. BASOPHILS 0.1 0.0 - 0.2 K/UL  
 ABS. IMM. GRANS. 0.1 0.0 - 0.5 K/UL METABOLIC PANEL, COMPREHENSIVE Collection Time: 05/15/19 12:06 PM  
Result Value Ref Range Sodium 139 136 - 145 mmol/L Potassium 3.9 3.5 - 5.1 mmol/L Chloride 103 98 - 107 mmol/L  
 CO2 30 21 - 32 mmol/L Anion gap 6 (L) 7 - 16 mmol/L Glucose 105 (H) 65 - 100 mg/dL BUN 10 8 - 23 MG/DL Creatinine 0.68 0.6 - 1.0 MG/DL  
 GFR est AA >60 >60 ml/min/1.73m2 GFR est non-AA >60 >60 ml/min/1.73m2 Calcium 9.3 8.3 - 10.4 MG/DL Bilirubin, total 1.4 (H) 0.2 - 1.1 MG/DL  
 ALT (SGPT) 14 12 - 65 U/L  
 AST (SGOT) 13 (L) 15 - 37 U/L Alk. phosphatase 70 50 - 136 U/L Protein, total 8.5 (H) 6.3 - 8.2 g/dL Albumin 3.1 (L) 3.2 - 4.6 g/dL Globulin 5.4 (H) 2.3 - 3.5 g/dL A-G Ratio 0.6 (L) 1.2 - 3.5 URINALYSIS W/ RFLX MICROSCOPIC Collection Time: 05/15/19 12:06 PM  
Result Value Ref Range Color RODGER Appearance CLEAR Specific gravity 1.017 1.001 - 1.023    
 pH (UA) 5.5 5.0 - 9.0 Protein TRACE (A) NEG mg/dL Glucose NEGATIVE  mg/dL Ketone NEGATIVE  NEG mg/dL  Bilirubin NEGATIVE  NEG    
 Blood NEGATIVE  NEG Urobilinogen 1.0 0.2 - 1.0 EU/dL Nitrites NEGATIVE  NEG Leukocyte Esterase SMALL (A) NEG    
 WBC 5-10 0 /hpf  
 RBC 0-3 0 /hpf Epithelial cells 0-3 0 /hpf Bacteria 0 0 /hpf Casts 0-3 0 /lpf PROTHROMBIN TIME + INR Collection Time: 05/15/19 12:06 PM  
Result Value Ref Range Prothrombin time 13.8 11.7 - 14.5 sec INR 1.1 Reviewed

## 2019-05-21 NOTE — PERIOP NOTES
Patient was seen by Dr. Ava Wallace yesterday (5/20/19)  for cardiology preop evaluation. Note not yet finished. Patient did have echocardiogram today at 730 am.  Self verified with radiology that patient did arrive.   Awaiting results of echo and finalized note by Dr. Ava Wallace.

## 2019-05-21 NOTE — PERIOP NOTES
Per note by Dr. Marlon Roman in [facility] 5/21/19 patient is cleared to proceed with surgery on 5/22/19.

## 2019-05-21 NOTE — PERIOP NOTES
Received faxed copy of echo done 5/21/19 at [facility]. This is within anesthesia protocol. Placed on chart. Office visit note by Dr. Hank Bettencourt from 5/20/19 is not yet completed. Self spoke to [facility] at the office and requested cardiac clearance evaluation. She will follow up by sending note to request this.

## 2019-05-22 ENCOUNTER — ANESTHESIA (OUTPATIENT)
Dept: SURGERY | Age: 79
DRG: 164 | End: 2019-05-22
Payer: MEDICARE

## 2019-05-22 ENCOUNTER — HOSPITAL ENCOUNTER (INPATIENT)
Age: 79
LOS: 9 days | Discharge: NURSING, REHAB OR LONG-TERM CARE | DRG: 164 | End: 2019-05-31
Attending: SURGERY | Admitting: SURGERY
Payer: MEDICARE

## 2019-05-22 ENCOUNTER — APPOINTMENT (OUTPATIENT)
Dept: GENERAL RADIOLOGY | Age: 79
DRG: 164 | End: 2019-05-22
Attending: NURSE PRACTITIONER
Payer: MEDICARE

## 2019-05-22 DIAGNOSIS — J43.2 CENTRILOBULAR EMPHYSEMA (HCC): Chronic | ICD-10-CM

## 2019-05-22 DIAGNOSIS — R91.8 RIGHT LOWER LOBE LUNG MASS: ICD-10-CM

## 2019-05-22 DIAGNOSIS — C34.31 MALIGNANT NEOPLASM OF LOWER LOBE OF RIGHT LUNG (HCC): Chronic | ICD-10-CM

## 2019-05-22 DIAGNOSIS — J44.9 CHRONIC OBSTRUCTIVE PULMONARY DISEASE, UNSPECIFIED COPD TYPE (HCC): ICD-10-CM

## 2019-05-22 DIAGNOSIS — R09.02 HYPOXIA: ICD-10-CM

## 2019-05-22 DIAGNOSIS — J96.01 ACUTE RESPIRATORY FAILURE WITH HYPOXIA (HCC): ICD-10-CM

## 2019-05-22 DIAGNOSIS — R91.8 LUNG MASS: ICD-10-CM

## 2019-05-22 DIAGNOSIS — C34.91 NON-SMALL CELL CANCER OF RIGHT LUNG (HCC): ICD-10-CM

## 2019-05-22 DIAGNOSIS — I48.91 ATRIAL FIBRILLATION WITH RAPID VENTRICULAR RESPONSE (HCC): ICD-10-CM

## 2019-05-22 PROBLEM — Z48.89 AFTERCARE FOLLOWING SURGERY: Status: ACTIVE | Noted: 2019-05-22

## 2019-05-22 LAB
ARTERIAL PATENCY WRIST A: ABNORMAL
BASE EXCESS BLD CALC-SCNC: 0 MMOL/L
BASE EXCESS BLD CALC-SCNC: 1 MMOL/L
BASE EXCESS BLD CALC-SCNC: 2 MMOL/L
BDY SITE: ABNORMAL
BODY TEMPERATURE: 98.6
CA-I BLD-MCNC: 1.14 MMOL/L (ref 1.12–1.32)
CA-I BLD-MCNC: 1.17 MMOL/L (ref 1.12–1.32)
CO2 BLD-SCNC: 27 MMOL/L
COLLECT TIME,HTIME: 1410
FLOW RATE ISTAT,IFRATE: 4 L/MIN
GAS FLOW.O2 O2 DELIVERY SYS: ABNORMAL L/MIN
GLUCOSE BLD STRIP.AUTO-MCNC: 151 MG/DL (ref 65–100)
GLUCOSE BLD STRIP.AUTO-MCNC: 171 MG/DL (ref 65–100)
HCO3 BLD-SCNC: 26 MMOL/L (ref 22–26)
HCO3 BLD-SCNC: 27.2 MMOL/L (ref 22–26)
HCO3 BLD-SCNC: 27.6 MMOL/L (ref 22–26)
PCO2 BLD: 45.6 MMHG (ref 35–45)
PCO2 BLD: 48.5 MMHG (ref 35–45)
PCO2 BLD: 48.6 MMHG (ref 35–45)
PH BLD: 7.36 [PH] (ref 7.35–7.45)
PO2 BLD: 107 MMHG (ref 75–100)
PO2 BLD: 120 MMHG (ref 75–100)
PO2 BLD: 326 MMHG (ref 75–100)
POTASSIUM BLD-SCNC: 3.4 MMOL/L (ref 3.5–5.1)
POTASSIUM BLD-SCNC: 3.5 MMOL/L (ref 3.5–5.1)
SAO2 % BLD: 100 % (ref 95–98)
SAO2 % BLD: 98 % (ref 95–98)
SAO2 % BLD: 99 % (ref 95–98)
SERVICE CMNT-IMP: ABNORMAL
SODIUM BLD-SCNC: 139 MMOL/L (ref 136–145)
SODIUM BLD-SCNC: 139 MMOL/L (ref 136–145)
SPECIMEN TYPE: ABNORMAL

## 2019-05-22 PROCEDURE — 74011250636 HC RX REV CODE- 250/636: Performed by: SURGERY

## 2019-05-22 PROCEDURE — 0BNF0ZZ RELEASE RIGHT LOWER LUNG LOBE, OPEN APPROACH: ICD-10-PCS | Performed by: SURGERY

## 2019-05-22 PROCEDURE — 77030019940 HC BLNKT HYPOTHRM STRY -B: Performed by: NURSE ANESTHETIST, CERTIFIED REGISTERED

## 2019-05-22 PROCEDURE — 0BJQ4ZZ INSPECTION OF PLEURA, PERCUTANEOUS ENDOSCOPIC APPROACH: ICD-10-PCS | Performed by: SURGERY

## 2019-05-22 PROCEDURE — 77030002966 HC SUT PDS J&J -A: Performed by: SURGERY

## 2019-05-22 PROCEDURE — 77030008467 HC STPLR SKN COVD -B: Performed by: SURGERY

## 2019-05-22 PROCEDURE — 88342 IMHCHEM/IMCYTCHM 1ST ANTB: CPT

## 2019-05-22 PROCEDURE — C2618 PROBE/NEEDLE, CRYO: HCPCS | Performed by: SURGERY

## 2019-05-22 PROCEDURE — 0BBC0ZZ EXCISION OF RIGHT UPPER LUNG LOBE, OPEN APPROACH: ICD-10-PCS | Performed by: SURGERY

## 2019-05-22 PROCEDURE — 94640 AIRWAY INHALATION TREATMENT: CPT

## 2019-05-22 PROCEDURE — C1729 CATH, DRAINAGE: HCPCS | Performed by: SURGERY

## 2019-05-22 PROCEDURE — 77030002996 HC SUT SLK J&J -A: Performed by: SURGERY

## 2019-05-22 PROCEDURE — 77030035048 HC TRCR ENDOSC OPTCL COVD -B: Performed by: SURGERY

## 2019-05-22 PROCEDURE — 0BNG0ZZ RELEASE LEFT UPPER LUNG LOBE, OPEN APPROACH: ICD-10-PCS | Performed by: SURGERY

## 2019-05-22 PROCEDURE — 74011250636 HC RX REV CODE- 250/636

## 2019-05-22 PROCEDURE — 77030008671 HC TU ENDO/BRNC CUF COVD -B: Performed by: ANESTHESIOLOGY

## 2019-05-22 PROCEDURE — 0BQT0ZZ REPAIR DIAPHRAGM, OPEN APPROACH: ICD-10-PCS | Performed by: SURGERY

## 2019-05-22 PROCEDURE — 76060000041 HC ANESTHESIA 5 TO 5.5 HR: Performed by: SURGERY

## 2019-05-22 PROCEDURE — P9045 ALBUMIN (HUMAN), 5%, 250 ML: HCPCS

## 2019-05-22 PROCEDURE — 74011000250 HC RX REV CODE- 250: Performed by: NURSE PRACTITIONER

## 2019-05-22 PROCEDURE — 82947 ASSAY GLUCOSE BLOOD QUANT: CPT

## 2019-05-22 PROCEDURE — 74011250637 HC RX REV CODE- 250/637: Performed by: NURSE PRACTITIONER

## 2019-05-22 PROCEDURE — 74011250636 HC RX REV CODE- 250/636: Performed by: ANESTHESIOLOGY

## 2019-05-22 PROCEDURE — 77030016151 HC PROTCTR LNS DFOG COVD -B: Performed by: SURGERY

## 2019-05-22 PROCEDURE — 77030000038 HC TIP SCIS LAPSCP SURI -B: Performed by: SURGERY

## 2019-05-22 PROCEDURE — P9045 ALBUMIN (HUMAN), 5%, 250 ML: HCPCS | Performed by: ANESTHESIOLOGY

## 2019-05-22 PROCEDURE — 74011250636 HC RX REV CODE- 250/636: Performed by: NURSE PRACTITIONER

## 2019-05-22 PROCEDURE — 74011000250 HC RX REV CODE- 250

## 2019-05-22 PROCEDURE — 07B70ZZ EXCISION OF THORAX LYMPHATIC, OPEN APPROACH: ICD-10-PCS | Performed by: SURGERY

## 2019-05-22 PROCEDURE — 88305 TISSUE EXAM BY PATHOLOGIST: CPT

## 2019-05-22 PROCEDURE — 82803 BLOOD GASES ANY COMBINATION: CPT

## 2019-05-22 PROCEDURE — 88341 IMHCHEM/IMCYTCHM EA ADD ANTB: CPT

## 2019-05-22 PROCEDURE — 86923 COMPATIBILITY TEST ELECTRIC: CPT

## 2019-05-22 PROCEDURE — 76010000176 HC OR TIME 4.5 TO 5 HR INTENSV-TIER 1: Performed by: SURGERY

## 2019-05-22 PROCEDURE — 71045 X-RAY EXAM CHEST 1 VIEW: CPT

## 2019-05-22 PROCEDURE — 65270000029 HC RM PRIVATE

## 2019-05-22 PROCEDURE — 77030018673: Performed by: SURGERY

## 2019-05-22 PROCEDURE — 77030037378 HC BRONCHOSCOPE DISP TRAN -D: Performed by: NURSE ANESTHETIST, CERTIFIED REGISTERED

## 2019-05-22 PROCEDURE — 0BTF0ZZ RESECTION OF RIGHT LOWER LUNG LOBE, OPEN APPROACH: ICD-10-PCS | Performed by: SURGERY

## 2019-05-22 PROCEDURE — 77030013292 HC BOWL MX PRSM J&J -A: Performed by: NURSE ANESTHETIST, CERTIFIED REGISTERED

## 2019-05-22 PROCEDURE — 74011000250 HC RX REV CODE- 250: Performed by: ANESTHESIOLOGY

## 2019-05-22 PROCEDURE — 77030039425 HC BLD LARYNG TRULITE DISP TELE -A: Performed by: ANESTHESIOLOGY

## 2019-05-22 PROCEDURE — 77030012414: Performed by: SURGERY

## 2019-05-22 PROCEDURE — 86900 BLOOD TYPING SEROLOGIC ABO: CPT

## 2019-05-22 PROCEDURE — 77030019908 HC STETH ESOPH SIMS -A: Performed by: NURSE ANESTHETIST, CERTIFIED REGISTERED

## 2019-05-22 PROCEDURE — 77030035051: Performed by: SURGERY

## 2019-05-22 PROCEDURE — 77030034850: Performed by: SURGERY

## 2019-05-22 PROCEDURE — 0BNJ0ZZ RELEASE LEFT LOWER LUNG LOBE, OPEN APPROACH: ICD-10-PCS | Performed by: SURGERY

## 2019-05-22 PROCEDURE — 77030009968 HC RELD STPLR ENDOSC J&J -D: Performed by: SURGERY

## 2019-05-22 PROCEDURE — 0BBT0ZZ EXCISION OF DIAPHRAGM, OPEN APPROACH: ICD-10-PCS | Performed by: SURGERY

## 2019-05-22 PROCEDURE — 77030018832 HC SOL IRR H20 ICUM -A: Performed by: SURGERY

## 2019-05-22 PROCEDURE — 77030002986 HC SUT PROL J&J -A: Performed by: SURGERY

## 2019-05-22 PROCEDURE — 0WB80ZZ EXCISION OF CHEST WALL, OPEN APPROACH: ICD-10-PCS | Performed by: SURGERY

## 2019-05-22 PROCEDURE — 76210000016 HC OR PH I REC 1 TO 1.5 HR: Performed by: SURGERY

## 2019-05-22 PROCEDURE — 77030032490 HC SLV COMPR SCD KNE COVD -B: Performed by: SURGERY

## 2019-05-22 PROCEDURE — 77030010349 HC TRCR ENDOSC THOR COVD -B: Performed by: SURGERY

## 2019-05-22 PROCEDURE — 77030012390 HC DRN CHST BTL GTNG -B: Performed by: SURGERY

## 2019-05-22 PROCEDURE — 94760 N-INVAS EAR/PLS OXIMETRY 1: CPT

## 2019-05-22 PROCEDURE — 77030008756 HC TU IRR SUC STRY -B: Performed by: SURGERY

## 2019-05-22 PROCEDURE — 77030027876 HC STPLR ENDOSC FLX PWR J&J -G1: Performed by: SURGERY

## 2019-05-22 PROCEDURE — 0BNC0ZZ RELEASE RIGHT UPPER LUNG LOBE, OPEN APPROACH: ICD-10-PCS | Performed by: SURGERY

## 2019-05-22 PROCEDURE — 77030031139 HC SUT VCRL2 J&J -A: Performed by: SURGERY

## 2019-05-22 PROCEDURE — 77030013794 HC KT TRNSDUC BLD EDWD -B: Performed by: NURSE ANESTHETIST, CERTIFIED REGISTERED

## 2019-05-22 PROCEDURE — 0BNN0ZZ RELEASE RIGHT PLEURA, OPEN APPROACH: ICD-10-PCS | Performed by: SURGERY

## 2019-05-22 PROCEDURE — 77030011264 HC ELECTRD BLD EXT COVD -A: Performed by: SURGERY

## 2019-05-22 RX ORDER — NALOXONE HYDROCHLORIDE 0.4 MG/ML
0.2 INJECTION, SOLUTION INTRAMUSCULAR; INTRAVENOUS; SUBCUTANEOUS
Status: DISCONTINUED | OUTPATIENT
Start: 2019-05-22 | End: 2019-05-22 | Stop reason: HOSPADM

## 2019-05-22 RX ORDER — GLYCOPYRROLATE 0.2 MG/ML
INJECTION INTRAMUSCULAR; INTRAVENOUS AS NEEDED
Status: DISCONTINUED | OUTPATIENT
Start: 2019-05-22 | End: 2019-05-22 | Stop reason: HOSPADM

## 2019-05-22 RX ORDER — SODIUM CHLORIDE 0.9 % (FLUSH) 0.9 %
5-40 SYRINGE (ML) INJECTION EVERY 8 HOURS
Status: DISCONTINUED | OUTPATIENT
Start: 2019-05-22 | End: 2019-05-31 | Stop reason: HOSPADM

## 2019-05-22 RX ORDER — SODIUM CHLORIDE, SODIUM LACTATE, POTASSIUM CHLORIDE, CALCIUM CHLORIDE 600; 310; 30; 20 MG/100ML; MG/100ML; MG/100ML; MG/100ML
75 INJECTION, SOLUTION INTRAVENOUS CONTINUOUS
Status: DISCONTINUED | OUTPATIENT
Start: 2019-05-22 | End: 2019-05-23

## 2019-05-22 RX ORDER — ALBUTEROL SULFATE 0.83 MG/ML
2.5 SOLUTION RESPIRATORY (INHALATION)
Status: COMPLETED | OUTPATIENT
Start: 2019-05-22 | End: 2019-05-22

## 2019-05-22 RX ORDER — MIDAZOLAM HYDROCHLORIDE 1 MG/ML
2 INJECTION, SOLUTION INTRAMUSCULAR; INTRAVENOUS
Status: DISCONTINUED | OUTPATIENT
Start: 2019-05-22 | End: 2019-05-22 | Stop reason: HOSPADM

## 2019-05-22 RX ORDER — NALOXONE HYDROCHLORIDE 0.4 MG/ML
0.2 INJECTION, SOLUTION INTRAMUSCULAR; INTRAVENOUS; SUBCUTANEOUS AS NEEDED
Status: DISCONTINUED | OUTPATIENT
Start: 2019-05-22 | End: 2019-05-22 | Stop reason: SDUPTHER

## 2019-05-22 RX ORDER — CEFAZOLIN SODIUM/WATER 2 G/20 ML
2 SYRINGE (ML) INTRAVENOUS EVERY 8 HOURS
Status: COMPLETED | OUTPATIENT
Start: 2019-05-22 | End: 2019-05-23

## 2019-05-22 RX ORDER — SODIUM CHLORIDE, SODIUM LACTATE, POTASSIUM CHLORIDE, CALCIUM CHLORIDE 600; 310; 30; 20 MG/100ML; MG/100ML; MG/100ML; MG/100ML
INJECTION, SOLUTION INTRAVENOUS
Status: DISCONTINUED | OUTPATIENT
Start: 2019-05-22 | End: 2019-05-22 | Stop reason: HOSPADM

## 2019-05-22 RX ORDER — LIDOCAINE HYDROCHLORIDE 10 MG/ML
0.1 INJECTION INFILTRATION; PERINEURAL AS NEEDED
Status: DISCONTINUED | OUTPATIENT
Start: 2019-05-22 | End: 2019-05-22 | Stop reason: HOSPADM

## 2019-05-22 RX ORDER — HYDROMORPHONE HYDROCHLORIDE 1 MG/ML
1 INJECTION, SOLUTION INTRAMUSCULAR; INTRAVENOUS; SUBCUTANEOUS
Status: DISCONTINUED | OUTPATIENT
Start: 2019-05-22 | End: 2019-05-23

## 2019-05-22 RX ORDER — KETAMINE HYDROCHLORIDE 100 MG/ML
INJECTION, SOLUTION INTRAMUSCULAR; INTRAVENOUS AS NEEDED
Status: DISCONTINUED | OUTPATIENT
Start: 2019-05-22 | End: 2019-05-22 | Stop reason: HOSPADM

## 2019-05-22 RX ORDER — PROPOFOL 10 MG/ML
INJECTION, EMULSION INTRAVENOUS AS NEEDED
Status: DISCONTINUED | OUTPATIENT
Start: 2019-05-22 | End: 2019-05-22 | Stop reason: HOSPADM

## 2019-05-22 RX ORDER — HYDROMORPHONE HYDROCHLORIDE 2 MG/ML
0.5 INJECTION, SOLUTION INTRAMUSCULAR; INTRAVENOUS; SUBCUTANEOUS
Status: COMPLETED | OUTPATIENT
Start: 2019-05-22 | End: 2019-05-22

## 2019-05-22 RX ORDER — PANTOPRAZOLE SODIUM 40 MG/1
40 TABLET, DELAYED RELEASE ORAL
Status: DISCONTINUED | OUTPATIENT
Start: 2019-05-23 | End: 2019-05-31 | Stop reason: HOSPADM

## 2019-05-22 RX ORDER — FENTANYL CITRATE 50 UG/ML
100 INJECTION, SOLUTION INTRAMUSCULAR; INTRAVENOUS ONCE
Status: DISCONTINUED | OUTPATIENT
Start: 2019-05-22 | End: 2019-05-22 | Stop reason: HOSPADM

## 2019-05-22 RX ORDER — LEVOTHYROXINE SODIUM 75 UG/1
75 TABLET ORAL DAILY
Status: DISCONTINUED | OUTPATIENT
Start: 2019-05-23 | End: 2019-05-31 | Stop reason: HOSPADM

## 2019-05-22 RX ORDER — HYDROMORPHONE HYDROCHLORIDE 2 MG/ML
0.25 INJECTION, SOLUTION INTRAMUSCULAR; INTRAVENOUS; SUBCUTANEOUS
Status: DISCONTINUED | OUTPATIENT
Start: 2019-05-22 | End: 2019-05-23 | Stop reason: HOSPADM

## 2019-05-22 RX ORDER — ONDANSETRON 2 MG/ML
INJECTION INTRAMUSCULAR; INTRAVENOUS AS NEEDED
Status: DISCONTINUED | OUTPATIENT
Start: 2019-05-22 | End: 2019-05-22 | Stop reason: HOSPADM

## 2019-05-22 RX ORDER — SODIUM CHLORIDE 0.9 % (FLUSH) 0.9 %
5-40 SYRINGE (ML) INJECTION AS NEEDED
Status: DISCONTINUED | OUTPATIENT
Start: 2019-05-22 | End: 2019-05-22 | Stop reason: HOSPADM

## 2019-05-22 RX ORDER — IPRATROPIUM BROMIDE AND ALBUTEROL SULFATE 2.5; .5 MG/3ML; MG/3ML
3 SOLUTION RESPIRATORY (INHALATION)
Status: DISCONTINUED | OUTPATIENT
Start: 2019-05-22 | End: 2019-05-30

## 2019-05-22 RX ORDER — LIDOCAINE HYDROCHLORIDE 20 MG/ML
INJECTION, SOLUTION EPIDURAL; INFILTRATION; INTRACAUDAL; PERINEURAL AS NEEDED
Status: DISCONTINUED | OUTPATIENT
Start: 2019-05-22 | End: 2019-05-22 | Stop reason: HOSPADM

## 2019-05-22 RX ORDER — ACETAMINOPHEN 10 MG/ML
INJECTION, SOLUTION INTRAVENOUS AS NEEDED
Status: DISCONTINUED | OUTPATIENT
Start: 2019-05-22 | End: 2019-05-22 | Stop reason: HOSPADM

## 2019-05-22 RX ORDER — ALBUMIN HUMAN 50 G/1000ML
SOLUTION INTRAVENOUS AS NEEDED
Status: DISCONTINUED | OUTPATIENT
Start: 2019-05-22 | End: 2019-05-22 | Stop reason: HOSPADM

## 2019-05-22 RX ORDER — NALOXONE HYDROCHLORIDE 0.4 MG/ML
0.4 INJECTION, SOLUTION INTRAMUSCULAR; INTRAVENOUS; SUBCUTANEOUS AS NEEDED
Status: DISCONTINUED | OUTPATIENT
Start: 2019-05-22 | End: 2019-05-31 | Stop reason: HOSPADM

## 2019-05-22 RX ORDER — AMOXICILLIN 250 MG
2 CAPSULE ORAL 2 TIMES DAILY
Status: DISCONTINUED | OUTPATIENT
Start: 2019-05-22 | End: 2019-05-31 | Stop reason: HOSPADM

## 2019-05-22 RX ORDER — ONDANSETRON 2 MG/ML
4 INJECTION INTRAMUSCULAR; INTRAVENOUS
Status: DISCONTINUED | OUTPATIENT
Start: 2019-05-22 | End: 2019-05-22 | Stop reason: HOSPADM

## 2019-05-22 RX ORDER — CEFAZOLIN SODIUM/WATER 2 G/20 ML
2 SYRINGE (ML) INTRAVENOUS ONCE
Status: COMPLETED | OUTPATIENT
Start: 2019-05-22 | End: 2019-05-22

## 2019-05-22 RX ORDER — SODIUM CHLORIDE 0.9 % (FLUSH) 0.9 %
5-40 SYRINGE (ML) INJECTION AS NEEDED
Status: DISCONTINUED | OUTPATIENT
Start: 2019-05-22 | End: 2019-05-31 | Stop reason: HOSPADM

## 2019-05-22 RX ORDER — GABAPENTIN 300 MG/1
300 CAPSULE ORAL 3 TIMES DAILY
Status: DISCONTINUED | OUTPATIENT
Start: 2019-05-22 | End: 2019-05-31 | Stop reason: HOSPADM

## 2019-05-22 RX ORDER — ACETAMINOPHEN 10 MG/ML
1000 INJECTION, SOLUTION INTRAVENOUS ONCE
Status: COMPLETED | OUTPATIENT
Start: 2019-05-22 | End: 2019-05-22

## 2019-05-22 RX ORDER — DEXAMETHASONE SODIUM PHOSPHATE 4 MG/ML
INJECTION, SOLUTION INTRA-ARTICULAR; INTRALESIONAL; INTRAMUSCULAR; INTRAVENOUS; SOFT TISSUE AS NEEDED
Status: DISCONTINUED | OUTPATIENT
Start: 2019-05-22 | End: 2019-05-22 | Stop reason: HOSPADM

## 2019-05-22 RX ORDER — ALBUMIN HUMAN 50 G/1000ML
SOLUTION INTRAVENOUS
Status: ACTIVE
Start: 2019-05-22 | End: 2019-05-23

## 2019-05-22 RX ORDER — SODIUM CHLORIDE, SODIUM LACTATE, POTASSIUM CHLORIDE, CALCIUM CHLORIDE 600; 310; 30; 20 MG/100ML; MG/100ML; MG/100ML; MG/100ML
25 INJECTION, SOLUTION INTRAVENOUS CONTINUOUS
Status: DISCONTINUED | OUTPATIENT
Start: 2019-05-22 | End: 2019-05-22 | Stop reason: HOSPADM

## 2019-05-22 RX ORDER — SODIUM CHLORIDE, SODIUM LACTATE, POTASSIUM CHLORIDE, CALCIUM CHLORIDE 600; 310; 30; 20 MG/100ML; MG/100ML; MG/100ML; MG/100ML
75 INJECTION, SOLUTION INTRAVENOUS CONTINUOUS
Status: DISCONTINUED | OUTPATIENT
Start: 2019-05-22 | End: 2019-05-23 | Stop reason: HOSPADM

## 2019-05-22 RX ORDER — SODIUM CHLORIDE 9 MG/ML
250 INJECTION, SOLUTION INTRAVENOUS AS NEEDED
Status: DISCONTINUED | OUTPATIENT
Start: 2019-05-22 | End: 2019-05-22

## 2019-05-22 RX ORDER — NEOSTIGMINE METHYLSULFATE 1 MG/ML
INJECTION INTRAVENOUS AS NEEDED
Status: DISCONTINUED | OUTPATIENT
Start: 2019-05-22 | End: 2019-05-22 | Stop reason: HOSPADM

## 2019-05-22 RX ORDER — SODIUM CHLORIDE 0.9 % (FLUSH) 0.9 %
5-40 SYRINGE (ML) INJECTION EVERY 8 HOURS
Status: DISCONTINUED | OUTPATIENT
Start: 2019-05-22 | End: 2019-05-22 | Stop reason: HOSPADM

## 2019-05-22 RX ORDER — ENOXAPARIN SODIUM 100 MG/ML
40 INJECTION SUBCUTANEOUS EVERY 24 HOURS
Status: DISCONTINUED | OUTPATIENT
Start: 2019-05-23 | End: 2019-05-26

## 2019-05-22 RX ORDER — FENTANYL CITRATE 50 UG/ML
INJECTION, SOLUTION INTRAMUSCULAR; INTRAVENOUS AS NEEDED
Status: DISCONTINUED | OUTPATIENT
Start: 2019-05-22 | End: 2019-05-22 | Stop reason: HOSPADM

## 2019-05-22 RX ORDER — ONDANSETRON 2 MG/ML
4 INJECTION INTRAMUSCULAR; INTRAVENOUS
Status: DISCONTINUED | OUTPATIENT
Start: 2019-05-22 | End: 2019-05-31 | Stop reason: HOSPADM

## 2019-05-22 RX ORDER — MIDAZOLAM HYDROCHLORIDE 1 MG/ML
2 INJECTION, SOLUTION INTRAMUSCULAR; INTRAVENOUS ONCE
Status: DISCONTINUED | OUTPATIENT
Start: 2019-05-22 | End: 2019-05-22 | Stop reason: HOSPADM

## 2019-05-22 RX ORDER — EPHEDRINE SULFATE 50 MG/ML
INJECTION, SOLUTION INTRAVENOUS AS NEEDED
Status: DISCONTINUED | OUTPATIENT
Start: 2019-05-22 | End: 2019-05-22 | Stop reason: HOSPADM

## 2019-05-22 RX ORDER — ALBUMIN HUMAN 50 G/1000ML
25 SOLUTION INTRAVENOUS ONCE
Status: COMPLETED | OUTPATIENT
Start: 2019-05-22 | End: 2019-05-22

## 2019-05-22 RX ORDER — ROCURONIUM BROMIDE 10 MG/ML
INJECTION, SOLUTION INTRAVENOUS AS NEEDED
Status: DISCONTINUED | OUTPATIENT
Start: 2019-05-22 | End: 2019-05-22 | Stop reason: HOSPADM

## 2019-05-22 RX ADMIN — SODIUM CHLORIDE, SODIUM LACTATE, POTASSIUM CHLORIDE, AND CALCIUM CHLORIDE 75 ML/HR: 600; 310; 30; 20 INJECTION, SOLUTION INTRAVENOUS at 21:34

## 2019-05-22 RX ADMIN — DEXAMETHASONE SODIUM PHOSPHATE 10 MG: 4 INJECTION, SOLUTION INTRA-ARTICULAR; INTRALESIONAL; INTRAMUSCULAR; INTRAVENOUS; SOFT TISSUE at 12:54

## 2019-05-22 RX ADMIN — NEOSTIGMINE METHYLSULFATE 3 MG: 1 INJECTION INTRAVENOUS at 13:19

## 2019-05-22 RX ADMIN — ACETAMINOPHEN 1000 MG: 10 INJECTION, SOLUTION INTRAVENOUS at 18:36

## 2019-05-22 RX ADMIN — GABAPENTIN 300 MG: 300 CAPSULE ORAL at 22:01

## 2019-05-22 RX ADMIN — IPRATROPIUM BROMIDE AND ALBUTEROL SULFATE 3 ML: .5; 3 SOLUTION RESPIRATORY (INHALATION) at 21:50

## 2019-05-22 RX ADMIN — ONDANSETRON 4 MG: 2 INJECTION INTRAMUSCULAR; INTRAVENOUS at 12:55

## 2019-05-22 RX ADMIN — Medication 2 G: at 09:58

## 2019-05-22 RX ADMIN — HYDROMORPHONE HYDROCHLORIDE 0.5 MG: 2 INJECTION INTRAMUSCULAR; INTRAVENOUS; SUBCUTANEOUS at 14:45

## 2019-05-22 RX ADMIN — ROCURONIUM BROMIDE 10 MG: 10 INJECTION, SOLUTION INTRAVENOUS at 11:08

## 2019-05-22 RX ADMIN — PROPOFOL 130 MG: 10 INJECTION, EMULSION INTRAVENOUS at 09:07

## 2019-05-22 RX ADMIN — KETAMINE HYDROCHLORIDE 15 MG: 100 INJECTION, SOLUTION INTRAMUSCULAR; INTRAVENOUS at 12:15

## 2019-05-22 RX ADMIN — ALBUTEROL SULFATE 2.5 MG: 2.5 SOLUTION RESPIRATORY (INHALATION) at 07:38

## 2019-05-22 RX ADMIN — FENTANYL CITRATE 100 MCG: 50 INJECTION, SOLUTION INTRAMUSCULAR; INTRAVENOUS at 09:07

## 2019-05-22 RX ADMIN — FENTANYL CITRATE 50 MCG: 50 INJECTION, SOLUTION INTRAMUSCULAR; INTRAVENOUS at 10:08

## 2019-05-22 RX ADMIN — FENTANYL CITRATE 50 MCG: 50 INJECTION, SOLUTION INTRAMUSCULAR; INTRAVENOUS at 10:38

## 2019-05-22 RX ADMIN — ALBUMIN (HUMAN) 25 G: 12.5 INJECTION, SOLUTION INTRAVENOUS at 17:21

## 2019-05-22 RX ADMIN — Medication 2 G: at 18:48

## 2019-05-22 RX ADMIN — KETAMINE HYDROCHLORIDE 15 MG: 100 INJECTION, SOLUTION INTRAMUSCULAR; INTRAVENOUS at 10:22

## 2019-05-22 RX ADMIN — ROCURONIUM BROMIDE 10 MG: 10 INJECTION, SOLUTION INTRAVENOUS at 12:29

## 2019-05-22 RX ADMIN — HYDROMORPHONE HYDROCHLORIDE 0.5 MG: 2 INJECTION INTRAMUSCULAR; INTRAVENOUS; SUBCUTANEOUS at 16:05

## 2019-05-22 RX ADMIN — ACETAMINOPHEN 1000 MG: 10 INJECTION, SOLUTION INTRAVENOUS at 13:02

## 2019-05-22 RX ADMIN — Medication 10 ML: at 22:00

## 2019-05-22 RX ADMIN — EPHEDRINE SULFATE 10 MG: 50 INJECTION, SOLUTION INTRAVENOUS at 12:00

## 2019-05-22 RX ADMIN — ROCURONIUM BROMIDE 10 MG: 10 INJECTION, SOLUTION INTRAVENOUS at 10:22

## 2019-05-22 RX ADMIN — GLYCOPYRROLATE 0.4 MG: 0.2 INJECTION INTRAMUSCULAR; INTRAVENOUS at 13:19

## 2019-05-22 RX ADMIN — KETAMINE HYDROCHLORIDE 30 MG: 100 INJECTION, SOLUTION INTRAMUSCULAR; INTRAVENOUS at 09:15

## 2019-05-22 RX ADMIN — ALBUMIN HUMAN 250 ML: 50 SOLUTION INTRAVENOUS at 12:35

## 2019-05-22 RX ADMIN — HYDROMORPHONE HYDROCHLORIDE 0.26 MG: 2 INJECTION INTRAMUSCULAR; INTRAVENOUS; SUBCUTANEOUS at 20:54

## 2019-05-22 RX ADMIN — LIDOCAINE HYDROCHLORIDE 70 MG: 20 INJECTION, SOLUTION EPIDURAL; INFILTRATION; INTRACAUDAL; PERINEURAL at 09:07

## 2019-05-22 RX ADMIN — KETAMINE HYDROCHLORIDE 15 MG: 100 INJECTION, SOLUTION INTRAMUSCULAR; INTRAVENOUS at 11:15

## 2019-05-22 RX ADMIN — SODIUM CHLORIDE, SODIUM LACTATE, POTASSIUM CHLORIDE, CALCIUM CHLORIDE: 600; 310; 30; 20 INJECTION, SOLUTION INTRAVENOUS at 09:10

## 2019-05-22 RX ADMIN — SODIUM CHLORIDE, SODIUM LACTATE, POTASSIUM CHLORIDE, AND CALCIUM CHLORIDE 25 ML/HR: 600; 310; 30; 20 INJECTION, SOLUTION INTRAVENOUS at 07:33

## 2019-05-22 NOTE — ANESTHESIA POSTPROCEDURE EVALUATION
Procedure(s):   1. RIGHT THORACOSCOPY switched to thoracotomy with extensive pneumonolysis 2. LOWER LOBECTOMY  3. Upper lobe blebectomy x 2 4. Diaphragm resection with primary repair 5. Chest wall resection 6. MEDISTINAL LYMPHADENECTOMY 7. Intercostal nerve cryoablation    .    general    Anesthesia Post Evaluation      Multimodal analgesia: multimodal analgesia used between 6 hours prior to anesthesia start to PACU discharge  Patient location during evaluation: bedside  Patient participation: complete - patient participated  Level of consciousness: awake and alert  Pain score: 2  Pain management: adequate  Airway patency: patent  Anesthetic complications: no  Cardiovascular status: acceptable  Respiratory status: acceptable  Hydration status: acceptable  Comments: Patient doing well. Continue care on floor. Post anesthesia nausea and vomiting:  none      Vitals Value Taken Time   BP 99/54 5/22/2019  3:31 PM   Temp 36.5 °C (97.7 °F) 5/22/2019  3:12 PM   Pulse 97 5/22/2019  3:41 PM   Resp 37 5/22/2019  3:41 PM   SpO2 98 % 5/22/2019  3:41 PM   Vitals shown include unvalidated device data.

## 2019-05-22 NOTE — BRIEF OP NOTE
BRIEF OPERATIVE NOTE    Date of Procedure: 5/22/2019   Preoperative Diagnosis: right lung cancer  Postoperative Diagnosis: right lung cancer  Procedure(s):  1. RIGHT THORACOSCOPY switched to thoracotomy with extensive pneumonolysis  2. LOWER LOBECTOMY   3. Upper lobe blebectomy x 2  4. Diaphragm resection with primary repair  5. Chest wall resection  6. MEDISTINAL LYMPHADENECTOMY  7.  Intercostal nerve cryoablation    Surgeon(s) and Role:     Pauline Lockwood MD - Primary         Surgical Assistant: Anabel Garcia NP    Surgical Staff:  Circ-1: Dayton Peña RN  Circ-Relief: Rhonda Hare RN; Raheem Brown RN  Scrub Tech-1: Claritza Lundy  Scrub Tech-2: Jitendra Conley  Nurse Practitioner: Wesly Melendrez NP  Event Time In Time Out   Incision Start 10:03 AM    Incision Close  1:26 PM      Anesthesia: General   Estimated Blood Loss: 300 ml  Specimens:   ID Type Source Tests Collected by Time Destination   1 : #9 LYMPH NODE X2 Preservative Lymph Node  Piedad Valietne MD 5/22/2019 11:29 AM Pathology   2 : #7 LYMPH NODE X5 Preservative Lymph Node  Piedad Valiente MD 5/22/2019 12:07 PM Pathology   3 : PERIHILAR LYMPH NODE Preservative Lymph Node  Piedad Valiente MD 5/22/2019 12:21 PM Pathology   4 : 4 R LYMPH NODE X2 Preservative Lymph Node  Piedad Valiente MD 5/22/2019 12:40 PM Pathology   5 : Welford Duncans X2 Preservative Lung Biopsy  Piedad Valiente MD 5/22/2019 12:44 PM Pathology      Findings: 1 the right pleural space is completely obliterated with extensive adhesions 2 large right lower lobe mass with invasion onto chest wall and diaphragm, en bloc resection was obtained  Complications: none  Implants: chest tube x 2

## 2019-05-22 NOTE — ANESTHESIA PROCEDURE NOTES
Arterial Line Placement    Start time: 5/22/2019 9:40 AM  End time: 5/22/2019 9:45 AM  Performed by: Alejandro Young MD  Authorized by: Alejandro Young MD     Pre-Procedure  Indications:  Arterial pressure monitoring and blood sampling  Preanesthetic Checklist: patient identified, risks and benefits discussed, anesthesia consent, site marked, patient being monitored, timeout performed and patient being monitored    Timeout Time: 09:40        Procedure:   Prep:  Chlorhexidine  Seldinger Technique?: Yes    Orientation:  Left  Location:  Radial artery  Catheter size:  20 G  Number of attempts:  1  Cont Cardiac Output Sensor: No      Assessment:   Post-procedure:  Line secured and sterile dressing applied  Patient Tolerance:  Patient tolerated the procedure well with no immediate complications

## 2019-05-22 NOTE — PERIOP NOTES
Talked to patient's daughter Darrell Roberts at [phone] and updated her after 4 digit security code verified. Darrell Roberts given PACU's direct phone number so she could call and check her family member's status at anytime. Patient waiting on placement in Intensive Care, doing well, VSS, will continue to monitor in PACU.

## 2019-05-22 NOTE — PROGRESS NOTES
Spiritual Care visit. Initial Visit, Pre Surgery Consult. Visit and prayer before patient goes to surgery.     Visit by Betzaida Karimi M.Ed., Th.B. ,Staff

## 2019-05-23 ENCOUNTER — APPOINTMENT (OUTPATIENT)
Dept: GENERAL RADIOLOGY | Age: 79
DRG: 164 | End: 2019-05-23
Attending: NURSE PRACTITIONER
Payer: MEDICARE

## 2019-05-23 LAB
ANION GAP SERPL CALC-SCNC: 9 MMOL/L (ref 7–16)
BASOPHILS # BLD: 0 K/UL (ref 0–0.2)
BASOPHILS NFR BLD: 0 % (ref 0–2)
BUN SERPL-MCNC: 12 MG/DL (ref 8–23)
CALCIUM SERPL-MCNC: 8.4 MG/DL (ref 8.3–10.4)
CHLORIDE SERPL-SCNC: 105 MMOL/L (ref 98–107)
CO2 SERPL-SCNC: 27 MMOL/L (ref 21–32)
CREAT SERPL-MCNC: 0.67 MG/DL (ref 0.6–1)
DIFFERENTIAL METHOD BLD: ABNORMAL
EOSINOPHIL # BLD: 0 K/UL (ref 0–0.8)
EOSINOPHIL NFR BLD: 0 % (ref 0.5–7.8)
ERYTHROCYTE [DISTWIDTH] IN BLOOD BY AUTOMATED COUNT: 14.6 % (ref 11.9–14.6)
GLUCOSE SERPL-MCNC: 140 MG/DL (ref 65–100)
HCT VFR BLD AUTO: 30.9 % (ref 35.8–46.3)
HGB BLD-MCNC: 9.7 G/DL (ref 11.7–15.4)
IMM GRANULOCYTES # BLD AUTO: 0.1 K/UL (ref 0–0.5)
IMM GRANULOCYTES NFR BLD AUTO: 1 % (ref 0–5)
LYMPHOCYTES # BLD: 0.8 K/UL (ref 0.5–4.6)
LYMPHOCYTES NFR BLD: 6 % (ref 13–44)
MAGNESIUM SERPL-MCNC: 1.9 MG/DL (ref 1.8–2.4)
MCH RBC QN AUTO: 29.1 PG (ref 26.1–32.9)
MCHC RBC AUTO-ENTMCNC: 31.4 G/DL (ref 31.4–35)
MCV RBC AUTO: 92.8 FL (ref 79.6–97.8)
MONOCYTES # BLD: 1 K/UL (ref 0.1–1.3)
MONOCYTES NFR BLD: 8 % (ref 4–12)
NEUTS SEG # BLD: 11.3 K/UL (ref 1.7–8.2)
NEUTS SEG NFR BLD: 86 % (ref 43–78)
NRBC # BLD: 0 K/UL (ref 0–0.2)
PLATELET # BLD AUTO: 264 K/UL (ref 150–450)
PMV BLD AUTO: 9.7 FL (ref 9.4–12.3)
POTASSIUM SERPL-SCNC: 3.5 MMOL/L (ref 3.5–5.1)
RBC # BLD AUTO: 3.33 M/UL (ref 4.05–5.2)
SODIUM SERPL-SCNC: 141 MMOL/L (ref 136–145)
WBC # BLD AUTO: 13.1 K/UL (ref 4.3–11.1)

## 2019-05-23 PROCEDURE — 36415 COLL VENOUS BLD VENIPUNCTURE: CPT

## 2019-05-23 PROCEDURE — 77030027138 HC INCENT SPIROMETER -A

## 2019-05-23 PROCEDURE — 83735 ASSAY OF MAGNESIUM: CPT

## 2019-05-23 PROCEDURE — 85025 COMPLETE CBC W/AUTO DIFF WBC: CPT

## 2019-05-23 PROCEDURE — 65270000029 HC RM PRIVATE

## 2019-05-23 PROCEDURE — 94640 AIRWAY INHALATION TREATMENT: CPT

## 2019-05-23 PROCEDURE — 74011000250 HC RX REV CODE- 250: Performed by: ANESTHESIOLOGY

## 2019-05-23 PROCEDURE — 74011250636 HC RX REV CODE- 250/636: Performed by: NURSE PRACTITIONER

## 2019-05-23 PROCEDURE — 94760 N-INVAS EAR/PLS OXIMETRY 1: CPT

## 2019-05-23 PROCEDURE — 86580 TB INTRADERMAL TEST: CPT | Performed by: SURGERY

## 2019-05-23 PROCEDURE — 77010033678 HC OXYGEN DAILY

## 2019-05-23 PROCEDURE — 77030032490 HC SLV COMPR SCD KNE COVD -B

## 2019-05-23 PROCEDURE — 71045 X-RAY EXAM CHEST 1 VIEW: CPT

## 2019-05-23 PROCEDURE — 74011000258 HC RX REV CODE- 258: Performed by: SURGERY

## 2019-05-23 PROCEDURE — 74011250637 HC RX REV CODE- 250/637: Performed by: NURSE PRACTITIONER

## 2019-05-23 PROCEDURE — 74011000302 HC RX REV CODE- 302: Performed by: SURGERY

## 2019-05-23 PROCEDURE — 74011250636 HC RX REV CODE- 250/636: Performed by: SURGERY

## 2019-05-23 PROCEDURE — 80048 BASIC METABOLIC PNL TOTAL CA: CPT

## 2019-05-23 PROCEDURE — 74011250636 HC RX REV CODE- 250/636: Performed by: ANESTHESIOLOGY

## 2019-05-23 PROCEDURE — 74011000250 HC RX REV CODE- 250: Performed by: NURSE PRACTITIONER

## 2019-05-23 RX ORDER — HYDROCODONE BITARTRATE AND ACETAMINOPHEN 5; 325 MG/1; MG/1
1 TABLET ORAL
Status: DISCONTINUED | OUTPATIENT
Start: 2019-05-23 | End: 2019-05-31 | Stop reason: HOSPADM

## 2019-05-23 RX ORDER — DEXTROSE MONOHYDRATE AND SODIUM CHLORIDE 5; .45 G/100ML; G/100ML
25 INJECTION, SOLUTION INTRAVENOUS CONTINUOUS
Status: DISCONTINUED | OUTPATIENT
Start: 2019-05-23 | End: 2019-05-24

## 2019-05-23 RX ORDER — HYDROMORPHONE HYDROCHLORIDE 1 MG/ML
0.5 INJECTION, SOLUTION INTRAMUSCULAR; INTRAVENOUS; SUBCUTANEOUS
Status: DISCONTINUED | OUTPATIENT
Start: 2019-05-23 | End: 2019-05-31 | Stop reason: HOSPADM

## 2019-05-23 RX ADMIN — HYDROMORPHONE HYDROCHLORIDE 0.26 MG: 2 INJECTION INTRAMUSCULAR; INTRAVENOUS; SUBCUTANEOUS at 07:13

## 2019-05-23 RX ADMIN — SENNOSIDES AND DOCUSATE SODIUM 2 TABLET: 8.6; 5 TABLET ORAL at 18:09

## 2019-05-23 RX ADMIN — Medication 2 G: at 04:19

## 2019-05-23 RX ADMIN — DEXTROSE MONOHYDRATE AND SODIUM CHLORIDE 50 ML/HR: 5; .45 INJECTION, SOLUTION INTRAVENOUS at 22:08

## 2019-05-23 RX ADMIN — SODIUM CHLORIDE, SODIUM LACTATE, POTASSIUM CHLORIDE, AND CALCIUM CHLORIDE 75 ML/HR: 600; 310; 30; 20 INJECTION, SOLUTION INTRAVENOUS at 02:06

## 2019-05-23 RX ADMIN — LEVOTHYROXINE SODIUM 75 MCG: 75 TABLET ORAL at 08:45

## 2019-05-23 RX ADMIN — PROMETHAZINE HYDROCHLORIDE 6.25 MG: 25 INJECTION INTRAMUSCULAR; INTRAVENOUS at 02:02

## 2019-05-23 RX ADMIN — HYDROMORPHONE HYDROCHLORIDE 0.26 MG: 2 INJECTION INTRAMUSCULAR; INTRAVENOUS; SUBCUTANEOUS at 01:12

## 2019-05-23 RX ADMIN — HYDROMORPHONE HYDROCHLORIDE 1 MG: 1 INJECTION, SOLUTION INTRAMUSCULAR; INTRAVENOUS; SUBCUTANEOUS at 20:57

## 2019-05-23 RX ADMIN — PANTOPRAZOLE SODIUM 40 MG: 40 TABLET, DELAYED RELEASE ORAL at 07:56

## 2019-05-23 RX ADMIN — SENNOSIDES AND DOCUSATE SODIUM 2 TABLET: 8.6; 5 TABLET ORAL at 08:45

## 2019-05-23 RX ADMIN — IPRATROPIUM BROMIDE AND ALBUTEROL SULFATE 3 ML: .5; 3 SOLUTION RESPIRATORY (INHALATION) at 20:54

## 2019-05-23 RX ADMIN — GABAPENTIN 300 MG: 300 CAPSULE ORAL at 08:49

## 2019-05-23 RX ADMIN — IPRATROPIUM BROMIDE AND ALBUTEROL SULFATE 3 ML: .5; 3 SOLUTION RESPIRATORY (INHALATION) at 07:59

## 2019-05-23 RX ADMIN — TUBERCULIN PURIFIED PROTEIN DERIVATIVE 5 UNITS: 5 INJECTION, SOLUTION INTRADERMAL at 22:10

## 2019-05-23 RX ADMIN — IPRATROPIUM BROMIDE AND ALBUTEROL SULFATE 3 ML: .5; 3 SOLUTION RESPIRATORY (INHALATION) at 15:04

## 2019-05-23 RX ADMIN — HYDROMORPHONE HYDROCHLORIDE 1 MG: 1 INJECTION, SOLUTION INTRAMUSCULAR; INTRAVENOUS; SUBCUTANEOUS at 12:28

## 2019-05-23 RX ADMIN — Medication 10 ML: at 22:06

## 2019-05-23 RX ADMIN — GABAPENTIN 300 MG: 300 CAPSULE ORAL at 18:09

## 2019-05-23 RX ADMIN — GABAPENTIN 300 MG: 300 CAPSULE ORAL at 22:05

## 2019-05-23 RX ADMIN — SODIUM CHLORIDE 250 ML: 900 INJECTION, SOLUTION INTRAVENOUS at 10:11

## 2019-05-23 RX ADMIN — ENOXAPARIN SODIUM 40 MG: 40 INJECTION SUBCUTANEOUS at 07:55

## 2019-05-23 NOTE — PROGRESS NOTES
5/23/2019    PLAN:  Diet- advance as tolerated  DVT Prophylactics- SCD/Lovenox  Pain control- Dilaudid/neurontin  SUP prophylaxis- Protonix  Encourage C/DB/IS  Chest tubes to 20 cm sx  Donald, strict I&O every 2 hours for next 24 hours  Bolus 250ml for hypotension          ASSESSMENT:  Admit Date: 5/22/2019   1 Day Post-Op  Procedure(s):   1. RIGHT THORACOSCOPY switched to thoracotomy with extensive pneumonolysis 2. LOWER LOBECTOMY  3. Upper lobe blebectomy x 2 4. Diaphragm resection with primary repair 5. Chest wall resection 6. MEDISTINAL LYMPHADENECTOMY 7. Intercostal nerve cryoablation          SUBJECTIVE: Complaints of pain, better with pain medication, Breathing OK, O2 3LNC sats good. Decreased urine output will monitor.       OBJECTIVE:  Patient Vitals for the past 24 hrs:   Temp Pulse Resp BP SpO2   05/23/19 1218 98.1 °F (36.7 °C) 100 16 123/88 95 %   05/23/19 1127 -- -- -- -- 94 %   05/23/19 1100 -- 90 14 92/51 100 %   05/23/19 1040 -- 92 16 98/56 99 %   05/23/19 1000 -- 95 14 (!) 86/50 98 %   05/23/19 0900 -- (!) 101 19 (!) 89/58 93 %   05/23/19 0844 98.2 °F (36.8 °C) -- -- -- 92 %   05/23/19 0800 97.6 °F (36.4 °C) 96 24 90/51 96 %   05/23/19 0700 -- 89 20 95/59 98 %   05/23/19 0600 -- 84 19 103/58 98 %   05/23/19 0500 -- 85 17 98/57 96 %   05/23/19 0400 97.8 °F (36.6 °C) 82 16 99/58 97 %   05/23/19 0300 -- 84 16 97/53 98 %   05/23/19 0200 -- 82 27 109/56 99 %   05/23/19 0100 -- 85 18 109/69 93 %   05/23/19 0002 97.7 °F (36.5 °C) 83 16 107/60 95 %   05/22/19 2215 -- 82 15 115/64 95 %   05/22/19 2153 -- -- -- -- 98 %   05/22/19 2133 -- 80 16 106/62 94 %   05/22/19 2051 -- 84 16 110/59 96 %   05/22/19 2028 -- 81 17 97/54 98 %   05/22/19 1958 -- 86 16 102/58 98 %   05/22/19 1928 -- 78 18 102/56 98 %   05/22/19 1858 -- 79 16 115/58 97 %   05/22/19 1848 -- 79 10 104/57 97 %   05/22/19 1843 -- 84 17 104/55 95 %   05/22/19 1838 -- 81 18 104/55 93 % 05/22/19 1833 -- 81 17 115/55 96 %   05/22/19 1531 -- 99 17 99/54 98 %   05/22/19 1526 -- 98 14 93/55 98 %   05/22/19 1521 -- 97 15 94/55 99 %   05/22/19 1516 -- 98 16 100/59 98 %   05/22/19 1512 97.7 °F (36.5 °C) 97 17 97/53 98 %   05/22/19 1506 -- 98 16 98/54 99 %   05/22/19 1501 -- 97 14 100/55 99 %   05/22/19 1456 -- 92 16 111/58 100 %   05/22/19 1451 -- 95 12 104/57 99 %   05/22/19 1446 -- 96 14 100/56 91 %   05/22/19 1436 -- 98 15 107/59 97 %   05/22/19 1431 -- (!) 102 16 100/56 100 %   05/22/19 1426 -- 96 14 96/52 100 %   05/22/19 1421 -- 99 14 93/51 (!) 89 %   05/22/19 1416 -- 97 12 93/51 94 %   05/22/19 1412 -- (!) 101 10 91/52 100 %   05/22/19 1407 -- (!) 112 12 (!) 86/53 100 %   05/22/19 1402 -- (!) 114 14 92/55 97 %   05/22/19 1359 -- (!) 102 14 94/50 (!) 80 %   05/22/19 1357 97.5 °F (36.4 °C) (!) 110 13 92/50 97 %   05/22/19 1356 -- (!) 104 -- 92/50 98 %   05/22/19 1353 -- 99 -- 92/54 98 %   05/22/19 1351 -- (!) 119 -- (!) 89/54 98 %         Date 05/22/19 0700 - 05/23/19 0659 05/23/19 0700 - 05/24/19 0659   Shift 4491-3208 4051-5667 24 Hour Total 7618-4535 1347-0170 24 Hour Total   INTAKE   P.O. 50 150 200 350  350     P. O. 50 150 200 350  350   I. V.(mL/kg/hr) 2559(3)  2250(1.5)        I.V. 600  600        Volume (lactated Ringers infusion) 500  500        Volume (lactated Ringers infusion) 1150  1150      Other  0 0        Intake (ml) (Chest Tube #2 05/22/19 Right; Angled; Posterior)  0 0      Shift Total(mL/kg) 2300(36.9) 150(2.4) 2450(39.3) 350(5.6)  350(5.6)   OUTPUT   Urine(mL/kg/hr) 225(0.3) 745(1) 970(0.6) 140  140     Urine Output 225  225        Urine Output (mL) (Urinary Catheter 05/22/19 2- way; Donald)  745 745 140  140   Blood 500  500        Estimated Blood Loss 500  500      Chest Tube 380 5167 5547 1735  1735     CT Total Volume 1 190 4170 4360 1500  1500     CT Total Volume 2  402 402 150  150     Output (ml) (Chest Tube #1 05/22/19 Straight; Anterior;Right) 180 540 720 65  65     Output (ml) (Chest Tube #2 05/22/19 Right; Angled; Posterior) 10 55 65 20  20   Shift Total(mL/kg) 1105(17.7) 5461(98.2) 8897(511.0) 1875(30)  1875(30)   NET 1195 -5762 -4567 -1525  -1525   Weight (kg) 62.4 62.4 62.4 62.4 62.4 62.4            General:          No acute distress    Lungs:             CTA Bilaterally, chest tube times 2 to suction with air leak noted   Heart:              RRR  Abdomen:        Soft, Non distended, Non tender, BS+  Extremities:     No cyanosis, clubbing or edema  Neurologic:      No focal deficits           Labs:    Recent Labs     05/23/19  0450   WBC 13.1*   HGB 9.7*         K 3.5      CO2 27   BUN 12   CREA 0.67   *         Karol Gutierrez, NP

## 2019-05-23 NOTE — PROGRESS NOTES
TRANSFER - IN REPORT:    Verbal report received from [facility]) on Kindra Curiel  being received from PACU(unit) for routine post - op      Report consisted of patients Situation, Background, Assessment and   Recommendations(SBAR). Information from the following report(s) Kardex was reviewed with the receiving nurse. Opportunity for questions and clarification was provided. Assessment completed upon patients arrival to unit and care assumed.

## 2019-05-23 NOTE — OP NOTES
[facility]  OPERATIVE REPORT    Name:  Luis Manuel Reagan  MR#:  [id number]  :  1940  ACCOUNT #:  [de-identified]  DATE OF SERVICE:  2019    PREOPERATIVE DIAGNOSIS:  Right lung cancer. POSTOPERATIVE DIAGNOSIS:  Right lung cancer. PROCEDURE PERFORMED:  1. Right thoracoscopy switched to right thoracotomy with extensive pneumonolysis. 2.  Right lower lobe lobectomy. 3.  Right upper lobe blebectomy x2.  4.  Diaphragm resection with primary repair. 5.  Chest wall resection. 6.  Mediastinal lymphadenectomy. 7. Intercostal nerve cryoablation. SURGEON:  Marisol Lujan MD    ASSISTANT:  Adam Aparicio NP    ANESTHESIA:  General.    COMPLICATIONS: none    SPECIMENS REMOVED: as above. IMPLANTS:  CT x 2    ESTIMATED BLOOD LOSS:  About 300 mL. INDICATION:  This is a 41-year-old female who presented with a large right lower lobe lung cancer and then she also developed a persistent pneumonia over the last months and was slowly getting better and it was felt part of pneumonia may be related to the mass. She did have borderline pulmonary function test and then we had an extensive discussion in the team conference and then with the pulmonologist.  We felt her lung function is borderline high where she may be able to tolerate surgeries although her need for long-term oxygen requirement is high. This has been repeatedly discussed with the family and the patient and they completely understood and she desired to have surgery. FINDINGS:  1. Her right pleural space is completely obliterated with extensive adhesions. 2.  She has a large right lower lobe mass with invasion onto the chest wall and diaphragm. En bloc resection was obtained. PROCEDURE:  After informed consent was obtained, the patient brought into the operating room, left in the supine position. General anesthesia was administered. Double lumen tube placed per Anesthesia.   The patient was then positioned to left decubitus position with the right side up. Her right chest was prepped and draped in the routine fashion. We first put in a trocar in the anterior axillary line in the 7th intercostal space, and then with the scope over trocar technique, intercostal space was entered. However, we immediately got into extensive scarring with careful dissection, I confirmed I was in the pleural space and then I was able to dissect some of the lung off the scar but it immediately came to my attention that thoracoscopy is not feasible as her pleural space is completely obliterated with the scars. So decision was made to switch to thoracotomy immediately. The right posterolateral standard thoracotomy was made. Dissection was carried through the latissimus dorsi. Then the serratus anterior was retracted and the #6 rib was isolated posteriorly and then divided and then pleural space was entered right above the #6 rib, and immediately, the extensive scarring was encountered. We spent more than 2 hours trying to cut down the adhesions, and then obviously, the cancer invaded onto the chest wall and also invaded onto the diaphragm. We dissected around it and then that part of the chest wall was removed and then the diaphragm was removed together with the cancer and the diaphragm was reconstructed with #1 Prolene stitch in a running fashion and the upper lobe was also mobilized, and as noted, there were two blebs at the apex which caused more dense adhesions. With tremendous effort, both upper and lower lobes were mobilized then the anterior pulmonary ligaments were taken down. The #9 lymph nodes were dissected out and sent separately and then we were able to isolate two branches of the inferior pulmonary veins. Both were divided individually with vascular stapler. Further mobilization around the mediastinum revealed more dense adhesions, but with great care, we were able to dissect #7 lymph node.   This is a group of lymph nodes that has at least five lymph nodes in there. We were able to isolate the middle lobe and then we were able to separate the major fissure. There are dense adhesions around the hilum and with maximum effort, we isolated the superior segment of the lower lobe. Further dissection down to the hilum turned out to be difficult. So we chose to use the Pulcifer stapler with black load, and then a rim of the upper segment of lower lobe were left, and then we were able to divide the lower lobe with the stapler. They were then fired and the lung was inflated. Upper and middle were inflated nicely. All demonstrable scarring in the lower lobe were removed and then the surgery field was inspected. We removed more lymph nodes in the perihilar and also right paratracheal lymph nodes were removed. Then, good hemostasis obtained. Intercostal nerve cryoablation was performed for postop pain control. Two 32-Chinese chest tubes were placed, one to the apex and one to the diaphragm and then the rib was reapproximated with #2 Vicryl in figure-of-eight fashion. The fascia was closed with #1 looped PDS on both posterior and anterior fascia. Skin closed with staple. The patient tolerated the procedure well, transferred to recovery room in stable condition. As noted, we did washed out two blebs in the upper lobe during surgery and the specimen was sent separately. All the instrument counts and lap counts were correct. Wellington Montejo was the first assistant. She was present, scrubbed and assisted during the entire case.         Earl Franklin MD      [id number]  D:  05/22/2019 14:07  T:  05/22/2019 23:53  JOB #:  [id number]

## 2019-05-23 NOTE — PERIOP NOTES
0920- Pt tolerated breakfast. Asked if she needs anything or would like to be repositioned. Pt bed reclined, pt sleeping shortly after. 10:11 AM  Betty called in [address] and notified that pt's UOP this last hour has been minimal at 5 cc, BP 86/50, and ancef order has completed and pt will not receive again. Pt comfortable and resting. Orders to give 250 cc NS bolus now. Nick Duran, daughter, called unit, given update. 11:28 AM  Pt able to move herself to hospital bed. No complications while moving. 11:39 AM  TRANSFER - OUT REPORT:    Verbal report given to BRET Reynolds(name) on [facility]  being transferred to [facility](unit) for routine post - op       Report consisted of patients Situation, Background, Assessment and   Recommendations(SBAR). Information from the following report(s) SBAR, ED Summary, OR Summary, Procedure Summary, Intake/Output, MAR and Cardiac Rhythm NSR was reviewed with the receiving nurse. Lines:   Peripheral IV 05/22/19 Right Hand (Active)   Site Assessment Clean, dry, & intact 5/23/2019  8:44 AM   Phlebitis Assessment 0 5/23/2019  8:44 AM   Infiltration Assessment 0 5/23/2019  8:44 AM   Dressing Status Clean, dry, & intact 5/23/2019  8:44 AM   Dressing Type Tape;Transparent 5/23/2019  8:44 AM   Hub Color/Line Status Flushed;Capped;Green 5/23/2019  8:44 AM   Alcohol Cap Used No 5/23/2019 12:02 AM       Peripheral IV 05/22/19 Left;Posterior Hand (Active)   Site Assessment Clean, dry, & intact 5/23/2019  8:44 AM   Phlebitis Assessment 0 5/23/2019  8:44 AM   Infiltration Assessment 0 5/23/2019  8:44 AM   Dressing Status Clean, dry, & intact 5/23/2019  8:44 AM   Dressing Type Tape;Transparent 5/23/2019  8:44 AM   Hub Color/Line Status Infusing 5/23/2019  8:44 AM   Alcohol Cap Used No 5/23/2019  8:44 AM        Opportunity for questions and clarification was provided.       Patient transported with:   O2 @ 3 liters    VTE prophylaxis orders have been written for Kecia Hatch Tom. Bo Evan, daughter, called and left message regarding room number.

## 2019-05-23 NOTE — PROGRESS NOTES
05/23/19 1200   Dual Skin Pressure Injury Assessment   Dual Skin Pressure Injury Assessment WDL   Second Care Provider (Based on [address] [place] [address]) bryn smith rn  (bryn smith rn)

## 2019-05-24 ENCOUNTER — APPOINTMENT (OUTPATIENT)
Dept: GENERAL RADIOLOGY | Age: 79
DRG: 164 | End: 2019-05-24
Attending: NURSE PRACTITIONER
Payer: MEDICARE

## 2019-05-24 PROBLEM — R09.02 HYPOXIA: Status: ACTIVE | Noted: 2019-05-24

## 2019-05-24 PROBLEM — J44.9 COPD (CHRONIC OBSTRUCTIVE PULMONARY DISEASE) (HCC): Status: ACTIVE | Noted: 2019-05-24

## 2019-05-24 LAB
ANION GAP SERPL CALC-SCNC: 7 MMOL/L (ref 7–16)
BASOPHILS # BLD: 0 K/UL (ref 0–0.2)
BASOPHILS NFR BLD: 0 % (ref 0–2)
BUN SERPL-MCNC: 12 MG/DL (ref 8–23)
CALCIUM SERPL-MCNC: 7.9 MG/DL (ref 8.3–10.4)
CHLORIDE SERPL-SCNC: 104 MMOL/L (ref 98–107)
CO2 SERPL-SCNC: 27 MMOL/L (ref 21–32)
CREAT SERPL-MCNC: 0.56 MG/DL (ref 0.6–1)
DIFFERENTIAL METHOD BLD: ABNORMAL
EOSINOPHIL # BLD: 0 K/UL (ref 0–0.8)
EOSINOPHIL NFR BLD: 0 % (ref 0.5–7.8)
ERYTHROCYTE [DISTWIDTH] IN BLOOD BY AUTOMATED COUNT: 15.2 % (ref 11.9–14.6)
GLUCOSE SERPL-MCNC: 129 MG/DL (ref 65–100)
HCT VFR BLD AUTO: 29.3 % (ref 35.8–46.3)
HGB BLD-MCNC: 9.3 G/DL (ref 11.7–15.4)
IMM GRANULOCYTES # BLD AUTO: 0.1 K/UL (ref 0–0.5)
IMM GRANULOCYTES NFR BLD AUTO: 1 % (ref 0–5)
LYMPHOCYTES # BLD: 1.4 K/UL (ref 0.5–4.6)
LYMPHOCYTES NFR BLD: 8 % (ref 13–44)
MCH RBC QN AUTO: 29.5 PG (ref 26.1–32.9)
MCHC RBC AUTO-ENTMCNC: 31.7 G/DL (ref 31.4–35)
MCV RBC AUTO: 93 FL (ref 79.6–97.8)
MM INDURATION POC: 0 MM (ref 0–5)
MONOCYTES # BLD: 1.4 K/UL (ref 0.1–1.3)
MONOCYTES NFR BLD: 8 % (ref 4–12)
NEUTS SEG # BLD: 14.3 K/UL (ref 1.7–8.2)
NEUTS SEG NFR BLD: 83 % (ref 43–78)
NRBC # BLD: 0 K/UL (ref 0–0.2)
PLATELET # BLD AUTO: 277 K/UL (ref 150–450)
PMV BLD AUTO: 9.8 FL (ref 9.4–12.3)
POTASSIUM SERPL-SCNC: 3.6 MMOL/L (ref 3.5–5.1)
PPD POC: NORMAL NEGATIVE
RBC # BLD AUTO: 3.15 M/UL (ref 4.05–5.2)
SODIUM SERPL-SCNC: 138 MMOL/L (ref 136–145)
WBC # BLD AUTO: 17.2 K/UL (ref 4.3–11.1)

## 2019-05-24 PROCEDURE — 97161 PT EVAL LOW COMPLEX 20 MIN: CPT

## 2019-05-24 PROCEDURE — 74011000258 HC RX REV CODE- 258: Performed by: SURGERY

## 2019-05-24 PROCEDURE — 74011000250 HC RX REV CODE- 250: Performed by: NURSE PRACTITIONER

## 2019-05-24 PROCEDURE — 94640 AIRWAY INHALATION TREATMENT: CPT

## 2019-05-24 PROCEDURE — 94760 N-INVAS EAR/PLS OXIMETRY 1: CPT

## 2019-05-24 PROCEDURE — 71045 X-RAY EXAM CHEST 1 VIEW: CPT

## 2019-05-24 PROCEDURE — 65270000029 HC RM PRIVATE

## 2019-05-24 PROCEDURE — 74011000250 HC RX REV CODE- 250: Performed by: INTERNAL MEDICINE

## 2019-05-24 PROCEDURE — 80048 BASIC METABOLIC PNL TOTAL CA: CPT

## 2019-05-24 PROCEDURE — 74011250636 HC RX REV CODE- 250/636: Performed by: NURSE PRACTITIONER

## 2019-05-24 PROCEDURE — 51798 US URINE CAPACITY MEASURE: CPT

## 2019-05-24 PROCEDURE — 97110 THERAPEUTIC EXERCISES: CPT

## 2019-05-24 PROCEDURE — 74011250637 HC RX REV CODE- 250/637: Performed by: NURSE PRACTITIONER

## 2019-05-24 PROCEDURE — 74011250636 HC RX REV CODE- 250/636: Performed by: SURGERY

## 2019-05-24 PROCEDURE — 77010033678 HC OXYGEN DAILY

## 2019-05-24 PROCEDURE — 99223 1ST HOSP IP/OBS HIGH 75: CPT | Performed by: INTERNAL MEDICINE

## 2019-05-24 PROCEDURE — 74011250637 HC RX REV CODE- 250/637: Performed by: SURGERY

## 2019-05-24 PROCEDURE — 85025 COMPLETE CBC W/AUTO DIFF WBC: CPT

## 2019-05-24 PROCEDURE — 36415 COLL VENOUS BLD VENIPUNCTURE: CPT

## 2019-05-24 RX ORDER — GUAIFENESIN 600 MG/1
600 TABLET, EXTENDED RELEASE ORAL EVERY 12 HOURS
Status: DISCONTINUED | OUTPATIENT
Start: 2019-05-24 | End: 2019-05-24

## 2019-05-24 RX ORDER — GUAIFENESIN 600 MG/1
1200 TABLET, EXTENDED RELEASE ORAL EVERY 12 HOURS
Status: DISCONTINUED | OUTPATIENT
Start: 2019-05-24 | End: 2019-05-24

## 2019-05-24 RX ORDER — POTASSIUM CHLORIDE 14.9 MG/ML
20 INJECTION INTRAVENOUS
Status: COMPLETED | OUTPATIENT
Start: 2019-05-24 | End: 2019-05-24

## 2019-05-24 RX ORDER — BUDESONIDE 0.5 MG/2ML
500 INHALANT ORAL
Status: DISCONTINUED | OUTPATIENT
Start: 2019-05-24 | End: 2019-05-31 | Stop reason: HOSPADM

## 2019-05-24 RX ORDER — POTASSIUM CHLORIDE 20 MEQ/1
20 TABLET, EXTENDED RELEASE ORAL
Status: COMPLETED | OUTPATIENT
Start: 2019-05-24 | End: 2019-05-24

## 2019-05-24 RX ADMIN — Medication 10 ML: at 06:27

## 2019-05-24 RX ADMIN — Medication 10 ML: at 14:18

## 2019-05-24 RX ADMIN — GABAPENTIN 300 MG: 300 CAPSULE ORAL at 21:34

## 2019-05-24 RX ADMIN — GABAPENTIN 300 MG: 300 CAPSULE ORAL at 16:31

## 2019-05-24 RX ADMIN — ENOXAPARIN SODIUM 40 MG: 40 INJECTION SUBCUTANEOUS at 08:45

## 2019-05-24 RX ADMIN — IPRATROPIUM BROMIDE AND ALBUTEROL SULFATE 3 ML: .5; 3 SOLUTION RESPIRATORY (INHALATION) at 19:59

## 2019-05-24 RX ADMIN — DEXTROSE MONOHYDRATE AND SODIUM CHLORIDE 50 ML/HR: 5; .45 INJECTION, SOLUTION INTRAVENOUS at 14:19

## 2019-05-24 RX ADMIN — POTASSIUM CHLORIDE 20 MEQ: 20 TABLET, EXTENDED RELEASE ORAL at 16:31

## 2019-05-24 RX ADMIN — PANTOPRAZOLE SODIUM 40 MG: 40 TABLET, DELAYED RELEASE ORAL at 06:28

## 2019-05-24 RX ADMIN — IPRATROPIUM BROMIDE AND ALBUTEROL SULFATE 3 ML: .5; 3 SOLUTION RESPIRATORY (INHALATION) at 02:36

## 2019-05-24 RX ADMIN — HYDROCODONE BITARTRATE AND ACETAMINOPHEN 1 TABLET: 5; 325 TABLET ORAL at 14:15

## 2019-05-24 RX ADMIN — HYDROCODONE BITARTRATE AND ACETAMINOPHEN 1 TABLET: 5; 325 TABLET ORAL at 10:58

## 2019-05-24 RX ADMIN — IPRATROPIUM BROMIDE AND ALBUTEROL SULFATE 3 ML: .5; 3 SOLUTION RESPIRATORY (INHALATION) at 07:50

## 2019-05-24 RX ADMIN — BUDESONIDE 500 MCG: 0.5 INHALANT RESPIRATORY (INHALATION) at 19:59

## 2019-05-24 RX ADMIN — SENNOSIDES AND DOCUSATE SODIUM 2 TABLET: 8.6; 5 TABLET ORAL at 18:15

## 2019-05-24 RX ADMIN — POTASSIUM CHLORIDE 20 MEQ: 200 INJECTION, SOLUTION INTRAVENOUS at 10:25

## 2019-05-24 RX ADMIN — IPRATROPIUM BROMIDE AND ALBUTEROL SULFATE 3 ML: .5; 3 SOLUTION RESPIRATORY (INHALATION) at 14:33

## 2019-05-24 RX ADMIN — POTASSIUM CHLORIDE 20 MEQ: 200 INJECTION, SOLUTION INTRAVENOUS at 14:00

## 2019-05-24 RX ADMIN — LEVOTHYROXINE SODIUM 75 MCG: 75 TABLET ORAL at 08:46

## 2019-05-24 RX ADMIN — GABAPENTIN 300 MG: 300 CAPSULE ORAL at 08:46

## 2019-05-24 RX ADMIN — Medication 10 ML: at 21:34

## 2019-05-24 RX ADMIN — SENNOSIDES AND DOCUSATE SODIUM 2 TABLET: 8.6; 5 TABLET ORAL at 08:46

## 2019-05-24 NOTE — PROGRESS NOTES
5/24/2019    PLAN:  Diet- Regular Diet  OOB/ Ambulate with assist  DVT Prophylactics- SCD/Lovenox  Pain control- Dilaudid/neurontin  SUP prophylaxis- Protonix  Encourage C/DB/IS  Chest tubes to 20 cm sx  Donald - I&O   Consult pulmonary    ASSESSMENT:  Admit Date: 5/22/2019   2 Day Post-Op  Procedure(s):   1. RIGHT THORACOSCOPY switched to thoracotomy with extensive pneumonolysis 2. LOWER LOBECTOMY  3. Upper lobe blebectomy x 2 4. Diaphragm resection with primary repair 5. Chest wall resection 6. MEDISTINAL LYMPHADENECTOMY 7. Intercostal nerve cryoablation        SUBJECTIVE: Awake in bed. Complaints of pain; controlled with medication. C/o congestion, however, unable to cough up mucus. Breathing OK, O2 3LNC sats good. Donald patent. AF, NAD. OBJECTIVE:  Patient Vitals for the past 24 hrs:   Temp Pulse Resp BP SpO2   05/24/19 1124 100.2 °F (37.9 °C) (!) 104 18 96/57 93 %   05/24/19 0755 99.3 °F (37.4 °C) (!) 111 18 115/65 96 %   05/24/19 0751 -- -- -- -- 90 %   05/24/19 0341 100.2 °F (37.9 °C) (!) 109 18 94/56 91 %   05/24/19 0238 -- -- -- -- 91 %   05/23/19 2315 98.9 °F (37.2 °C) (!) 107 16 92/54 92 %   05/23/19 2054 -- -- -- -- 94 %   05/23/19 1947 99.4 °F (37.4 °C) (!) 115 16 91/61 94 %   05/23/19 1505 -- -- -- -- 94 %   05/23/19 1451 99.5 °F (37.5 °C) 97 16 94/54 94 %         Date 05/23/19 0700 - 05/24/19 0659 05/24/19 0700 - 05/25/19 0659   Shift 4100-5811 8232-4518 24 Hour Total 0600-5826 8319-0965 24 Hour Total   INTAKE   P.O. 350  350        P. O. 350  350      I. V.(mL/kg/hr) 201(0.3)  201(0.1) 1019  1019     I.V. 201  201 1019  1019   Shift Total(mL/kg) 551(8.8)  551(8.7) 3823(46.9)  7118(46.9)   OUTPUT   Urine(mL/kg/hr) 340(0.5) 395(0.5) 735(0.5) 100  100     Urine Output (mL) (Urinary Catheter 05/22/19 2- way; Donald) 340 395 735 100  100   Chest Tube 9864 869 1671 40  40     CT Total Volume 1 1500  1500        CT Total Volume 2 150  150 Output (ml) (Chest Tube #1 05/22/19 Straight; Anterior;Right) 65 220 285        Output (ml) (Chest Tube #2 05/22/19 Right; Angled; Posterior) 20 285 305 40  40   Shift Total(mL/kg) 2091(38.0) 900(14.3) 8011(02.0) 140(2.2)  140(2.2)   NET -1524 -900 -2424 879  879   Weight (kg) 62.4 63 63 63 63 63            General:          No acute distress    Lungs:             CTA Bilaterally, chest tube times 2 to suction with air leak noted   Heart:              RRR  Abdomen:        Soft, Non distended, Non tender, BS+  Extremities:     No cyanosis, clubbing or edema  Neurologic:      No focal deficits           Labs:    Recent Labs     05/24/19  0355   WBC 17.2*   HGB 9.3*         K 3.6      CO2 27   BUN 12   CREA 0.56*   *         Wellington Can, NP

## 2019-05-24 NOTE — PROGRESS NOTES
Patient was resting in bed  Receptive to  presence  No family in room  Patient begged for prayer  Acknowledged patient is going thru a very difficult time  Prayer offered  Will follow closely thru her admission    Helena Singh, staff Ankit duncan [address], [address] [facility]  /   [email or web address]

## 2019-05-24 NOTE — PROGRESS NOTES
Shift assessment complete via doc flow sheet. All needs me at this time. Staff will monitor with hourly rounds. 05/23/19 2009   Psychosocial   Psychosocial (WDL) WDL   Patient Behaviors Calm; Cooperative   Purposeful Interaction Yes   [employer] Attend basic human needs   Caring Interventions Reassure   Reassure Caring rounds

## 2019-05-24 NOTE — PROGRESS NOTES
Problem: Mobility Impaired (Adult and Pediatric)  Goal: *Acute Goals and Plan of Care (Insert Text)  Description  LTG:  (1.)Ms. Karyn Khan will move from supine to sit and sit to supine , scoot up and down and roll side to side in flat bed without siderails with  INDEPENDENT within 7 day(s). (2.)Ms. Karyn Khan will perform all functional transfers with  MODIFIED INDEPENDENCE using the least restrictive/no device within 7 day(s). (3.)Ms. Karyn Khan will ambulate with  STAND BY ASSIST for 250+ feet with normal vital sign response with the least restrictive/no device within 7 day(s). (4.)Ms. Karyn Khan will ambulate up/down 2 steps with bilateral  railing with  CONTACT GUARD ASSIST with no device within 7 day(s). Outcome: Progressing Towards Goal     PHYSICAL THERAPY: Initial Assessment and Daily Note 5/24/2019  INPATIENT: PT Visit Days : 1  [place] / [place] / Product Type: Medicare /       NAME/AGE/GENDER: Bruno Pablo is a 78 y.o. female   PRIMARY DIAGNOSIS: Lung mass [R91.8]  Lung mass [R91.8]  Aftercare following surgery [Z48.89]  Right lower lobe lung mass [R91.8]  Right lower lobe lung mass [R91.8] Lung mass   Lung mass    Procedure(s) (LRB):   1. RIGHT THORACOSCOPY switched to thoracotomy with extensive pneumonolysis 2. LOWER LOBECTOMY  3. Upper lobe blebectomy x 2 4. Diaphragm resection with primary repair 5. Chest wall resection 6. MEDISTINAL LYMPHADENECTOMY 7. Intercostal nerve cryoablation     (Right)  2 Days Post-Op  ICD-10: Treatment Diagnosis:    Other abnormalities of gait and mobility (R26.89)   Precaution/Allergies:  Penicillins; Percocet [oxycodone-acetaminophen]; and Prednisone      ASSESSMENT:     Ms. Karyn Khan underwent above surgery and currently has 2 chest tubes to Pleurevacs and is on 4L O2 via nasal cannula. At baseline patient lives with daughter and ambulates independently. Patient endorses 5/10 pain R shoulder and thoracic area.   Patient required max assist to transition to sit. SpO2 88% on 4L so increased to 5L for activity. SpO2 increased to 90%. She stood with min Ax2 and able to take few small shuffling steps to [facility] with min HHAx2. Encouraged patient to sit in recliner, but she declined due to pain. Sat and back to supine with max Ax2. Patient has declined in functional mobility, Ms. Alcides Santizo would benefit from skilled physical therapy (medically necessary) to address her deficits and maximize her function. Initiated treatment to include LE exercises. Patient with good participation. This section established at most recent assessment   PROBLEM LIST (Impairments causing functional limitations):  Decreased ADL/Functional Activities  Decreased Transfer Abilities  Decreased Ambulation Ability/Technique  Increased Pain  Decreased Activity Tolerance   INTERVENTIONS PLANNED: (Benefits and precautions of physical therapy have been discussed with the patient.)  Balance Exercise  Bed Mobility  Gait Training  Therapeutic Activites  Therapeutic Exercise/Strengthening  Transfer Training  education      TREATMENT PLAN: Frequency/Duration: 3 times a week for duration of hospital stay  Rehabilitation Potential For Stated Goals: Excellent     REHAB RECOMMENDATIONS (at time of discharge pending progress):    Placement: It is my opinion, based on this patient's performance to date, that Ms. Alcides Santizo may benefit from intensive therapy at a [address] [place] [address] after discharge due to the functional deficits listed above that are likely to improve with skilled rehabilitation and may be unsafe to be at home alone . Equipment:   None at this time              HISTORY:   History of Present Injury/Illness (Reason for Referral): Admitted for above surgery.   Past Medical History/Comorbidities:   Ms. Alcides Santizo  has a past medical history of Cancer [facility]), Chronic obstructive pulmonary disease ([facility] [address] Hypertension, Subarachnoid hemorrhage ([facility] [address] and Thyroid disease. She also has no past medical history of Difficult intubation, Malignant hyperthermia due to anesthesia, Nausea & vomiting, or Pseudocholinesterase deficiency. Ms. Suzi Max  has a past surgical history that includes hx wrist fracture tx and hx other surgical.  Social History/Living Environment:      Prior Level of Function/Work/Activity:  At baseline patient lives with daughter and ambulates independently. Number of Personal Factors/Comorbidities that affect the Plan of Care: 1-2: MODERATE COMPLEXITY   EXAMINATION:   Most Recent Physical Functioning:   Gross Assessment:  AROM: Generally decreased, functional  Strength: Generally decreased, functional               Posture:  Posture (WDL): Exceptions to WDL  Posture Assessment: Forward head, Rounded shoulders  Balance:  Sitting: Impaired  Sitting - Static: Good (unsupported)  Sitting - Dynamic: Fair (occasional)  Standing: Impaired  Standing - Static: Constant support  Standing - Dynamic : Constant support Bed Mobility:  Rolling: Maximum assistance  Supine to Sit: Maximum assistance  Sit to Supine: Maximum assistance  Wheelchair Mobility:     Transfers:  Sit to Stand: Minimum assistance;Assist x2  Stand to Sit: Minimum assistance  Gait:     Base of Support: Widened  Speed/Aracelis: Slow  Step Length: Right shortened;Left shortened  Distance (ft): 3 Feet (ft)(sidestepping at EOB)  Assistive Device: Other (comment)(hand hold assist of 2)  Ambulation - Level of Assistance: Minimal assistance;Assist x2      Body Structures Involved:  Lungs  Thoracic Cage  Bones  Muscles Body Functions Affected:  Sensory/Pain  Respiratory  Movement Related Activities and Participation Affected:   [address]   Number of elements that affect the Plan of Care: 4+: HIGH COMPLEXITY   CLINICAL PRESENTATION:   Presentation: Evolving clinical presentation with changing clinical characteristics: MODERATE COMPLEXITY   CLINICAL DECISION MAKING:   [facility] AM-PAC? ?6 Clicks? Basic Mobility Inpatient Short Form  How much difficulty does the patient currently have. .. Unable A Lot A Little None   1. Turning over in bed (including adjusting bedclothes, sheets and blankets)? ? 1   ? 2   ? 3   ? 4   2. Sitting down on and standing up from a chair with arms ( e.g., wheelchair, bedside commode, etc.)   ? 1   ? 2   ? 3   ? 4   3. Moving from lying on back to sitting on the side of the bed?   ? 1   ? 2   ? 3   ? 4   How much help from another person does the patient currently need. .. Total A Lot A Little None   4. Moving to and from a bed to a chair (including a wheelchair)? ? 1   ? 2   ? 3   ? 4   5. Need to walk in hospital room? ? 1   ? 2   ? 3   ? 4   6. Climbing 3-5 steps with a railing? ? 1   ? 2   ? 3   ? 4   © 2007, Trustees of [facility], under license to [employer]. All rights reserved      Score:  Initial: 15 Most Recent: X (Date: -- )    Interpretation of Tool:  Represents activities that are increasingly more difficult (i.e. Bed mobility, Transfers, Gait). Medical Necessity:     Patient is expected to demonstrate progress in strength, range of motion, balance, coordination and functional technique   to increase independence with   and improve safety during all functional mobility. .  Reason for Services/Other Comments:  Patient continues to require skilled intervention due to medical complications and patient unable to attend/participate in therapy as expected  .    Use of outcome tool(s) and clinical judgement create a POC that gives a: Clear prediction of patient's progress: LOW COMPLEXITY            TREATMENT:   (In addition to Assessment/Re-Assessment sessions the following treatments were rendered)   Pre-treatment Symptoms/Complaints:  pain  Pain: Initial:   Pain Intensity 1: 5  Pain Location 1: Chest, Shoulder  Pain Orientation 1: Right  Pain Intervention(s) 1: Repositioned, Nurse notified Post Session:  4/10     Therapeutic Exercise: ( 8 minutes):  Exercises per grid below to improve mobility, strength and balance. Required minimal visual and verbal cues to promote proper body alignment and promote proper body breathing techniques. Progressed complexity of movement as indicated. DATE: 5/24/19        Straight leg raise         Hip abduct/ adduct         Heel slides  X10 AB        Hip external/ internal rotation         Ankle dorsiflexion/ plantarflexion X20 AB        Long arc quads X10 AB                            Key:  A=active, AA=active assisted, P=passive, B=bilaterally, R=right, L=left      Braces/Orthotics/Lines/Etc:   IV  goodman catheter  Chest tubes x2, Pleurevac to suction   O2 Device: Nasal cannula  Treatment/Session Assessment:    Response to Treatment:  cooperative. Interdisciplinary Collaboration:   Physical Therapist  Registered Nurse  Rehabilitation Attendant  After treatment position/precautions:   Supine in bed  Bed/Chair-wheels locked  Bed in low position  Call light within reach   Compliance with Program/Exercises: Will assess as treatment progresses  Recommendations/Intent for next treatment session: \"Next visit will focus on advancements to more challenging activities and reduction in assistance provided\".   Total Treatment Duration:  PT Patient Time In/Time Out  Time In: 1035  Time Out: [address], PT, DPT

## 2019-05-24 NOTE — CONSULTS
CONSULT NOTE    Lena Chavira    5/24/2019    Date of Admission:  5/22/2019    The patient's chart is reviewed and the patient is discussed with the staff. Subjective:     Patient is a 78 y.o.  female seen and evaluated at the request of Dr. Theresa Gibson.  Patient has a history of prior tobacco abuse (1.5 ppd x 45 years - quit 2004), emphysema, hypothyroidism, subarachnoid hemorrhage, anterior cerebral aneurysm s/p brain coil, HTN, HL, and lung mass. PET CT for staging was ordered and performed on 3/28/19, demonstrating moderate to intense activity within the patient's known right lower lobe mass measuring 5.4 cm x 4.4 cm with hypermetabolic tumor appearing to extend to the right inferior hilum; no evidence for metastatic disease seen in the neck, chest, abdomen, or pelvis; however, focal activity is seen in the right posterior 10th rib which is felt to be due to a rib fracture without aggressive features appreciated. Bronch with EBUS and fine needle aspiration of hilar/mediastinal LN and TBBx with final path on RLL mass consistent with poorly differentiated carcinoma. She was admitted for tx of aspiration PNA post bronch. Patient opted to pursue surgical intervention and was seen by Heme/Onc with recommendation for adjuvant chemo. Pre-op PFTs showing mild obstruction with reduced DLCO suggestive of COPD. Patient is now s/p R thoracoscopy >>>thoractomy with extensive pneumonolysis, R lower lobectomy, upper lobe blebectomy x 2, diaphragm resection with primary repair, chest wall resection, mediastinal lymphadenectomy, and intercostal nerve cryoablation on 5/22/19 per Dr. Theresa Gibson.    Boo Pals op she continues to require O2 and had sat of 88% on 4 lpm with ambulation requiring increase to 5 lpm to maintain sat of 90%. Tmax 100.2. She denies sob but does admit to cough and post op chest pain.         Review of Systems  Constitutional: negative for fevers and chills  Eyes: negative for visual disturbance  Ears, nose, mouth, throat, and face: negative for hearing loss  Cardiovascular: positive for chest pain  Gastrointestinal: negative for nausea and vomiting  Genitourinary:negative for frequency and dysuria  Musculoskeletal:negative for arthralgias  Neurological: negative for headaches    Patient Active Problem List   Diagnosis Code    Lung mass R91.8    Personal history of tobacco use, presenting hazards to health Z87.891    Liver lesion K76.9    Centrilobular emphysema ([address] J43.2    Malignant neoplasm of lower lobe of right lung (HCC) C34.31    Aspiration pneumonia ([address] J69.0    Acute respiratory failure with hypoxia ([address] J96.01    HTN (hypertension) I10    Acquired hypothyroidism E03.9    Severe sepsis with acute organ dysfunction (HCC) A41.9, R65.20    Aftercare following surgery Z48.89    Right lower lobe lung mass R91.8    Hypoxia R09.02    COPD (chronic obstructive pulmonary disease) ([facility] [address] J44.9           Prior to Admission Medications   Prescriptions Last Dose Informant Patient Reported? Taking? MAGNESIUM PO 5/15/2019 at Unknown time  Yes Yes   Sig: Take 100 mg by mouth daily. VITAMIN B COMPLEX PO 5/15/2019 at Unknown time  Yes Yes   Sig: Take 1 Tab by mouth daily. albuterol (PROVENTIL HFA, VENTOLIN HFA, PROAIR HFA) 90 mcg/actuation inhaler 5/21/2019 at Unknown time  No Yes   Sig: Take 1 Puff by inhalation every six (6) hours as needed for Wheezing or Shortness of Breath. ascorbic acid, vitamin C, (VITAMIN C) 500 mg tablet 5/15/2019  Yes No   Sig: Take  by mouth. atorvastatin (LIPITOR) 10 mg tablet 5/15/2019 at Unknown time  Yes Yes   Sig: Take 10 mg by mouth daily. benzonatate (TESSALON) 100 mg capsule 5/22/2019 at Unknown time  No Yes   Sig: Take 1 Cap by mouth three (3) times daily as needed for Cough. calc-D3-magnes-O9-Dk-Pz-jake 250 mg-400 unit -40 mg-5 mg tab 5/15/2019 at Unknown time  Yes Yes   Sig: Take 1 Tab by mouth daily.    cholecalciferol (VITAMIN D3) 1,000 unit tablet 5/15/2019 at Unknown time  Yes Yes   Sig: Take 1,000 Units by mouth daily. levothyroxine (SYNTHROID) 75 mcg tablet 5/15/2019 at Unknown time  Yes Yes   Sig: Take 75 mcg by mouth daily. losartan (COZAAR) 25 mg tablet 5/15/2019 at Unknown time  Yes Yes   Sig: Take 25 mg by mouth daily. omega 3-dha-epa-fish oil (FISH OIL) 100-160-1,000 mg cap 5/15/2019 at Unknown time  Yes Yes   Sig: Take 1 Cap by mouth daily. therapeutic multivitamin (THERAGRAN) tablet 5/15/2019 at Unknown time  Yes Yes   Sig: Take 1 Tab by mouth daily. zolpidem (AMBIEN) 5 mg tablet 5/21/2019 at Unknown time  Yes Yes   Sig: Take 1 Tab by mouth nightly.       Facility-Administered Medications: None       Past Medical History:   Diagnosis Date    Cancer ([facility] [address]     Lung cancer    Chronic obstructive pulmonary disease ([facility] [address]     Hypertension     Subarachnoid hemorrhage ([facility] [address]     Thyroid disease      Past Surgical History:   Procedure Laterality Date    HX OTHER SURGICAL      coil in brain    HX WRIST FRACTURE TX      bilat wrist     Social History     Socioeconomic History    Marital status: SINGLE     Spouse name: Not on file    Number of children: Not on file    Years of education: Not on file    Highest education level: Not on file   Occupational History    Not on file   Social Needs    Financial resource strain: Not on file    Food insecurity:     Worry: Not on file     Inability: Not on file    Transportation needs:     Medical: Not on file     Non-medical: Not on file   Tobacco Use    Smoking status: Former Smoker     Packs/day: 1.50     Years: 45.00     Pack years: 67.50     Types: Cigarettes     Last attempt to quit: 2004     Years since quitting: 15.4    Smokeless tobacco: Never Used   Substance and Sexual Activity    Alcohol use: Not Currently    Drug use: Never    Sexual activity: Not on file   Lifestyle    Physical activity:     Days per week: Not on file     Minutes per session: Not on file    Stress: Not on file   Relationships    Social connections:     Talks on phone: Not on file     Gets together: Not on file     Attends Shinto service: Not on file     Active member of club or organization: Not on file     Attends meetings of clubs or organizations: Not on file     Relationship status: Not on file    Intimate partner violence:     Fear of current or ex partner: Not on file     Emotionally abused: Not on file     Physically abused: Not on file     Forced sexual activity: Not on file   Other Topics Concern    Not on file   Social History Narrative    Not on file     History reviewed. No pertinent family history.   Allergies   Allergen Reactions    Penicillins Unknown (comments)     Hives    Percocet [Oxycodone-Acetaminophen] Unknown (comments)    Prednisone Other (comments)     Tachycardia       Current Facility-Administered Medications   Medication Dose Route Frequency    guaiFENesin ER (MUCINEX) tablet 1,200 mg  1,200 mg Oral Q12H    potassium chloride 20 mEq in 100 ml IVPB  20 mEq IntraVENous Q2H    dextrose 5 % - 0.45% NaCl infusion  50 mL/hr IntraVENous CONTINUOUS    HYDROmorphone (PF) (DILAUDID) injection 0.5 mg  0.5 mg IntraVENous Q4H PRN    HYDROcodone-acetaminophen (NORCO) 5-325 mg per tablet 1 Tab  1 Tab Oral Q4H PRN    tuberculin injection 5 Units  5 Units IntraDERMal ONCE    levothyroxine (SYNTHROID) tablet 75 mcg  75 mcg Oral DAILY    albuterol-ipratropium (DUO-NEB) 2.5 MG-0.5 MG/3 ML  3 mL Nebulization Q6H RT    senna-docusate (PERICOLACE) 8.6-50 mg per tablet 2 Tab  2 Tab Oral BID    gabapentin (NEURONTIN) capsule 300 mg  300 mg Oral TID    pantoprazole (PROTONIX) tablet 40 mg  40 mg Oral ACB    sodium chloride (NS) flush 5-40 mL  5-40 mL IntraVENous Q8H    sodium chloride (NS) flush 5-40 mL  5-40 mL IntraVENous PRN    naloxone (NARCAN) injection 0.4 mg  0.4 mg IntraVENous PRN    ondansetron (ZOFRAN) injection 4 mg  4 mg IntraVENous Q4H PRN    enoxaparin (LOVENOX) injection 40 mg  40 mg SubCUTAneous Q24H         Objective:     Vitals:    19 0751 19 0755 19 1124 19 1434   BP:  115/65 96/57    Pulse:  (!) 111 (!) 104    Resp:  18 18    Temp:  99.3 °F (37.4 °C) 100.2 °F (37.9 °C)    SpO2: 90% 96% 93% 94%   Weight:       Height:           PHYSICAL EXAM     Constitutional:  the patient is well developed and in no acute distress  EENMT:  Sclera clear, pupils equal, oral mucosa moist  Respiratory: few crackles on the R, not much wheezing  Cardiovascular:  RRR without M,G,R  Gastrointestinal: soft and non-tender; with positive bowel sounds. Musculoskeletal: warm without cyanosis. There is no lower extremity edema. Skin:  no jaundice or rashes, chest wounds   Neurologic: no gross neuro deficits     Psychiatric:  alert and oriented x 3    CXR:    2019  COMPLETE PULMONARY FUNCTION STUDY    SPIROMETRY:  FVC 2.66  L (83% predicted). FEV1   1.73  L (74% predicted). FEV1/FVC       65%. FEF 25-75       0.94  L/s (55% predicted). The flow-volume loop reveals obstruction.    There is not a response to bronchodilators. LUNG VOLUMES:  Obtained by plethysmography. Total Lung Capacity: 6.06 or 107 % of predicted. FRC:   3.59 or 109 % of predicted. Residual Volume:   3.18 or 123 % of predicted. RV/T, which is normal.    DIFFUSION CAPACITY:  Hgb: 12.2  Diffusion Capacity:  9.4 or 38 % of predicted. DLCO corrected for alveolar volume:  2.65 or 57 % of predicted. IMPRESSION:  Mild obstruction with reduced DLCO suggestive of COPD      Recent Labs     19  0355 19  0450   WBC 17.2* 13.1*   HGB 9.3* 9.7*   HCT 29.3* 30.9*    264     Recent Labs     19  0355 19  0450    141   K 3.6 3.5    105   * 140*   CO2 27 27   BUN 12 12   CREA 0.56* 0.67   MG  --  1.9   CA 7.9* 8.4     No results for input(s): PH, PCO2, PO2, HCO3 in the last 72 hours.   No results for input(s): LCAD, LAC in the last 72 hours. Assessment:  (Medical Decision Making)     Hospital Problems  Date Reviewed: 5/22/2019          Codes Class Noted POA    Hypoxia ICD-10-CM: R09.02  ICD-9-CM: 799.02  5/24/2019 Unknown        COPD (chronic obstructive pulmonary disease) ([facility] [address] ICD-10-CM: J44.9  ICD-9-CM: 806  5/24/2019 Unknown        Aftercare following surgery ICD-10-CM: Z48.89  ICD-9-CM: V58.89  5/22/2019 Unknown        Right lower lobe lung mass ICD-10-CM: R91.8  ICD-9-CM: 786.6  5/22/2019 Unknown        * (Principal) Lung mass ICD-10-CM: R91.8  ICD-9-CM: 786.6  3/18/2019 Unknown              Plan:  (Medical Decision Making)     --Duo-neb rxs  --mucinex bid  --WBC 17.2  Add pulmicort to nebs    Follow up cxr- will do better when chest tubes can be removed but still has small air leak  More than 50% of the time documented was spent in face-to-face contact with the patient and in the care of the patient on the floor/unit where the patient is located. Thank you very much for this referral.  We appreciate the opportunity to participate in this patient's care. Will follow along with above stated plan.     Hudson Saleem MD

## 2019-05-24 NOTE — PROGRESS NOTES
05/23/19 2058   Pain 1   Pain Scale 1 Numeric (0 - 10)   Pain Intensity 1 8   Pain Location 1 Chest;Shoulder   Pain Orientation 1 Right   Pain Intervention(s) 1 Medication (see MAR)

## 2019-05-24 NOTE — PROGRESS NOTES
Spoke to Ms. Terry Bolaños in room 216 about Case Management and discharge planning. Ms. Terry Bolaños has her own place in [place], [place], and was independent with ADLs, but after her diagnosis, moved in with her daughter in [facility] side). She has equipment at home for supplemental oxygen at 2.5 lpm via NC. Plan is home with daughter when stable. Will re-assess closer to discharge date (she still has two chest tubes). Care Management Interventions  Current Support Network: Relative's Home  Plan discussed with Pt/Family/Caregiver:  Yes

## 2019-05-25 ENCOUNTER — APPOINTMENT (OUTPATIENT)
Dept: GENERAL RADIOLOGY | Age: 79
DRG: 164 | End: 2019-05-25
Attending: SURGERY
Payer: MEDICARE

## 2019-05-25 LAB
ANION GAP SERPL CALC-SCNC: 5 MMOL/L (ref 7–16)
BASOPHILS # BLD: 0 K/UL (ref 0–0.2)
BASOPHILS NFR BLD: 0 % (ref 0–2)
BUN SERPL-MCNC: 11 MG/DL (ref 8–23)
CALCIUM SERPL-MCNC: 7.8 MG/DL (ref 8.3–10.4)
CHLORIDE SERPL-SCNC: 105 MMOL/L (ref 98–107)
CO2 SERPL-SCNC: 30 MMOL/L (ref 21–32)
CREAT SERPL-MCNC: 0.52 MG/DL (ref 0.6–1)
DIFFERENTIAL METHOD BLD: ABNORMAL
EOSINOPHIL # BLD: 0.1 K/UL (ref 0–0.8)
EOSINOPHIL NFR BLD: 1 % (ref 0.5–7.8)
ERYTHROCYTE [DISTWIDTH] IN BLOOD BY AUTOMATED COUNT: 15.4 % (ref 11.9–14.6)
GLUCOSE SERPL-MCNC: 106 MG/DL (ref 65–100)
HCT VFR BLD AUTO: 28.4 % (ref 35.8–46.3)
HGB BLD-MCNC: 8.9 G/DL (ref 11.7–15.4)
IMM GRANULOCYTES # BLD AUTO: 0.1 K/UL (ref 0–0.5)
IMM GRANULOCYTES NFR BLD AUTO: 1 % (ref 0–5)
LYMPHOCYTES # BLD: 1.4 K/UL (ref 0.5–4.6)
LYMPHOCYTES NFR BLD: 9 % (ref 13–44)
MCH RBC QN AUTO: 29.1 PG (ref 26.1–32.9)
MCHC RBC AUTO-ENTMCNC: 31.3 G/DL (ref 31.4–35)
MCV RBC AUTO: 92.8 FL (ref 79.6–97.8)
MM INDURATION POC: 0 MM (ref 0–5)
MONOCYTES # BLD: 1.1 K/UL (ref 0.1–1.3)
MONOCYTES NFR BLD: 8 % (ref 4–12)
NEUTS SEG # BLD: 11.8 K/UL (ref 1.7–8.2)
NEUTS SEG NFR BLD: 82 % (ref 43–78)
NRBC # BLD: 0 K/UL (ref 0–0.2)
PLATELET # BLD AUTO: 299 K/UL (ref 150–450)
PMV BLD AUTO: 9.7 FL (ref 9.4–12.3)
POTASSIUM SERPL-SCNC: 3.7 MMOL/L (ref 3.5–5.1)
PPD POC: NORMAL NEGATIVE
RBC # BLD AUTO: 3.06 M/UL (ref 4.05–5.2)
SODIUM SERPL-SCNC: 140 MMOL/L (ref 136–145)
WBC # BLD AUTO: 14.4 K/UL (ref 4.3–11.1)

## 2019-05-25 PROCEDURE — 77010033678 HC OXYGEN DAILY

## 2019-05-25 PROCEDURE — 71045 X-RAY EXAM CHEST 1 VIEW: CPT

## 2019-05-25 PROCEDURE — 74011250636 HC RX REV CODE- 250/636: Performed by: SURGERY

## 2019-05-25 PROCEDURE — 94760 N-INVAS EAR/PLS OXIMETRY 1: CPT

## 2019-05-25 PROCEDURE — 74011000250 HC RX REV CODE- 250: Performed by: NURSE PRACTITIONER

## 2019-05-25 PROCEDURE — 85025 COMPLETE CBC W/AUTO DIFF WBC: CPT

## 2019-05-25 PROCEDURE — 65270000029 HC RM PRIVATE

## 2019-05-25 PROCEDURE — 99232 SBSQ HOSP IP/OBS MODERATE 35: CPT | Performed by: INTERNAL MEDICINE

## 2019-05-25 PROCEDURE — 74011000250 HC RX REV CODE- 250: Performed by: INTERNAL MEDICINE

## 2019-05-25 PROCEDURE — 74011250637 HC RX REV CODE- 250/637: Performed by: SURGERY

## 2019-05-25 PROCEDURE — 74011250637 HC RX REV CODE- 250/637: Performed by: NURSE PRACTITIONER

## 2019-05-25 PROCEDURE — 80048 BASIC METABOLIC PNL TOTAL CA: CPT

## 2019-05-25 PROCEDURE — 36415 COLL VENOUS BLD VENIPUNCTURE: CPT

## 2019-05-25 PROCEDURE — 94640 AIRWAY INHALATION TREATMENT: CPT

## 2019-05-25 PROCEDURE — 74011250636 HC RX REV CODE- 250/636: Performed by: NURSE PRACTITIONER

## 2019-05-25 RX ORDER — ADHESIVE BANDAGE
30 BANDAGE TOPICAL DAILY
Status: DISCONTINUED | OUTPATIENT
Start: 2019-05-25 | End: 2019-05-31 | Stop reason: HOSPADM

## 2019-05-25 RX ORDER — ADHESIVE BANDAGE
30 BANDAGE TOPICAL DAILY PRN
Status: DISCONTINUED | OUTPATIENT
Start: 2019-05-25 | End: 2019-05-31 | Stop reason: HOSPADM

## 2019-05-25 RX ADMIN — HYDROCODONE BITARTRATE AND ACETAMINOPHEN 1 TABLET: 5; 325 TABLET ORAL at 10:04

## 2019-05-25 RX ADMIN — GABAPENTIN 300 MG: 300 CAPSULE ORAL at 10:04

## 2019-05-25 RX ADMIN — Medication 10 ML: at 02:43

## 2019-05-25 RX ADMIN — ENOXAPARIN SODIUM 40 MG: 40 INJECTION SUBCUTANEOUS at 10:04

## 2019-05-25 RX ADMIN — SENNOSIDES AND DOCUSATE SODIUM 2 TABLET: 8.6; 5 TABLET ORAL at 17:19

## 2019-05-25 RX ADMIN — GABAPENTIN 300 MG: 300 CAPSULE ORAL at 17:19

## 2019-05-25 RX ADMIN — HYDROCODONE BITARTRATE AND ACETAMINOPHEN 1 TABLET: 5; 325 TABLET ORAL at 02:23

## 2019-05-25 RX ADMIN — IPRATROPIUM BROMIDE AND ALBUTEROL SULFATE 3 ML: .5; 3 SOLUTION RESPIRATORY (INHALATION) at 07:25

## 2019-05-25 RX ADMIN — SENNOSIDES AND DOCUSATE SODIUM 2 TABLET: 8.6; 5 TABLET ORAL at 10:04

## 2019-05-25 RX ADMIN — Medication 5 ML: at 20:02

## 2019-05-25 RX ADMIN — IPRATROPIUM BROMIDE AND ALBUTEROL SULFATE 3 ML: .5; 3 SOLUTION RESPIRATORY (INHALATION) at 14:17

## 2019-05-25 RX ADMIN — HYDROCODONE BITARTRATE AND ACETAMINOPHEN 1 TABLET: 5; 325 TABLET ORAL at 20:00

## 2019-05-25 RX ADMIN — Medication 10 ML: at 14:40

## 2019-05-25 RX ADMIN — BUDESONIDE 500 MCG: 0.5 INHALANT RESPIRATORY (INHALATION) at 19:20

## 2019-05-25 RX ADMIN — PANTOPRAZOLE SODIUM 40 MG: 40 TABLET, DELAYED RELEASE ORAL at 06:28

## 2019-05-25 RX ADMIN — IPRATROPIUM BROMIDE AND ALBUTEROL SULFATE 3 ML: .5; 3 SOLUTION RESPIRATORY (INHALATION) at 19:20

## 2019-05-25 RX ADMIN — IPRATROPIUM BROMIDE AND ALBUTEROL SULFATE 3 ML: .5; 3 SOLUTION RESPIRATORY (INHALATION) at 01:54

## 2019-05-25 RX ADMIN — MAGNESIUM HYDROXIDE 30 ML: 400 SUSPENSION ORAL at 20:01

## 2019-05-25 RX ADMIN — LEVOTHYROXINE SODIUM 75 MCG: 75 TABLET ORAL at 10:04

## 2019-05-25 RX ADMIN — Medication 10 ML: at 03:47

## 2019-05-25 RX ADMIN — BUDESONIDE 500 MCG: 0.5 INHALANT RESPIRATORY (INHALATION) at 07:25

## 2019-05-25 RX ADMIN — HYDROMORPHONE HYDROCHLORIDE 0.5 MG: 1 INJECTION, SOLUTION INTRAMUSCULAR; INTRAVENOUS; SUBCUTANEOUS at 03:45

## 2019-05-25 RX ADMIN — GABAPENTIN 300 MG: 300 CAPSULE ORAL at 21:53

## 2019-05-25 NOTE — PROGRESS NOTES
END OF SHIFT NOTE:    INTAKE/OUTPUT  05/23 0701 - 05/24 0700  In: 553 [P.O.:350; I.V.:201]  Out: 2050 [Urine:685]  Voiding: NO  Catheter: YES  Drain:              Flatus: Patient does have flatus present. Stool:  0 occurrences. Characteristics:       Emesis: 0 occurrences. Characteristics:        VITAL SIGNS  Patient Vitals for the past 12 hrs:   Temp Pulse Resp BP SpO2   05/24/19 1959 -- -- -- -- 92 %   05/24/19 1608 98 °F (36.7 °C) 100 18 90/57 92 %   05/24/19 1434 -- -- -- -- 94 %   05/24/19 1124 100.2 °F (37.9 °C) (!) 104 18 96/57 93 %       Pain Assessment  Pain Intensity 1: 10 (05/24/19 1124)  Pain Location 1: Back, Chest  Pain Intervention(s) 1: Medication (see MAR)  Patient Stated Pain Goal: 2    Ambulating  No    Shift report given to oncoming nurse at the bedside.     Roseline Riedel, RN

## 2019-05-25 NOTE — PROGRESS NOTES
Rosendo Santos  Admission Date: 5/22/2019             Daily Progress Note: 5/25/2019    The patient's chart is reviewed and the patient is discussed with the staff.    78 y.o. CF evaluated at the request of Dr. Annemarie Steele.  Chronic medical:  tobacco abuse (1.5 ppd x 45 years - quit 2004), emphysema, hypothyroidism, subarachnoid hemorrhage, anterior cerebral aneurysm s/p brain coil, HTN, HLD, and lung mass. PET CT for staging was ordered and performed on 3/28/19, demonstrating moderate to intense activity within the patient's known right lower lobe mass measuring 5.4 cm x 4.4 cm with hypermetabolic tumor appearing to extend to the right inferior hilum; no evidence for metastatic disease seen in the neck, chest, abdomen, or pelvis; however, focal activity is seen in the right posterior 10th rib which is felt to be due to a rib fracture without aggressive features appreciated. Bronch with EBUS and fine needle aspiration of hilar/mediastinal LN and TBBx with final path on RLL mass consistent with poorly differentiated carcinoma. She was admitted for tx of aspiration PNA post bronch. Patient opted to pursue surgical intervention and was seen by Heme/Onc with recommendation for adjuvant chemo. Pre-op PFTs showing mild obstruction with reduced DLCO suggestive of COPD.       Patient is now s/p R thoracoscopy >>>thoractomy with extensive pneumonolysis, R lower lobectomy, upper lobe blebectomy x 2, diaphragm resection with primary repair, chest wall resection, mediastinal lymphadenectomy, and intercostal nerve cryoablation on 5/22/19 per Dr. Gracelyn Collet op she continues to require O2 and had sat of 88% on 4 lpm with ambulation requiring increase to 5 lpm to maintain sat of 90%. Tmax 100.2. She denies shortness of breath but does admit to cough and post op chest pain. Subjective:     Lying in bed, states she did not sleep well last night. Denies shortness of breath and no sputum production today. Up to [facility], no BM and c/o constipation.     Current Facility-Administered Medications   Medication Dose Route Frequency    budesonide (PULMICORT) 500 mcg/2 ml nebulizer suspension  500 mcg Nebulization BID RT    HYDROmorphone (PF) (DILAUDID) injection 0.5 mg  0.5 mg IntraVENous Q4H PRN    HYDROcodone-acetaminophen (NORCO) 5-325 mg per tablet 1 Tab  1 Tab Oral Q4H PRN    levothyroxine (SYNTHROID) tablet 75 mcg  75 mcg Oral DAILY    albuterol-ipratropium (DUO-NEB) 2.5 MG-0.5 MG/3 ML  3 mL Nebulization Q6H RT    senna-docusate (PERICOLACE) 8.6-50 mg per tablet 2 Tab  2 Tab Oral BID    gabapentin (NEURONTIN) capsule 300 mg  300 mg Oral TID    pantoprazole (PROTONIX) tablet 40 mg  40 mg Oral ACB    sodium chloride (NS) flush 5-40 mL  5-40 mL IntraVENous Q8H    sodium chloride (NS) flush 5-40 mL  5-40 mL IntraVENous PRN    naloxone (NARCAN) injection 0.4 mg  0.4 mg IntraVENous PRN    ondansetron (ZOFRAN) injection 4 mg  4 mg IntraVENous Q4H PRN    enoxaparin (LOVENOX) injection 40 mg  40 mg SubCUTAneous Q24H       Review of Systems  Constitutional: negative for fever, chills, sweats  Cardiovascular: negative for chest pain, palpitations, syncope, edema  Gastrointestinal:  negative for dysphagia, reflux, vomiting, diarrhea, abdominal pain, or melena  Neurologic:  negative for focal weakness, numbness, headache    Objective:     Vitals:    05/25/19 0408 05/25/19 0521 05/25/19 0728 05/25/19 0737   BP: 92/56   (!) 87/50   Pulse: 98   95   Resp: 19   18   Temp: 100.3 °F (37.9 °C)   99 °F (37.2 °C)   SpO2: 91%  94% 96%   Weight:  151 lb 6.4 oz (68.7 kg)     Height:         Intake and Output:   05/23 1901 - 05/25 0700  In: 1892 [I.V.:1892]  Out: 2610 [Urine:1895]  05/25 0701 - 05/25 1900  In: -   Out: 10     Physical Exam:   Constitution:  the patient is well developed and in no acute distress, NC 4L sat 96%  EENMT:  Sclera clear, pupils equal, oral mucosa moist  Respiratory: few anterior crackles, no air leak noted, right chest tubes  Cardiovascular:  RRR without M,G,R  Gastrointestinal: soft and non-tender; with positive bowel sounds. Musculoskeletal: warm without cyanosis. There is no lower extremity edema, SCDs. Skin:  no jaundice or rashes, surgical wounds   Neurologic: no gross neuro deficits     Psychiatric:  alert and oriented x 3    CHEST XRAY:   5/25/19:  Small right apical pneumothorax and 2 right-sided chest tubes. No significant change. LAB  Recent Labs     05/22/19  1233 05/22/19  1146   GLUCPOC 171* 151*      Recent Labs     05/25/19  0620 05/24/19  0355 05/23/19  0450   WBC 14.4* 17.2* 13.1*   HGB 8.9* 9.3* 9.7*   HCT 28.4* 29.3* 30.9*    277 264     Recent Labs     05/25/19  0620 05/24/19  0355 05/23/19  0450    138 141   K 3.7 3.6 3.5    104 105   CO2 30 27 27   * 129* 140*   BUN 11 12 12   CREA 0.52* 0.56* 0.67   MG  --   --  1.9   CA 7.8* 7.9* 8.4     Recent Labs     05/22/19  1410 05/22/19  1233 05/22/19  1146   PHI 7.363 7.358 7.363   PCO2I 45.6* 48.5* 48.6*   PO2I 120* 326* 107*   HCO3I 26.0 27.2* 27.6*     No results for input(s): LCAD, LAC in the last 72 hours.       Assessment:  (Medical Decision Making)     Patient Active Problem List   Diagnosis Code    Lung mass R91.8    Personal history of tobacco use, presenting hazards to health Z87.891    Liver lesion K76.9    Centrilobular emphysema ([address] J43.2    Malignant neoplasm of lower lobe of right lung (HCC) C34.31    Aspiration pneumonia ([address] J69.0    Acute respiratory failure with hypoxia (HCC) J96.01    HTN (hypertension) I10    Acquired hypothyroidism E03.9    Severe sepsis with acute organ dysfunction (HCC) A41.9, R65.20    Aftercare following surgery Z48.89    Right lower lobe lung mass R91.8    Hypoxia R09.02    COPD (chronic obstructive pulmonary disease) ([facility]) J44.9         Plan:  (Medical Decision Making)     Hospital Problems  Date Reviewed: 5/22/2019          Codes Class Noted POA Hypoxia ICD-10-CM: R09.02  ICD-9-CM: 799.02  5/24/2019 Unknown    Wean O2 as tolerated    COPD (chronic obstructive pulmonary disease) (HCC) ICD-10-CM: J44.9  ICD-9-CM: 496  5/24/2019 Unknown    Continue nebs    Aftercare following surgery ICD-10-CM: Z48.89  ICD-9-CM: V58.89  5/22/2019 Unknown    Continue current    Right lower lobe lung mass ICD-10-CM: R91.8  ICD-9-CM: 786.6  5/22/2019 Unknown    carcinoma    * (Principal) Lung mass ICD-10-CM: R91.8  ICD-9-CM: 786.6  3/18/2019 Unknown              --Duo-neb, Pulmicort  --Chest tubes per surgery--CXR with small right pneumothorax   --WBC down to 14.4 from 17.2  --Constipation  --Wean O2 as tolerated    More than 50% of the time documented was spent in face-to-face contact with the patient and in the care of the patient on the floor/unit where the patient is located. Sarai Portillo, NP   Lungs:  Some rhonchi  Heart:  RRR with no Murmur/Rubs/Gallops    Additional Comments:  Still has air leak-small    I have spoken with and examined the patient. I agree with the above assessment and plan as documented.     Khai Cartwright MD

## 2019-05-25 NOTE — PROGRESS NOTES
5/25/2019    PLAN:  Diet- Regular Diet  OOB/ Ambulate with assist  DVT Prophylactics- SCD/Lovenox  Pain control- Dilaudid/neurontin  SUP prophylaxis- Protonix  Encourage C/DB/IS  Chest tubes to 20 cm sx  Donald - I&O   Pulmonary following  MOM    ASSESSMENT:  Admit Date: 5/22/2019   3 Day Post-Op  Procedure(s):   1. RIGHT THORACOSCOPY switched to thoracotomy with extensive pneumonolysis 2. LOWER LOBECTOMY  3. Upper lobe blebectomy x 2 4. Diaphragm resection with primary repair 5. Chest wall resection 6. MEDISTINAL LYMPHADENECTOMY 7. Intercostal nerve cryoablation        SUBJECTIVE: Awake in bed. Complaints of pain; controlled with medication. Also states has not had a BM and feels like she needs to. Breathing OK, O2 4LNC sats good. Donald patent. AF, NAD. OBJECTIVE:  Patient Vitals for the past 24 hrs:   Temp Pulse Resp BP SpO2   05/25/19 1418 -- -- -- -- 93 %   05/25/19 1137 99.3 °F (37.4 °C) 97 18 100/59 97 %   05/25/19 0737 99 °F (37.2 °C) 95 18 (!) 87/50 96 %   05/25/19 0728 -- -- -- -- 94 %   05/25/19 0408 100.3 °F (37.9 °C) 98 19 92/56 91 %   05/24/19 2349 (!) 100.7 °F (38.2 °C) (!) 103 19 107/65 91 %   05/24/19 1959 -- -- -- -- 92 %         Date 05/24/19 0700 - 05/25/19 0659 05/25/19 0700 - 05/26/19 0659   Shift 1163-9808 8982-9068 24 Hour Total 8170-1447 7813-6714 24 Hour Total   INTAKE   I.V.(mL/kg/hr) 1892(2.5)  1892(1.1)        I.V. 1892 1892      Shift Total(mL/kg) 1892(30)  4418(99.2)      OUTPUT   Urine(mL/kg/hr) 600(0.8) 900(1.1) 1500(0.9) 300  300     Urine Output (mL) ([REMOVED] Urinary Catheter 05/22/19 2- way; Donald)  300  300   Chest Tube 40 170 210 10  10     Output (ml) (Chest Tube #1 05/22/19 Straight; Anterior;Right)  70 70 10  10     Output (ml) (Chest Tube #2 05/22/19 Right; Angled; Posterior) 40 100 140 0  0   Shift Total(mL/kg) 640(10.2) 1070(15.6) 1710(24.9) 310(4.5)  310(4.5)   NET 2912 -7324 182 -310  -129 Weight (kg) 63 68.7 68.7 68.7 68.7 68.7            General:          No acute distress    Lungs:             CTA Bilaterally, chest tube times 2 to suction with air leak noted   Heart:              RRR  Abdomen:        Soft, Non distended, Non tender, BS+  Extremities:     No cyanosis, clubbing or edema  Neurologic:      No focal deficits           Labs:    Recent Labs     05/25/19  0620   WBC 14.4*   HGB 8.9*         K 3.7      CO2 30   BUN 11   CREA 0.52*   *         Darcy Carbajal, NP

## 2019-05-25 NOTE — PROGRESS NOTES
END OF SHIFT NOTE:    INTAKE/OUTPUT  05/24 0701 - 05/25 0700  In: 1892 [I.V.:1892]  Out: 810 [Urine:600]  Voiding: NO  Catheter: YES  Drain:              Flatus: Patient does not have flatus present. Stool:  0 occurrences. Characteristics:       Emesis: 0 occurrences. Characteristics:        VITAL SIGNS  Patient Vitals for the past 12 hrs:   Temp Pulse Resp BP SpO2   05/25/19 0408 100.3 °F (37.9 °C) 98 19 92/56 91 %   05/24/19 2349 (!) 100.7 °F (38.2 °C) (!) 103 19 107/65 91 %   05/24/19 1959 -- -- -- -- 92 %       Pain Assessment  Pain Intensity 1: 7 (05/25/19 0347)  Pain Location 1: Back, Shoulder  Pain Intervention(s) 1: Medication (see MAR)  Patient Stated Pain Goal: 2    Ambulating  No  Chest tubes with scant serosangunous drainage  Shift report given to oncoming nurse at the bedside.     Lorie Foster RN

## 2019-05-25 NOTE — PROGRESS NOTES
END OF SHIFT NOTE:    INTAKE/OUTPUT  05/24 0701 - 05/25 0700  In: 1892 [I.V.:1892]  Out: 7867 [Urine:1500]  Voiding: YES  Catheter: NO  Drain:              Flatus: Patient does have flatus present. Stool:  0 occurrences. Characteristics:       Emesis: 0 occurrences. Characteristics:        VITAL SIGNS  Patient Vitals for the past 12 hrs:   Temp Pulse Resp BP SpO2   05/25/19 1921 -- -- -- -- 91 %   05/25/19 1745 98.9 °F (37.2 °C) 98 18 99/55 90 %   05/25/19 1418 -- -- -- -- 93 %   05/25/19 1137 99.3 °F (37.4 °C) 97 18 100/59 97 %   05/25/19 0737 99 °F (37.2 °C) 95 18 (!) 87/50 96 %   05/25/19 0728 -- -- -- -- 94 %       Pain Assessment  Pain Intensity 1: 7 (05/25/19 0347)  Pain Location 1: Back, Shoulder  Pain Intervention(s) 1: Medication (see MAR)  Patient Stated Pain Goal: 2    Ambulating  YES, short distances to [facility] and chair. Shift report given to oncoming nurse at the bedside.     Whitney Holder RN

## 2019-05-26 ENCOUNTER — APPOINTMENT (OUTPATIENT)
Dept: GENERAL RADIOLOGY | Age: 79
DRG: 164 | End: 2019-05-26
Attending: SURGERY
Payer: MEDICARE

## 2019-05-26 PROBLEM — D64.9 NORMOCHROMIC ANEMIA: Status: ACTIVE | Noted: 2019-05-26

## 2019-05-26 PROBLEM — I48.91 ATRIAL FIBRILLATION WITH RAPID VENTRICULAR RESPONSE (HCC): Status: ACTIVE | Noted: 2019-05-26

## 2019-05-26 PROBLEM — I48.91 ATRIAL FIBRILLATION WITH RVR (HCC): Status: ACTIVE | Noted: 2019-05-26

## 2019-05-26 LAB
ABO + RH BLD: NORMAL
ANION GAP SERPL CALC-SCNC: 8 MMOL/L (ref 7–16)
ATRIAL RATE: 153 BPM
BASOPHILS # BLD: 0 K/UL (ref 0–0.2)
BASOPHILS NFR BLD: 0 % (ref 0–2)
BLD PROD TYP BPU: NORMAL
BLOOD GROUP ANTIBODIES SERPL: NORMAL
BPU ID: NORMAL
BUN SERPL-MCNC: 10 MG/DL (ref 8–23)
CALCIUM SERPL-MCNC: 7.8 MG/DL (ref 8.3–10.4)
CALCULATED R AXIS, ECG10: 74 DEGREES
CALCULATED T AXIS, ECG11: 58 DEGREES
CHLORIDE SERPL-SCNC: 104 MMOL/L (ref 98–107)
CO2 SERPL-SCNC: 29 MMOL/L (ref 21–32)
CREAT SERPL-MCNC: 0.4 MG/DL (ref 0.6–1)
CROSSMATCH RESULT,%XM: NORMAL
DIAGNOSIS, 93000: NORMAL
DIFFERENTIAL METHOD BLD: ABNORMAL
EOSINOPHIL # BLD: 0.3 K/UL (ref 0–0.8)
EOSINOPHIL NFR BLD: 2 % (ref 0.5–7.8)
ERYTHROCYTE [DISTWIDTH] IN BLOOD BY AUTOMATED COUNT: 15.2 % (ref 11.9–14.6)
GLUCOSE SERPL-MCNC: 100 MG/DL (ref 65–100)
HCT VFR BLD AUTO: 27.4 % (ref 35.8–46.3)
HGB BLD-MCNC: 8.6 G/DL (ref 11.7–15.4)
IMM GRANULOCYTES # BLD AUTO: 0.1 K/UL (ref 0–0.5)
IMM GRANULOCYTES NFR BLD AUTO: 1 % (ref 0–5)
LYMPHOCYTES # BLD: 1.1 K/UL (ref 0.5–4.6)
LYMPHOCYTES NFR BLD: 9 % (ref 13–44)
MCH RBC QN AUTO: 28.6 PG (ref 26.1–32.9)
MCHC RBC AUTO-ENTMCNC: 31.4 G/DL (ref 31.4–35)
MCV RBC AUTO: 91 FL (ref 79.6–97.8)
MM INDURATION POC: 0 MM (ref 0–5)
MONOCYTES # BLD: 1 K/UL (ref 0.1–1.3)
MONOCYTES NFR BLD: 8 % (ref 4–12)
NEUTS SEG # BLD: 9.4 K/UL (ref 1.7–8.2)
NEUTS SEG NFR BLD: 80 % (ref 43–78)
NRBC # BLD: 0 K/UL (ref 0–0.2)
PLATELET # BLD AUTO: 320 K/UL (ref 150–450)
PMV BLD AUTO: 9.8 FL (ref 9.4–12.3)
POTASSIUM SERPL-SCNC: 3.4 MMOL/L (ref 3.5–5.1)
PPD POC: NEGATIVE NEGATIVE
Q-T INTERVAL, ECG07: 286 MS
QRS DURATION, ECG06: 84 MS
QTC CALCULATION (BEZET), ECG08: 460 MS
RBC # BLD AUTO: 3.01 M/UL (ref 4.05–5.2)
SODIUM SERPL-SCNC: 141 MMOL/L (ref 136–145)
SPECIMEN EXP DATE BLD: NORMAL
STATUS OF UNIT,%ST: NORMAL
UNIT DIVISION, %UDIV: 0
VENTRICULAR RATE, ECG03: 156 BPM
WBC # BLD AUTO: 11.9 K/UL (ref 4.3–11.1)

## 2019-05-26 PROCEDURE — 74011000258 HC RX REV CODE- 258: Performed by: PHYSICIAN ASSISTANT

## 2019-05-26 PROCEDURE — 94640 AIRWAY INHALATION TREATMENT: CPT

## 2019-05-26 PROCEDURE — 65660000000 HC RM CCU STEPDOWN

## 2019-05-26 PROCEDURE — 74011250636 HC RX REV CODE- 250/636: Performed by: SURGERY

## 2019-05-26 PROCEDURE — 74011250637 HC RX REV CODE- 250/637: Performed by: NURSE PRACTITIONER

## 2019-05-26 PROCEDURE — 99232 SBSQ HOSP IP/OBS MODERATE 35: CPT | Performed by: INTERNAL MEDICINE

## 2019-05-26 PROCEDURE — 74011250636 HC RX REV CODE- 250/636: Performed by: PHYSICIAN ASSISTANT

## 2019-05-26 PROCEDURE — 36415 COLL VENOUS BLD VENIPUNCTURE: CPT

## 2019-05-26 PROCEDURE — 74011250636 HC RX REV CODE- 250/636: Performed by: NURSE PRACTITIONER

## 2019-05-26 PROCEDURE — 71045 X-RAY EXAM CHEST 1 VIEW: CPT

## 2019-05-26 PROCEDURE — 74011250637 HC RX REV CODE- 250/637: Performed by: SURGERY

## 2019-05-26 PROCEDURE — 74011000250 HC RX REV CODE- 250: Performed by: NURSE PRACTITIONER

## 2019-05-26 PROCEDURE — 77010033678 HC OXYGEN DAILY

## 2019-05-26 PROCEDURE — 85025 COMPLETE CBC W/AUTO DIFF WBC: CPT

## 2019-05-26 PROCEDURE — 93005 ELECTROCARDIOGRAM TRACING: CPT | Performed by: NURSE PRACTITIONER

## 2019-05-26 PROCEDURE — 80048 BASIC METABOLIC PNL TOTAL CA: CPT

## 2019-05-26 PROCEDURE — 94760 N-INVAS EAR/PLS OXIMETRY 1: CPT

## 2019-05-26 PROCEDURE — 74011000250 HC RX REV CODE- 250: Performed by: INTERNAL MEDICINE

## 2019-05-26 RX ORDER — FUROSEMIDE 40 MG/1
40 TABLET ORAL DAILY
Status: DISCONTINUED | OUTPATIENT
Start: 2019-05-26 | End: 2019-05-31 | Stop reason: HOSPADM

## 2019-05-26 RX ORDER — HEPARIN SODIUM 5000 [USP'U]/100ML
500 INJECTION, SOLUTION INTRAVENOUS CONTINUOUS
Status: DISCONTINUED | OUTPATIENT
Start: 2019-05-26 | End: 2019-05-27

## 2019-05-26 RX ORDER — POTASSIUM CHLORIDE 20 MEQ/1
40 TABLET, EXTENDED RELEASE ORAL
Status: COMPLETED | OUTPATIENT
Start: 2019-05-26 | End: 2019-05-26

## 2019-05-26 RX ADMIN — HEPARIN SODIUM 500 UNITS/HR: 5000 INJECTION, SOLUTION INTRAVENOUS at 19:12

## 2019-05-26 RX ADMIN — LEVOTHYROXINE SODIUM 75 MCG: 75 TABLET ORAL at 08:22

## 2019-05-26 RX ADMIN — ENOXAPARIN SODIUM 40 MG: 40 INJECTION SUBCUTANEOUS at 08:19

## 2019-05-26 RX ADMIN — HYDROCODONE BITARTRATE AND ACETAMINOPHEN 1 TABLET: 5; 325 TABLET ORAL at 17:52

## 2019-05-26 RX ADMIN — GABAPENTIN 300 MG: 300 CAPSULE ORAL at 23:19

## 2019-05-26 RX ADMIN — BUDESONIDE 500 MCG: 0.5 INHALANT RESPIRATORY (INHALATION) at 19:47

## 2019-05-26 RX ADMIN — GABAPENTIN 300 MG: 300 CAPSULE ORAL at 16:02

## 2019-05-26 RX ADMIN — AMIODARONE HYDROCHLORIDE 150 MG: 50 INJECTION, SOLUTION INTRAVENOUS at 13:38

## 2019-05-26 RX ADMIN — AMIODARONE HYDROCHLORIDE 0.5 MG/MIN: 900 INJECTION, SOLUTION INTRAVENOUS at 23:22

## 2019-05-26 RX ADMIN — GABAPENTIN 300 MG: 300 CAPSULE ORAL at 08:22

## 2019-05-26 RX ADMIN — AMIODARONE HYDROCHLORIDE 1 MG/MIN: 900 INJECTION, SOLUTION INTRAVENOUS at 13:38

## 2019-05-26 RX ADMIN — FUROSEMIDE 40 MG: 40 TABLET ORAL at 11:59

## 2019-05-26 RX ADMIN — Medication 10 ML: at 16:05

## 2019-05-26 RX ADMIN — PANTOPRAZOLE SODIUM 40 MG: 40 TABLET, DELAYED RELEASE ORAL at 06:30

## 2019-05-26 RX ADMIN — HYDROCODONE BITARTRATE AND ACETAMINOPHEN 1 TABLET: 5; 325 TABLET ORAL at 06:37

## 2019-05-26 RX ADMIN — HEPARIN SODIUM 500 UNITS/HR: 5000 INJECTION, SOLUTION INTRAVENOUS at 16:02

## 2019-05-26 RX ADMIN — POTASSIUM CHLORIDE 40 MEQ: 20 TABLET, EXTENDED RELEASE ORAL at 11:59

## 2019-05-26 RX ADMIN — IPRATROPIUM BROMIDE AND ALBUTEROL SULFATE 3 ML: .5; 3 SOLUTION RESPIRATORY (INHALATION) at 19:47

## 2019-05-26 RX ADMIN — BUDESONIDE 500 MCG: 0.5 INHALANT RESPIRATORY (INHALATION) at 07:38

## 2019-05-26 RX ADMIN — Medication 10 ML: at 23:20

## 2019-05-26 RX ADMIN — IPRATROPIUM BROMIDE AND ALBUTEROL SULFATE 3 ML: .5; 3 SOLUTION RESPIRATORY (INHALATION) at 07:38

## 2019-05-26 RX ADMIN — IPRATROPIUM BROMIDE AND ALBUTEROL SULFATE 3 ML: .5; 3 SOLUTION RESPIRATORY (INHALATION) at 14:01

## 2019-05-26 RX ADMIN — IPRATROPIUM BROMIDE AND ALBUTEROL SULFATE 3 ML: .5; 3 SOLUTION RESPIRATORY (INHALATION) at 02:07

## 2019-05-26 NOTE — PROGRESS NOTES
END OF SHIFT NOTE:    INTAKE/OUTPUT  05/25 0701 - 05/26 0700  In: -   Out: 530 [Urine:400]  Voiding: YES  Catheter: NO  Drain:              Flatus: Patient does have flatus present. Stool:  1 occurrences. Large soft brown BM ; no blood seen in stool. Characteristics:       Emesis: 0 occurrences. Characteristics:        VITAL SIGNS  Patient Vitals for the past 12 hrs:   Temp Pulse Resp BP SpO2   05/26/19 0353 97.9 °F (36.6 °C) 79 19 111/77 99 %   05/25/19 2329 98.3 °F (36.8 °C) 74 19 114/73 92 %   05/25/19 1957 99.8 °F (37.7 °C) (!) 107 19 122/75 93 %   05/25/19 1921 -- -- -- -- 91 %   05/25/19 1745 98.9 °F (37.2 °C) 98 18 99/55 90 %       Pain Assessment  Pain Intensity 1: 6 (05/25/19 2000)  Pain Location 1: Back, Shoulder  Pain Intervention(s) 1: Medication (see MAR)  Patient Stated Pain Goal: 2    Ambulating  Yes taking a few steps in room. Shift report given to oncoming nurse at the bedside.     María Aguilar RN

## 2019-05-26 NOTE — PROGRESS NOTES
Patient found by [facility] [place] pulmonary, she was talking on the phone to her daughter stating \"I'm having tachicardia\" patient was then assessed by provider to be tachicardic with suspect afib. A-fib with RVR confirmed by EKG. Patient reports history of with no existing provider. Cardiology consult placed pending results. Patient resting in bed with eyes closed. Other vital signs stable to baseline.

## 2019-05-26 NOTE — PROGRESS NOTES
TRANSFER - IN REPORT:    Verbal report received from Mayra Jenkins RN on Reynaldo Smart being received from 2nd floor surgical for routine progression of care      Report consisted of patients Situation, Background, Assessment and Recommendations(SBAR). Information from the following report(s) SBAR, Kardex, Procedure Summary, Intake/Output, MAR, Accordion, Recent Results, Med Rec Status and Cardiac Rhythm Afib with RVR was reviewed with the receiving nurse. Opportunity for questions and clarification was provided. Assessment completed upon patients arrival to unit and care assumed.

## 2019-05-26 NOTE — PROGRESS NOTES
Patient was calm as she enjoyed her breakfast  Brief encouragement  Will follow up as patient wishes to have prayer often    Krista Palacios, staff [address], [address] [facility] [place]  /   [email or web address]

## 2019-05-26 NOTE — PROGRESS NOTES
5/26/2019    PLAN:  Diet- Regular Diet  OOB/ Ambulate with assist  DVT Prophylactics- SCD/Lovenox  Pain control- Dilaudid/neurontin  SUP prophylaxis- Protonix  Encourage C/DB/IS  Chest tubes to water seal  Donald out   Pulmonary following      ASSESSMENT:  Admit Date: 5/22/2019   4 Day Post-Op  Procedure(s):   1. RIGHT THORACOSCOPY switched to thoracotomy with extensive pneumonolysis 2. LOWER LOBECTOMY  3. Upper lobe blebectomy x 2 4. Diaphragm resection with primary repair 5. Chest wall resection 6. MEDISTINAL LYMPHADENECTOMY 7. Intercostal nerve cryoablation        SUBJECTIVE: Awake in bed. Continues to c/o pain; controlled with medication. +BM x2. Breathing OK, O2 4LNC sats good. AF, NAD. OBJECTIVE:  Patient Vitals for the past 24 hrs:   Temp Pulse Resp BP SpO2   05/26/19 0738 -- -- -- -- 95 %   05/26/19 0731 98.1 °F (36.7 °C) 77 18 109/70 94 %   05/26/19 0353 97.9 °F (36.6 °C) 79 19 111/77 99 %   05/25/19 2329 98.3 °F (36.8 °C) 74 19 114/73 92 %   05/25/19 1957 99.8 °F (37.7 °C) (!) 107 19 122/75 93 %   05/25/19 1921 -- -- -- -- 91 %   05/25/19 1745 98.9 °F (37.2 °C) 98 18 99/55 90 %   05/25/19 1418 -- -- -- -- 93 %   05/25/19 1137 99.3 °F (37.4 °C) 97 18 100/59 97 %         Date 05/25/19 0700 - 05/26/19 0659 05/26/19 0700 - 05/27/19 0659   Shift 4199-7912 0419-1508 24 Hour Total 1169-7188 4126-9236 24 Hour Total   INTAKE   Shift Total(mL/kg)         OUTPUT   Urine(mL/kg/hr) 400(0.5)  400(0.2)        Urine Voided 100  100        Urine Occurrence(s)  2 x 2 x        Urine Output (mL) ([REMOVED] Urinary Catheter 05/22/19 2- way; Donald) 300  300      Stool           Stool Occurrence(s)  2 x 2 x      Chest Tube 130 65 195        Output (ml) (Chest Tube #1 05/22/19 Straight; Anterior;Right) 60 0 60        Output (ml) (Chest Tube #2 05/22/19 Right; Angled; Posterior) 70 65 135      Shift Total(mL/kg) 530(7.7) 65(1) 595(8.9)      NET -530 -65 -595 Weight (kg) 68.7 67 67 67 67 67            General:          No acute distress    Lungs:             CTA Bilaterally, chest tube times 2 to suction with air leak noted   Heart:              RRR  Abdomen:        Soft, Non distended, Non tender, BS+  Extremities:     No cyanosis, clubbing or edema  Neurologic:      No focal deficits           Labs:    Recent Labs     05/26/19  0350   WBC 11.9*   HGB 8.6*         K 3.4*      CO2 29   BUN 10   CREA 0.40*            Edi Butler, NP

## 2019-05-26 NOTE — PROGRESS NOTES
Kecia Santos  Admission Date: 5/22/2019             Daily Progress Note: 5/26/2019    The patient's chart is reviewed and the patient is discussed with the staff.    78 y.o. CF evaluated at the request of Dr. Carmenza Stephen medical:  tobacco abuse (1.5 ppd x 45 years - quit 2004), emphysema, hypothyroidism, subarachnoid hemorrhage, anterior cerebral aneurysm s/p brain coil, HTN, HLD, and lung mass.  PET CT for staging was ordered and performed on 3/28/19, demonstrating moderate to intense activity within the patient's known right lower lobe mass measuring 5.4 cm x 4.4 cm with hypermetabolic tumor appearing to extend to the right inferior hilum; no evidence for metastatic disease seen in the neck, chest, abdomen, or pelvis; however, focal activity is seen in the right posterior 10th rib which is felt to be due to a rib fracture without aggressive features appreciated. Bronch with EBUS and fine needle aspiration of hilar/mediastinal LN and TBBx with final path on RLL mass consistent with poorly differentiated carcinoma. She was admitted for tx of aspiration PNA post bronch.  Patient opted to pursue surgical intervention and was seen by Heme/Onc with recommendation for adjuvant chemo.  Pre-op PFTs showing mild obstruction with reduced DLCO suggestive of COPD.       Patient is now s/p R thoracoscopy >>>thoractomy with extensive pneumonolysis, R lower lobectomy, upper lobe blebectomy x 2, diaphragm resection with primary repair, chest wall resection, mediastinal lymphadenectomy, and intercostal nerve cryoablation on 5/22/19 per Dr. Kourtney Luong.  Agata Pinky op she continues to require O2 and had sat of 88% on 4 lpm with ambulation requiring increase to 5 lpm to maintain sat of 90%.  Tmax 100.2.  She denies shortness of breath but does admit to cough and post op chest pain        Subjective:     Requiring 6L/nc today;  Denies SOB or wheezing. Nonproductive cough.      Pt states she yelled at her daughter earlier today and since then, her heart has been racing. Pain controlled. Says she is just very tired.         Current Facility-Administered Medications   Medication Dose Route Frequency    potassium chloride (K-DUR, KLOR-CON) SR tablet 40 mEq  40 mEq Oral NOW    furosemide (LASIX) tablet 40 mg  40 mg Oral DAILY    magnesium hydroxide (MILK OF MAGNESIA) 400 mg/5 mL oral suspension 30 mL  30 mL Oral DAILY PRN    magnesium hydroxide (MILK OF MAGNESIA) 400 mg/5 mL oral suspension 30 mL  30 mL Oral DAILY    budesonide (PULMICORT) 500 mcg/2 ml nebulizer suspension  500 mcg Nebulization BID RT    HYDROmorphone (PF) (DILAUDID) injection 0.5 mg  0.5 mg IntraVENous Q4H PRN    HYDROcodone-acetaminophen (NORCO) 5-325 mg per tablet 1 Tab  1 Tab Oral Q4H PRN    levothyroxine (SYNTHROID) tablet 75 mcg  75 mcg Oral DAILY    albuterol-ipratropium (DUO-NEB) 2.5 MG-0.5 MG/3 ML  3 mL Nebulization Q6H RT    senna-docusate (PERICOLACE) 8.6-50 mg per tablet 2 Tab  2 Tab Oral BID    gabapentin (NEURONTIN) capsule 300 mg  300 mg Oral TID    pantoprazole (PROTONIX) tablet 40 mg  40 mg Oral ACB    sodium chloride (NS) flush 5-40 mL  5-40 mL IntraVENous Q8H    sodium chloride (NS) flush 5-40 mL  5-40 mL IntraVENous PRN    naloxone (NARCAN) injection 0.4 mg  0.4 mg IntraVENous PRN    ondansetron (ZOFRAN) injection 4 mg  4 mg IntraVENous Q4H PRN    enoxaparin (LOVENOX) injection 40 mg  40 mg SubCUTAneous Q24H       Review of Systems  Constitutional: negative for fever, chills, sweats  Cardiovascular: negative for chest pain, syncope, edema  +palpitations  Gastrointestinal:  negative for dysphagia, reflux, vomiting, diarrhea, abdominal pain, or melena  Neurologic:  negative for focal weakness, numbness, headache    Objective:     Vitals:    05/26/19 0353 05/26/19 0536 05/26/19 0731 05/26/19 0738   BP: 111/77  109/70    Pulse: 79  77    Resp: 19  18    Temp: 97.9 °F (36.6 °C)  98.1 °F (36.7 °C)    SpO2: 99%  94% 95% Weight:  147 lb 9.6 oz (67 kg)     Height:         Intake and Output:   1901 - 700  In: -   Out: 1652 [WYNN]  701 - 1900  In: 240 [P.O.:240]  Out: -     Physical Exam:   Constitution:  the patient is well developed and in no acute distress; A little anxious  EENMT:  Sclera clear, pupils equal, oral mucosa moist  Respiratory: Few anterior crackles. CT x2 to water seal, no air leak noted. Serous drainage noted. Cardiovascular:  Irregular rate and tachycardic without M,G,R  Gastrointestinal: soft and non-tender; with positive bowel sounds. Musculoskeletal: warm without cyanosis. There is no lower extremity edema. Skin:  no jaundice or rashes, surgical wounds   Neurologic: no gross neuro deficits     Psychiatric:  alert and oriented x 3    CXR: Today, persistent small right apical pneumothorax.  Unchanged from yesterday        LAB     Recent Labs     19  0350 19  0620 19  0355   WBC 11.9* 14.4* 17.2*   HGB 8.6* 8.9* 9.3*   HCT 27.4* 28.4* 29.3*    299 277     Recent Labs     19  0350 19  0620 19  0355    140 138   K 3.4* 3.7 3.6    105 104   CO2 29 30 27    106* 129*   BUN 10 11 12   CREA 0.40* 0.52* 0.56*   CA 7.8* 7.8* 7.9*             Assessment:  (Medical Decision Making)     Hospital Problems  Date Reviewed: 2019          Codes Class Noted POA    Hypoxia ICD-10-CM: R09.02  ICD-9-CM: 799.02  2019 Unknown    Requiring 6L/nc today    [facility]ch[facility] ICD-10-CM: J44.9  ICD-9-CM: 505  2019 Unknown    Continue nebs    Aftercare following surgery ICD-10-CM: Z48.89  ICD-9-CM: V58.89  2019 Unknown        Right lower lobe lung mass ICD-10-CM: R91.8  ICD-9-CM: 786.6  2019 Unknown    carcinoma    * (Principal) Lung mass ICD-10-CM: R91.8  ICD-9-CM: 786.6  3/18/2019 Unknown          Atrial fibrillation with RVR: consult cardiology      Plan:  (Medical Decision Making) --Duoneb, pulmicort  --lasix 40mg po daily  --Chest tubes per surgery--CXR unchanged, still has small right apical pneumothorax; Changed to water seal today per surgery. --Wean O2 as tolerated, could be requiring more O2 due to Tachycardia. --WBC trending down, 11.9 today from 14.4 yesterday  --Stat EKG done for tachycardia and appears to be in Afib with RVR, will consult cardiology. She is currently asymptomatic, will have nursing recheck vitals    More than 50% of the time documented was spent in face-to-face contact with the patient and in the care of the patient on the floor/unit where the patient is located. [id number] Miriam Arellano NP   Lungs:  Fairly clear  Heart:  Irregular tachycardia    Additional Comments:  Move to tele to rx  A fib with rvr,  No air leak seen today with chest tubes    I have spoken with and examined the patient. I agree with the above assessment and plan as documented.     Adrian Toro MD

## 2019-05-26 NOTE — PROGRESS NOTES
TRANSFER - OUT REPORT:    Verbal report given to Asha Diehl RN(name) on Tatum Stuart  being transferred to [facility] telemetry(unit) for urgent transfer       Report consisted of patients Situation, Background, Assessment and   Recommendations(SBAR). Information from the following report(s) SBAR, Kardex, OR Summary and Recent Results was reviewed with the receiving nurse. Lines:   Peripheral IV 05/25/19 Anterior; Left Forearm (Active)   Site Assessment Clean, dry, & intact 5/25/2019  7:57 PM   Phlebitis Assessment 0 5/25/2019  7:57 PM   Infiltration Assessment 0 5/25/2019  7:57 PM   Dressing Status Clean, dry, & intact 5/25/2019  7:57 PM   Dressing Type Transparent 5/25/2019  7:57 PM   Hub Color/Line Status Infusing 5/25/2019  7:57 PM        Opportunity for questions and clarification was provided.       Patient transported with:   Registered Nurse

## 2019-05-26 NOTE — CONSULTS
[facility] Cardiology Consult                Date of  Admission: 5/22/2019  6:41 AM     Primary Care Physician: Dr Alcides Zuniga  Primary Cardiologist: Dr Alejo Adler  Referring Physician: Dr Gonzáles Home Physician: Dr Rona Mathur    CC/Reason for consult: a fib Ruby Cranker is a 78 y.o. female admitted for Lung mass [R91.8]  Lung mass [R91.8]  Aftercare following surgery [Z48.89]  Right lower lobe lung mass [R91.8]  Right lower lobe lung mass [R91.8]. She was recently dx with lung cancer and admitted 5-22 for resection. COPD smoked 1 ppd x 45 years quit 2004, h/o subarachnoid hemorrhage due to cerebral aneurysm s/p brain coil in 2012. Pre op clearance w Dr Alejo Adler w echo 5-21-19 w EF 60-65% with mild LAE, mod aortic sclerosis, mild MR/TR. She has no h/o CHF, CAD or arrhythmia. Post op she has chest tubes in place and today was noted to be tachycardic, EKG shows a fib w rate 156. She denies CP, SOB, dizziness, palpitations, syncope, LE edema though she told pulmonary she felt her heart racing. BP 90/55. Temp 100.3. Hgb trending down to 8.6.      Patient Active Problem List   Diagnosis Code    Lung mass R91.8    Personal history of tobacco use, presenting hazards to health Z87.891    Liver lesion K76.9    Centrilobular emphysema ([address] J43.2    Malignant neoplasm of lower lobe of right lung (HCC) C34.31    Aspiration pneumonia ([address] J69.0    Acute respiratory failure with hypoxia (HCC) J96.01    HTN (hypertension) I10    Acquired hypothyroidism E03.9    Severe sepsis with acute organ dysfunction (HCC) A41.9, R65.20    Aftercare following surgery Z48.89    Right lower lobe lung mass R91.8    Hypoxia R09.02    COPD (chronic obstructive pulmonary disease) (HCC) J44.9    Atrial fibrillation with rapid ventricular response (HCC) I48.91    Atrial fibrillation with RVR (HCC) I48.91       Past Medical History:   Diagnosis Date    Cancer [facility])     Lung cancer    [facility] obstructive pulmonary disease ([facility]     Hypertension     [facility]     Thyroid disease       Past Surgical History:   Procedure Laterality Date    HX OTHER SURGICAL      coil in brain    HX WRIST FRACTURE [place]      bilat wrist     Allergies   Allergen Reactions    Penicillins Unknown (comments)     Hives    Percocet [Oxycodone-Acetaminophen] Unknown (comments)    Prednisone Other (comments)     Tachycardia      History reviewed. No pertinent family history.    Social History     Tobacco Use    Smoking status: Former Smoker     Packs/day: 1.50     Years: 45.00     Pack years: 67.50     Types: Cigarettes     Last attempt to quit: 2004     Years since quitting: 15.4    Smokeless tobacco: Never Used   Substance Use Topics    Alcohol use: Not Currently        Current Facility-Administered Medications   Medication Dose Route Frequency    furosemide (LASIX) tablet 40 mg  40 mg Oral DAILY    magnesium hydroxide (MILK OF MAGNESIA) 400 mg/5 mL oral suspension 30 mL  30 mL Oral DAILY PRN    magnesium hydroxide (MILK OF MAGNESIA) 400 mg/5 mL oral suspension 30 mL  30 mL Oral DAILY    budesonide (PULMICORT) 500 mcg/2 ml nebulizer suspension  500 mcg Nebulization BID RT    HYDROmorphone (PF) (DILAUDID) injection 0.5 mg  0.5 mg IntraVENous Q4H PRN    HYDROcodone-acetaminophen (NORCO) 5-325 mg per tablet 1 Tab  1 Tab Oral Q4H PRN    levothyroxine (SYNTHROID) tablet 75 mcg  75 mcg Oral DAILY    albuterol-ipratropium (DUO-NEB) 2.5 MG-0.5 MG/3 ML  3 mL Nebulization Q6H RT    senna-docusate (PERICOLACE) 8.6-50 mg per tablet 2 Tab  2 Tab Oral BID    gabapentin (NEURONTIN) capsule 300 mg  300 mg Oral TID    pantoprazole (PROTONIX) tablet 40 mg  40 mg Oral ACB    sodium chloride (NS) flush 5-40 mL  5-40 mL IntraVENous Q8H    sodium chloride (NS) flush 5-40 mL  5-40 mL IntraVENous PRN    naloxone (NARCAN) injection 0.4 mg  0.4 mg IntraVENous PRN    ondansetron (ZOFRAN) injection 4 mg  4 mg IntraVENous Q4H PRN    enoxaparin (LOVENOX) injection 40 mg  40 mg SubCUTAneous Q24H       Review of Symptoms:  General: + weight loss,  + weakness, fever or chills  Skin: no rashes, lumps, or other skin changes  HEENT: no headache, dizziness, lightheadedness, vision changes, hearing changes, tinnitus, vertigo, sinus pressure/pain, bleeding gums, sore throat, or hoarseness  Neck: no swollen glands, goiter, pain or stiffness  Respiratory: no cough, sputum, hemoptysis, no dyspnea, wheezing  Cardiovascular: + as per HPI  Gastrointestinal: no GERD, constipation, diarrhea, liver problems, or h/o GI bleed  Urinary: no frequency, urgency , hematuria, burning/pain with urination, recent flank pain, polyuria, nocturia, or difficulty urinating  Peripheral Vascular: no claudication, leg cramps, prior DVTs, swelling of calves, legs, or feet, color change, or swelling with redness or tenderness  Musculoskeletal: no muscle or joint pain/stiffness, joint swelling, erythema of joints, or back pain  Psychiatric: no depression or excessive stress  Neurological: no sensory or motor loss, seizures, syncope, tremors, numbness, no dementia  Hematologic: no anemia, easy bruising or bleeding  Endocrine: no thyroid problems, heat or cold intolerance, excessive sweating, polyuria, polydipsia, no  diabetes.        Physical Exam  Vitals:    05/26/19 0731 05/26/19 0738 05/26/19 1126 05/26/19 1217   BP: 109/70  102/66 90/55   Pulse: 77  88 (!) 136   Resp: 18  19 17   Temp: 98.1 °F (36.7 °C)  98.3 °F (36.8 °C) 98.3 °F (36.8 °C)   SpO2: 94% 95% 95% 94%   Weight:       Height:           Physical Exam:  General: Well Developed, Well Nourished, No Acute Distress, 6L O2 by NC  HEENT: pupils equal and round, no abnormalities noted  Neck: supple, no JVD, no carotid bruits  Heart: S1S2 irregularly irregular   Lungs: Decreased, CT x 2 in place   Abd: soft, nontender, nondistended, with good bowel sounds  Ext: warm, no edema, SCDs in place   Skin: warm and dry  Psychiatric: Normal mood and affect  Neurologic: Alert and oriented X 3    Labs:   Recent Labs     05/26/19  0350 05/25/19  0620    140   K 3.4* 3.7   BUN 10 11   CREA 0.40* 0.52*    106*   WBC 11.9* 14.4*   HGB 8.6* 8.9*   HCT 27.4* 28.4*    299        Assessment/Plan:     Assessment:   Lung mass (3/18/2019)- s/p resection per Dr Kajal Coates, chest tubes in place    Aftercare following surgery (5/22/2019)    Right lower lobe lung mass (5/22/2019)- poorly differentiated carcinoma     Hypoxia (5/24/2019)- 6L O2 by NC    COPD (chronic obstructive pulmonary disease) ([facility] (5/24/2019)    Atrial fibrillation with RVR ([facility] (5/26/2019)- Will hold on anticoagulation as appears to have gone into a fib today around noon, was in NSR pre-op, and still has chest tubes w hgb trending down. Will transfer to [facility], start IV amiodarone to try and maintain NSR     Normochromic anemia- monitor     H/O subarachnoid hemorrhage - s/p brain coil 2012    Thank you very much for this referral. We appreciate the opportunity to participate in this patient's care. We will follow along with above stated plan.     Paradise Friend PA-C  Consulting MD: Isac Mcmillan

## 2019-05-26 NOTE — PROGRESS NOTES
Patient seen and examined by me. Agree with above note by physician extender.   Key findings are: mild cp and sob after lung surgery- unchanged at this time- increased hr- af with rvr- not feeling palpitations- no chf symptoms  CV- irreg, irreg without murmur  Lungs- Clear bilaterally  Ext- no edema  ekg - af with rvr  Plan: Af with rvr- not ac candidate at this time with recent lung surgery and ct in place- transfer to [facility] and start iv amio

## 2019-05-27 ENCOUNTER — APPOINTMENT (OUTPATIENT)
Dept: GENERAL RADIOLOGY | Age: 79
DRG: 164 | End: 2019-05-27
Attending: SURGERY
Payer: MEDICARE

## 2019-05-27 LAB
ANION GAP SERPL CALC-SCNC: 6 MMOL/L (ref 7–16)
APTT PPP: 39.6 SEC (ref 24.7–39.8)
ATRIAL RATE: 91 BPM
BASOPHILS # BLD: 0.1 K/UL (ref 0–0.2)
BASOPHILS NFR BLD: 0 % (ref 0–2)
BUN SERPL-MCNC: 10 MG/DL (ref 8–23)
CALCIUM SERPL-MCNC: 8 MG/DL (ref 8.3–10.4)
CALCULATED P AXIS, ECG09: 51 DEGREES
CALCULATED R AXIS, ECG10: 67 DEGREES
CALCULATED T AXIS, ECG11: 60 DEGREES
CHLORIDE SERPL-SCNC: 102 MMOL/L (ref 98–107)
CO2 SERPL-SCNC: 29 MMOL/L (ref 21–32)
CREAT SERPL-MCNC: 0.52 MG/DL (ref 0.6–1)
DIAGNOSIS, 93000: NORMAL
DIFFERENTIAL METHOD BLD: ABNORMAL
EOSINOPHIL # BLD: 0.1 K/UL (ref 0–0.8)
EOSINOPHIL NFR BLD: 1 % (ref 0.5–7.8)
ERYTHROCYTE [DISTWIDTH] IN BLOOD BY AUTOMATED COUNT: 15.3 % (ref 11.9–14.6)
GLUCOSE SERPL-MCNC: 121 MG/DL (ref 65–100)
HCT VFR BLD AUTO: 28.2 % (ref 35.8–46.3)
HGB BLD-MCNC: 9.2 G/DL (ref 11.7–15.4)
IMM GRANULOCYTES # BLD AUTO: 0.1 K/UL (ref 0–0.5)
IMM GRANULOCYTES NFR BLD AUTO: 1 % (ref 0–5)
LYMPHOCYTES # BLD: 1.1 K/UL (ref 0.5–4.6)
LYMPHOCYTES NFR BLD: 7 % (ref 13–44)
MAGNESIUM SERPL-MCNC: 2 MG/DL (ref 1.8–2.4)
MCH RBC QN AUTO: 29.5 PG (ref 26.1–32.9)
MCHC RBC AUTO-ENTMCNC: 32.6 G/DL (ref 31.4–35)
MCV RBC AUTO: 90.4 FL (ref 79.6–97.8)
MONOCYTES # BLD: 1.4 K/UL (ref 0.1–1.3)
MONOCYTES NFR BLD: 9 % (ref 4–12)
NEUTS SEG # BLD: 13 K/UL (ref 1.7–8.2)
NEUTS SEG NFR BLD: 83 % (ref 43–78)
NRBC # BLD: 0 K/UL (ref 0–0.2)
P-R INTERVAL, ECG05: 174 MS
PLATELET # BLD AUTO: 370 K/UL (ref 150–450)
PMV BLD AUTO: 9.5 FL (ref 9.4–12.3)
POTASSIUM SERPL-SCNC: 4.1 MMOL/L (ref 3.5–5.1)
Q-T INTERVAL, ECG07: 372 MS
QRS DURATION, ECG06: 90 MS
QTC CALCULATION (BEZET), ECG08: 457 MS
RBC # BLD AUTO: 3.12 M/UL (ref 4.05–5.2)
SODIUM SERPL-SCNC: 137 MMOL/L (ref 136–145)
VENTRICULAR RATE, ECG03: 91 BPM
WBC # BLD AUTO: 15.7 K/UL (ref 4.3–11.1)

## 2019-05-27 PROCEDURE — 65660000000 HC RM CCU STEPDOWN

## 2019-05-27 PROCEDURE — 85025 COMPLETE CBC W/AUTO DIFF WBC: CPT

## 2019-05-27 PROCEDURE — 83735 ASSAY OF MAGNESIUM: CPT

## 2019-05-27 PROCEDURE — 74011000258 HC RX REV CODE- 258: Performed by: PHYSICIAN ASSISTANT

## 2019-05-27 PROCEDURE — 97530 THERAPEUTIC ACTIVITIES: CPT

## 2019-05-27 PROCEDURE — 77010033678 HC OXYGEN DAILY

## 2019-05-27 PROCEDURE — 74011250636 HC RX REV CODE- 250/636: Performed by: SURGERY

## 2019-05-27 PROCEDURE — 74011250636 HC RX REV CODE- 250/636: Performed by: PHYSICIAN ASSISTANT

## 2019-05-27 PROCEDURE — 74011250637 HC RX REV CODE- 250/637: Performed by: NURSE PRACTITIONER

## 2019-05-27 PROCEDURE — 80048 BASIC METABOLIC PNL TOTAL CA: CPT

## 2019-05-27 PROCEDURE — 74011000250 HC RX REV CODE- 250: Performed by: NURSE PRACTITIONER

## 2019-05-27 PROCEDURE — 85730 THROMBOPLASTIN TIME PARTIAL: CPT

## 2019-05-27 PROCEDURE — 74011250637 HC RX REV CODE- 250/637: Performed by: SURGERY

## 2019-05-27 PROCEDURE — 94760 N-INVAS EAR/PLS OXIMETRY 1: CPT

## 2019-05-27 PROCEDURE — 74011000250 HC RX REV CODE- 250: Performed by: INTERNAL MEDICINE

## 2019-05-27 PROCEDURE — 99232 SBSQ HOSP IP/OBS MODERATE 35: CPT | Performed by: INTERNAL MEDICINE

## 2019-05-27 PROCEDURE — 93005 ELECTROCARDIOGRAM TRACING: CPT | Performed by: INTERNAL MEDICINE

## 2019-05-27 PROCEDURE — 36415 COLL VENOUS BLD VENIPUNCTURE: CPT

## 2019-05-27 PROCEDURE — 71045 X-RAY EXAM CHEST 1 VIEW: CPT

## 2019-05-27 PROCEDURE — 94640 AIRWAY INHALATION TREATMENT: CPT

## 2019-05-27 RX ORDER — ENOXAPARIN SODIUM 100 MG/ML
40 INJECTION SUBCUTANEOUS EVERY 24 HOURS
Status: DISCONTINUED | OUTPATIENT
Start: 2019-05-27 | End: 2019-05-31 | Stop reason: HOSPADM

## 2019-05-27 RX ADMIN — GABAPENTIN 300 MG: 300 CAPSULE ORAL at 16:07

## 2019-05-27 RX ADMIN — FUROSEMIDE 40 MG: 40 TABLET ORAL at 07:40

## 2019-05-27 RX ADMIN — Medication 10 ML: at 06:02

## 2019-05-27 RX ADMIN — IPRATROPIUM BROMIDE AND ALBUTEROL SULFATE 3 ML: .5; 3 SOLUTION RESPIRATORY (INHALATION) at 08:55

## 2019-05-27 RX ADMIN — LEVOTHYROXINE SODIUM 75 MCG: 75 TABLET ORAL at 07:40

## 2019-05-27 RX ADMIN — BUDESONIDE 500 MCG: 0.5 INHALANT RESPIRATORY (INHALATION) at 20:58

## 2019-05-27 RX ADMIN — BUDESONIDE 500 MCG: 0.5 INHALANT RESPIRATORY (INHALATION) at 08:55

## 2019-05-27 RX ADMIN — IPRATROPIUM BROMIDE AND ALBUTEROL SULFATE 3 ML: .5; 3 SOLUTION RESPIRATORY (INHALATION) at 20:58

## 2019-05-27 RX ADMIN — ENOXAPARIN SODIUM 40 MG: 40 INJECTION SUBCUTANEOUS at 16:07

## 2019-05-27 RX ADMIN — AMIODARONE HYDROCHLORIDE 0.5 MG/MIN: 900 INJECTION, SOLUTION INTRAVENOUS at 15:19

## 2019-05-27 RX ADMIN — IPRATROPIUM BROMIDE AND ALBUTEROL SULFATE 3 ML: .5; 3 SOLUTION RESPIRATORY (INHALATION) at 13:36

## 2019-05-27 RX ADMIN — PANTOPRAZOLE SODIUM 40 MG: 40 TABLET, DELAYED RELEASE ORAL at 07:40

## 2019-05-27 RX ADMIN — HYDROCODONE BITARTRATE AND ACETAMINOPHEN 1 TABLET: 5; 325 TABLET ORAL at 17:41

## 2019-05-27 RX ADMIN — HYDROMORPHONE HYDROCHLORIDE 0.5 MG: 1 INJECTION, SOLUTION INTRAMUSCULAR; INTRAVENOUS; SUBCUTANEOUS at 07:27

## 2019-05-27 RX ADMIN — GABAPENTIN 300 MG: 300 CAPSULE ORAL at 07:40

## 2019-05-27 NOTE — PROGRESS NOTES
5/27/2019 9:19 AM    Admit Date: 5/22/2019    Admit Diagnosis: Lung mass [R91.8]; Lung mass [R91.8]; Aftercare following surgery [Z48.89]; Right lower lobe lung mass [R91.8]; Right lower lobe lung mass [R91.8]      Subjective:   No cp or sob- back in nsr      Objective:      Visit Vitals  /81   Pulse (!) 134   Temp (!) 100.5 °F (38.1 °C)   Resp 18   Ht 5' 7\" (1.702 m)   Wt 66 kg (145 lb 6.4 oz)   SpO2 90%   BMI 22.77 kg/m²       Physical Exam:  Nikki Frame, Well Nourished, No Acute Distress, Alert & Oriented x 3, appropriate mood. Neck- supple, no JVD  CV- regular rate and rhythm no MRG  Lung- clear bilaterally  Abd- soft, nontender, nondistended  Ext- no edema bilaterally. Skin- warm and dry        Data Review:   Recent Labs     05/27/19  0523      K 4.1   BUN 10   CREA 0.52*   WBC 15.7*   HGB 9.2*   HCT 28.2*          Assessment/Plan:     Principal Problem:    Lung mass (3/18/2019)- per surgery    Active Problems:    Aftercare following surgery (5/22/2019)      Right lower lobe lung mass (5/22/2019)      Hypoxia (5/24/2019)      COPD (chronic obstructive pulmonary disease) ([facility] (5/24/2019)      Atrial fibrillation with RVR ([facility] (5/26/2019)Improved with current therapy.  Will continue medications  In nsr - one more day iv amio- will stop heparin      Normochromic anemia (5/26/2019)

## 2019-05-27 NOTE — PROGRESS NOTES
Bedside and Verbal shift change report given to Self (oncoming nurse) by Mauri Hunt RN (offgoing nurse). Report included the following information Kardex, Intake/Output, MAR, Recent Results and Cardiac Rhythm A-FIB., rate controlled at 110 bpm.   Amiodarone gtt infusing as ordered.

## 2019-05-27 NOTE — PROGRESS NOTES
Bedside and Verbal shift change report given to Johnny Jaimes RN (oncoming nurse) by self Eveline irwin).  Report included the following information SBAR, Kardex, Intake/Output, MAR, Accordion, Recent Results, Med Rec Status and Cardiac Rhythm SR.

## 2019-05-27 NOTE — PROGRESS NOTES
Problem: Mobility Impaired (Adult and Pediatric)  Goal: *Acute Goals and Plan of Care (Insert Text)  Description  LTG:  (1.)Ms. Corky De León will move from supine to sit and sit to supine , scoot up and down and roll side to side in flat bed without siderails with  INDEPENDENT within 7 day(s). (2.)Ms. Corky De León will perform all functional transfers with  MODIFIED INDEPENDENCE using the least restrictive/no device within 7 day(s). (3.)Ms. Corky De León will ambulate with  STAND BY ASSIST for 250+ feet with normal vital sign response with the least restrictive/no device within 7 day(s). (4.)Ms. Corky De León will ambulate up/down 2 steps with bilateral  railing with  CONTACT GUARD ASSIST with no device within 7 day(s). Outcome: Progressing Towards Goal     PHYSICAL THERAPY: Daily Note and PM 5/27/2019  INPATIENT: PT Visit Days : 2  [place] / [place] / Product Type: Medicare /       NAME/AGE/GENDER: Abner Stapleton is a 78 y.o. female   PRIMARY DIAGNOSIS: Lung mass [R91.8]  Lung mass [R91.8]  Aftercare following surgery [Z48.89]  Right lower lobe lung mass [R91.8]  Right lower lobe lung mass [R91.8] Lung mass   Lung mass    Procedure(s) (LRB):   1. RIGHT THORACOSCOPY switched to thoracotomy with extensive pneumonolysis 2. LOWER LOBECTOMY  3. Upper lobe blebectomy x 2 4. Diaphragm resection with primary repair 5. Chest wall resection 6. MEDISTINAL LYMPHADENECTOMY 7. Intercostal nerve cryoablation     (Right)  5 Days Post-Op  ICD-10: Treatment Diagnosis:    · Other abnormalities of gait and mobility (R26.89)   Precaution/Allergies:  Penicillins; Percocet [oxycodone-acetaminophen]; and Prednisone      ASSESSMENT:     Ms. Corky De León was sitting on [facility] with nursing assistant present. Pt transferred [facility] to bed with mod A. She sat EOB for a few minutes and was very winded. Attempted to perform exercises sitting, but pt unable to maintain sitting balance.  Returned to supine and exercises performed per chart below. RN requested for pt to be out of bed per MD orders. Supine to sit with mod A and increased time and verbal cues. Transferred bed to chair with mod A for balance. Pt has multiple chest tubes and lines limiting ambulation. Pt left sitting up in chair with chair alarm in place and on. RN notified. This section established at most recent assessment   PROBLEM LIST (Impairments causing functional limitations):  1. Decreased ADL/Functional Activities  2. Decreased Transfer Abilities  3. Decreased Ambulation Ability/Technique  4. Increased Pain  5. Decreased Activity Tolerance   INTERVENTIONS PLANNED: (Benefits and precautions of physical therapy have been discussed with the patient.)  1. Balance Exercise  2. Bed Mobility  3. Gait Training  4. Therapeutic Activites  5. Therapeutic Exercise/Strengthening  6. Transfer Training  7. education      TREATMENT PLAN: Frequency/Duration: 3 times a week for duration of hospital stay  Rehabilitation Potential For Stated Goals: Excellent     REHAB RECOMMENDATIONS (at time of discharge pending progress):    Placement: It is my opinion, based on this patient's performance to date, that Ms. Karyn Khan may benefit from intensive therapy at a [address] [place] [address] after discharge due to the functional deficits listed above that are likely to improve with skilled rehabilitation and may be unsafe to be at home alone . Equipment:    None at this time              HISTORY:   History of Present Injury/Illness (Reason for Referral): Admitted for above surgery. Past Medical History/Comorbidities:   Ms. Karyn Khan  [facility] disease ([facility] Hypertension, Subarachnoid hemorrhage ([facility] and Thyroid disease. She also has no past medical history of Difficult intubation, Malignant hyperthermia due to anesthesia, Nausea & vomiting, or Pseudocholinesterase deficiency.   Ms. Karyn Khan  has a past surgical history that includes hx wrist fracture tx and hx other surgical.  Social History/Living Environment:   Home Environment: Private residence  One/Two Story Residence: One story  Living Alone: No  Support Systems: Family member(s)  Patient Expects to be Discharged to[de-identified] Rehabilitation facility  Current DME Used/Available at Home: Other (comment)  Prior Level of Function/Work/Activity:  At baseline patient lives with daughter and ambulates independently. Number of Personal Factors/Comorbidities that affect the Plan of Care: 1-2: MODERATE COMPLEXITY   EXAMINATION:   Most Recent Physical Functioning:   Gross Assessment:                  Posture:     Balance:  Sitting - Static: Good (unsupported)  Sitting - Dynamic: Fair (occasional)  Standing: Impaired  Standing - Static: Constant support  Standing - Dynamic : Constant support Bed Mobility:  Supine to Sit: Moderate assistance  Sit to Supine: Moderate assistance  Wheelchair Mobility:     Transfers:  Sit to Stand: Moderate assistance  Stand to Sit: Moderate assistance  Gait:     Base of Support: Widened  Speed/Aracelis: Pace decreased (<100 feet/min); Slow  Step Length: Left shortened;Right shortened  Distance (ft): 4 Feet (ft)  Assistive Device: (none)  Ambulation - Level of Assistance: Moderate assistance      Body Structures Involved:  1. Lungs  2. Thoracic Cage  3. Bones  4. Muscles Body Functions Affected:  1. Sensory/Pain  2. Respiratory  3. Movement Related Activities and Participation Affected:  1. Mobility  2. Self Care  3. Domestic Life  4. Community, Social and [place]   Number of elements that affect the Plan of Care: 4+: HIGH COMPLEXITY   CLINICAL PRESENTATION:   Presentation: Evolving clinical presentation with changing clinical characteristics: MODERATE COMPLEXITY   CLINICAL DECISION MAKIN [address] Mobility Inpatient Short Form  How much difficulty does the patient currently have. .. Unable A Lot A Little None   1. Turning over in bed (including adjusting bedclothes, sheets and blankets)? ? 1   ? 2   ? 3   ? 4   2. Sitting down on and standing up from a chair with arms ( e.g., wheelchair, bedside commode, etc.)   ? 1   ? 2   ? 3   ? 4   3. Moving from lying on back to sitting on the side of the bed?   ? 1   ? 2   ? 3   ? 4   How much help from another person does the patient currently need. .. Total A Lot A Little None   4. Moving to and from a bed to a chair (including a wheelchair)? ? 1   ? 2   ? 3   ? 4   5. Need to walk in hospital room? ? 1   ? 2   ? 3   ? 4   6. Climbing 3-5 steps with a railing? ? 1   ? 2   ? 3   ? 4   © 2007, Trustees of [facility] [employer], under license to [employer]. All rights reserved      Score:  Initial: 15 Most Recent: X (Date: -- )    Interpretation of Tool:  Represents activities that are increasingly more difficult (i.e. Bed mobility, Transfers, Gait). Medical Necessity:     · Patient is expected to demonstrate progress in strength, range of motion, balance, coordination and functional technique  ·  to increase independence with   and improve safety during all functional mobility. · .  Reason for Services/Other Comments:  · Patient continues to require skilled intervention due to medical complications and patient unable to attend/participate in therapy as expected  · . Use of outcome tool(s) and clinical judgement create a POC that gives a: Clear prediction of patient's progress: LOW COMPLEXITY            TREATMENT:   (In addition to Assessment/Re-Assessment sessions the following treatments were rendered)   Pre-treatment Symptoms/Complaints:  Complaints of back pain  Pain: Initial:      Post Session:  Not rated     Therapeutic Activity: (    30 minutes): Therapeutic activities including Bed transfers and Chair transfers to improve mobility, strength and balance. Required minimal   to promote dynamic balance in standing.      DATE: 5/24/19 5/27/19       Straight leg raise Hip abduct/ adduct  10 B       Heel slides  X10 AB 10 B       Hip external/ internal rotation         Ankle dorsiflexion/ plantarflexion X20 AB 10 B       Long arc quads X10 AB        SAQs  10 B                  Key:  A=active, AA=active assisted, P=passive, B=bilaterally, R=right, L=left    Braces/Orthotics/Lines/Etc:   · IV  · goodman catheter  · Chest tubes x2, Pleurevac to suction   · O2 Device: Nasal cannula  Treatment/Session Assessment:    · Response to Treatment:  Very winded, but willing to move. · Interdisciplinary Collaboration:   o Physical Therapy Assistant  o Registered Nurse  · After treatment position/precautions:   o Up in chair  o Bed alarm/tab alert on  o Bed/Chair-wheels locked  o Bed in low position  o Call light within reach   · Compliance with Program/Exercises: Will assess as treatment progresses  · Recommendations/Intent for next treatment session: \"Next visit will focus on advancements to more challenging activities and reduction in assistance provided\".   Total Treatment Duration:  PT Patient Time In/Time Out  Time In: 1345  Time Out: BALDEMAR Murphy

## 2019-05-27 NOTE — PROGRESS NOTES
Patient resting and has converted to NSR. [id number]. Nneka Hoff NP made aware. EKG ordered for confirmation.

## 2019-05-27 NOTE — PROGRESS NOTES
CM reviewed pt chart. Await PT/OT evals for any therapy recommendations. Discharge plan is to return home with spouse unless other recommendations received.

## 2019-05-27 NOTE — PROGRESS NOTES
Emmy Santos  Admission Date: 5/22/2019             Daily Progress Note: 5/27/2019    The patient's chart is reviewed and the patient is discussed with the staff.    78 y.o. CF evaluated at the request of Dr. Bhavna Potter [facility]:  tobacco abuse (1.5 ppd x 45 years - quit 2004), emphysema, hypothyroidism, subarachnoid hemorrhage, anterior cerebral aneurysm s/p brain coil, HTN, HLD, and lung mass.  PET CT for staging was ordered and performed on 3/28/19, demonstrating moderate to intense activity within the patient's known right lower lobe mass measuring 5.4 cm x 4.4 cm with hypermetabolic tumor appearing to extend to the right inferior hilum; no evidence for metastatic disease seen in the neck, chest, abdomen, or pelvis; however, focal activity is seen in the right posterior 10th rib which is felt to be due to a rib fracture without aggressive features appreciated. Bronch with EBUS and fine needle aspiration of hilar/mediastinal LN and TBBx with final path on RLL mass consistent with poorly differentiated carcinoma.  She was admitted for tx of aspiration PNA post bronch.  Patient opted to pursue surgical intervention and was seen by Heme/Onc with recommendation for adjuvant chemo.  Pre-op PFTs showing mild obstruction with reduced DLCO suggestive of COPD.       Patient is now s/p R thoracoscopy >>>thoractomy with extensive pneumonolysis, R lower lobectomy, upper lobe blebectomy x 2, diaphragm resection with primary repair, chest wall resection, mediastinal lymphadenectomy, and intercostal nerve cryoablation on 5/22/19 per Dr. Job Mcmahan.  Lena Doctor op she continues to require O2 and had sat of 88% on 4 lpm with ambulation requiring increase to 5 lpm to maintain sat of 90%.  Tmax 100.2.  She denies shortness of breath but does admit to cough and post op chest pain      Subjective:     Requiring 4L/nc - post op Day 7  Chest tubes on right- surgery managing  Temp 100.5 x 1      Current Facility-Administered Medications   Medication Dose Route Frequency    enoxaparin (LOVENOX) injection 40 mg  40 mg SubCUTAneous Q24H    furosemide (LASIX) tablet 40 mg  40 mg Oral DAILY    amiodarone (CORDARONE) 450 mg in dextrose 5% 250 mL infusion  0.5-1 mg/min IntraVENous TITRATE    magnesium hydroxide (MILK OF MAGNESIA) 400 mg/5 mL oral suspension 30 mL  30 mL Oral DAILY PRN    magnesium hydroxide (MILK OF MAGNESIA) 400 mg/5 mL oral suspension 30 mL  30 mL Oral DAILY    budesonide (PULMICORT) 500 mcg/2 ml nebulizer suspension  500 mcg Nebulization BID RT    HYDROmorphone (PF) (DILAUDID) injection 0.5 mg  0.5 mg IntraVENous Q4H PRN    HYDROcodone-acetaminophen (NORCO) 5-325 mg per tablet 1 Tab  1 Tab Oral Q4H PRN    levothyroxine (SYNTHROID) tablet 75 mcg  75 mcg Oral DAILY    albuterol-ipratropium (DUO-NEB) 2.5 MG-0.5 MG/3 ML  3 mL Nebulization Q6H RT    senna-docusate (PERICOLACE) 8.6-50 mg per tablet 2 Tab  2 Tab Oral BID    gabapentin (NEURONTIN) capsule 300 mg  300 mg Oral TID    pantoprazole (PROTONIX) tablet 40 mg  40 mg Oral ACB    sodium chloride (NS) flush 5-40 mL  5-40 mL IntraVENous Q8H    sodium chloride (NS) flush 5-40 mL  5-40 mL IntraVENous PRN    naloxone (NARCAN) injection 0.4 mg  0.4 mg IntraVENous PRN    ondansetron (ZOFRAN) injection 4 mg  4 mg IntraVENous Q4H PRN       Review of Systems  Constitutional: negative for chills, sweats  Cardiovascular: negative for chest pain, syncope, edema  +palpitations  Gastrointestinal:  negative for dysphagia, reflux, vomiting, diarrhea, abdominal pain, or melena  Neurologic:  negative for focal weakness, numbness, headache    Objective:     Vitals:    05/27/19 0417 05/27/19 0745 05/27/19 0855 05/27/19 0916   BP: 101/65 122/81     Pulse: (!) 124 (!) 134     Resp: 18 18     Temp: 98.2 °F (36.8 °C) (!) 100.5 °F (38.1 °C)  99 °F (37.2 °C)   SpO2: 93% 92% 90%    Weight:       Height:         Intake and Output:   05/25 1901 - 05/27 0700  In: 720 [P.O.:720]  Out: 760 [Urine:550]  No intake/output data recorded. Physical Exam:   Constitution:  the patient is elderly  EENMT:  Sclera clear, pupils equal, oral mucosa moist  Respiratory: expiratory wheezes on left, chest tubes on right X 2 -no air leak, on 4 lpm  Cardiovascular:  Irregular rate and tachycardic without M,G,R  Gastrointestinal: soft and non-tender; with positive bowel sounds. Musculoskeletal: warm without cyanosis. There is no lower extremity edema.   Skin:  no jaundice or rashes, surgical wounds   Neurologic: no gross neuro deficits     Psychiatric:  alert and oriented x 3    CXR: unable to copy into  chart      LAB     Recent Labs     05/27/19  0523 05/26/19  0350 05/25/19  0620   WBC 15.7* 11.9* 14.4*   HGB 9.2* 8.6* 8.9*   HCT 28.2* 27.4* 28.4*    320 299     Recent Labs     05/27/19 0523 05/26/19  0350 05/25/19  0620    141 140   K 4.1 3.4* 3.7    104 105   CO2 29 29 30   * 100 106*   BUN 10 10 11   CREA 0.52* 0.40* 0.52*   MG 2.0  --   --    CA 8.0* 7.8* 7.8*             Assessment:  (Medical Decision Making)     Hospital Problems  Date Reviewed: 5/22/2019          Codes Class Noted POA    Hypoxia ICD-10-CM: R09.02  ICD-9-CM: 799.02  5/24/2019 Unknown    Requiring 6L/nc today    [facility] ICD-10-CM: J44.9  ICD-9-CM: 224  5/24/2019 Unknown    Continue nebs    Aftercare following surgery ICD-10-CM: Z48.89  ICD-9-CM: V58.89  5/22/2019 Unknown        Right lower lobe lung mass ICD-10-CM: R91.8  ICD-9-CM: 786.6  5/22/2019 Unknown    carcinoma    * (Principal) Lung mass ICD-10-CM: R91.8  ICD-9-CM: 786.6  3/18/2019 Unknown    Atrial fibrillation         Plan:  (Medical Decision Making)     --Duoneb, pulmicort  --lasix 40mg po daily  --Chest tubes per surgery  --still needs 4 lpm. May need oxygen at home  --On amiodarone drip, cardiology following  -- watch blood pressure on lasix  -- fall precautions, OOB to chair with assistance    More than 50% of the time documented was spent in face-to-face contact with the patient and in the care of the patient on the floor/unit where the patient is located. Nasrin Ugarte NP     Lungs:  Mild rales, chest tubes x 2 on right, no air leak, only 100cc out overnight  Heart:  RRR with no Murmur/Rubs/Gallops    Additional Comments:  Continue lasix, increase mobility, PT consult pending. May need O2 at discharge. Continue nebs. Surgical path is pending, but sounds to need medical oncology for more advanced cancer on surgical staging. Chest tubes per surgery. I have spoken with and examined the patient. I agree with the above assessment and plan as documented.     Spencer Aguero MD

## 2019-05-28 ENCOUNTER — APPOINTMENT (OUTPATIENT)
Dept: GENERAL RADIOLOGY | Age: 79
DRG: 164 | End: 2019-05-28
Attending: SURGERY
Payer: MEDICARE

## 2019-05-28 ENCOUNTER — APPOINTMENT (OUTPATIENT)
Dept: GENERAL RADIOLOGY | Age: 79
DRG: 164 | End: 2019-05-28
Attending: NURSE PRACTITIONER
Payer: MEDICARE

## 2019-05-28 LAB
ANION GAP SERPL CALC-SCNC: 7 MMOL/L (ref 7–16)
APPEARANCE UR: ABNORMAL
BACTERIA URNS QL MICRO: ABNORMAL /HPF
BASOPHILS # BLD: 0.1 K/UL (ref 0–0.2)
BASOPHILS NFR BLD: 0 % (ref 0–2)
BILIRUB UR QL: NEGATIVE
BUN SERPL-MCNC: 11 MG/DL (ref 8–23)
CALCIUM SERPL-MCNC: 7.8 MG/DL (ref 8.3–10.4)
CHLORIDE SERPL-SCNC: 98 MMOL/L (ref 98–107)
CO2 SERPL-SCNC: 30 MMOL/L (ref 21–32)
COLOR UR: ABNORMAL
CREAT SERPL-MCNC: 0.58 MG/DL (ref 0.6–1)
DIFFERENTIAL METHOD BLD: ABNORMAL
EOSINOPHIL # BLD: 0 K/UL (ref 0–0.8)
EOSINOPHIL NFR BLD: 0 % (ref 0.5–7.8)
EPI CELLS #/AREA URNS HPF: ABNORMAL /HPF
ERYTHROCYTE [DISTWIDTH] IN BLOOD BY AUTOMATED COUNT: 15.5 % (ref 11.9–14.6)
GLUCOSE SERPL-MCNC: 114 MG/DL (ref 65–100)
GLUCOSE UR STRIP.AUTO-MCNC: NEGATIVE MG/DL
HCT VFR BLD AUTO: 28 % (ref 35.8–46.3)
HGB BLD-MCNC: 8.9 G/DL (ref 11.7–15.4)
HGB UR QL STRIP: ABNORMAL
IMM GRANULOCYTES # BLD AUTO: 0.2 K/UL (ref 0–0.5)
IMM GRANULOCYTES NFR BLD AUTO: 1 % (ref 0–5)
KETONES UR QL STRIP.AUTO: NEGATIVE MG/DL
LEUKOCYTE ESTERASE UR QL STRIP.AUTO: ABNORMAL
LYMPHOCYTES # BLD: 0.9 K/UL (ref 0.5–4.6)
LYMPHOCYTES NFR BLD: 5 % (ref 13–44)
MCH RBC QN AUTO: 28.3 PG (ref 26.1–32.9)
MCHC RBC AUTO-ENTMCNC: 31.8 G/DL (ref 31.4–35)
MCV RBC AUTO: 89.2 FL (ref 79.6–97.8)
MONOCYTES # BLD: 1.4 K/UL (ref 0.1–1.3)
MONOCYTES NFR BLD: 8 % (ref 4–12)
NEUTS SEG # BLD: 15.9 K/UL (ref 1.7–8.2)
NEUTS SEG NFR BLD: 86 % (ref 43–78)
NITRITE UR QL STRIP.AUTO: POSITIVE
NRBC # BLD: 0 K/UL (ref 0–0.2)
OTHER OBSERVATIONS,UCOM: ABNORMAL
PH UR STRIP: 5.5 [PH] (ref 5–9)
PLATELET # BLD AUTO: 386 K/UL (ref 150–450)
PMV BLD AUTO: 9.6 FL (ref 9.4–12.3)
POTASSIUM SERPL-SCNC: 4.6 MMOL/L (ref 3.5–5.1)
PROCALCITONIN SERPL-MCNC: 0.3 NG/ML
PROT UR STRIP-MCNC: 30 MG/DL
RBC # BLD AUTO: 3.14 M/UL (ref 4.05–5.2)
RBC #/AREA URNS HPF: ABNORMAL /HPF
SODIUM SERPL-SCNC: 135 MMOL/L (ref 136–145)
SP GR UR REFRACTOMETRY: 1.02 (ref 1–1.02)
UROBILINOGEN UR QL STRIP.AUTO: 1 EU/DL (ref 0.2–1)
WBC # BLD AUTO: 18.5 K/UL (ref 4.3–11.1)
WBC URNS QL MICRO: >100 /HPF
YEAST URNS QL MICRO: ABNORMAL

## 2019-05-28 PROCEDURE — 97535 SELF CARE MNGMENT TRAINING: CPT

## 2019-05-28 PROCEDURE — 94640 AIRWAY INHALATION TREATMENT: CPT

## 2019-05-28 PROCEDURE — 84145 PROCALCITONIN (PCT): CPT

## 2019-05-28 PROCEDURE — 85025 COMPLETE CBC W/AUTO DIFF WBC: CPT

## 2019-05-28 PROCEDURE — 81001 URINALYSIS AUTO W/SCOPE: CPT

## 2019-05-28 PROCEDURE — 74011000258 HC RX REV CODE- 258: Performed by: PHYSICIAN ASSISTANT

## 2019-05-28 PROCEDURE — 77010033678 HC OXYGEN DAILY

## 2019-05-28 PROCEDURE — 74011250637 HC RX REV CODE- 250/637: Performed by: INTERNAL MEDICINE

## 2019-05-28 PROCEDURE — 74011250636 HC RX REV CODE- 250/636: Performed by: PHYSICIAN ASSISTANT

## 2019-05-28 PROCEDURE — 71045 X-RAY EXAM CHEST 1 VIEW: CPT

## 2019-05-28 PROCEDURE — 74011000250 HC RX REV CODE- 250: Performed by: INTERNAL MEDICINE

## 2019-05-28 PROCEDURE — 97166 OT EVAL MOD COMPLEX 45 MIN: CPT

## 2019-05-28 PROCEDURE — 74011000258 HC RX REV CODE- 258: Performed by: NURSE PRACTITIONER

## 2019-05-28 PROCEDURE — 74011000250 HC RX REV CODE- 250: Performed by: NURSE PRACTITIONER

## 2019-05-28 PROCEDURE — 36415 COLL VENOUS BLD VENIPUNCTURE: CPT

## 2019-05-28 PROCEDURE — 74011250636 HC RX REV CODE- 250/636: Performed by: NURSE PRACTITIONER

## 2019-05-28 PROCEDURE — 74011250637 HC RX REV CODE- 250/637: Performed by: NURSE PRACTITIONER

## 2019-05-28 PROCEDURE — 65660000000 HC RM CCU STEPDOWN

## 2019-05-28 PROCEDURE — 74011250636 HC RX REV CODE- 250/636: Performed by: SURGERY

## 2019-05-28 PROCEDURE — 94760 N-INVAS EAR/PLS OXIMETRY 1: CPT

## 2019-05-28 PROCEDURE — 87186 SC STD MICRODIL/AGAR DIL: CPT

## 2019-05-28 PROCEDURE — 99232 SBSQ HOSP IP/OBS MODERATE 35: CPT | Performed by: INTERNAL MEDICINE

## 2019-05-28 PROCEDURE — 87086 URINE CULTURE/COLONY COUNT: CPT

## 2019-05-28 PROCEDURE — 77030012794 HC KT NSL CANN/HGH TRAN -A

## 2019-05-28 PROCEDURE — 74011250637 HC RX REV CODE- 250/637: Performed by: SURGERY

## 2019-05-28 PROCEDURE — 77030021668 HC NEB PREFIL KT VYRM -A

## 2019-05-28 PROCEDURE — 80048 BASIC METABOLIC PNL TOTAL CA: CPT

## 2019-05-28 PROCEDURE — 87088 URINE BACTERIA CULTURE: CPT

## 2019-05-28 RX ORDER — AMIODARONE HYDROCHLORIDE 200 MG/1
200 TABLET ORAL EVERY 12 HOURS
Status: DISCONTINUED | OUTPATIENT
Start: 2019-05-28 | End: 2019-05-29

## 2019-05-28 RX ADMIN — AMIODARONE HYDROCHLORIDE 200 MG: 200 TABLET ORAL at 10:32

## 2019-05-28 RX ADMIN — GABAPENTIN 300 MG: 300 CAPSULE ORAL at 09:07

## 2019-05-28 RX ADMIN — AMIODARONE HYDROCHLORIDE 200 MG: 200 TABLET ORAL at 21:59

## 2019-05-28 RX ADMIN — LEVOTHYROXINE SODIUM 75 MCG: 75 TABLET ORAL at 09:07

## 2019-05-28 RX ADMIN — GABAPENTIN 300 MG: 300 CAPSULE ORAL at 21:59

## 2019-05-28 RX ADMIN — ENOXAPARIN SODIUM 40 MG: 40 INJECTION SUBCUTANEOUS at 16:18

## 2019-05-28 RX ADMIN — IPRATROPIUM BROMIDE AND ALBUTEROL SULFATE 3 ML: .5; 3 SOLUTION RESPIRATORY (INHALATION) at 14:28

## 2019-05-28 RX ADMIN — GABAPENTIN 300 MG: 300 CAPSULE ORAL at 16:09

## 2019-05-28 RX ADMIN — AMIODARONE HYDROCHLORIDE 150 MG: 50 INJECTION, SOLUTION INTRAVENOUS at 23:35

## 2019-05-28 RX ADMIN — Medication 5 ML: at 22:00

## 2019-05-28 RX ADMIN — CEFTRIAXONE SODIUM 1 G: 1 INJECTION, POWDER, FOR SOLUTION INTRAMUSCULAR; INTRAVENOUS at 16:10

## 2019-05-28 RX ADMIN — FUROSEMIDE 40 MG: 40 TABLET ORAL at 10:32

## 2019-05-28 RX ADMIN — IPRATROPIUM BROMIDE AND ALBUTEROL SULFATE 3 ML: .5; 3 SOLUTION RESPIRATORY (INHALATION) at 07:43

## 2019-05-28 RX ADMIN — BUDESONIDE 500 MCG: 0.5 INHALANT RESPIRATORY (INHALATION) at 07:43

## 2019-05-28 RX ADMIN — AMIODARONE HYDROCHLORIDE 0.5 MG/MIN: 900 INJECTION, SOLUTION INTRAVENOUS at 07:31

## 2019-05-28 RX ADMIN — HYDROCODONE BITARTRATE AND ACETAMINOPHEN 1 TABLET: 5; 325 TABLET ORAL at 09:08

## 2019-05-28 RX ADMIN — BUDESONIDE 500 MCG: 0.5 INHALANT RESPIRATORY (INHALATION) at 19:43

## 2019-05-28 RX ADMIN — IPRATROPIUM BROMIDE AND ALBUTEROL SULFATE 3 ML: .5; 3 SOLUTION RESPIRATORY (INHALATION) at 19:43

## 2019-05-28 RX ADMIN — PANTOPRAZOLE SODIUM 40 MG: 40 TABLET, DELAYED RELEASE ORAL at 09:07

## 2019-05-28 RX ADMIN — Medication 5 ML: at 16:19

## 2019-05-28 RX ADMIN — Medication 5 ML: at 06:13

## 2019-05-28 RX ADMIN — HYDROCODONE BITARTRATE AND ACETAMINOPHEN 1 TABLET: 5; 325 TABLET ORAL at 03:31

## 2019-05-28 NOTE — PROGRESS NOTES
Verbal bedside report given to Hilary Mcghee, oncoming RN. Patient's situation, background, assessment and recommendations provided. Opportunity for questions provided. Oncoming RN assumed care of patient. Thoracostomy dressing and chest tube site visualized.

## 2019-05-28 NOTE — PROGRESS NOTES
Paged Thais De La Rosa NP directly via number given from 2nd floor after no response from office page x2. Updated Thu Gomez on patient's situation - see prior progress note. Patient currently resting in no distress on 4L NC post breathing treatment. No new orders received at this time.

## 2019-05-28 NOTE — PROGRESS NOTES
Patient's o2 sats noted to be 86-89% on 4 L. Respiratory paged to bedside to help evaluate patient's O2 needs. Patient not 5L high flow NC with O2 sats 95%. WIll continue to monitor.

## 2019-05-28 NOTE — PROGRESS NOTES
Anterior chest tube removed at end of inspiration without complication. Dressings changed. Purulent drainage and erythema noted from thoracotomy incision. Dr. Reji Quiros informed.     JONNY Witt

## 2019-05-28 NOTE — PROGRESS NOTES
Nilson Santos  Admission Date: 5/22/2019             Daily Progress Note: 5/28/2019    The patient's chart is reviewed and the patient is discussed with the staff.    78 y.o. CF evaluated at the request of Dr. Antonio Vaughn medical:  tobacco abuse (1.5 ppd x 45 years - quit 2004), emphysema, hypothyroidism, subarachnoid hemorrhage, anterior cerebral aneurysm s/p brain coil, HTN, HLD, and lung mass.  PET CT for staging was ordered and performed on 3/28/19, demonstrating moderate to intense activity within the patient's known right lower lobe mass measuring 5.4 cm x 4.4 cm with hypermetabolic tumor appearing to extend to the right inferior hilum; no evidence for metastatic disease seen in the neck, chest, abdomen, or pelvis; however, focal activity is seen in the right posterior 10th rib which is felt to be due to a rib fracture without aggressive features appreciated. Bronch with EBUS and fine needle aspiration of hilar/mediastinal LN and TBBx with final path on RLL mass consistent with poorly differentiated carcinoma. She was admitted for tx of aspiration PNA post bronch.  Patient opted to pursue surgical intervention and was seen by Heme/Onc with recommendation for adjuvant chemo.  Pre-op PFTs showing mild obstruction with reduced DLCO suggestive of COPD.       Patient is now s/p R thoracoscopy >>>thoractomy with extensive pneumonolysis, R lower lobectomy, upper lobe blebectomy x 2, diaphragm resection with primary repair, chest wall resection, mediastinal lymphadenectomy, and intercostal nerve cryoablation on 5/22/19 per Dr. Chi Junior.  Angelica Alvarado op she continues to require O2 and had sat of 88% on 4 lpm with ambulation requiring increase to 5 lpm to maintain sat of 90%.  Tmax 100.2.  She denies shortness of breath but does admit to cough and post op chest pain      Subjective:     Requiring 4L/nc - post op Day 8.  Afebrile but WBC continues to climb  Chest tubes on right- surgery managing  OOB to bedside commode      Current Facility-Administered Medications   Medication Dose Route Frequency    amiodarone (CORDARONE) tablet 200 mg  200 mg Oral Q12H    enoxaparin (LOVENOX) injection 40 mg  40 mg SubCUTAneous Q24H    furosemide (LASIX) tablet 40 mg  40 mg Oral DAILY    magnesium hydroxide (MILK OF MAGNESIA) 400 mg/5 mL oral suspension 30 mL  30 mL Oral DAILY PRN    magnesium hydroxide (MILK OF MAGNESIA) 400 mg/5 mL oral suspension 30 mL  30 mL Oral DAILY    budesonide (PULMICORT) 500 mcg/2 ml nebulizer suspension  500 mcg Nebulization BID RT    HYDROmorphone (PF) (DILAUDID) injection 0.5 mg  0.5 mg IntraVENous Q4H PRN    HYDROcodone-acetaminophen (NORCO) 5-325 mg per tablet 1 Tab  1 Tab Oral Q4H PRN    levothyroxine (SYNTHROID) tablet 75 mcg  75 mcg Oral DAILY    albuterol-ipratropium (DUO-NEB) 2.5 MG-0.5 MG/3 ML  3 mL Nebulization Q6H RT    senna-docusate (PERICOLACE) 8.6-50 mg per tablet 2 Tab  2 Tab Oral BID    gabapentin (NEURONTIN) capsule 300 mg  300 mg Oral TID    pantoprazole (PROTONIX) tablet 40 mg  40 mg Oral ACB    sodium chloride (NS) flush 5-40 mL  5-40 mL IntraVENous Q8H    sodium chloride (NS) flush 5-40 mL  5-40 mL IntraVENous PRN    naloxone (NARCAN) injection 0.4 mg  0.4 mg IntraVENous PRN    ondansetron (ZOFRAN) injection 4 mg  4 mg IntraVENous Q4H PRN       Review of Systems  Constitutional: negative for chills, sweats  Cardiovascular: negative for chest pain, syncope, edema  +palpitations  Gastrointestinal:  negative for dysphagia, reflux, vomiting, diarrhea, abdominal pain, or melena  Neurologic:  negative for focal weakness, numbness, headache    Objective:     Vitals:    05/28/19 0153 05/28/19 0453 05/28/19 0841 05/28/19 1032   BP: 109/58 97/55 97/55 102/56   Pulse: 83 84 89 87   Resp: 16 16 17    Temp: 98.9 °F (37.2 °C) 97.7 °F (36.5 °C) 98.1 °F (36.7 °C)    SpO2: 95% 96% 96%    Weight:  142 lb 8 oz (64.6 kg)     Height:  5' 7\" (1.702 m)       Intake and Output: 05/26 1901 - 05/28 0700  In: 960 [P.O.:960]  Out: 245 [Urine:150]  No intake/output data recorded. Physical Exam:   Constitution:  the patient is elderly  EENMT:  Sclera clear, pupils equal, oral mucosa moist  Respiratory: CTA anteriorly, chest tubes on right X 2 -no air leak, on 4 lpm  Cardiovascular:  Irregular rate and tachycardic without M,G,R  Gastrointestinal: soft and non-tender; with positive bowel sounds. Musculoskeletal: warm without cyanosis. There is no lower extremity edema. Skin:  no jaundice or rashes, surgical wounds   Neurologic: no gross neuro deficits     Psychiatric:  alert and oriented x 3    CXR:        LAB     Recent Labs     05/28/19  0442 05/27/19  0523 05/26/19  0350   WBC 18.5* 15.7* 11.9*   HGB 8.9* 9.2* 8.6*   HCT 28.0* 28.2* 27.4*    370 320     Recent Labs     05/28/19  0404 05/27/19  0523 05/26/19  0350   * 137 141   K 4.6 4.1 3.4*   CL 98 102 104   CO2 30 29 29   * 121* 100   BUN 11 10 10   CREA 0.58* 0.52* 0.40*   MG  --  2.0  --    CA 7.8* 8.0* 7.8*             Assessment:  (Medical Decision Making)     Hospital Problems  Date Reviewed: 5/22/2019          Codes Class Noted POA    Hypoxia ICD-10-CM: R09.02  ICD-9-CM: 799.02  5/24/2019 Unknown    Requiring 4L/nc today    [facility]) [facility] ICD-10-CM: J44.9  ICD-9-CM: 839  5/24/2019 Unknown    Continue nebs    Aftercare following surgery ICD-10-CM: Z48.89  ICD-9-CM: V58.89  5/22/2019 Unknown        Right lower lobe lung mass ICD-10-CM: R91.8  ICD-9-CM: 786.6  5/22/2019 Unknown    carcinoma    * (Principal) Lung mass ICD-10-CM: R91.8  ICD-9-CM: 786.6  3/18/2019 Unknown    Atrial fibrillation         Plan:  (Medical Decision Making)     --Duoneb, pulmicort  --lasix 40mg po daily  --Chest tubes per surgery- removing one today  --still needs 4 lpm. Wean as able  -- WBC continues to climb, check PCT and U/A.  May need empiric abx  -- On oral amiodarone per cardiology  -- fall precautions, OOB to chair with assistance. Looks much better today    More than 50% of the time documented was spent in face-to-face contact with the patient and in the care of the patient on the floor/unit where the patient is located. Allison Alfaro NP   I have spoken with and examined the patient. I agree with the above assessment and plan as documented.     Gen: pleasant on 3lpm  Lungs:  CTAB  Heart:  RRR with no Murmur/Rubs/Gallops  Ext: no edema    --abx per surgery, but will f/u procalcitonin  --wean oxygen and continue IS    Brooke Fisher MD

## 2019-05-28 NOTE — PROGRESS NOTES
Patient resting in chair s/p removal of one of her two chest tubes by Dr. Hiral Ken. RN not present for this. Patient working with OT when RN came into room. Patient assisted to bedside commode and became tachypneic with some shallow breathing. O2 sats reading 82% on 3 L at this time. Lung sounds present. Increased patient's O2 to 5 liters and assisted patient to bed. Patient resting in bed with O2 sats fluctuating between 86-91% on 6L NC. Patient less tachypneic, but with increasing O2 demands post chest tube removal, general surgery NP Nadia Elizabeth paged. Update at 9825- No response from general surgery. Spoke with pulmonary provider WILLIAN Corea and she is to place orders for stat chest xray. Charge RN Kevin Min made aware of situation. Patient currently on non-rebreather at 10 L and O2 sats 94-98%. Paged General surgery again. Patient stable, resting in bed on non-rebreather in no noted distress. Patient states she does not feel out of breath. Awaiting page back from general surgery provider to update them on situation.

## 2019-05-28 NOTE — PROGRESS NOTES
PT attempted treatment this afternoon but nursing requested we wait until tomorrow as her O2 sats have dropped significantly and they were just able to get them back up with additional support. PT will check back as requested.     Daisy Chacon,HERACLIOT

## 2019-05-28 NOTE — PROGRESS NOTES
Nursing reports worsening hypoxemia. Chest tube X 1 removed. Stat CXR. Discussed with surgery. Rebecca Barahona NP    Patient followed up on. She is back on 4L NC breathing comfortably. Lower chest tube remains stable position, no PTX, no air leak with chest tube. Will continue to monitor.     Fabi Mathew MD

## 2019-05-28 NOTE — PROGRESS NOTES
5/28/2019    PLAN:  Diet- Regular Diet  OOB/ Ambulate with assist  DVT Prophylactics- SCD/Lovenox  Pain control- Dilaudid/neurontin  SUP prophylaxis- Protonix  Encourage C/DB/IS  Chest tubes to water seal  Remove anterior CT today  Dnoald out   Pulmonary following--will likely need home O2  Cardiology following--on Amio gtt  Consult SW  Hopefully home Friday      ASSESSMENT:  Admit Date: 5/22/2019   5 Day Post-Op  Procedure(s):   1. RIGHT THORACOSCOPY switched to thoracotomy with extensive pneumonolysis 2. LOWER LOBECTOMY  3. Upper lobe blebectomy x 2 4. Diaphragm resection with primary repair 5. Chest wall resection 6. MEDISTINAL LYMPHADENECTOMY 7. Intercostal nerve cryoablation        SUBJECTIVE: Awake in bed. Pain controlled with medication. +BM x2. Oxygenating well on O2 4LNC. Tmax 100.5, NAD. Path pending. CT without air leak. OBJECTIVE:  Patient Vitals for the past 24 hrs:   Temp Pulse Resp BP SpO2   05/28/19 1032 -- 87 -- 102/56 --   05/28/19 0841 98.1 °F (36.7 °C) 89 17 97/55 96 %   05/28/19 0453 97.7 °F (36.5 °C) 84 16 97/55 96 %   05/28/19 0153 98.9 °F (37.2 °C) 83 16 109/58 95 %   05/27/19 2127 97.9 °F (36.6 °C) 84 18 104/57 97 %   05/27/19 2058 -- -- -- -- 92 %   05/27/19 1658 100 °F (37.8 °C) 94 20 97/55 94 %   05/27/19 1336 -- -- -- -- 91 %   05/27/19 1237 98.2 °F (36.8 °C) 93 19 110/56 90 %         Date 05/27/19 0700 - 05/28/19 0659 05/28/19 0700 - 05/29/19 0659   Shift 4011-7861 2439-3966 24 Hour Total 6168-6825 3194-0531 24 Hour Total   INTAKE   P.O. 480  480        P. O. 480  480      Other 0  0        Intake (ml) (Chest Tube #1 05/22/19 Straight; Anterior;Right) 0  0        Intake (ml) (Chest Tube #2 05/22/19 Right; Angled; Posterior) 0  0      Shift Total(mL/kg) 480(7.3)  480(7.4)      OUTPUT   Chest Tube 95  95        Output (ml) (Chest Tube #1 05/22/19 Straight; Anterior;Right) 40  40        Output (ml) (Chest Tube #2 05/22/19 Right; Angled; Posterior) 55  55      Shift Total(mL/kg) 95(1.4)  95(1.5)        385      Weight (kg) 66 64.6 64.6 64.6 64.6 64.6            General:          No acute distress    Lungs:             CTA Bilaterally, chest tube times 2 to suction without air leak noted   Heart:              RRR  Abdomen:        Soft, Non distended, Non tender, BS+  Extremities:     No cyanosis, clubbing or edema  Neurologic:      No focal deficits           Labs:    Recent Labs     05/28/19  0442 05/28/19  0404 05/27/19  0523   WBC 18.5*  --  15.7*   HGB 8.9*  --  9.2*     --  370   NA  --  135* 137   K  --  4.6 4.1   CL  --  98 102   CO2  --  30 29   BUN  --  11 10   CREA  --  0.58* 0.52*   GLU  --  114* 121*   APTT  --   --  39.6         Jessika Alt, NP

## 2019-05-28 NOTE — PROGRESS NOTES
[facility] CARDIOLOGY PROGRESS NOTE           5/28/2019 10:03 AM    Admit Date: 5/22/2019      Subjective:   Patient remains in SR.      ROS:  Cardiovascular:  As noted above    Objective:      Vitals:    05/27/19 2127 05/28/19 0153 05/28/19 0453 05/28/19 0841   BP: 104/57 109/58 97/55 97/55   Pulse: 84 83 84 89   Resp: 18 16 16 17   Temp: 97.9 °F (36.6 °C) 98.9 °F (37.2 °C) 97.7 °F (36.5 °C) 98.1 °F (36.7 °C)   SpO2: 97% 95% 96% 96%   Weight:   64.6 kg (142 lb 8 oz)    Height:   5' 7\" (1.702 m)        Physical Exam:  General-No Acute Distress  Neck- supple, no JVD  CV- regular rate and rhythm no MRG  Lung- clear bilaterally  Abd- soft, nontender, nondistended  Ext- no edema bilaterally. Skin- warm and dry    Data Review:   Recent Labs     05/28/19  0442 05/28/19  0404 05/27/19  0523   NA  --  135* 137   K  --  4.6 4.1   MG  --   --  2.0   BUN  --  11 10   CREA  --  0.58* 0.52*   GLU  --  114* 121*   WBC 18.5*  --  15.7*   HGB 8.9*  --  9.2*   HCT 28.0*  --  28.2*     --  370       Assessment/Plan:     Principal Problem:    Lung mass (3/18/2019)    Per primary team     Active Problems:    Aftercare following surgery (5/22/2019)    Per primary team       Right lower lobe lung mass (5/22/2019)    Per primary team       Hypoxia (5/24/2019)    Per primary team       COPD (chronic obstructive pulmonary disease) ([facility] [address] (5/24/2019)    Per primary team       Atrial fibrillation with RVR ([facility] [address] (5/26/2019)    Now in SR and change to oral amiodarone. Would continue amiodarone 200mg q12hrs x 30 days and then can reassess. She is not a candidate for [facility] due to recent lung surgery. May need to consider if AF recurs. Normochromic anemia (5/26/2019)    This is stable and monitor for now.             Marly Kline MD  5/28/2019 10:03 AM

## 2019-05-28 NOTE — PROGRESS NOTES
Bedside and Verbal shift change report given to Henna Jacobson RN (oncoming nurse) by Self (offgoing nurse). Report included the following information Kardex, Intake/Output, MAR, Recent Results and Cardiac Rhythm nsr.

## 2019-05-28 NOTE — PROGRESS NOTES
Care Management Interventions  PCP Verified by CM: Yes  Palliative Care Criteria Met (RRAT>21 & CHF Dx)?: No(RRAT 29 Dx S/P Lobectomy)  Transition of Care Consult (CM Consult): Discharge Planning  Discharge Durable Medical Equipment: (Equ[employer] for life O2 at 2.4 liter NC)  Physical Therapy Consult: Yes  Occupational Therapy Consult: Yes  Speech Therapy Consult: No  Current Support Network: Lives with Spouse  Confirm Follow Up Transport: Family  Plan discussed with Pt/Family/Caregiver: Yes  Freedom of Choice Offered: Yes  Discharge Location  Discharge Placement: Skilled nursing facility  Followed up with patient for d/c planning. Patient states she may need rehab at d/c and not go home. Provided list of SNF and she also requested CM check with [facility] 9th floor for possible admission. Noted patient may need home O2. Patient states has Equ[employer] for life O2 and CM left message with DME company to see what they may need for possible increase in O2 requirement. CM following.

## 2019-05-28 NOTE — PROGRESS NOTES
Reviewed U/A ordered by pulmonary team  +Nitrites, +yeast  Will start Rocephin 1G x3 doses and continue to monitor    Jordyn Orta NP

## 2019-05-28 NOTE — PROGRESS NOTES
Problem: Self Care Deficits Care Plan (Adult)  Goal: *Acute Goals and Plan of Care (Insert Text)  Description  1. Pt will toilet with CGA   2. Pt will complete functional mobility for ADLs with CGA  3. Pt will complete lower body dressing with CGA using AE as needed  4. Pt will complete grooming and hygiene at sink with CGA  5. Pt will demonstrate independence with HEP to promote increased BUE strength and functional use for ADLs  6. Pt will tolerate 23 minutes functional activity with min or fewer rest breaks to promote increased endurance for ADLs  7. Pt will complete bed mobility with SBA in prep for ADLs    Timeframe: 7 days     Outcome: Progressing Towards Goal     OCCUPATIONAL THERAPY: Initial Assessment and Daily Note 5/28/2019  INPATIENT: OT Visit Days: 1  [place] / [place] / Product Type: Medicare /      NAME/AGE/GENDER: Zeynep Herrera is a 78 y.o. female   PRIMARY DIAGNOSIS:  Lung mass [R91.8]  Lung mass [R91.8]  Aftercare following surgery [Z48.89]  Right lower lobe lung mass [R91.8]  Right lower lobe lung mass [R91.8] Lung mass   Lung mass    Procedure(s) (LRB):   1. RIGHT THORACOSCOPY switched to thoracotomy with extensive pneumonolysis 2. LOWER LOBECTOMY  3. Upper lobe blebectomy x 2 4. Diaphragm resection with primary repair 5. Chest wall resection 6. MEDISTINAL LYMPHADENECTOMY 7. Intercostal nerve cryoablation     (Right)  6 Days Post-Op  ICD-10: Treatment Diagnosis:    Generalized Muscle Weakness (M62.81)   Precautions/Allergies:    falls Penicillins; Percocet [oxycodone-acetaminophen]; and Prednisone      ASSESSMENT:     Ms. Maryam Jo was admitted with R lung mass, s/p thoracic surgery, has R side chest tube. Pt lives alone and is independent at baseline, does not use an AD for mobility. This session, pt presented with deficits in mobility, endurance, strength, and balance impacting ADLs.  Pt required min-mod X2 to stand and for functional transfers to/ from BSC, chair, and bed. Pt demonstrated decreased safety awareness and required mod cues for safe transfer technique for hand placement and safe use of RW for fall prevention. Pt toileted with min assistance, donned/ doffed socks with CGA using cross leg technique. Pt returned to bed with min assistance. BUE strength is generally decreased with < ROM at R shoulder d/t pain at chest tube site. Pt fatigued quickly with ADLs and mobility for ADLs, desaturated to 80s with activity. RN present and increased O2, remained with pt post session. Pt is well below her functional baseline and would benefit from skilled OT services to address deficits, recommend d/c to STR. This section established at most recent assessment   PROBLEM LIST (Impairments causing functional limitations):  Decreased Strength  Decreased ADL/Functional Activities  Decreased Transfer Abilities  Decreased Balance  Decreased Activity Tolerance  Increased Fatigue  Increased Shortness of Breath   INTERVENTIONS PLANNED: (Benefits and precautions of occupational therapy have been discussed with the patient.)  Activities of daily living training  Adaptive equipment training  Balance training  Therapeutic activity  Therapeutic exercise     TREATMENT PLAN: Frequency/Duration: Follow patient 3 times/ week to address above goals. Rehabilitation Potential For Stated Goals: Good     REHAB RECOMMENDATIONS (at time of discharge pending progress):    Placement: It is my opinion, based on this patient's performance to date, that Ms. Gerald Brar may benefit from intensive therapy at a [address] [place] [address] after discharge due to the functional deficits listed above that are likely to improve with skilled rehabilitation and inability to safely care for herself at home .   Equipment:   None at this time              OCCUPATIONAL PROFILE AND HISTORY:   History of Present Injury/Illness (Reason for Referral):  See H&P  Past Medical History/Comorbidities:   Ms. Gerald Brar  has a [facility] Chronic obstructive pulmonary disease ([facility] Hypertension, Subarachnoid hemorrhage ([facility] and Thyroid disease. She also has no past medical history of Difficult intubation, Malignant hyperthermia due to anesthesia, Nausea & vomiting, or Pseudocholinesterase deficiency. Ms. Eden Hood  has a past surgical history that includes hx wrist fracture tx and hx other surgical.  Social History/Living Environment:   Home Environment: Private residence  One/Two Story Residence: One story  Living Alone: Yes  Support Systems: Family member(s)  Patient Expects to be Discharged to[de-identified] Unknown  Current DME Used/Available at Home: None  Tub or Shower Type: Shower  Prior Level of Function/Work/Activity:  Independent, lives alone     Number of Personal Factors/Comorbidities that affect the Plan of Care: Expanded review of therapy/medical records (1-2):  MODERATE COMPLEXITY   ASSESSMENT OF OCCUPATIONAL PERFORMANCE[de-identified]   Activities of Daily Living:   Basic ADLs (From Assessment) Complex ADLs (From Assessment)   Feeding: Independent  Oral Facial Hygiene/Grooming: Contact guard assistance  Bathing: Moderate assistance  Upper Body Dressing: Minimum assistance  Lower Body Dressing: Minimum assistance  Toileting: Minimum assistance Instrumental ADL  Meal Preparation: Maximum assistance  Homemaking: Maximum assistance  Medication Management: Minimum assistance   Grooming/Bathing/Dressing Activities of Daily Living     Cognitive Retraining  Safety/Judgement: Awareness of environment           Toileting  Toileting Assistance: Minimum assistance     Functional Transfers  Toilet Transfer : Minimum assistance; Moderate assistance;Assist x2   Lower Body Dressing Assistance  Socks: Contact guard assistance Bed/Mat Mobility  Sit to Supine: Minimum assistance  Sit to Stand: Minimum assistance; Moderate assistance;Assist x2  Stand to Sit: Minimum assistance;Assist x2  Bed to Chair: Minimum assistance; Moderate assistance;Assist x2       Most Recent Physical Functioning:   Gross Assessment:  AROM: Generally decreased, functional  Strength: Generally decreased, functional               Posture:  Posture (WDL): Exceptions to WDL  Posture Assessment: Forward head, Rounded shoulders  Balance:  Sitting: Impaired  Sitting - Static: Good (unsupported)  Sitting - Dynamic: Fair (occasional)  Standing: Impaired  Standing - Static: Fair  Standing - Dynamic : Poor Bed Mobility:  Sit to Supine: Minimum assistance  Wheelchair Mobility:     Transfers:  Sit to Stand: Minimum assistance; Moderate assistance;Assist x2  Stand to Sit: Minimum assistance;Assist x2  Bed to Chair: Minimum assistance; Moderate assistance;Assist x2            Patient Vitals for the past 6 hrs:   BP SpO2 O2 Flow Rate (L/min) Pulse   19 1032 102/56 -- -- 87   19 1205 105/56 92 % -- 84   19 1430 -- 91 % 4 l/min --       Mental Status  Neurologic State: Alert  Orientation Level: Oriented X4  Cognition: Follows commands  Perception: Appears intact  Perseveration: No perseveration noted  Safety/Judgement: Awareness of environment                          Physical Skills Involved:  Range of Motion  Balance  Strength  Activity Tolerance Cognitive Skills Affected (resulting in the inability to perform in a timely and safe manner):  Sustained Attention Psychosocial Skills Affected:  Habits/Routines  Environmental Adaptation   Number of elements that affect the Plan of Care: 3-5:  MODERATE COMPLEXITY   CLINICAL DECISION MAKIN [address]? ?6 Clicks? Daily Activity Inpatient Short Form  How much help from another person does the patient currently need. .. Total A Lot A Little None   1. Putting on and taking off regular lower body clothing? ? 1   ? 2   ? 3   ? 4   2. Bathing (including washing, rinsing, drying)? ? 1   ? 2   ? 3   ? 4   3. Toileting, which includes using toilet, bedpan or urinal?   ? 1   ? 2   ? 3   ? 4   4.   Putting on and taking off regular upper body clothing? ? 1   ? 2   ? 3   ? 4   5. Taking care of personal grooming such as brushing teeth? ? 1   ? 2   ? 3   ? 4   6. Eating meals? ? 1   ? 2   ? 3   ? 4   © 2007, Trustees of [facility] [employer], under license to [employer]. All rights reserved    16  Interpretation of Tool:  Represents activities that are increasingly more difficult (i.e. Bed mobility, Transfers, Gait). Medical Necessity:     Patient demonstrates good   rehab potential due to higher previous functional level. Reason for Services/Other Comments:  Patient continues to require present interventions due to patient's inability to complete ADLs   . Use of outcome tool(s) and clinical judgement create a POC that gives a: MODERATE COMPLEXITY         TREATMENT:   (In addition to Assessment/Re-Assessment sessions the following treatments were rendered)     Pre-treatment Symptoms/Complaints:    Pain: Initial:     0 Post Session:  0     Self Care: (15 min): Procedure(s) (per grid) utilized to improve and/or restore self-care/home management as related to dressing, toileting and grooming. Required minimal visual, verbal and manual cueing to facilitate activities of daily living skills and compensatory activities. Braces/Orthotics/Lines/Etc:   Chest tube   O2 Device: Nasal cannula  Treatment/Session Assessment:    Response to Treatment:  fatigue, SOB, desaturation  Interdisciplinary Collaboration:   Occupational Therapist  Registered Nurse  After treatment position/precautions:   Supine in bed  Bed alarm/tab alert on  Bed/Chair-wheels locked  Bed in low position  Call light within reach  Nurse at bedside   Compliance with Program/Exercises: Compliant most of the time. Recommendations/Intent for next treatment session: \"Next visit will focus on advancements to more challenging activities and reduction in assistance provided\".   Total Treatment Duration:  OT Patient Time In/Time Out  Time In: 1313  Time Out: [address] [place] [address],[address] [facility]

## 2019-05-29 ENCOUNTER — APPOINTMENT (OUTPATIENT)
Dept: GENERAL RADIOLOGY | Age: 79
DRG: 164 | End: 2019-05-29
Attending: SURGERY
Payer: MEDICARE

## 2019-05-29 PROBLEM — D64.9 NORMOCHROMIC ANEMIA: Chronic | Status: ACTIVE | Noted: 2019-05-26

## 2019-05-29 PROBLEM — R91.8 LUNG MASS: Chronic | Status: ACTIVE | Noted: 2019-03-18

## 2019-05-29 PROBLEM — J44.9 COPD (CHRONIC OBSTRUCTIVE PULMONARY DISEASE) (HCC): Chronic | Status: ACTIVE | Noted: 2019-05-24

## 2019-05-29 PROBLEM — N39.0 UTI (URINARY TRACT INFECTION): Status: ACTIVE | Noted: 2019-05-29

## 2019-05-29 PROBLEM — I48.91 ATRIAL FIBRILLATION WITH RVR (HCC): Status: RESOLVED | Noted: 2019-05-26 | Resolved: 2019-05-29

## 2019-05-29 LAB
ANION GAP SERPL CALC-SCNC: 8 MMOL/L (ref 7–16)
BASOPHILS # BLD: 0.1 K/UL (ref 0–0.2)
BASOPHILS NFR BLD: 0 % (ref 0–2)
BUN SERPL-MCNC: 11 MG/DL (ref 8–23)
CALCIUM SERPL-MCNC: 7.6 MG/DL (ref 8.3–10.4)
CHLORIDE SERPL-SCNC: 97 MMOL/L (ref 98–107)
CO2 SERPL-SCNC: 29 MMOL/L (ref 21–32)
CREAT SERPL-MCNC: 0.58 MG/DL (ref 0.6–1)
DIFFERENTIAL METHOD BLD: ABNORMAL
EOSINOPHIL # BLD: 0 K/UL (ref 0–0.8)
EOSINOPHIL NFR BLD: 0 % (ref 0.5–7.8)
ERYTHROCYTE [DISTWIDTH] IN BLOOD BY AUTOMATED COUNT: 15.6 % (ref 11.9–14.6)
GLUCOSE SERPL-MCNC: 123 MG/DL (ref 65–100)
HCT VFR BLD AUTO: 27.4 % (ref 35.8–46.3)
HGB BLD-MCNC: 8.7 G/DL (ref 11.7–15.4)
IMM GRANULOCYTES # BLD AUTO: 0.2 K/UL (ref 0–0.5)
IMM GRANULOCYTES NFR BLD AUTO: 1 % (ref 0–5)
LYMPHOCYTES # BLD: 1 K/UL (ref 0.5–4.6)
LYMPHOCYTES NFR BLD: 5 % (ref 13–44)
MCH RBC QN AUTO: 28.9 PG (ref 26.1–32.9)
MCHC RBC AUTO-ENTMCNC: 31.8 G/DL (ref 31.4–35)
MCV RBC AUTO: 91 FL (ref 79.6–97.8)
MONOCYTES # BLD: 1.6 K/UL (ref 0.1–1.3)
MONOCYTES NFR BLD: 8 % (ref 4–12)
NEUTS SEG # BLD: 17.6 K/UL (ref 1.7–8.2)
NEUTS SEG NFR BLD: 86 % (ref 43–78)
NRBC # BLD: 0 K/UL (ref 0–0.2)
PLATELET # BLD AUTO: 367 K/UL (ref 150–450)
PMV BLD AUTO: 9.7 FL (ref 9.4–12.3)
POTASSIUM SERPL-SCNC: 3.3 MMOL/L (ref 3.5–5.1)
RBC # BLD AUTO: 3.01 M/UL (ref 4.05–5.2)
SODIUM SERPL-SCNC: 134 MMOL/L (ref 136–145)
WBC # BLD AUTO: 20.5 K/UL (ref 4.3–11.1)

## 2019-05-29 PROCEDURE — 74011000250 HC RX REV CODE- 250: Performed by: INTERNAL MEDICINE

## 2019-05-29 PROCEDURE — 71045 X-RAY EXAM CHEST 1 VIEW: CPT

## 2019-05-29 PROCEDURE — 99232 SBSQ HOSP IP/OBS MODERATE 35: CPT | Performed by: INTERNAL MEDICINE

## 2019-05-29 PROCEDURE — 94760 N-INVAS EAR/PLS OXIMETRY 1: CPT

## 2019-05-29 PROCEDURE — 36415 COLL VENOUS BLD VENIPUNCTURE: CPT

## 2019-05-29 PROCEDURE — 77030008031

## 2019-05-29 PROCEDURE — 74011000258 HC RX REV CODE- 258: Performed by: NURSE PRACTITIONER

## 2019-05-29 PROCEDURE — 74011250636 HC RX REV CODE- 250/636: Performed by: NURSE PRACTITIONER

## 2019-05-29 PROCEDURE — 74011250636 HC RX REV CODE- 250/636: Performed by: SURGERY

## 2019-05-29 PROCEDURE — 65660000000 HC RM CCU STEPDOWN

## 2019-05-29 PROCEDURE — 80048 BASIC METABOLIC PNL TOTAL CA: CPT

## 2019-05-29 PROCEDURE — 74011000250 HC RX REV CODE- 250: Performed by: NURSE PRACTITIONER

## 2019-05-29 PROCEDURE — 97530 THERAPEUTIC ACTIVITIES: CPT

## 2019-05-29 PROCEDURE — 77010033678 HC OXYGEN DAILY

## 2019-05-29 PROCEDURE — 97110 THERAPEUTIC EXERCISES: CPT

## 2019-05-29 PROCEDURE — 85025 COMPLETE CBC W/AUTO DIFF WBC: CPT

## 2019-05-29 PROCEDURE — 74011250637 HC RX REV CODE- 250/637: Performed by: INTERNAL MEDICINE

## 2019-05-29 PROCEDURE — 94640 AIRWAY INHALATION TREATMENT: CPT

## 2019-05-29 PROCEDURE — 74011250637 HC RX REV CODE- 250/637: Performed by: NURSE PRACTITIONER

## 2019-05-29 PROCEDURE — 74011250637 HC RX REV CODE- 250/637: Performed by: SURGERY

## 2019-05-29 PROCEDURE — 77030018836 HC SOL IRR NACL ICUM -A

## 2019-05-29 RX ORDER — AMIODARONE HYDROCHLORIDE 200 MG/1
200 TABLET ORAL 2 TIMES DAILY
Status: DISCONTINUED | OUTPATIENT
Start: 2019-05-29 | End: 2019-05-29

## 2019-05-29 RX ORDER — AMIODARONE HYDROCHLORIDE 200 MG/1
400 TABLET ORAL 2 TIMES DAILY
Status: DISCONTINUED | OUTPATIENT
Start: 2019-05-29 | End: 2019-05-31 | Stop reason: HOSPADM

## 2019-05-29 RX ORDER — POTASSIUM CHLORIDE 1.5 G/1.77G
40 POWDER, FOR SOLUTION ORAL 2 TIMES DAILY
Status: COMPLETED | OUTPATIENT
Start: 2019-05-29 | End: 2019-05-30

## 2019-05-29 RX ADMIN — AMIODARONE HYDROCHLORIDE 0.5 MG/MIN: 900 INJECTION, SOLUTION INTRAVENOUS at 10:25

## 2019-05-29 RX ADMIN — HYDROCODONE BITARTRATE AND ACETAMINOPHEN 1 TABLET: 5; 325 TABLET ORAL at 20:32

## 2019-05-29 RX ADMIN — HYDROMORPHONE HYDROCHLORIDE 0.5 MG: 1 INJECTION, SOLUTION INTRAMUSCULAR; INTRAVENOUS; SUBCUTANEOUS at 09:18

## 2019-05-29 RX ADMIN — BUDESONIDE 500 MCG: 0.5 INHALANT RESPIRATORY (INHALATION) at 19:29

## 2019-05-29 RX ADMIN — GABAPENTIN 300 MG: 300 CAPSULE ORAL at 16:17

## 2019-05-29 RX ADMIN — LEVOTHYROXINE SODIUM 75 MCG: 75 TABLET ORAL at 09:09

## 2019-05-29 RX ADMIN — AMIODARONE HYDROCHLORIDE 400 MG: 200 TABLET ORAL at 17:13

## 2019-05-29 RX ADMIN — PANTOPRAZOLE SODIUM 40 MG: 40 TABLET, DELAYED RELEASE ORAL at 09:09

## 2019-05-29 RX ADMIN — SENNOSIDES AND DOCUSATE SODIUM 2 TABLET: 8.6; 5 TABLET ORAL at 16:19

## 2019-05-29 RX ADMIN — IPRATROPIUM BROMIDE AND ALBUTEROL SULFATE 3 ML: .5; 3 SOLUTION RESPIRATORY (INHALATION) at 08:59

## 2019-05-29 RX ADMIN — FUROSEMIDE 40 MG: 40 TABLET ORAL at 10:39

## 2019-05-29 RX ADMIN — GABAPENTIN 300 MG: 300 CAPSULE ORAL at 09:09

## 2019-05-29 RX ADMIN — POTASSIUM CHLORIDE 40 MEQ: 1.5 POWDER, FOR SOLUTION ORAL at 16:18

## 2019-05-29 RX ADMIN — CEFTRIAXONE SODIUM 1 G: 1 INJECTION, POWDER, FOR SOLUTION INTRAMUSCULAR; INTRAVENOUS at 16:25

## 2019-05-29 RX ADMIN — IPRATROPIUM BROMIDE AND ALBUTEROL SULFATE 3 ML: .5; 3 SOLUTION RESPIRATORY (INHALATION) at 19:29

## 2019-05-29 RX ADMIN — ENOXAPARIN SODIUM 40 MG: 40 INJECTION SUBCUTANEOUS at 16:24

## 2019-05-29 RX ADMIN — IPRATROPIUM BROMIDE AND ALBUTEROL SULFATE 3 ML: .5; 3 SOLUTION RESPIRATORY (INHALATION) at 14:31

## 2019-05-29 RX ADMIN — AMIODARONE HYDROCHLORIDE 1 MG/MIN: 900 INJECTION, SOLUTION INTRAVENOUS at 00:20

## 2019-05-29 RX ADMIN — Medication 5 ML: at 21:12

## 2019-05-29 RX ADMIN — Medication 5 ML: at 05:28

## 2019-05-29 RX ADMIN — HYDROCODONE BITARTRATE AND ACETAMINOPHEN 1 TABLET: 5; 325 TABLET ORAL at 10:40

## 2019-05-29 RX ADMIN — HYDROCODONE BITARTRATE AND ACETAMINOPHEN 1 TABLET: 5; 325 TABLET ORAL at 16:17

## 2019-05-29 RX ADMIN — BUDESONIDE 500 MCG: 0.5 INHALANT RESPIRATORY (INHALATION) at 08:59

## 2019-05-29 RX ADMIN — Medication 5 ML: at 13:11

## 2019-05-29 RX ADMIN — IPRATROPIUM BROMIDE AND ALBUTEROL SULFATE 3 ML: .5; 3 SOLUTION RESPIRATORY (INHALATION) at 01:47

## 2019-05-29 RX ADMIN — GABAPENTIN 300 MG: 300 CAPSULE ORAL at 21:12

## 2019-05-29 NOTE — PROGRESS NOTES
Posterior chest tube removed at end of inspiration without complication. CXR ordered for the morning.     Ban Stark NP

## 2019-05-29 NOTE — PROGRESS NOTES
Spoke with Adarsh Cruz NP for surgery about referral to 9th floor and she is in agreement with referral. Order obtained for [facility] to evaluate patient. If unable to go to 9th then will proceed with SNF placement when medically ready possibly Friday? CM following.

## 2019-05-29 NOTE — PROGRESS NOTES
Nutrition LOS Note:   Assessment  Diet order(s): Regular diet  Food,Nutrition, and Pertinent History: Patient is a 78year old female with PMH of lung mass, HLD, HTN, COPD, hypothyroidism, prior smoker who presented for surgery for right lung cancer. Pt has  Maintained/gained weight during visit. Significant Labs: Sodium 134 low, potassium 3.3 low, creatinine . 58 low. Anthropometrics: Height: 5' 7\" (170.2 cm), Weight Source: Standing scale (comment), Weight: 64.6 kg (142 lb 6.4 oz), Body mass index is 22.3 kg/m². BMI class of normal weight. Macronutrient Needs:  · EER:  6496-4428 kcal /day (20-25 kcal/kg listed BW)  · EPR: 64-77  grams protein/day (1-1.2 grams/kg listed BW)  Intake/Comparative Standards: Per pt report she has a decent appetite and eating at least 70% of her meals. She stated she has skipped a meal or two during this stay due to not feeling well post surgery. Pt also states that she declines nutrition supplements like ensure because they go right through her. Nutrition Diagnosis: No nutrition diagnosis at this time    Intervention:   Meals and snacks: Continue current diet.   Discharge nutrition plan: No discharge needs identified    Israel Essex, MS, RD, LD

## 2019-05-29 NOTE — PROGRESS NOTES
Verbal bedside report [facility]ley RN. Patient's situation, background, assessment and recommendations provided. Opportunity for questions provided. Oncoming RN assumed care of patient.

## 2019-05-29 NOTE — PROGRESS NOTES
Problem: Breathing Pattern - Ineffective  Goal: *Absence of hypoxia  Outcome: Progressing Towards Goal  Goal: *Use of effective breathing techniques  Outcome: Progressing Towards Goal  Note:   Patient educated to use incentive spirometer at least 10 times an hour.

## 2019-05-29 NOTE — PROGRESS NOTES
[facility] CARDIOLOGY PROGRESS NOTE           5/29/2019 10:03 AM    Admit Date: 5/22/2019      Subjective:   Patient with transient AF/RVR and was back on amio gtt. Has been in SR all day. ROS:  Cardiovascular:  As noted above    Objective:      Vitals:    05/29/19 0902 05/29/19 1039 05/29/19 1250 05/29/19 1434   BP:  100/64 100/54    Pulse:  79 79    Resp:   17    Temp:   97.5 °F (36.4 °C)    SpO2: 95%  94% 94%   Weight:       Height:           Physical Exam:  General-No Acute Distress  Neck- supple, no JVD  CV- regular rate and rhythm no MRG  Lung- clear bilaterally  Abd- soft, nontender, nondistended  Ext- no edema bilaterally. Skin- warm and dry    Data Review:   Recent Labs     05/29/19  0503 05/28/19  0442 05/28/19  0404 05/27/19  0523   *  --  135* 137   K 3.3*  --  4.6 4.1   MG  --   --   --  2.0   BUN 11  --  11 10   CREA 0.58*  --  0.58* 0.52*   *  --  114* 121*   WBC 20.5* 18.5*  --  15.7*   HGB 8.7* 8.9*  --  9.2*   HCT 27.4* 28.0*  --  28.2*    386  --  370       Assessment/Plan:     Principal Problem:    Lung mass (3/18/2019)    Per primary team     Active Problems:    Aftercare following surgery (5/22/2019)    Per primary team       Right lower lobe lung mass (5/22/2019)    Per primary team       Hypoxia (5/24/2019)    Per primary team       COPD (chronic obstructive pulmonary disease) ([facility] (5/24/2019)    Per primary team       Atrial fibrillation with RVR ([facility] (5/26/2019)    Now in SR and change back to oral amiodarone 400mg q12 x 1 week and then would continue amiodarone 200mg q12hrs x 30 days. She is not a candidate for [facility] due to recent lung surgery. May need to consider if AF recurs after CT, etc are removed and patient is stable. Normochromic anemia (5/26/2019)    This is stable and monitor for now.             Shane Mosqueda MD  5/29/2019 10:03 AM

## 2019-05-29 NOTE — PROGRESS NOTES
Annika Santos  Admission Date: 5/22/2019             Daily Progress Note: 5/29/2019    The patient's chart is reviewed and the patient is discussed with the staff.      79 y.o. CF evaluated at the request of Dr. Joseluis Mcrae abuse (1.5 ppd x 45 years - quit 2004), emphysema, hypothyroidism, subarachnoid hemorrhage, anterior cerebral aneurysm s/p brain coil, HTN, HLD, and lung mass.  PET CT for staging was ordered and performed on 3/28/19, demonstrating moderate to intense activity within the patient's known right lower lobe mass measuring 5.4 cm x 4.4 cm with hypermetabolic tumor appearing to extend to the right inferior hilum; no evidence for metastatic disease seen in the neck, chest, abdomen, or pelvis; however, focal activity is seen in the right posterior 10th rib which is felt to be due to a rib fracture without aggressive features appreciated. Bronch with EBUS and fine needle aspiration of hilar/mediastinal LN and TBBx with final path on RLL mass consistent with poorly differentiated carcinoma.  She was admitted for tx of aspiration PNA post bronch.  Patient opted to pursue surgical intervention and was seen by Heme/Onc with recommendation for adjuvant chemo.  Pre-op PFTs showing mild obstruction with reduced DLCO suggestive of COPD.       Patient is now s/p R thoracoscopy >>>thoractomy with extensive pneumonolysis, R lower lobectomy, upper lobe blebectomy x 2, diaphragm resection with primary repair, chest wall resection, mediastinal lymphadenectomy, and intercostal nerve cryoablation on 5/22/19 per Dr. Beth Muñoz.  Viola Isjade op she continues to require O2 and had sat of 88% on 4 lpm with ambulation requiring increase to 5 lpm to maintain sat of 90%.  Tmax 100.2.  She denies shortness of breath but does admit to cough and post op chest pain      Subjective:   A fib with rvr earlier but hr in 70s now- ? Sr, amio drip restarted  She is c/oing of R side back pain  No air leak    Current Facility-Administered Medications   Medication Dose Route Frequency    amiodarone (CORDARONE) 450 mg in dextrose 5% 250 mL infusion  0.5-1 mg/min IntraVENous TITRATE    cefTRIAXone (ROCEPHIN) 1 g in 0.9% sodium chloride (MBP/ADV) 50 mL  1 g IntraVENous Q24H    enoxaparin (LOVENOX) injection 40 mg  40 mg SubCUTAneous Q24H    furosemide (LASIX) tablet 40 mg  40 mg Oral DAILY    magnesium hydroxide (MILK OF MAGNESIA) 400 mg/5 mL oral suspension 30 mL  30 mL Oral DAILY PRN    magnesium hydroxide (MILK OF MAGNESIA) 400 mg/5 mL oral suspension 30 mL  30 mL Oral DAILY    budesonide (PULMICORT) 500 mcg/2 ml nebulizer suspension  500 mcg Nebulization BID RT    HYDROmorphone (PF) (DILAUDID) injection 0.5 mg  0.5 mg IntraVENous Q4H PRN    HYDROcodone-acetaminophen (NORCO) 5-325 mg per tablet 1 Tab  1 Tab Oral Q4H PRN    levothyroxine (SYNTHROID) tablet 75 mcg  75 mcg Oral DAILY    albuterol-ipratropium (DUO-NEB) 2.5 MG-0.5 MG/3 ML  3 mL Nebulization Q6H RT    senna-docusate (PERICOLACE) 8.6-50 mg per tablet 2 Tab  2 Tab Oral BID    gabapentin (NEURONTIN) capsule 300 mg  300 mg Oral TID    pantoprazole (PROTONIX) tablet 40 mg  40 mg Oral ACB    sodium chloride (NS) flush 5-40 mL  5-40 mL IntraVENous Q8H    sodium chloride (NS) flush 5-40 mL  5-40 mL IntraVENous PRN    naloxone (NARCAN) injection 0.4 mg  0.4 mg IntraVENous PRN    ondansetron (ZOFRAN) injection 4 mg  4 mg IntraVENous Q4H PRN       Review of Systems    Constitutional: negative for fever, chills, sweats  Cardiovascular: negative for chest pain, palpitations, syncope, edema  Gastrointestinal:  negative for dysphagia, reflux, vomiting, diarrhea, abdominal pain, or melena  Neurologic:  negative for focal weakness, numbness, headache    Objective:     Vitals:    05/29/19 0500 05/29/19 0524 05/29/19 0806 05/29/19 0902   BP: 97/54 92/54 102/55    Pulse: 86 81 77    Resp:   18    Temp:  98.9 °F (37.2 °C) 99.6 °F (37.6 °C)    SpO2:   92% 95%   Weight:       Height:         Intake and Output:   05/27 1901 - 05/29 0700  In: 590 [P.O.:590]  Out: 1100 [Urine:1050]  No intake/output data recorded. Physical Exam:   Constitution:  the patient is well developed and in no acute distress  EENMT:  Sclera clear, pupils equal, oral mucosa moist  Respiratory: clear  Cardiovascular:  RRR without M,G,R  Gastrointestinal: soft and non-tender; with positive bowel sounds. Musculoskeletal: warm without cyanosis. There is no lower extremity edema. Skin:  no jaundice or rashes, chest wounds   Neurologic: no gross neuro deficits     Psychiatric:  alert and oriented x 3    CXR:  No ptx today      LAB  No results for input(s): GLUCPOC in the last 72 hours. No lab exists for component: [place]   Recent Labs     05/29/19  0503 05/28/19  0442 05/27/19  0523   WBC 20.5* 18.5* 15.7*   HGB 8.7* 8.9* 9.2*   HCT 27.4* 28.0* 28.2*    386 370     Recent Labs     05/29/19  0503 05/28/19  0404 05/27/19  0523   * 135* 137   K 3.3* 4.6 4.1   CL 97* 98 102   CO2 29 30 29   * 114* 121*   BUN 11 11 10   CREA 0.58* 0.58* 0.52*   MG  --   --  2.0   CA 7.6* 7.8* 8.0*     No results for input(s): PH, PCO2, PO2, HCO3, PHI, PCO2I, PO2I, HCO3I in the last 72 hours. No results for input(s): LCAD, LAC in the last 72 hours.       Assessment:  (Medical Decision Making)     Hospital Problems  Date Reviewed: 5/26/2019          Codes Class Noted POA    Atrial fibrillation with RVR ([facility] [address] ICD-10-CM: I48.91  ICD-9-CM: 427.31  5/26/2019 Unknown    recurrent    Normochromic anemia ICD-10-CM: D64.9  ICD-9-CM: 285.9  5/26/2019 Unknown        Hypoxia ICD-10-CM: R09.02  ICD-9-CM: 799.02  5/24/2019 Unknown    On 4 L    COPD (chronic obstructive pulmonary disease) ([facility] [address] ICD-10-CM: J44.9  ICD-9-CM: 532  5/24/2019 Unknown        Aftercare following surgery ICD-10-CM: Z48.89  ICD-9-CM: V58.89  5/22/2019 Unknown        Right lower lobe lung mass ICD-10-CM: R91.8  ICD-9-CM: 786.6  5/22/2019 [place] Unknown        * (Principal) Lung mass ICD-10-CM: R91.8  ICD-9-CM: 786.6  3/18/2019 Unknown    S/p surgery          Plan:  (Medical Decision Making)   1    amio per cardiology  2   May be able to remove remaining chest tube soon  3   Wean O2 as tolerated  --    More than 50% of the time documented was spent in face-to-face contact with the patient and in the care of the patient on the floor/unit where the patient is located.     Khai Cartwright MD

## 2019-05-29 NOTE — PROGRESS NOTES
Problem: Mobility Impaired (Adult and Pediatric)  Goal: *Acute Goals and Plan of Care (Insert Text)  Description  LTG:  (1.)Ms. Bryant Cope will move from supine to sit and sit to supine , scoot up and down and roll side to side in flat bed without siderails with  INDEPENDENT within 7 day(s). (2.)Ms. Bryant Cope will perform all functional transfers with  MODIFIED INDEPENDENCE using the least restrictive/no device within 7 day(s). (3.)Ms. Bryant Cope will ambulate with  STAND BY ASSIST for 250+ feet with normal vital sign response with the least restrictive/no device within 7 day(s). (4.)Ms. Bryant Cope will ambulate up/down 2 steps with bilateral  railing with  CONTACT GUARD ASSIST with no device within 7 day(s). Outcome: Progressing Towards Goal     PHYSICAL THERAPY: Daily Note and PM 5/29/2019  INPATIENT: PT Visit Days : 3  [place] / [place] / Product Type: Medicare /       NAME/AGE/GENDER: Darryle Rochester is a 78 y.o. female   PRIMARY DIAGNOSIS: Lung mass [R91.8]  Lung mass [R91.8]  Aftercare following surgery [Z48.89]  Right lower lobe lung mass [R91.8]  Right lower lobe lung mass [R91.8] Lung mass   Lung mass    Procedure(s) (LRB):   1. RIGHT THORACOSCOPY switched to thoracotomy with extensive pneumonolysis 2. LOWER LOBECTOMY  3. Upper lobe blebectomy x 2 4. Diaphragm resection with primary repair 5. Chest wall resection 6. MEDISTINAL LYMPHADENECTOMY 7. Intercostal nerve cryoablation     (Right)  7 Days Post-Op  ICD-10: Treatment Diagnosis:    · Other abnormalities of gait and mobility (R26.89)   Precaution/Allergies:  Penicillins; Percocet [oxycodone-acetaminophen]; and Prednisone      ASSESSMENT:     Ms. Bryant Cope was supine in bed, agreeable to physical therapy treatment this PM. She required minimal assist to transfer to sitting (educated on log roll technique).  She sat edge of bed x25 minutes to improve sitting balance and activity tolerance while performing BLE exercises and requiring rest breaks. SpO2 >90% throughout on 5 L/min O2 and patient required minimal cues to perform exercises correctly. She stood with minimal assist and took side steps at edge of bed with minimal assist and no assistive device. She required minimal assist to return to supine. Good progress with bed mobility, activity tolerance and transfers this session. Discussed need to attempt ambulation further next therapy session. Will continue therapy efforts. This section established at most recent assessment   PROBLEM LIST (Impairments causing functional limitations):  1. Decreased ADL/Functional Activities  2. Decreased Transfer Abilities  3. Decreased Ambulation Ability/Technique  4. Increased Pain  5. Decreased Activity Tolerance   INTERVENTIONS PLANNED: (Benefits and precautions of physical therapy have been discussed with the patient.)  1. Balance Exercise  2. Bed Mobility  3. Gait Training  4. Therapeutic Activites  5. Therapeutic Exercise/Strengthening  6. Transfer Training  7. education      TREATMENT PLAN: Frequency/Duration: 3 times a week for duration of hospital stay  Rehabilitation Potential For Stated Goals: Excellent     REHAB RECOMMENDATIONS (at time of discharge pending progress):    Placement: It is my opinion, based on this patient's performance to date, that Ms. Darshan Leos may benefit from intensive therapy at a [address] [place] [address] after discharge due to the functional deficits listed above that are likely to improve with skilled rehabilitation and may be unsafe to be at home alone . Equipment:    None at this time              HISTORY:   History of Present Injury/Illness (Reason for Referral): Admitted for above surgery. Past Medical History/Comorbidities:   Ms. Darshan Leos  has a past medical history of Cancer [facility]), Chronic obstructive pulmonary disease ([facility] [address] Hypertension, Subarachnoid hemorrhage ([facility] [address] and Thyroid disease.  She also has no past medical history of Difficult intubation, Malignant hyperthermia due to anesthesia, Nausea & vomiting, or Pseudocholinesterase deficiency. Ms. Bryant Cope  has a past surgical history that includes hx wrist fracture tx and hx other surgical.  Social History/Living Environment:   Home Environment: Private residence  One/Two Story Residence: One story  Living Alone: Yes  Support Systems: Family member(s)  Patient Expects to be Discharged to[de-identified] Unknown  Current DME Used/Available at Home: None  Tub or Shower Type: Shower  Prior Level of Function/Work/Activity:  At baseline patient lives with daughter and ambulates independently. Number of Personal Factors/Comorbidities that affect the Plan of Care: 1-2: MODERATE COMPLEXITY   EXAMINATION:   Most Recent Physical Functioning:   Gross Assessment:                  Posture:     Balance:  Sitting: Intact  Standing: Impaired  Standing - Static: Fair  Standing - Dynamic : Fair Bed Mobility:  Rolling: Contact guard assistance  Supine to Sit: Minimum assistance  Sit to Supine: Minimum assistance  Scooting: Minimum assistance  Interventions: Safety awareness training;Verbal cues; Visual cues  Wheelchair Mobility:     Transfers:  Sit to Stand: Minimum assistance  Stand to Sit: Minimum assistance  Gait:     Base of Support: Center of gravity altered;Narrowed  Speed/Aracelis: Shuffled;Slow;Pace decreased (<100 feet/min)  Step Length: Right shortened;Left shortened  Distance (ft): 5 Feet (ft)(side steps edge of bed)  Assistive Device: (hand held assist)  Ambulation - Level of Assistance: Minimal assistance      Body Structures Involved:  1. Lungs  2. Thoracic Cage  3. Bones  4. Muscles Body Functions Affected:  1. Sensory/Pain  2. Respiratory  3. Movement Related Activities and Participation Affected:  1. Mobility  2. Self Care  3. Domestic Life  4.  Community, Social and [place]   Number of elements that affect the Plan of Care: 4+: HIGH COMPLEXITY   CLINICAL PRESENTATION:   Presentation: Evolving clinical presentation with changing clinical characteristics: MODERATE COMPLEXITY   CLINICAL DECISION MAKIN [facility] Mobility Inpatient Short Form  How much difficulty does the patient currently have. .. Unable A Lot A Little None   1. Turning over in bed (including adjusting bedclothes, sheets and blankets)? ? 1   ? 2   ? 3   ? 4   2. Sitting down on and standing up from a chair with arms ( e.g., wheelchair, bedside commode, etc.)   ? 1   ? 2   ? 3   ? 4   3. Moving from lying on back to sitting on the side of the bed?   ? 1   ? 2   ? 3   ? 4   How much help from another person does the patient currently need. .. Total A Lot A Little None   4. Moving to and from a bed to a chair (including a wheelchair)? ? 1   ? 2   ? 3   ? 4   5. Need to walk in hospital room? ? 1   ? 2   ? 3   ? 4   6. Climbing 3-5 steps with a railing? ? 1   ? 2   ? 3   ? 4   © , Trustees of [facility] [employer], under license to [employer]. All rights reserved      Score:  Initial: 15 Most Recent: X (Date: -- )    Interpretation of Tool:  Represents activities that are increasingly more difficult (i.e. Bed mobility, Transfers, Gait). Medical Necessity:     · Patient is expected to demonstrate progress in strength, range of motion, balance, coordination and functional technique  ·  to increase independence with   and improve safety during all functional mobility. · .  Reason for Services/Other Comments:  · Patient continues to require skilled intervention due to medical complications and patient unable to attend/participate in therapy as expected  · .    Use of outcome tool(s) and clinical judgement create a POC that gives a: Clear prediction of patient's progress: LOW COMPLEXITY            TREATMENT:   (In addition to Assessment/Re-Assessment sessions the following treatments were rendered)   Pre-treatment Symptoms/Complaints:  Complaints of back pain  Pain: Initial:   Pain Intensity 1: 4  Post Session:  Not rated     Therapeutic Activity: (    25 minutes): Therapeutic activities including Bed transfers, sitting balance/activity tolerance, sit to stand transfers and side steps to improve mobility, strength and balance. Required minimal   to promote dynamic balance in standing. Therapeutic Exercise: (  13 minutes):  Exercises per grid below to improve mobility, strength and balance. Required minimal visual and verbal cues to promote proper body alignment. Progressed complexity of movement as indicated. DATE: 5/24/19 5/27/19 5/29/19      Straight leg raise         Hip abduct/ adduct  10 B Sitting x15 B A      Heel slides  X10 AB 10 B       Hip external/ internal rotation         Ankle dorsiflexion/ plantarflexion X20 AB 10 B Sitting x20 A      Long arc quads X10 AB  Sitting x15 A      SAQs  10 B       marching   Sitting x15 A        Key:  A=active, AA=active assisted, P=passive, B=bilaterally, R=right, L=left    Braces/Orthotics/Lines/Etc:   · IV  · chest tube  · O2 Device: Nasal cannula  Treatment/Session Assessment:    · Response to Treatment:  SpO2 >90%. · Interdisciplinary Collaboration:   o Physical Therapist  o Registered Nurse  · After treatment position/precautions:   o Supine in bed  o Bed alarm/tab alert on  o Bed/Chair-wheels locked  o Bed in low position  o Call light within reach  o RN notified   · Compliance with Program/Exercises: Will assess as treatment progresses  · Recommendations/Intent for next treatment session: \"Next visit will focus on advancements to more challenging activities and reduction in assistance provided\".   Total Treatment Duration:  PT Patient Time In/Time Out  Time In: 1345  Time Out: [address], PT, DPT

## 2019-05-29 NOTE — PROGRESS NOTES
5/29/2019    PLAN:  Diet- Regular Diet  OOB/ Ambulate with assist  DVT Prophylactics- SCD/Lovenox  Pain control- Dilaudid/neurontin  SUP prophylaxis- Protonix  Encourage C/DB/IS  Chest tubes to water seal  Anterior CT removed 5/28  Remove posterior CT today  Donald out   Pulmonary following--will likely need home O2  Cardiology following--on Amio gtt  Pack right chest incision wet to dry twice daily with gauze, cover with dry gauze/tape  SW following  Hopefully home Friday      ASSESSMENT:  Admit Date: 5/22/2019   6 Day Post-Op  Procedure(s):   1. RIGHT THORACOSCOPY switched to thoracotomy with extensive pneumonolysis 2. LOWER LOBECTOMY  3. Upper lobe blebectomy x 2 4. Diaphragm resection with primary repair 5. Chest wall resection 6. MEDISTINAL LYMPHADENECTOMY 7. Intercostal nerve cryoablation        SUBJECTIVE: Awake in bed. Pain controlled with medication. +BM x2. Oxygenating well on O2 4LNC. Tmax 100.5, NAD. Path pending. CT without air leak.  Incision opened yesterday, draining some purulent material.     OBJECTIVE:  Patient Vitals for the past 24 hrs:   Temp Pulse Resp BP SpO2   05/29/19 1434 -- -- -- -- 94 %   05/29/19 1250 97.5 °F (36.4 °C) 79 17 100/54 94 %   05/29/19 1039 -- 79 -- 100/64 --   05/29/19 0902 -- -- -- -- 95 %   05/29/19 0806 99.6 °F (37.6 °C) 77 18 102/55 92 %   05/29/19 0524 98.9 °F (37.2 °C) 81 -- 92/54 --   05/29/19 0500 -- 86 -- 97/54 --   05/29/19 0436 97.5 °F (36.4 °C) 88 17 93/50 96 %   05/29/19 0149 99.1 °F (37.3 °C) (!) 122 17 100/52 95 %   05/29/19 0031 99.3 °F (37.4 °C) (!) 143 17 91/52 96 %   05/28/19 2354 -- (!) 116 -- -- --   05/28/19 2350 98.3 °F (36.8 °C) (!) 128 17 92/53 --   05/28/19 2330 99.3 °F (37.4 °C) (!) 139 17 109/55 --   05/28/19 2200 99.8 °F (37.7 °C) (!) 130 17 -- --   05/28/19 2110 (!) 100.8 °F (38.2 °C) 99 18 -- --   05/28/19 2101 (!) 102.1 °F (38.9 °C) 100 19 109/55 92 %   05/28/19 1945 -- -- -- -- 93 %   05/28/19 1657 -- -- -- -- 95 %   05/28/19 1625 99.1 °F (37.3 °C) 95 19 122/83 (!) 89 %         Date 05/28/19 0700 - 05/29/19 0659 05/29/19 0700 - 05/30/19 0659   Shift 2729-9352 0018-1093 24 Hour Total 7653-0187 9359-2369 24 Hour Total   INTAKE   P.O. 240 350 590        P.O. 240 350 590      Other  0 0        Intake (ml) (Chest Tube #2 05/22/19 Right; Angled; Posterior)  0 0      Shift Total(mL/kg) 240(3.7) 350(5.4) 590(9.1)      OUTPUT   Urine(mL/kg/hr) 700(0.9) 350(0.5) 1050(0.7)        Urine Voided       Emesis/NG output  0 0        Emesis  0 0      Other  0 0        Other Output  0 0      Stool  0 0        Stool  0 0      Blood  0 0        Quantitative Blood Loss  0 0        Blood  0 0      Chest Tube  50 50        Output (ml) (Chest Tube #2 05/22/19 Right; Angled; Posterior)  50 50      Shift Total(mL/kg) 700(10.8) 400(6.2) 1100(17)      NET -460 50 -688      Weight (kg) 64.6 64.6 64.6 64.6 64.6 64.6            General:          No acute distress    Lungs:             CTA Bilaterally, chest tube times 2 to suction without air leak noted   Heart:              RRR  Abdomen:        Soft, Non distended, Non tender, BS+  Extremities:     No cyanosis, clubbing or edema  Neurologic:      No focal deficits           Labs:    Recent Labs     05/29/19  0503  05/27/19  0523   WBC 20.5*   < > 15.7*   HGB 8.7*   < > 9.2*      < > 370   *   < > 137   K 3.3*   < > 4.1   CL 97*   < > 102   CO2 29   < > 29   BUN 11   < > 10   CREA 0.58*   < > 0.52*   *   < > 121*   APTT  --   --  39.6    < > = values in this interval not displayed.          Kya Arshad NP

## 2019-05-29 NOTE — PROGRESS NOTES
Patient continued A-FIB. RVR, uncontrolled rate 130's. Notified Zaira Magaña NP. New orders received, restart Amiodarone gtt at 1 mg/minute, run over 6 hours, then decrease rate to 0.5 mg/minute continuous until discontinued.

## 2019-05-29 NOTE — PROGRESS NOTES
Came to bedside to observe Dr. Chi Junior and Render Putty, NP change dressing to patient's thoracostomy incision. Per Dr. Chi Junior, ensure that open parts of incision are packed with gauze soaked in sterile normal saline. Modified orders per general surgery to clearly indicate the need for packing to staff RN's caring for patient.

## 2019-05-29 NOTE — PROGRESS NOTES
Rhythm changed from NSR back into Atrial Fib. Rapid ventricular rate, 150's. Notified Denys Mondragon NP   New orders received, Amiodarone 150 mg bolus.

## 2019-05-29 NOTE — PROGRESS NOTES
Bedside and Verbal shift change report given to self, RN (oncoming nurse) by Smitha Blake RN (offgoing nurse). Report included the following information SBAR, Kardex, Intake/Output, MAR and Recent Results.

## 2019-05-30 ENCOUNTER — APPOINTMENT (OUTPATIENT)
Dept: GENERAL RADIOLOGY | Age: 79
DRG: 164 | End: 2019-05-30
Attending: SURGERY
Payer: MEDICARE

## 2019-05-30 PROBLEM — C34.90 NON-SMALL CELL LUNG CANCER (NSCLC) (HCC): Status: ACTIVE | Noted: 2019-05-30

## 2019-05-30 PROBLEM — J96.01 ACUTE RESPIRATORY FAILURE WITH HYPOXIA (HCC): Status: RESOLVED | Noted: 2019-04-12 | Resolved: 2019-05-30

## 2019-05-30 LAB
ANION GAP SERPL CALC-SCNC: 7 MMOL/L (ref 7–16)
BACTERIA SPEC CULT: ABNORMAL
BASOPHILS # BLD: 0 K/UL (ref 0–0.2)
BASOPHILS NFR BLD: 0 % (ref 0–2)
BUN SERPL-MCNC: 12 MG/DL (ref 8–23)
CALCIUM SERPL-MCNC: 8.1 MG/DL (ref 8.3–10.4)
CHLORIDE SERPL-SCNC: 98 MMOL/L (ref 98–107)
CO2 SERPL-SCNC: 31 MMOL/L (ref 21–32)
CREAT SERPL-MCNC: 0.52 MG/DL (ref 0.6–1)
DIFFERENTIAL METHOD BLD: ABNORMAL
EOSINOPHIL # BLD: 0.1 K/UL (ref 0–0.8)
EOSINOPHIL NFR BLD: 1 % (ref 0.5–7.8)
ERYTHROCYTE [DISTWIDTH] IN BLOOD BY AUTOMATED COUNT: 15.4 % (ref 11.9–14.6)
GLUCOSE SERPL-MCNC: 103 MG/DL (ref 65–100)
HCT VFR BLD AUTO: 26.8 % (ref 35.8–46.3)
HGB BLD-MCNC: 8.7 G/DL (ref 11.7–15.4)
IMM GRANULOCYTES # BLD AUTO: 0.2 K/UL (ref 0–0.5)
IMM GRANULOCYTES NFR BLD AUTO: 1 % (ref 0–5)
LYMPHOCYTES # BLD: 1 K/UL (ref 0.5–4.6)
LYMPHOCYTES NFR BLD: 6 % (ref 13–44)
MCH RBC QN AUTO: 29.1 PG (ref 26.1–32.9)
MCHC RBC AUTO-ENTMCNC: 32.5 G/DL (ref 31.4–35)
MCV RBC AUTO: 89.6 FL (ref 79.6–97.8)
MONOCYTES # BLD: 1.2 K/UL (ref 0.1–1.3)
MONOCYTES NFR BLD: 7 % (ref 4–12)
NEUTS SEG # BLD: 15 K/UL (ref 1.7–8.2)
NEUTS SEG NFR BLD: 86 % (ref 43–78)
NRBC # BLD: 0 K/UL (ref 0–0.2)
PLATELET # BLD AUTO: 415 K/UL (ref 150–450)
PMV BLD AUTO: 9.6 FL (ref 9.4–12.3)
POTASSIUM SERPL-SCNC: 3.5 MMOL/L (ref 3.5–5.1)
RBC # BLD AUTO: 2.99 M/UL (ref 4.05–5.2)
SERVICE CMNT-IMP: ABNORMAL
SODIUM SERPL-SCNC: 136 MMOL/L (ref 136–145)
WBC # BLD AUTO: 17.5 K/UL (ref 4.3–11.1)

## 2019-05-30 PROCEDURE — 36415 COLL VENOUS BLD VENIPUNCTURE: CPT

## 2019-05-30 PROCEDURE — 74011250636 HC RX REV CODE- 250/636: Performed by: NURSE PRACTITIONER

## 2019-05-30 PROCEDURE — 74011250637 HC RX REV CODE- 250/637: Performed by: NURSE PRACTITIONER

## 2019-05-30 PROCEDURE — 74011000258 HC RX REV CODE- 258: Performed by: SURGERY

## 2019-05-30 PROCEDURE — 74011000250 HC RX REV CODE- 250: Performed by: INTERNAL MEDICINE

## 2019-05-30 PROCEDURE — 74011250636 HC RX REV CODE- 250/636: Performed by: SURGERY

## 2019-05-30 PROCEDURE — 80048 BASIC METABOLIC PNL TOTAL CA: CPT

## 2019-05-30 PROCEDURE — 77010033678 HC OXYGEN DAILY

## 2019-05-30 PROCEDURE — 99222 1ST HOSP IP/OBS MODERATE 55: CPT | Performed by: PHYSICAL MEDICINE & REHABILITATION

## 2019-05-30 PROCEDURE — 99232 SBSQ HOSP IP/OBS MODERATE 35: CPT | Performed by: INTERNAL MEDICINE

## 2019-05-30 PROCEDURE — 74011000250 HC RX REV CODE- 250: Performed by: NURSE PRACTITIONER

## 2019-05-30 PROCEDURE — 97530 THERAPEUTIC ACTIVITIES: CPT

## 2019-05-30 PROCEDURE — 65660000000 HC RM CCU STEPDOWN

## 2019-05-30 PROCEDURE — 74011250637 HC RX REV CODE- 250/637: Performed by: INTERNAL MEDICINE

## 2019-05-30 PROCEDURE — 94760 N-INVAS EAR/PLS OXIMETRY 1: CPT

## 2019-05-30 PROCEDURE — 74011250637 HC RX REV CODE- 250/637: Performed by: SURGERY

## 2019-05-30 PROCEDURE — 85025 COMPLETE CBC W/AUTO DIFF WBC: CPT

## 2019-05-30 PROCEDURE — 94640 AIRWAY INHALATION TREATMENT: CPT

## 2019-05-30 PROCEDURE — 71045 X-RAY EXAM CHEST 1 VIEW: CPT

## 2019-05-30 RX ORDER — AMIODARONE HYDROCHLORIDE 200 MG/1
200 TABLET ORAL DAILY
Status: DISCONTINUED | OUTPATIENT
Start: 2019-06-04 | End: 2019-05-31 | Stop reason: HOSPADM

## 2019-05-30 RX ORDER — ALBUTEROL SULFATE 0.83 MG/ML
2.5 SOLUTION RESPIRATORY (INHALATION)
Status: DISCONTINUED | OUTPATIENT
Start: 2019-05-30 | End: 2019-05-31 | Stop reason: HOSPADM

## 2019-05-30 RX ADMIN — ONDANSETRON 4 MG: 2 INJECTION INTRAMUSCULAR; INTRAVENOUS at 08:07

## 2019-05-30 RX ADMIN — HYDROCODONE BITARTRATE AND ACETAMINOPHEN 1 TABLET: 5; 325 TABLET ORAL at 18:05

## 2019-05-30 RX ADMIN — ENOXAPARIN SODIUM 40 MG: 40 INJECTION SUBCUTANEOUS at 16:21

## 2019-05-30 RX ADMIN — HYDROCODONE BITARTRATE AND ACETAMINOPHEN 1 TABLET: 5; 325 TABLET ORAL at 22:05

## 2019-05-30 RX ADMIN — HYDROMORPHONE HYDROCHLORIDE 0.5 MG: 1 INJECTION, SOLUTION INTRAMUSCULAR; INTRAVENOUS; SUBCUTANEOUS at 14:06

## 2019-05-30 RX ADMIN — AMIODARONE HYDROCHLORIDE 400 MG: 200 TABLET ORAL at 17:31

## 2019-05-30 RX ADMIN — Medication 5 ML: at 05:40

## 2019-05-30 RX ADMIN — Medication 5 ML: at 22:04

## 2019-05-30 RX ADMIN — Medication 10 ML: at 16:21

## 2019-05-30 RX ADMIN — PANTOPRAZOLE SODIUM 40 MG: 40 TABLET, DELAYED RELEASE ORAL at 09:38

## 2019-05-30 RX ADMIN — FUROSEMIDE 40 MG: 40 TABLET ORAL at 09:39

## 2019-05-30 RX ADMIN — BUDESONIDE 500 MCG: 0.5 INHALANT RESPIRATORY (INHALATION) at 07:23

## 2019-05-30 RX ADMIN — POTASSIUM CHLORIDE 40 MEQ: 1.5 POWDER, FOR SOLUTION ORAL at 09:38

## 2019-05-30 RX ADMIN — BUDESONIDE 500 MCG: 0.5 INHALANT RESPIRATORY (INHALATION) at 19:42

## 2019-05-30 RX ADMIN — HYDROCODONE BITARTRATE AND ACETAMINOPHEN 1 TABLET: 5; 325 TABLET ORAL at 09:39

## 2019-05-30 RX ADMIN — CEFTRIAXONE SODIUM 1 G: 1 INJECTION, POWDER, FOR SOLUTION INTRAMUSCULAR; INTRAVENOUS at 16:20

## 2019-05-30 RX ADMIN — GABAPENTIN 300 MG: 300 CAPSULE ORAL at 22:04

## 2019-05-30 RX ADMIN — IPRATROPIUM BROMIDE AND ALBUTEROL SULFATE 3 ML: .5; 3 SOLUTION RESPIRATORY (INHALATION) at 07:23

## 2019-05-30 RX ADMIN — ALBUTEROL SULFATE 2.5 MG: 2.5 SOLUTION RESPIRATORY (INHALATION) at 19:42

## 2019-05-30 RX ADMIN — LEVOTHYROXINE SODIUM 75 MCG: 75 TABLET ORAL at 09:38

## 2019-05-30 RX ADMIN — HYDROCODONE BITARTRATE AND ACETAMINOPHEN 1 TABLET: 5; 325 TABLET ORAL at 04:51

## 2019-05-30 RX ADMIN — IPRATROPIUM BROMIDE AND ALBUTEROL SULFATE 3 ML: .5; 3 SOLUTION RESPIRATORY (INHALATION) at 13:50

## 2019-05-30 RX ADMIN — AMIODARONE HYDROCHLORIDE 400 MG: 200 TABLET ORAL at 09:38

## 2019-05-30 RX ADMIN — GABAPENTIN 300 MG: 300 CAPSULE ORAL at 16:19

## 2019-05-30 RX ADMIN — GABAPENTIN 300 MG: 300 CAPSULE ORAL at 09:38

## 2019-05-30 NOTE — PROGRESS NOTES
[facility] CARDIOLOGY PROGRESS NOTE           5/30/2019 8:10 AM    Admit Date: 5/22/2019      Subjective:   No cp or inc sob      Objective:      Vitals:    05/29/19 2132 05/30/19 0129 05/30/19 0533 05/30/19 0756   BP: 94/50 90/52 113/58 (!) (P) 88/51   Pulse: 83 72 83 (P) 83   Resp: 18 18 18 (P) 18   Temp: 97.3 °F (36.3 °C) 98.5 °F (36.9 °C) 99 °F (37.2 °C) (P) 97.6 °F (36.4 °C)   SpO2: 94% 96% 90% (P) 100%   Weight:   66.9 kg (147 lb 6.4 oz)    Height:           Physical Exam:  General-No Acute Distress    Data Review:   Recent Labs     05/30/19  0453 05/29/19  0503    134*   K 3.5 3.3*   BUN 12 11   CREA 0.52* 0.58*   * 123*   WBC 17.5* 20.5*   HGB 8.7* 8.7*   HCT 26.8* 27.4*    367     Rhythm: NSR    Assessment/Plan:       Principal Problem:    Lung mass (3/18/2019)        Active Problems:    Aftercare following surgery (5/22/2019)          Right lower lobe lung mass (5/22/2019)          Hypoxia (5/24/2019)      COPD (chronic obstructive pulmonary disease) ([facility] [address] (5/24/2019)          Normochromic anemia (5/26/2019)          UTI (urinary tract infection) (5/29/2019)      ////    No further a fib. Amiodarone dosing outlined. On standby.         Adam Andrews MD  5/30/2019 8:10 AM

## 2019-05-30 NOTE — PROGRESS NOTES
Problem: Mobility Impaired (Adult and Pediatric)  Goal: *Acute Goals and Plan of Care (Insert Text)  Description  LTG:  (1.)Ms. Lupe Martines will move from supine to sit and sit to supine , scoot up and down and roll side to side in flat bed without siderails with  INDEPENDENT within 7 day(s). (2.)Ms. Lupe Martines will perform all functional transfers with  MODIFIED INDEPENDENCE using the least restrictive/no device within 7 day(s). (3.)Ms. Lupe Martines will ambulate with  STAND BY ASSIST for 250+ feet with normal vital sign response with the least restrictive/no device within 7 day(s). (4.)Ms. Lupe Martines will ambulate up/down 2 steps with bilateral  railing with  CONTACT GUARD ASSIST with no device within 7 day(s). Outcome: Progressing Towards Goal     PHYSICAL THERAPY: Daily Note and AM 5/30/2019  INPATIENT: PT Visit Days : 4  [place] / [place] / Product Type: Medicare /       NAME/AGE/GENDER: Emily Hensley is a 78 y.o. female   PRIMARY DIAGNOSIS: Lung mass [R91.8]  Lung mass [R91.8]  Aftercare following surgery [Z48.89]  Right lower lobe lung mass [R91.8]  Right lower lobe lung mass [R91.8] Non-small cell lung cancer (NSCLC) ([facility] [address]   Non-small cell lung cancer (NSCLC) ([facility] [address]    Procedure(s) (LRB):   1. RIGHT THORACOSCOPY switched to thoracotomy with extensive pneumonolysis 2. LOWER LOBECTOMY  3. Upper lobe blebectomy x 2 4. Diaphragm resection with primary repair 5. Chest wall resection 6. MEDISTINAL LYMPHADENECTOMY 7. Intercostal nerve cryoablation     (Right)  8 Days Post-Op  ICD-10: Treatment Diagnosis:    · Other abnormalities of gait and mobility (R26.89)   Precaution/Allergies:  Penicillins; Percocet [oxycodone-acetaminophen]; and Prednisone      ASSESSMENT:     Ms. Lupe Martines was supine in bed, agreeable to physical therapy treatment this PM. She required minimal assist to transfer to sitting (educated on log roll technique).  She is on 5 L/min O2 this AM. Transferred to bedside commode with minimal assist, able to perform erica care without assist and then ambulated to recliner 5' with no AD and moderate assist reaching for objects. Required a rest, and then able to ambulate 60' with rolling walker with minimal assist and verbal cues for posture and walker proximity. Chair follow provided for safety. Slow gait raquel noted. Great progress towards ambulation goals this session. SpO2 91% post ambulation on 5 L/min O2. Will continue therapy efforts. This section established at most recent assessment   PROBLEM LIST (Impairments causing functional limitations):  1. Decreased ADL/Functional Activities  2. Decreased Transfer Abilities  3. Decreased Ambulation Ability/Technique  4. Increased Pain  5. Decreased Activity Tolerance   INTERVENTIONS PLANNED: (Benefits and precautions of physical therapy have been discussed with the patient.)  1. Balance Exercise  2. Bed Mobility  3. Gait Training  4. Therapeutic Activites  5. Therapeutic Exercise/Strengthening  6. Transfer Training  7. education      TREATMENT PLAN: Frequency/Duration: 3 times a week for duration of hospital stay  Rehabilitation Potential For Stated Goals: Excellent     REHAB RECOMMENDATIONS (at time of discharge pending progress):    Placement: It is my opinion, based on this patient's performance to date, that Ms. Rajiv Vázquez may benefit from intensive therapy at a [address] [place] [address] after discharge due to the functional deficits listed above that are likely to improve with skilled rehabilitation and may be unsafe to be at home alone . Equipment:    None at this time              HISTORY:   History of Present Injury/Illness (Reason for Referral): Admitted for above surgery.   Past Medical History/Comorbidities:   Ms. Rajiv Vázquez  has a past medical history of Aspiration pneumonia ([facility] [address] (4/12/2019), Cancer [facility]), Chronic obstructive pulmonary disease ([address] Hypertension, Severe sepsis with acute organ dysfunction ([address] (4/12/2019), Subarachnoid hemorrhage ([facility] [address] and Thyroid disease. She also has no past medical history of Difficult intubation, Malignant hyperthermia due to anesthesia, Nausea & vomiting, or Pseudocholinesterase deficiency. Ms. Julissa Larson  has a past surgical history that includes hx wrist fracture tx and hx other surgical.  Social History/Living Environment:   Home Environment: Private residence  One/Two Story Residence: One story  Living Alone: Yes  Support Systems: Family member(s)  Patient Expects to be Discharged to[de-identified] Unknown  Current DME Used/Available at Home: None  Tub or Shower Type: Shower  Prior Level of Function/Work/Activity:  At baseline patient lives with daughter and ambulates independently. Number of Personal Factors/Comorbidities that affect the Plan of Care: 1-2: MODERATE COMPLEXITY   EXAMINATION:   Most Recent Physical Functioning:   Gross Assessment:                  Posture:     Balance:  Sitting: Intact  Standing: Impaired  Standing - Static: Fair  Standing - Dynamic : Fair Bed Mobility:  Rolling: Contact guard assistance  Supine to Sit: Minimum assistance  Scooting: Minimum assistance  Interventions: Safety awareness training;Verbal cues; Visual cues  Wheelchair Mobility:     Transfers:  Sit to Stand: Minimum assistance  Stand to Sit: Minimum assistance  Bed to Chair: Minimum assistance  Interventions: Safety awareness training;Verbal cues; Visual cues  Gait:     Base of Support: Center of gravity altered;Narrowed  Speed/Aracelis: Slow;Shuffled;Pace decreased (<100 feet/min)  Step Length: Right shortened;Left shortened  Distance (ft): 60 Feet (ft)  Assistive Device: Walker, rolling  Ambulation - Level of Assistance: Minimal assistance      Body Structures Involved:  1. Lungs  2. Thoracic Cage  3. Bones  4. Muscles Body Functions Affected:  1. Sensory/Pain  2. Respiratory  3. Movement Related Activities and Participation Affected:  1. Mobility  2. Self Care  3. Domestic Life  4.  Community, Social and [place]   Number of elements that affect the Plan of Care: 4+: HIGH COMPLEXITY   CLINICAL PRESENTATION:   Presentation: Evolving clinical presentation with changing clinical characteristics: MODERATE COMPLEXITY   CLINICAL DECISION MAKIN [facility] Mobility Inpatient Short Form  How much difficulty does the patient currently have. .. Unable A Lot A Little None   1. Turning over in bed (including adjusting bedclothes, sheets and blankets)? ? 1   ? 2   ? 3   ? 4   2. Sitting down on and standing up from a chair with arms ( e.g., wheelchair, bedside commode, etc.)   ? 1   ? 2   ? 3   ? 4   3. Moving from lying on back to sitting on the side of the bed?   ? 1   ? 2   ? 3   ? 4   How much help from another person does the patient currently need. .. Total A Lot A Little None   4. Moving to and from a bed to a chair (including a wheelchair)? ? 1   ? 2   ? 3   ? 4   5. Need to walk in hospital room? ? 1   ? 2   ? 3   ? 4   6. Climbing 3-5 steps with a railing? ? 1   ? 2   ? 3   ? 4   © , Trustees of [facility] [employer] [employer], under license to [email or web address]. All rights reserved      Score:  Initial: 15 Most Recent: X (Date: -- )    Interpretation of Tool:  Represents activities that are increasingly more difficult (i.e. Bed mobility, Transfers, Gait). Medical Necessity:     · Patient is expected to demonstrate progress in strength, range of motion, balance, coordination and functional technique  ·  to increase independence with   and improve safety during all functional mobility. · .  Reason for Services/Other Comments:  · Patient continues to require skilled intervention due to medical complications and patient unable to attend/participate in therapy as expected  · .    Use of outcome tool(s) and clinical judgement create a POC that gives a: Clear prediction of patient's progress: LOW COMPLEXITY            TREATMENT:   (In addition to Assessment/Re-Assessment sessions the following treatments were rendered)   Pre-treatment Symptoms/Complaints:  Complaints of back pain  Pain: Initial:   Pain Intensity 1: 6  Post Session:  Not rated     Therapeutic Activity: (    38 minutes): Therapeutic activities including Bed transfers, toilet transfers, sit to stand transfers, sitting/standing balance/activity tolerance, ambulation on level ground to improve mobility, strength, balance and activity tolerance. Required minimal   to promote dynamic balance in standing. Therapeutic Exercise: (   minutes):  Exercises per grid below to improve mobility, strength and balance. Required minimal visual and verbal cues to promote proper body alignment. Progressed complexity of movement as indicated. DATE: 5/24/19 5/27/19 5/29/19      Straight leg raise         Hip abduct/ adduct  10 B Sitting x15 B A      Heel slides  X10 AB 10 B       Hip external/ internal rotation         Ankle dorsiflexion/ plantarflexion X20 AB 10 B Sitting x20 A      Long arc quads X10 AB  Sitting x15 A      SAQs  10 B       marching   Sitting x15 A        Key:  A=active, AA=active assisted, P=passive, B=bilaterally, R=right, L=left    Braces/Orthotics/Lines/Etc:   · IV  · chest tube  · O2 Device: Nasal cannula  Treatment/Session Assessment:    · Response to Treatment:  SpO2 >90%. · Interdisciplinary Collaboration:   o Physical Therapist  o Registered Nurse  · After treatment position/precautions:   o Up in chair  o Bed alarm/tab alert on  o Bed/Chair-wheels locked  o Bed in low position  o Call light within reach  o RN notified   · Compliance with Program/Exercises: Will assess as treatment progresses  · Recommendations/Intent for next treatment session: \"Next visit will focus on advancements to more challenging activities and reduction in assistance provided\".   Total Treatment Duration:  PT Patient Time In/Time Out  Time In: 1035  Time Out: 1115    Dahiana Macias, PT, DPT

## 2019-05-30 NOTE — PROGRESS NOTES
Bedside and Verbal shift change report given to self, RN (oncoming nurse) by Fabi Leiva RN (offgoing nurse). Report included the following information SBAR, Kardex, Intake/Output, MAR and Recent Results.

## 2019-05-30 NOTE — PROGRESS NOTES
5/30/2019    PLAN:  Diet- Regular Diet  OOB/ Ambulate with assist  DVT Prophylactics- SCD/Lovenox  Pain control- Dilaudid/neurontin  SUP prophylaxis- Protonix  Encourage C/DB/IS  Posterior CT removed 5/29  Anterior CT removed 5/28  Donald out   Pulmonary following--will likely need home O2  Cardiology following--off Amio gtt 5/29  Pack right chest incision wet to dry twice daily with gauze, cover with dry gauze/tape  SW following  Hopefully to 9th floor Friday      ASSESSMENT:  Admit Date: 5/22/2019   7 Day Post-Op  Procedure(s):   1. RIGHT THORACOSCOPY switched to thoracotomy with extensive pneumonolysis 2. LOWER LOBECTOMY  3. Upper lobe blebectomy x 2 4. Diaphragm resection with primary repair 5. Chest wall resection 6. MEDISTINAL LYMPHADENECTOMY 7. Intercostal nerve cryoablation        SUBJECTIVE: Awake in bed. Pain controlled with medication. +BM. Oxygenating well on O2 5LNC. AF, VSS, NAD. Path clear. CTs out. Chest incision c/d/i s/p recent dressing change. Pathology 5/22    DIAGNOSIS   A: #9 LYMPH NODE X2:   TWO LYMPH NODES NEGATIVE FOR METASTATIC CARCINOMA (0/2). B: #7 LYMPH NODE X5:   FIVE LYMPH NODES NEGATIVE FOR METASTATIC CARCINOMA (0/5). C: PERIHILAR LYMPH NODE:   ONE LYMPH NODE NEGATIVE FOR METASTATIC CARCINOMA (0/1). D: 4R LYMPH NODE X2:   TWO LYMPH NODES NEGATIVE FOR METASTATIC CARCINOMA (0/2). E: UPPER LOBE BLEB X2:   FINDINGS CONSISTENT WITH PULMONARY BLEBS. F: RIGHT LOWER LOBE, CHEST WALL, DIAPHRAGM:   POORLY DIFFERENTIATED NON-SMALL CELL CARCINOMA WITH SQUAMOUS FEATURES AND FOCAL NEUROENDOCRINE DIFFERENTIATION. CARCINOMA IS 6.9 CM IN GREATEST DIMENSION. MARGINS ARE NEGATIVE FOR CARCINOMA. FIVE LYMPH NODES NEGATIVE FOR METASTATIC CARCINOMA (0/5).    SEE ATTACHED CANCER CHECKLIST.   sms/5/24/2019   Electronically signed out on 5/28/2019 12:00 by Donna Blunt MD     OBJECTIVE:  Patient Vitals for the past 24 hrs:   Temp Pulse Resp BP SpO2   05/30/19 0938 -- 81 -- 117/57 --   05/30/19 0756 97.6 °F (36.4 °C) 83 18 (!) 88/51 100 %   05/30/19 0723 -- -- -- -- 95 %   05/30/19 0533 99 °F (37.2 °C) 83 18 113/58 90 %   05/30/19 0129 98.5 °F (36.9 °C) 72 18 90/52 96 %   05/29/19 2132 97.3 °F (36.3 °C) 83 18 94/50 94 %   05/29/19 1929 -- -- -- -- 91 %   05/29/19 1805 98.5 °F (36.9 °C) 77 18 114/51 96 %   05/29/19 1705 -- 80 -- 106/55 --   05/29/19 1434 -- -- -- -- 94 %   05/29/19 1250 97.5 °F (36.4 °C) 79 17 100/54 94 %   05/29/19 1039 -- 79 -- 100/64 --         Date 05/29/19 0700 - 05/30/19 0659 05/30/19 0700 - 05/31/19 0659   Shift 9732-7220 2587-4248 24 Hour Total 5725-3239 8179-3373 24 Hour Total   INTAKE   P.O. 120  120 0  0     P.O. 120  120 0  0   Shift Total(mL/kg) 120(1.9)  120(1.8) 0(0)  0(0)   OUTPUT   Urine(mL/kg/hr) 300(0.4)  300(0.2) 0  0     Urine Voided 300  300 0  0     Urine Occurrence(s) 1 x  1 x      Shift Total(mL/kg) 300(4.6)  300(4.5) 0(0)  0(0)   NET -180  -180 0  0   Weight (kg) 64.6 66.9 66.9 66.9 66.9 66.9            General:          No acute distress    Lungs:             CTA Bilaterally, Dressing to right chest c/d/i  Heart:              RRR  Abdomen:        Soft, Non distended, Non tender, BS+  Extremities:     No cyanosis, clubbing or edema  Neurologic:      No focal deficits           Labs:    Recent Labs     05/30/19  0453   WBC 17.5*   HGB 8.7*         K 3.5   CL 98   CO2 31   BUN 12   CREA 0.52*   *         Wilmer Deshpande, NP

## 2019-05-30 NOTE — PROGRESS NOTES
Adriana Santos  Admission Date: 5/22/2019             Daily Progress Note: 5/30/2019    The patient's chart is reviewed and the patient is discussed with the staff.     79 y.o. CF evaluated at the request of Dr. Fitch Plum abuse (1.5 ppd x 45 years - quit 2004), emphysema, hypothyroidism, subarachnoid hemorrhage, anterior cerebral aneurysm s/p brain coil, HTN, HLD, and lung mass.  PET CT for staging was ordered and performed on 3/28/19, demonstrating moderate to intense activity within the patient's known right lower lobe mass measuring 5.4 cm x 4.4 cm with hypermetabolic tumor appearing to extend to the right inferior hilum; no evidence for metastatic disease seen in the neck, chest, abdomen, or pelvis; however, focal activity is seen in the right posterior 10th rib which is felt to be due to a rib fracture without aggressive features appreciated. Bronch with EBUS and fine needle aspiration of hilar/mediastinal LN and TBBx with final path on RLL mass consistent with poorly differentiated carcinoma. She was admitted for tx of aspiration PNA post bronch.  Patient opted to pursue surgical intervention and was seen by Heme/Onc with recommendation for adjuvant chemo.  Pre-op PFTs showing mild obstruction with reduced DLCO suggestive of COPD.       Patient is now s/p R thoracoscopy >>>thoractomy with extensive pneumonolysis, R lower lobectomy, upper lobe blebectomy x 2, diaphragm resection with primary repair, chest wall resection, mediastinal lymphadenectomy, and intercostal nerve cryoablation on 5/22/19 per Dr. Óscar Dietrich.    Post op a fib - transient. On Amio. No anticoagulation due to recent surgery. Plans for rehab - ? 9th floor. Oxygen dependent for now.        Subjective:     Dressing change to chest at 5 am and about wiped her out. Hoping to go to rehab tomorrow. Up to date on problem list, plan.      Current Facility-Administered Medications   Medication Dose Route Frequency    [START ON 6/4/2019] amiodarone (CORDARONE) tablet 200 mg  200 mg Oral DAILY    amiodarone (CORDARONE) tablet 400 mg  400 mg Oral BID    cefTRIAXone (ROCEPHIN) 1 g in 0.9% sodium chloride (MBP/ADV) 50 mL  1 g IntraVENous Q24H    enoxaparin (LOVENOX) injection 40 mg  40 mg SubCUTAneous Q24H    furosemide (LASIX) tablet 40 mg  40 mg Oral DAILY    magnesium hydroxide (MILK OF MAGNESIA) 400 mg/5 mL oral suspension 30 mL  30 mL Oral DAILY PRN    magnesium hydroxide (MILK OF MAGNESIA) 400 mg/5 mL oral suspension 30 mL  30 mL Oral DAILY    budesonide (PULMICORT) 500 mcg/2 ml nebulizer suspension  500 mcg Nebulization BID RT    HYDROmorphone (PF) (DILAUDID) injection 0.5 mg  0.5 mg IntraVENous Q4H PRN    HYDROcodone-acetaminophen (NORCO) 5-325 mg per tablet 1 Tab  1 Tab Oral Q4H PRN    levothyroxine (SYNTHROID) tablet 75 mcg  75 mcg Oral DAILY    albuterol-ipratropium (DUO-NEB) 2.5 MG-0.5 MG/3 ML  3 mL Nebulization Q6H RT    senna-docusate (PERICOLACE) 8.6-50 mg per tablet 2 Tab  2 Tab Oral BID    gabapentin (NEURONTIN) capsule 300 mg  300 mg Oral TID    pantoprazole (PROTONIX) tablet 40 mg  40 mg Oral ACB    sodium chloride (NS) flush 5-40 mL  5-40 mL IntraVENous Q8H    sodium chloride (NS) flush 5-40 mL  5-40 mL IntraVENous PRN    naloxone (NARCAN) injection 0.4 mg  0.4 mg IntraVENous PRN    ondansetron (ZOFRAN) injection 4 mg  4 mg IntraVENous Q4H PRN         Objective:     Vitals:    05/30/19 0533 05/30/19 0723 05/30/19 0756 05/30/19 0938   BP: 113/58  (!) 88/51 117/57   Pulse: 83  83 81   Resp: 18  18    Temp: 99 °F (37.2 °C)  97.6 °F (36.4 °C)    SpO2: 90% 95% 100%    Weight: 147 lb 6.4 oz (66.9 kg)      Height:         Intake and Output:   05/28 1901 - 05/30 0700  In: 470 [P.O.:470]  Out: 700 [Urine:650]  No intake/output data recorded.     Physical Exam:   Constitutional:  the patient is well developed and in no acute distress  HEENT:  Sclera clear, pupils equal, oral mucosa moist  Lungs: crackles on the right from posterior. 5 liter cannula. Clear on the left  Cardiovascular:  RRR without M,G,R. Sinus per telemetry  Abd/GI: soft and non-tender; with positive bowel sounds. Ext: warm without cyanosis. There is no lower leg edema. Musculoskeletal: moves all four extremities with equal strength  Skin:  no jaundice or rashes, right chest wound - dressing wet   Neuro: no gross neuro deficits   Musculoskeletal: can ambulate. No deformity  Psychiatric: Calm. ROS:  Pain with right chest wound, weak, dyspnea with exertion  Lines: peripheral IV    CHEST XRAY:       LAB  Recent Labs     05/30/19  0453 05/29/19  0503 05/28/19  0442   WBC 17.5* 20.5* 18.5*   HGB 8.7* 8.7* 8.9*   HCT 26.8* 27.4* 28.0*    367 386     Recent Labs     05/30/19  0453 05/29/19  0503 05/28/19  0404    134* 135*   K 3.5 3.3* 4.6   CL 98 97* 98   CO2 31 29 30   * 123* 114*   BUN 12 11 11   CREA 0.52* 0.58* 0.58*           Assessment:  (Medical Decision Making)     Hospital Problems  Date Reviewed: 5/26/2019          Codes Class Noted POA    * (Principal) Non-small cell lung cancer (NSCLC) ([facility] [address] ICD-10-CM: C34.90  ICD-9-CM: 162.9  5/30/2019 Yes    S/p surgery. Nodes negative.      UTI (urinary tract infection) ICD-10-CM: N39.0  ICD-9-CM: 599.0  5/29/2019 Yes    Enterobacter - on rocephin    Atrial fibrillation with rapid ventricular response (HCC) ICD-10-CM: I48.91  ICD-9-CM: 427.31  5/26/2019 Yes    Maintaining sinus - on amio    Normochromic anemia (Chronic) ICD-10-CM: D64.9  ICD-9-CM: 285.9  5/26/2019 Yes    stable    Hypoxia ICD-10-CM: R09.02  ICD-9-CM: 799.02  5/24/2019 Yes    Remains on oxygen support - sat 97% today on 5 liters    COPD (chronic obstructive pulmonary disease) (HCC) (Chronic) ICD-10-CM: J44.9  ICD-9-CM: 496  5/24/2019 Yes    Mild per preop PFTs    Aftercare following surgery ICD-10-CM: Z48.89  ICD-9-CM: V58.89  5/22/2019 Yes        Right lower lobe lung mass ICD-10-CM: R91.8  ICD-9-CM: 786.6  5/22/2019 Yes    As above          Plan:  (Medical Decision Making)   1. 5 day course Rocephin for UTI. WBC down  2. Dressing changes/wound care right chest  3. Try to wean oxygen - sat 97% today on 5 liters. 4. Mild COPD per PFTs- no outpatient Rx - pulmicort and duoneb here. No evidence exacerbation  5. ? 9th floor rehab tomorrow  6. Cardiac rhythm stable - on Amio. No anticoagulation with recent chest surgery    Negrito Hooper NP    More than 50% of time documented was spent face-to-face contact with the patient and in the care of the patient on the floor/unit where the patient is located. Lungs:  Decreased BS with a few scattered rhonchi  Heart:  RRR with no Murmur/Rubs/Gallops    Additional Comments:  Expectorating thick clear secretions. Add mucolytics and stop duoneb in favor of albuterol and spiriva. Wean oxygen as able. I have spoken with and examined the patient. I agree with the above assessment and plan as documented.     Rigo Crews MD

## 2019-05-30 NOTE — PROGRESS NOTES
Patient has been seen by Dr. Bonifacio Gilmore and he will discuss with physician and let CM know if accepted to 9th floor. Patient aware being considered for 9th and hopes to be able to go there as she does not want to go to a SNF.

## 2019-05-30 NOTE — CONSULTS
Physical Medicine & Rehabilitation Note-consult    Patient: Maggie Corona MRN: [id number]  SSN: [id number]    YOB: 1940  Age: 78 y.o. Sex: female      Admit Date: 5/22/2019  Admitting Physician: Doug Phillip MD    Medical Decision Making/Plan/Recommend: Functional deficit, mobility, gait impairment. Continue acute PT, OT as tolerated. Monitor SaO2 on O2 supplement. Cardiopulmonary precautions. Focus on gait training, transfer training, balance activities, exercises to improve strength and enduramce to maximize ADLs and functional mobility. [facility] rehab program is reasonable and necessary due to ongoing medical conditions and functional deficit following surgery described below. Patient will need and benefit from daily multi disciplinary  inpatient rehabilitation program and the availability of all the needed medical and nursing care. Patient will require continued cardiac monitoring/ remote telemetry for a.fib, with recurrent RVR, treatment for HR control, BP management, . Patient will need closepulmonary monitoing, management of hypoxia, respiratory compromise, on high flow O2 following lobectomy. She will be followed by pulmonary, general surgery for post op surgical care, pulmonary management. following will be monitored daily as hgb decreasing following post op transfusions. RLE will be followed daily to monitor for ischemia, bypass failure. For these ongoing medical issues, rehabilitation services could not be safely provided at a lower level of care such as a skilled nursing facility or nursing home. The patient has shown the ability to tolerate and benefit from 3 hours of therapy daily and is being admitted to a comprehensive acute inpatient rehabilitation program consisting of at least 3 hours of combined physical and occupational therapies.   planning intensive Physical Therapy for a minimum of 1.5 hours a day, at least 5 out of 7 days per week to address bed mobility, transfers, ambulation, strengthening, balance, and endurance. Planning intensive Occupational Therapy for a minimum of 1.5 hours a day, at least 5 out of 7 days per week to address ADL ( bathing, LE dressing, toileting) and adaptive equipment as needed.     Patient was independent at baseline with mobility and ADLs until recently. Patient is normally alone at home, however daughter is able to provide supervision following discharge home if needed     Expect patient will improve to mod I level with mobility, transfers, gait and ADls as pulmonary condition improves,and as  strength and endurance improve. . Expect 10 days length of stay and patient is expected to return home with --- and continued rehabilitation with home health therapy, out pt PT.        Thank you for the opportunity to participate in the care of this patient. Chief Complaint : Gait dysfunction secondary to below. Admit Diagnosis: Lung mass [R91.8]; Lung mass [R91.8]; Aftercare following surgery [Z48.89]; Right lower lobe lung mass [R91.8]; Right lower lobe lung mass [R91.8]  PREOPERATIVE DIAGNOSIS:  Right lung cancer.     POSTOPERATIVE DIAGNOSIS:  Right lung cancer.     PROCEDURE PERFORMED:  1. Right thoracoscopy switched to right thoracotomy with extensive pneumonolysis. 2.  Right lower lobe lobectomy. 3.  Right upper lobe blebectomy x2.  4.  Diaphragm resection with primary repair. 5.  Chest wall resection. 6.  Mediastinal lymphadenectomy. 7. Intercostal nerve cryoablation.     weakness  Pain  DVT risk  Non-small cell lung cancer (NSCLC) ([address] (5/30/2019)  Aftercare following surgery (5/22/2019)  Right lower lobe lung mass (5/22/2019)  Hypoxia (5/24/2019)  COPD (chronic obstructive pulmonary disease) ([address] (5/24/2019)  Atrial fibrillation with rapid ventricular response ([address] (5/26/2019)  Normochromic anemia (5/26/2019)  leukocytosis  UTI (urinary tract infection) (5/29/2019)  Acute Rehab Dx:  weakness  Debility    deconditioning  Mobility and ambulation deficits  Self Care/ADL deficits    Medical Dx:  Past Medical History:   Diagnosis Date    Aspiration pneumonia ([facility] 4/12/2019    Cancer ([facility]     Lung cancer    Chronic obstructive pulmonary disease ([facility]     Hypertension     Severe sepsis with acute organ dysfunction ([facility] 4/12/2019    Subarachnoid hemorrhage ([facility]     Thyroid disease      Subjective:     Date of Evaluation:  May 30, 2019    HPI: Óscar Alvarado is a 78 y.o. female patient at [address] [place] [address] who was admitted on 5/22/2019  by Surekha Abel MD with below mentioned medical history ,is being seen for Physical Medicine and Rehabilitation consult. Óscar Alvarado who has history of emphysema, hypothyroidism, subarachnoid hemorrhage, anterior cerebral aneurysm s/p brain coil, HTN, HLD was recently diagnosed with lung cancer. Tumor noted to be in right lower lobe mass measuring 5.4 cm x 4.4 cm and was appearing to extend to the right inferior hilum. patient underwent a right thoracoscopy switched to right thoracotomy with extensive pneumonolysis, right lower lobe lobectomy, right upper lobe blebectomy x2, diaphragm resection with primary repair, chest wall resection, mediastinal lymphadenectomy and intercostal nerve cryoablation per Dr. Surekha Abel MD on 5/22/2019. Post op patient continued to be hypoxic, requiring high flow O2 to maintain sat of 90%. Patient developed atrial fibrillation with RVR, 5/26/2019. HR, BP control treated per cardiology. Patient continued on amiodarone, continued on telemetry. She is not on [address] [place] [address] due to recent lung surgery. CxR 5/30 demonstrates still bibasilar opacities, likely  combination of small effusion and atelectasis, and vascular congestion. Patient continued on pulmicort and duoneb for COPD. Patient is continued on Rocephin for UTI. WBC trending down, 17.5 on 5/30. Patient has started to participate in therapies with acute PT, OT and ST.  Patient shows mild generalized weakness, deconditioned state, poor activity tolerance due to hypoxia,limiting her mobility, ambulation and ADLs. Patient is managing her gait with minimal assist. We are consulted to assist with rehab needs and placement. Óscar Alvarado is seen and examined today. Medical Records reviewed. Patient lives alone, has been independent prior to this admission. She has a daughter she she has been staying with recently. Current Level of Function:   bed mobility - min A, transfers - min A, decreased balance , ambulation - 61' with RW and min A. History reviewed. No pertinent family history. Social History     Tobacco Use    Smoking status: Former Smoker     Packs/day: 1.50     Years: 45.00     Pack years: 67.50     Types: Cigarettes     Last attempt to quit: 2004     Years since quitting: 15.4    Smokeless tobacco: Never Used   Substance Use Topics    Alcohol use: Not Currently     Past Surgical History:   Procedure Laterality Date    HX OTHER SURGICAL      coil in brain    HX WRIST FRACTURE [place]      bilat wrist      Prior to Admission medications    Medication Sig Start Date End Date Taking? Authorizing Provider   benzonatate (TESSALON) 100 mg capsule Take 1 Cap by mouth three (3) times daily as needed for Cough. 5/6/19  Yes Adilene Gilbert MD   albuterol (PROVENTIL HFA, VENTOLIN HFA, PROAIR HFA) 90 mcg/actuation inhaler Take 1 Puff by inhalation every six (6) hours as needed for Wheezing or Shortness of Breath. 4/16/19  Yes [facility]   calc-D3-magnes-W8-Gv-Di-jake 250 mg-400 unit -40 mg-5 mg tab Take 1 Tab by mouth daily. Yes Provider, Historical   omega 3-dha-epa-fish oil (FISH OIL) 100-160-1,000 mg cap Take 1 Cap by mouth daily. Yes Provider, Historical   MAGNESIUM PO Take 100 mg by mouth daily. Yes Provider, Historical   VITAMIN B COMPLEX PO Take 1 Tab by mouth daily. Yes Provider, Historical   atorvastatin (LIPITOR) 10 mg tablet Take 10 mg by mouth daily.    Yes Provider, Historical   cholecalciferol (VITAMIN D3) 1,000 unit tablet Take 1,000 Units by mouth daily. Yes Provider, Historical   levothyroxine (SYNTHROID) 75 mcg tablet Take 75 mcg by mouth daily. Yes Provider, Historical   losartan (COZAAR) 25 mg tablet Take 25 mg by mouth daily. Yes Provider, Historical   therapeutic multivitamin (THERAGRAN) tablet Take 1 Tab by mouth daily. Yes Provider, Historical   zolpidem (AMBIEN) 5 mg tablet Take 1 Tab by mouth nightly. Yes Provider, Historical   ascorbic acid, vitamin C, (VITAMIN C) 500 mg tablet Take  by mouth. Provider, Historical     Allergies   Allergen Reactions    Penicillins Unknown (comments)     Hives. Tolerated ceftriaxone May 2019    Percocet [Oxycodone-Acetaminophen] Unknown (comments)    Prednisone Other (comments)     Tachycardia        Review of Systems: Denies chest pain, shortness of breath, cough, headache, visual problems, abdominal pain, dysurea, calf pain. Pertinent positives are as noted in the medical records and unremarkable otherwise. Objective:     Vitals:  Blood pressure 95/51, pulse 76, temperature 98.6 °F (37 °C), resp. rate 18, height 5' 7\" (1.702 m), weight 147 lb 6.4 oz (66.9 kg), SpO2 95 %. Temp (24hrs), Av.3 °F (36.8 °C), Min:97.3 °F (36.3 °C), Max:99 °F (37.2 °C)    BMI (calculated): 23.1 (19 0533)   Intake and Output:   1901 -  0700  In: 26 [P.O.:470]  Out: 700 [Urine:650]    Physical Exam:   General: Alert and age appropriately oriented. No acute cardio respiratory distress. HEENT: Normocephalic,no scleral icterus  Oral mucosa moist without cyanosis, No bruit, No JVD. Lungs: Clear to auscultation  Bilaterally. Crackles thoughout B lung fields. Respiration even and unlabored   Heart: Regular rate and rhythm, S1, S2   No  murmurs, clicks, rub or gallops   Abdomen: Soft, non-tender, nondistended. Bowel sounds present. Genitourinary: defer   Neuromuscular:      PERRL, EOMI  Speech is clear. Follows simple commands consistently. Able to identify, recall repeat. Mood appropriate.   + generalized weakness 4 to 5-/5 throughout major muscle groups. Sensory - intact  No cerebellar signs. No atrophy, no fasciculations, no tremors. Skin/extremity: Calf non tender BLE. Right chest wall, thorax covered. Labs/Studies:  Recent Results (from the past 72 hour(s))   METABOLIC PANEL, BASIC    Collection Time: 05/28/19  4:04 AM   Result Value Ref Range    Sodium 135 (L) 136 - 145 mmol/L    Potassium 4.6 3.5 - 5.1 mmol/L    Chloride 98 98 - 107 mmol/L    CO2 30 21 - 32 mmol/L    Anion gap 7 7 - 16 mmol/L    Glucose 114 (H) 65 - 100 mg/dL    BUN 11 8 - 23 MG/DL    Creatinine 0.58 (L) 0.6 - 1.0 MG/DL    GFR est AA >60 >60 ml/min/1.73m2    GFR est non-AA >60 >60 ml/min/1.73m2    Calcium 7.8 (L) 8.3 - 10.4 MG/DL   CBC WITH AUTOMATED DIFF    Collection Time: 05/28/19  4:42 AM   Result Value Ref Range    WBC 18.5 (H) 4.3 - 11.1 K/uL    RBC 3.14 (L) 4.05 - 5.2 M/uL    HGB 8.9 (L) 11.7 - 15.4 g/dL    HCT 28.0 (L) 35.8 - 46.3 %    MCV 89.2 79.6 - 97.8 FL    MCH 28.3 26.1 - 32.9 PG    MCHC 31.8 31.4 - 35.0 g/dL    RDW 15.5 (H) 11.9 - 14.6 %    PLATELET 686 846 - 548 K/uL    MPV 9.6 9.4 - 12.3 FL    ABSOLUTE NRBC 0.00 0.0 - 0.2 K/uL    DF AUTOMATED      NEUTROPHILS 86 (H) 43 - 78 %    LYMPHOCYTES 5 (L) 13 - 44 %    MONOCYTES 8 4.0 - 12.0 %    EOSINOPHILS 0 (L) 0.5 - 7.8 %    BASOPHILS 0 0.0 - 2.0 %    IMMATURE GRANULOCYTES 1 0.0 - 5.0 %    ABS. NEUTROPHILS 15.9 (H) 1.7 - 8.2 K/UL    ABS. LYMPHOCYTES 0.9 0.5 - 4.6 K/UL    ABS. MONOCYTES 1.4 (H) 0.1 - 1.3 K/UL    ABS. EOSINOPHILS 0.0 0.0 - 0.8 K/UL    ABS. BASOPHILS 0.1 0.0 - 0.2 K/UL    ABS. IMM.  GRANS. 0.2 0.0 - 0.5 K/UL   PROCALCITONIN    Collection Time: 05/28/19 12:56 PM   Result Value Ref Range    Procalcitonin 0.3 ng/mL   URINALYSIS W/ RFLX MICROSCOPIC    Collection Time: 05/28/19 2:26 PM   Result Value Ref Range    Color RODGER      Appearance TURBID      Specific gravity 1.018 1.001 - 1.023      pH (UA) 5.5 5.0 - 9.0      Protein 30 (A) NEG mg/dL    Glucose NEGATIVE  mg/dL    Ketone NEGATIVE  NEG mg/dL    Bilirubin NEGATIVE  NEG      Blood MODERATE (A) NEG      Urobilinogen 1.0 0.2 - 1.0 EU/dL    Nitrites POSITIVE (A) NEG      Leukocyte Esterase LARGE (A) NEG      WBC >100 0 /hpf    RBC 10-20 0 /hpf    Epithelial cells 5-10 0 /hpf    Bacteria 4+ (H) 0 /hpf    Yeast MODERATE      Other observations RESULTS VERIFIED MANUALLY     CULTURE, URINE    Collection Time: 05/28/19  2:26 PM   Result Value Ref Range    Special Requests: NO SPECIAL REQUESTS      Culture result: >100,000 COLONIES/mL ENTEROBACTER CLOACAE (A)         Susceptibility    Enterobacter cloacae - RYAN     Trimeth-Sulfamethoxa <=0.5/9.5 Susceptible ug/mL     Nitrofurantoin 64 Intermediate ug/mL     Ciprofloxacin ($) <=0.5 Susceptible ug/mL     Gentamicin ($) 1 Susceptible ug/mL     Tobramycin ($) 1 Susceptible ug/mL     Ampicillin/sulbactam ($) >16/8 Resistant ug/mL     Cefazolin ($) >32 Resistant ug/mL     Ceftriaxone ($) <=0.5 Susceptible ug/mL     Cefepime ($$) <=0.5 Susceptible ug/mL     Piperacillin/Tazobac ($) <=2/4 Susceptible ug/mL     Aztreonam ($$$$) <=1 Susceptible ug/mL     Cefoxitin >16 Resistant ug/mL   CBC WITH AUTOMATED DIFF    Collection Time: 05/29/19  5:03 AM   Result Value Ref Range    WBC 20.5 (H) 4.3 - 11.1 K/uL    RBC 3.01 (L) 4.05 - 5.2 M/uL    HGB 8.7 (L) 11.7 - 15.4 g/dL    HCT 27.4 (L) 35.8 - 46.3 %    MCV 91.0 79.6 - 97.8 FL    MCH 28.9 26.1 - 32.9 PG    MCHC 31.8 31.4 - 35.0 g/dL    RDW 15.6 (H) 11.9 - 14.6 %    PLATELET 530 956 - 931 K/uL    MPV 9.7 9.4 - 12.3 FL    ABSOLUTE NRBC 0.00 0.0 - 0.2 K/uL    DF AUTOMATED      NEUTROPHILS 86 (H) 43 - 78 %    LYMPHOCYTES 5 (L) 13 - 44 %    MONOCYTES 8 4.0 - 12.0 %    EOSINOPHILS 0 (L) 0.5 - 7.8 %    BASOPHILS 0 0.0 - 2.0 %    IMMATURE GRANULOCYTES 1 0.0 - 5.0 % ABS. NEUTROPHILS 17.6 (H) 1.7 - 8.2 K/UL    ABS. LYMPHOCYTES 1.0 0.5 - 4.6 K/UL    ABS. MONOCYTES 1.6 (H) 0.1 - 1.3 K/UL    ABS. EOSINOPHILS 0.0 0.0 - 0.8 K/UL    ABS. BASOPHILS 0.1 0.0 - 0.2 K/UL    ABS. IMM. GRANS. 0.2 0.0 - 0.5 K/UL   METABOLIC PANEL, BASIC    Collection Time: 05/29/19  5:03 AM   Result Value Ref Range    Sodium 134 (L) 136 - 145 mmol/L    Potassium 3.3 (L) 3.5 - 5.1 mmol/L    Chloride 97 (L) 98 - 107 mmol/L    CO2 29 21 - 32 mmol/L    Anion gap 8 7 - 16 mmol/L    Glucose 123 (H) 65 - 100 mg/dL    BUN 11 8 - 23 MG/DL    Creatinine 0.58 (L) 0.6 - 1.0 MG/DL    GFR est AA >60 >60 ml/min/1.73m2    GFR est non-AA >60 >60 ml/min/1.73m2    Calcium 7.6 (L) 8.3 - 27.2 MG/DL   METABOLIC PANEL, BASIC    Collection Time: 05/30/19  4:53 AM   Result Value Ref Range    Sodium 136 136 - 145 mmol/L    Potassium 3.5 3.5 - 5.1 mmol/L    Chloride 98 98 - 107 mmol/L    CO2 31 21 - 32 mmol/L    Anion gap 7 7 - 16 mmol/L    Glucose 103 (H) 65 - 100 mg/dL    BUN 12 8 - 23 MG/DL    Creatinine 0.52 (L) 0.6 - 1.0 MG/DL    GFR est AA >60 >60 ml/min/1.73m2    GFR est non-AA >60 >60 ml/min/1.73m2    Calcium 8.1 (L) 8.3 - 10.4 MG/DL   CBC WITH AUTOMATED DIFF    Collection Time: 05/30/19  4:53 AM   Result Value Ref Range    WBC 17.5 (H) 4.3 - 11.1 K/uL    RBC 2.99 (L) 4.05 - 5.2 M/uL    HGB 8.7 (L) 11.7 - 15.4 g/dL    HCT 26.8 (L) 35.8 - 46.3 %    MCV 89.6 79.6 - 97.8 FL    MCH 29.1 26.1 - 32.9 PG    MCHC 32.5 31.4 - 35.0 g/dL    RDW 15.4 (H) 11.9 - 14.6 %    PLATELET 354 993 - 157 K/uL    MPV 9.6 9.4 - 12.3 FL    ABSOLUTE NRBC 0.00 0.0 - 0.2 K/uL    DF AUTOMATED      NEUTROPHILS 86 (H) 43 - 78 %    LYMPHOCYTES 6 (L) 13 - 44 %    MONOCYTES 7 4.0 - 12.0 %    EOSINOPHILS 1 0.5 - 7.8 %    BASOPHILS 0 0.0 - 2.0 %    IMMATURE GRANULOCYTES 1 0.0 - 5.0 %    ABS. NEUTROPHILS 15.0 (H) 1.7 - 8.2 K/UL    ABS. LYMPHOCYTES 1.0 0.5 - 4.6 K/UL    ABS. MONOCYTES 1.2 0.1 - 1.3 K/UL    ABS. EOSINOPHILS 0.1 0.0 - 0.8 K/UL    ABS.  BASOPHILS 0.0 0.0 - 0.2 K/UL    ABS. IMM.  GRANS. 0.2 0.0 - 0.5 K/UL       Functional Assessment:  Reviewed participation and progress in therapies  Gross Assessment  AROM: Generally decreased, functional (05/28/19 1300)  Strength: Generally decreased, functional (05/28/19 1300)   Bed Mobility  Rolling: Contact guard assistance (05/30/19 1100)  Supine to Sit: Minimum assistance (05/30/19 1100)  Sit to Supine: Minimum assistance (05/29/19 1400)  Scooting: Minimum assistance (05/30/19 1100)   Balance  Sitting: Intact (05/30/19 1100)  Sitting - Static: Good (unsupported) (05/28/19 1300)  Sitting - Dynamic: Fair (occasional) (05/28/19 1300)  Standing: Impaired (05/30/19 1100)  Standing - Static: Fair (05/30/19 1100)  Standing - Dynamic : Fair (05/30/19 1100)           Toileting  Toileting Assistance: Minimum assistance (05/28/19 1300)   Bed/Mat Mobility  Rolling: Contact guard assistance (05/30/19 1100)  Supine to Sit: Minimum assistance (05/30/19 1100)  Sit to Supine: Minimum assistance (05/29/19 1400)  Sit to Stand: Minimum assistance (05/30/19 1100)  Stand to Sit: Minimum assistance (05/30/19 1100)  Bed to Chair: Minimum assistance (05/30/19 1100)  Scooting: Minimum assistance (05/30/19 1100)     Ambulation:  Gait  Base of Support: Center of gravity altered;Narrowed (05/30/19 1100)  Speed/Aracelis: Slow;Shuffled;Pace decreased (<100 feet/min) (05/30/19 1100)  Step Length: Right shortened;Left shortened (05/30/19 1100)  Ambulation - Level of Assistance: Minimal assistance (05/30/19 1100)  Distance (ft): 60 Feet (ft) (05/30/19 1100)  Assistive Device: Walker, rolling (05/30/19 1100)    Impression/Plan:     Principal Problem:    Non-small cell lung cancer (NSCLC) ([facility] (5/30/2019)    Active Problems:    Aftercare following surgery (5/22/2019)      Right lower lobe lung mass (5/22/2019)      Hypoxia (5/24/2019)      COPD (chronic obstructive pulmonary disease) ([facility] (5/24/2019)      Atrial fibrillation with rapid ventricular response ([facility] (5/26/2019)      Normochromic anemia (5/26/2019)      UTI (urinary tract infection) (5/29/2019)        Current Facility-Administered Medications   Medication Dose Route Frequency Provider Last Rate Last Dose    [START ON 6/4/2019] amiodarone (CORDARONE) tablet 200 mg  200 mg Oral DAILY Drew Diaz MD        albuterol (PROVENTIL VENTOLIN) nebulizer solution 2.5 mg  2.5 mg Nebulization QID RT Elyse Bennett MD        [START ON 5/31/2019] tiotropium [facility]) inhalation capsule 18 mcg  1 Cap Inhalation DAILY Elyse Bennett MD        amiodarone (CORDARONE) tablet 400 mg  400 mg Oral BID Champ Romeo MD   400 mg at 05/30/19 9943    cefTRIAXone (ROCEPHIN) 1 g in 0.9% sodium chloride (MBP/ADV) 50 mL  1 g IntraVENous Q24H Shailesh Zaidi  mL/hr at 05/29/19 1625 1 g at 05/29/19 1625    enoxaparin (LOVENOX) injection 40 mg  40 mg SubCUTAneous Q24H Shailesh Zaidi MD   40 mg at 05/29/19 1624    furosemide (LASIX) tablet 40 mg  40 mg Oral DAILY Shailesh Zaidi MD   40 mg at 05/30/19 3012    magnesium hydroxide (MILK OF MAGNESIA) 400 mg/5 mL oral suspension 30 mL  30 mL Oral DAILY PRN Ramiro Muhammad NP        magnesium hydroxide (MILK OF MAGNESIA) 400 mg/5 mL oral suspension 30 mL  30 mL Oral DAILY Shailesh Zaidi MD   Stopped at 05/26/19 0900    budesonide (PULMICORT) 500 mcg/2 ml nebulizer suspension  500 mcg Nebulization BID RT Jake Saldana MD   500 mcg at 05/30/19 0723    HYDROmorphone (PF) (DILAUDID) injection 0.5 mg  0.5 mg IntraVENous Q4H PRN Shailesh Zaidi MD   0.5 mg at 05/30/19 1406    HYDROcodone-acetaminophen (NORCO) 5-325 mg per tablet 1 Tab  1 Tab Oral Q4H PRN Shailesh Zaidi MD   1 Tab at 05/30/19 8707    levothyroxine (SYNTHROID) tablet 75 mcg  75 mcg Oral DAILY Karol Melton NP   75 mcg at 05/30/19 9725    senna-docusate (PERICOLACE) 8.6-50 mg per tablet 2 Tab  2 Tab Oral BID Rodolfo ULLOA NP   2 Tab at 05/29/19 1619    gabapentin (NEURONTIN) capsule 300 mg  300 mg Oral TID Yudi Barahona, NP   300 mg at 05/30/19 0938    pantoprazole (PROTONIX) tablet 40 mg  40 mg Oral ACB Karol Melton, NP   40 mg at 05/30/19 0938    sodium chloride (NS) flush 5-40 mL  5-40 mL IntraVENous Q8H Karol Melton L, NP   5 mL at 05/30/19 0540    sodium chloride (NS) flush 5-40 mL  5-40 mL IntraVENous PRN Karol Melton, NP   5 mL at 05/25/19 2002    naloxone [facility]) injection 0.4 mg  0.4 mg IntraVENous PRN Marcelle ULLOA NP        ondansetron (ZOFRAN) injection 4 mg  4 mg IntraVENous Q4H PRN Marcelle ULLOA NP   4 mg at 05/30/19 7034        Signed By: Deanna Davis MD     May 30, 2019

## 2019-05-30 NOTE — PROGRESS NOTES
Spoke with Ami Ho, patient's daughter. Daughter gave the 4 digit security code and was updated on patient's condition.

## 2019-05-30 NOTE — PROGRESS NOTES
Bedside and Verbal shift change report given to Koko Hightower RN (oncoming nurse) by self, RN (offgoing nurse). Report included the following information SBAR, Kardex, Intake/Output, MAR and Recent Results. Right incision visualized with oncoming RN.

## 2019-05-31 ENCOUNTER — APPOINTMENT (OUTPATIENT)
Dept: GENERAL RADIOLOGY | Age: 79
DRG: 164 | End: 2019-05-31
Attending: SURGERY
Payer: MEDICARE

## 2019-05-31 ENCOUNTER — HOSPITAL ENCOUNTER (INPATIENT)
Age: 79
LOS: 12 days | Discharge: NURSING, REHAB OR LONG-TERM CARE | DRG: 181 | End: 2019-06-12
Attending: PHYSICAL MEDICINE & REHABILITATION | Admitting: PHYSICAL MEDICINE & REHABILITATION
Payer: MEDICARE

## 2019-05-31 VITALS
DIASTOLIC BLOOD PRESSURE: 54 MMHG | HEIGHT: 67 IN | HEART RATE: 74 BPM | SYSTOLIC BLOOD PRESSURE: 101 MMHG | TEMPERATURE: 98.3 F | WEIGHT: 140.3 LBS | RESPIRATION RATE: 18 BRPM | OXYGEN SATURATION: 97 % | BODY MASS INDEX: 22.02 KG/M2

## 2019-05-31 DIAGNOSIS — R52 PAIN: ICD-10-CM

## 2019-05-31 DIAGNOSIS — J44.9 CHRONIC OBSTRUCTIVE PULMONARY DISEASE, UNSPECIFIED COPD TYPE (HCC): Chronic | ICD-10-CM

## 2019-05-31 DIAGNOSIS — C80.1 SMALL CELL CARCINOMA (HCC): ICD-10-CM

## 2019-05-31 DIAGNOSIS — R53.81 DEBILITY: ICD-10-CM

## 2019-05-31 DIAGNOSIS — R50.9 FEVER, UNSPECIFIED FEVER CAUSE: ICD-10-CM

## 2019-05-31 DIAGNOSIS — R91.8 RIGHT LOWER LOBE LUNG MASS: ICD-10-CM

## 2019-05-31 DIAGNOSIS — C34.31 MALIGNANT NEOPLASM OF LOWER LOBE OF RIGHT LUNG (HCC): ICD-10-CM

## 2019-05-31 DIAGNOSIS — Z98.890 HISTORY OF THORACOTOMY: ICD-10-CM

## 2019-05-31 DIAGNOSIS — I48.91 ATRIAL FIBRILLATION WITH RVR (HCC): ICD-10-CM

## 2019-05-31 DIAGNOSIS — R26.9 GAIT DIFFICULTY: ICD-10-CM

## 2019-05-31 DIAGNOSIS — R09.02 HYPOXIA: ICD-10-CM

## 2019-05-31 DIAGNOSIS — I48.91 ATRIAL FIBRILLATION WITH RAPID VENTRICULAR RESPONSE (HCC): ICD-10-CM

## 2019-05-31 DIAGNOSIS — Z48.89 AFTERCARE FOLLOWING SURGERY: ICD-10-CM

## 2019-05-31 PROBLEM — C34.90 LUNG CANCER (HCC): Status: ACTIVE | Noted: 2019-05-31

## 2019-05-31 LAB
ANION GAP SERPL CALC-SCNC: 6 MMOL/L (ref 7–16)
BASOPHILS # BLD: 0.1 K/UL (ref 0–0.2)
BASOPHILS NFR BLD: 0 % (ref 0–2)
BUN SERPL-MCNC: 11 MG/DL (ref 8–23)
CALCIUM SERPL-MCNC: 7.8 MG/DL (ref 8.3–10.4)
CHLORIDE SERPL-SCNC: 99 MMOL/L (ref 98–107)
CO2 SERPL-SCNC: 31 MMOL/L (ref 21–32)
CREAT SERPL-MCNC: 0.57 MG/DL (ref 0.6–1)
DIFFERENTIAL METHOD BLD: ABNORMAL
EOSINOPHIL # BLD: 0.2 K/UL (ref 0–0.8)
EOSINOPHIL NFR BLD: 2 % (ref 0.5–7.8)
ERYTHROCYTE [DISTWIDTH] IN BLOOD BY AUTOMATED COUNT: 15.4 % (ref 11.9–14.6)
GLUCOSE SERPL-MCNC: 104 MG/DL (ref 65–100)
HCT VFR BLD AUTO: 26.9 % (ref 35.8–46.3)
HGB BLD-MCNC: 8.3 G/DL (ref 11.7–15.4)
IMM GRANULOCYTES # BLD AUTO: 0.1 K/UL (ref 0–0.5)
IMM GRANULOCYTES NFR BLD AUTO: 1 % (ref 0–5)
LYMPHOCYTES # BLD: 1 K/UL (ref 0.5–4.6)
LYMPHOCYTES NFR BLD: 7 % (ref 13–44)
MCH RBC QN AUTO: 28.3 PG (ref 26.1–32.9)
MCHC RBC AUTO-ENTMCNC: 30.9 G/DL (ref 31.4–35)
MCV RBC AUTO: 91.8 FL (ref 79.6–97.8)
MONOCYTES # BLD: 1.1 K/UL (ref 0.1–1.3)
MONOCYTES NFR BLD: 8 % (ref 4–12)
NEUTS SEG # BLD: 11.6 K/UL (ref 1.7–8.2)
NEUTS SEG NFR BLD: 82 % (ref 43–78)
NRBC # BLD: 0 K/UL (ref 0–0.2)
PLATELET # BLD AUTO: 432 K/UL (ref 150–450)
PMV BLD AUTO: 9.5 FL (ref 9.4–12.3)
POTASSIUM SERPL-SCNC: 3.6 MMOL/L (ref 3.5–5.1)
RBC # BLD AUTO: 2.93 M/UL (ref 4.05–5.2)
SODIUM SERPL-SCNC: 136 MMOL/L (ref 136–145)
WBC # BLD AUTO: 14 K/UL (ref 4.3–11.1)

## 2019-05-31 PROCEDURE — 94760 N-INVAS EAR/PLS OXIMETRY 1: CPT

## 2019-05-31 PROCEDURE — 94640 AIRWAY INHALATION TREATMENT: CPT

## 2019-05-31 PROCEDURE — 36415 COLL VENOUS BLD VENIPUNCTURE: CPT

## 2019-05-31 PROCEDURE — 77030018836 HC SOL IRR NACL ICUM -A

## 2019-05-31 PROCEDURE — 65310000000 HC RM PRIVATE REHAB

## 2019-05-31 PROCEDURE — 74011000258 HC RX REV CODE- 258: Performed by: PHYSICAL MEDICINE & REHABILITATION

## 2019-05-31 PROCEDURE — 74011250637 HC RX REV CODE- 250/637: Performed by: INTERNAL MEDICINE

## 2019-05-31 PROCEDURE — 74011000250 HC RX REV CODE- 250: Performed by: INTERNAL MEDICINE

## 2019-05-31 PROCEDURE — 74011250636 HC RX REV CODE- 250/636: Performed by: SURGERY

## 2019-05-31 PROCEDURE — 74011250637 HC RX REV CODE- 250/637: Performed by: NURSE PRACTITIONER

## 2019-05-31 PROCEDURE — 74011000250 HC RX REV CODE- 250: Performed by: PHYSICAL MEDICINE & REHABILITATION

## 2019-05-31 PROCEDURE — 77010033678 HC OXYGEN DAILY

## 2019-05-31 PROCEDURE — 99223 1ST HOSP IP/OBS HIGH 75: CPT | Performed by: PHYSICAL MEDICINE & REHABILITATION

## 2019-05-31 PROCEDURE — 80048 BASIC METABOLIC PNL TOTAL CA: CPT

## 2019-05-31 PROCEDURE — 74011250636 HC RX REV CODE- 250/636: Performed by: PHYSICAL MEDICINE & REHABILITATION

## 2019-05-31 PROCEDURE — 97162 PT EVAL MOD COMPLEX 30 MIN: CPT

## 2019-05-31 PROCEDURE — 71045 X-RAY EXAM CHEST 1 VIEW: CPT

## 2019-05-31 PROCEDURE — 74011250637 HC RX REV CODE- 250/637: Performed by: PHYSICAL MEDICINE & REHABILITATION

## 2019-05-31 PROCEDURE — 74011250637 HC RX REV CODE- 250/637: Performed by: SURGERY

## 2019-05-31 PROCEDURE — 97530 THERAPEUTIC ACTIVITIES: CPT

## 2019-05-31 PROCEDURE — 85025 COMPLETE CBC W/AUTO DIFF WBC: CPT

## 2019-05-31 RX ORDER — PANTOPRAZOLE SODIUM 40 MG/1
40 TABLET, DELAYED RELEASE ORAL
Status: CANCELLED | OUTPATIENT
Start: 2019-06-01

## 2019-05-31 RX ORDER — BUDESONIDE 0.5 MG/2ML
500 INHALANT ORAL
Status: DISCONTINUED | OUTPATIENT
Start: 2019-05-31 | End: 2019-06-12 | Stop reason: HOSPADM

## 2019-05-31 RX ORDER — ENOXAPARIN SODIUM 100 MG/ML
40 INJECTION SUBCUTANEOUS EVERY 24 HOURS
Status: CANCELLED | OUTPATIENT
Start: 2019-05-31

## 2019-05-31 RX ORDER — AMOXICILLIN 250 MG
2 CAPSULE ORAL 2 TIMES DAILY
Status: DISCONTINUED | OUTPATIENT
Start: 2019-05-31 | End: 2019-06-12 | Stop reason: HOSPADM

## 2019-05-31 RX ORDER — AMIODARONE HYDROCHLORIDE 200 MG/1
400 TABLET ORAL 2 TIMES DAILY
Status: CANCELLED | OUTPATIENT
Start: 2019-05-31 | End: 2019-06-03

## 2019-05-31 RX ORDER — HYDROCODONE BITARTRATE AND ACETAMINOPHEN 5; 325 MG/1; MG/1
1 TABLET ORAL
Qty: 25 TAB | Refills: 0 | Status: ON HOLD
Start: 2019-05-31 | End: 2019-06-12

## 2019-05-31 RX ORDER — SODIUM CHLORIDE 0.9 % (FLUSH) 0.9 %
5-40 SYRINGE (ML) INJECTION AS NEEDED
Status: DISCONTINUED | OUTPATIENT
Start: 2019-05-31 | End: 2019-06-12 | Stop reason: HOSPADM

## 2019-05-31 RX ORDER — ALBUTEROL SULFATE 0.83 MG/ML
2.5 SOLUTION RESPIRATORY (INHALATION)
Status: CANCELLED | OUTPATIENT
Start: 2019-05-31

## 2019-05-31 RX ORDER — ADHESIVE BANDAGE
30 BANDAGE TOPICAL DAILY
Status: CANCELLED | OUTPATIENT
Start: 2019-06-01

## 2019-05-31 RX ORDER — ONDANSETRON 2 MG/ML
4 INJECTION INTRAMUSCULAR; INTRAVENOUS
Status: DISCONTINUED | OUTPATIENT
Start: 2019-05-31 | End: 2019-06-12 | Stop reason: HOSPADM

## 2019-05-31 RX ORDER — CELECOXIB 100 MG/1
100 CAPSULE ORAL 2 TIMES DAILY
Qty: 60 CAP | Refills: 2 | Status: SHIPPED | OUTPATIENT
Start: 2019-05-31 | End: 2019-06-12

## 2019-05-31 RX ORDER — FUROSEMIDE 20 MG/1
40 TABLET ORAL DAILY
Status: DISCONTINUED | OUTPATIENT
Start: 2019-06-01 | End: 2019-06-10

## 2019-05-31 RX ORDER — LEVOTHYROXINE SODIUM 75 UG/1
75 TABLET ORAL DAILY
Status: CANCELLED | OUTPATIENT
Start: 2019-06-01

## 2019-05-31 RX ORDER — AMOXICILLIN 250 MG
2 CAPSULE ORAL 2 TIMES DAILY
Status: SHIPPED | COMMUNITY
Start: 2019-05-31 | End: 2019-07-06

## 2019-05-31 RX ORDER — HYDROCODONE BITARTRATE AND ACETAMINOPHEN 5; 325 MG/1; MG/1
1 TABLET ORAL
Status: DISCONTINUED | OUTPATIENT
Start: 2019-05-31 | End: 2019-06-12 | Stop reason: HOSPADM

## 2019-05-31 RX ORDER — ONDANSETRON 2 MG/ML
4 INJECTION INTRAMUSCULAR; INTRAVENOUS
Status: CANCELLED | OUTPATIENT
Start: 2019-05-31

## 2019-05-31 RX ORDER — AMOXICILLIN 250 MG
2 CAPSULE ORAL 2 TIMES DAILY
Status: CANCELLED | OUTPATIENT
Start: 2019-05-31

## 2019-05-31 RX ORDER — GABAPENTIN 300 MG/1
300 CAPSULE ORAL 3 TIMES DAILY
Status: DISCONTINUED | OUTPATIENT
Start: 2019-05-31 | End: 2019-06-12 | Stop reason: HOSPADM

## 2019-05-31 RX ORDER — ENOXAPARIN SODIUM 100 MG/ML
40 INJECTION SUBCUTANEOUS EVERY 24 HOURS
Status: DISCONTINUED | OUTPATIENT
Start: 2019-05-31 | End: 2019-06-12 | Stop reason: HOSPADM

## 2019-05-31 RX ORDER — ALBUTEROL SULFATE 0.83 MG/ML
2.5 SOLUTION RESPIRATORY (INHALATION)
Status: DISCONTINUED | OUTPATIENT
Start: 2019-05-31 | End: 2019-06-12 | Stop reason: HOSPADM

## 2019-05-31 RX ORDER — LEVOTHYROXINE SODIUM 75 UG/1
75 TABLET ORAL DAILY
Status: DISCONTINUED | OUTPATIENT
Start: 2019-06-01 | End: 2019-06-12 | Stop reason: HOSPADM

## 2019-05-31 RX ORDER — PANTOPRAZOLE SODIUM 40 MG/1
40 TABLET, DELAYED RELEASE ORAL
Qty: 30 TAB | Refills: 0 | Status: SHIPPED | OUTPATIENT
Start: 2019-06-01 | End: 2019-06-13 | Stop reason: SDUPTHER

## 2019-05-31 RX ORDER — AMIODARONE HYDROCHLORIDE 200 MG/1
200 TABLET ORAL DAILY
Status: DISCONTINUED | OUTPATIENT
Start: 2019-06-04 | End: 2019-06-12 | Stop reason: HOSPADM

## 2019-05-31 RX ORDER — SODIUM CHLORIDE 0.9 % (FLUSH) 0.9 %
5-40 SYRINGE (ML) INJECTION EVERY 8 HOURS
Status: DISCONTINUED | OUTPATIENT
Start: 2019-05-31 | End: 2019-06-12 | Stop reason: HOSPADM

## 2019-05-31 RX ORDER — ENOXAPARIN SODIUM 100 MG/ML
40 INJECTION SUBCUTANEOUS EVERY 24 HOURS
Qty: 8.4 ML | Refills: 0 | Status: SHIPPED | OUTPATIENT
Start: 2019-05-31 | End: 2019-06-12

## 2019-05-31 RX ORDER — PANTOPRAZOLE SODIUM 40 MG/1
40 TABLET, DELAYED RELEASE ORAL
Status: DISCONTINUED | OUTPATIENT
Start: 2019-06-01 | End: 2019-06-12 | Stop reason: HOSPADM

## 2019-05-31 RX ORDER — BUDESONIDE 0.5 MG/2ML
500 INHALANT ORAL
Status: CANCELLED | OUTPATIENT
Start: 2019-05-31

## 2019-05-31 RX ORDER — FUROSEMIDE 40 MG/1
40 TABLET ORAL DAILY
Status: CANCELLED | OUTPATIENT
Start: 2019-06-01

## 2019-05-31 RX ORDER — NALOXONE HYDROCHLORIDE 0.4 MG/ML
0.4 INJECTION, SOLUTION INTRAMUSCULAR; INTRAVENOUS; SUBCUTANEOUS AS NEEDED
Status: DISCONTINUED | OUTPATIENT
Start: 2019-05-31 | End: 2019-06-12 | Stop reason: HOSPADM

## 2019-05-31 RX ORDER — FUROSEMIDE 40 MG/1
40 TABLET ORAL DAILY
Qty: 30 TAB | Refills: 0 | Status: SHIPPED | OUTPATIENT
Start: 2019-05-31 | End: 2019-06-12

## 2019-05-31 RX ORDER — ADHESIVE BANDAGE
30 BANDAGE TOPICAL DAILY
Status: DISCONTINUED | OUTPATIENT
Start: 2019-06-01 | End: 2019-06-12 | Stop reason: HOSPADM

## 2019-05-31 RX ORDER — ADHESIVE BANDAGE
30 BANDAGE TOPICAL DAILY
Status: SHIPPED | COMMUNITY
Start: 2019-06-01 | End: 2019-07-06

## 2019-05-31 RX ORDER — BUDESONIDE 0.5 MG/2ML
500 INHALANT ORAL 2 TIMES DAILY
Qty: 60 EACH | Refills: 0 | Status: SHIPPED | OUTPATIENT
Start: 2019-05-31 | End: 2019-06-12

## 2019-05-31 RX ORDER — ADHESIVE BANDAGE
30 BANDAGE TOPICAL DAILY PRN
Status: CANCELLED | OUTPATIENT
Start: 2019-05-31

## 2019-05-31 RX ORDER — ADHESIVE BANDAGE
30 BANDAGE TOPICAL DAILY PRN
Status: DISCONTINUED | OUTPATIENT
Start: 2019-05-31 | End: 2019-06-12 | Stop reason: HOSPADM

## 2019-05-31 RX ORDER — GABAPENTIN 300 MG/1
300 CAPSULE ORAL 3 TIMES DAILY
Status: CANCELLED | OUTPATIENT
Start: 2019-05-31

## 2019-05-31 RX ORDER — HYDROCODONE BITARTRATE AND ACETAMINOPHEN 5; 325 MG/1; MG/1
1 TABLET ORAL
Status: CANCELLED | OUTPATIENT
Start: 2019-05-31

## 2019-05-31 RX ORDER — AMIODARONE HYDROCHLORIDE 200 MG/1
200 TABLET ORAL DAILY
Qty: 30 TAB | Refills: 0 | Status: SHIPPED | OUTPATIENT
Start: 2019-06-04 | End: 2019-06-13 | Stop reason: SDUPTHER

## 2019-05-31 RX ORDER — AMIODARONE HYDROCHLORIDE 200 MG/1
400 TABLET ORAL 2 TIMES DAILY
Status: DISPENSED | OUTPATIENT
Start: 2019-05-31 | End: 2019-06-03

## 2019-05-31 RX ORDER — SODIUM CHLORIDE 0.9 % (FLUSH) 0.9 %
5-40 SYRINGE (ML) INJECTION EVERY 8 HOURS
Status: CANCELLED | OUTPATIENT
Start: 2019-05-31

## 2019-05-31 RX ORDER — NALOXONE HYDROCHLORIDE 0.4 MG/ML
0.4 INJECTION, SOLUTION INTRAMUSCULAR; INTRAVENOUS; SUBCUTANEOUS AS NEEDED
Status: CANCELLED | OUTPATIENT
Start: 2019-05-31

## 2019-05-31 RX ORDER — AMIODARONE HYDROCHLORIDE 400 MG/1
400 TABLET ORAL 2 TIMES DAILY
Qty: 30 TAB | Refills: 0 | Status: SHIPPED | OUTPATIENT
Start: 2019-05-31 | End: 2019-06-12

## 2019-05-31 RX ORDER — CELECOXIB 100 MG/1
100 CAPSULE ORAL 2 TIMES DAILY
Status: DISCONTINUED | OUTPATIENT
Start: 2019-05-31 | End: 2019-05-31 | Stop reason: HOSPADM

## 2019-05-31 RX ORDER — GABAPENTIN 300 MG/1
300 CAPSULE ORAL 3 TIMES DAILY
Qty: 90 CAP | Refills: 0 | Status: SHIPPED | OUTPATIENT
Start: 2019-05-31 | End: 2019-08-23

## 2019-05-31 RX ORDER — SODIUM CHLORIDE 0.9 % (FLUSH) 0.9 %
5-40 SYRINGE (ML) INJECTION AS NEEDED
Status: CANCELLED | OUTPATIENT
Start: 2019-05-31

## 2019-05-31 RX ORDER — AMIODARONE HYDROCHLORIDE 200 MG/1
200 TABLET ORAL DAILY
Status: CANCELLED | OUTPATIENT
Start: 2019-06-04

## 2019-05-31 RX ADMIN — HYDROCODONE BITARTRATE AND ACETAMINOPHEN 1 TABLET: 5; 325 TABLET ORAL at 16:56

## 2019-05-31 RX ADMIN — AMIODARONE HYDROCHLORIDE 400 MG: 200 TABLET ORAL at 08:13

## 2019-05-31 RX ADMIN — HYDROMORPHONE HYDROCHLORIDE 0.5 MG: 1 INJECTION, SOLUTION INTRAMUSCULAR; INTRAVENOUS; SUBCUTANEOUS at 09:45

## 2019-05-31 RX ADMIN — BUDESONIDE 500 MCG: 0.5 INHALANT RESPIRATORY (INHALATION) at 21:44

## 2019-05-31 RX ADMIN — LEVOTHYROXINE SODIUM 75 MCG: 75 TABLET ORAL at 08:13

## 2019-05-31 RX ADMIN — FUROSEMIDE 40 MG: 40 TABLET ORAL at 08:13

## 2019-05-31 RX ADMIN — SENNOSIDES AND DOCUSATE SODIUM 2 TABLET: 8.6; 5 TABLET ORAL at 17:03

## 2019-05-31 RX ADMIN — Medication 5 ML: at 21:57

## 2019-05-31 RX ADMIN — Medication 5 ML: at 06:00

## 2019-05-31 RX ADMIN — GABAPENTIN 300 MG: 300 CAPSULE ORAL at 17:03

## 2019-05-31 RX ADMIN — Medication 5 ML: at 15:00

## 2019-05-31 RX ADMIN — ENOXAPARIN SODIUM 40 MG: 40 INJECTION SUBCUTANEOUS at 17:03

## 2019-05-31 RX ADMIN — ALBUTEROL SULFATE 2.5 MG: 2.5 SOLUTION RESPIRATORY (INHALATION) at 07:01

## 2019-05-31 RX ADMIN — SENNOSIDES AND DOCUSATE SODIUM 2 TABLET: 8.6; 5 TABLET ORAL at 08:12

## 2019-05-31 RX ADMIN — GABAPENTIN 300 MG: 300 CAPSULE ORAL at 08:13

## 2019-05-31 RX ADMIN — TIOTROPIUM BROMIDE 18 MCG: 18 CAPSULE ORAL; RESPIRATORY (INHALATION) at 07:01

## 2019-05-31 RX ADMIN — BUDESONIDE 500 MCG: 0.5 INHALANT RESPIRATORY (INHALATION) at 07:01

## 2019-05-31 RX ADMIN — ALBUTEROL SULFATE 2.5 MG: 2.5 SOLUTION RESPIRATORY (INHALATION) at 21:44

## 2019-05-31 RX ADMIN — HYDROCODONE BITARTRATE AND ACETAMINOPHEN 1 TABLET: 5; 325 TABLET ORAL at 21:56

## 2019-05-31 RX ADMIN — PANTOPRAZOLE SODIUM 40 MG: 40 TABLET, DELAYED RELEASE ORAL at 08:12

## 2019-05-31 RX ADMIN — CEFTRIAXONE SODIUM 1 G: 1 INJECTION, POWDER, FOR SOLUTION INTRAMUSCULAR; INTRAVENOUS at 16:57

## 2019-05-31 RX ADMIN — GABAPENTIN 300 MG: 300 CAPSULE ORAL at 21:56

## 2019-05-31 RX ADMIN — Medication 10 ML: at 09:50

## 2019-05-31 RX ADMIN — CELECOXIB 100 MG: 100 CAPSULE ORAL at 12:06

## 2019-05-31 RX ADMIN — HYDROCODONE BITARTRATE AND ACETAMINOPHEN 1 TABLET: 5; 325 TABLET ORAL at 08:13

## 2019-05-31 RX ADMIN — HYDROCODONE BITARTRATE AND ACETAMINOPHEN 1 TABLET: 5; 325 TABLET ORAL at 02:12

## 2019-05-31 NOTE — PROGRESS NOTES
In accordance with Medicare guidelines, a copy of the Important Letter from Medicare was presented to the patient/family in anticipation of expected discharge. Patient declined copy of letter. A signed copy was placed in the patients chart.

## 2019-05-31 NOTE — PROGRESS NOTES
Bedside and Verbal shift change report given to Wilmar Brar RN (oncoming nurse) by self, RN (offgoing nurse). Report included the following information SBAR, Kardex, Intake/Output, MAR and Recent Results.

## 2019-05-31 NOTE — DISCHARGE INSTRUCTIONS
Discharge Instructions/Follow-up Plans:   MD Instructions:     Follow-up with Dr. Keyur Cash in 1 week. Keep incisions clean and dry, may remain uncovered. Wet to dry dressing changes to chest wound daily. Complete a 5 day course of Rocephin for UTI. Do not apply lotions, creams or ointments to incisions.     Diet - as tolerated. Activity - ambulate - as tolerated - no heavy lifting >10lb. May shower - no tub baths or soaking/submerging.     No driving while taking narcotics. Do not drink alcohol while taking narcotics.   Resume other home medications.      If problems or questions arise, please call our office at [phone].     Greater than 30 minutes were spent discharging the patient

## 2019-05-31 NOTE — DISCHARGE SUMMARY
[facility] [address]  [place], [address] [facility]  [phone]   Discharge Summary     Maggie Corona  MRN: [id number]     : 1940     Age: 78 y.o. Admit date: 2019     Discharge date: 2019  Attending Physician: Dr. Óscar Dietrich  Primary Discharge Diagnosis:   Principal Problem:    Non-small cell lung cancer (NSCLC) ([facility] (2019)    Active Problems:    Aftercare following surgery (2019)      Right lower lobe lung mass (2019)      Hypoxia (2019)      COPD (chronic obstructive pulmonary disease) ([facility] (2019)      Atrial fibrillation with rapid ventricular response ([facility] (2019)      Normochromic anemia (2019)      UTI (urinary tract infection) (2019)      Primary Operations or Procedures Performed :  Procedure(s):   1. RIGHT THORACOSCOPY switched to thoracotomy with extensive pneumonolysis 2. LOWER LOBECTOMY  3. Upper lobe blebectomy x 2 4. Diaphragm resection with primary repair 5. Chest wall resection 6. MEDISTINAL LYMPHADENECTOMY 7. Intercostal nerve cryoablation         Brief History and Reason for Admission: Maggie Corona was admitted with the following history of present illness. Maggie Corona is a 78 y.o. female who is seen for right lung cancer. This appears to be incidental finding on CXR during a ER visit secondary to a fall. She was referred to pulmonary for workups, and she developed pneumonia since the bronchoscopy, which has dragged on over a month. She appears recovering now from pneumonia eventually, she breathes better, off oxygen now.      She denies any shortness of breath prior to bronchoscopy, she claims she was in very good health. She denies any cardiac issues. She was 45 pack year smoker, quit 14 year ago. No blood thinners     Other medical issues are reviewed as below. Hospital Course:  On 19, Dr. Óscar Dietrich performed   PROCEDURE PERFORMED:  1.   Right thoracoscopy switched to right thoracotomy with extensive pneumonolysis. 2.  Right lower lobe lobectomy. 3.  Right upper lobe blebectomy x2.  4.  Diaphragm resection with primary repair. 5.  Chest wall resection. 6.  Mediastinal lymphadenectomy. 7. Intercostal nerve cryoablation. 5/23/19:Complaints of pain, better with pain medication, Breathing OK, O2 3LNC sats good. Decreased urine output will monitor. 5/24/19: Awake in bed. Complaints of pain; controlled with medication. C/o congestion, however, unable to cough up mucus. Breathing OK, O2 3LNC sats good. Donald patent. AF, NAD.   5/25/19:Awake in bed. Complaints of pain; controlled with medication. Also states has not had a BM and feels like she needs to. Breathing OK, O2 4LNC sats good. Donald patent. AF, NAD.   5/26/19: Awake in bed. Continues to c/o pain; controlled with medication. +BM x2. Breathing OK, O2 4LNC sats good. AF, NAD.   5/28/19:Awake in bed. Pain controlled with medication. +BM x2. Oxygenating well on O2 4LNC. Tmax 100.5, NAD. Path pending. CT without air leak. 5/29/19:Awake in bed. Pain controlled with medication. +BM x2. Oxygenating well on O2 4LNC. Tmax 100.5, NAD. Path pending. CT without air leak. Incision opened yesterday, draining some purulent material.   5/30/19: Awake in bed. Pain controlled with medication. +BM. Oxygenating well on O2 5LNC. AF, VSS, NAD. Path clear. CTs out. Chest incision c/d/i s/p recent dressing change. On the day of discharge, Awake in bed. C/o right shoulder pain. Oxygenating well on O2 5LNC. AF, VSS, NAD. Path clear. CTs out. Chest incision with soiled dressing.     Pathology 5/22     DIAGNOSIS   A: #9 LYMPH NODE X2:   TWO LYMPH NODES NEGATIVE FOR METASTATIC CARCINOMA (0/2). B: #7 LYMPH NODE X5:   FIVE LYMPH NODES NEGATIVE FOR METASTATIC CARCINOMA (0/5). C: PERIHILAR LYMPH NODE:   ONE LYMPH NODE NEGATIVE FOR METASTATIC CARCINOMA (0/1).    D: 4R LYMPH NODE X2:   TWO LYMPH NODES NEGATIVE FOR METASTATIC CARCINOMA (0/2).   E: UPPER LOBE BLEB X2:   FINDINGS CONSISTENT WITH PULMONARY BLEBS. F: RIGHT LOWER LOBE, CHEST WALL, DIAPHRAGM:   POORLY DIFFERENTIATED NON-SMALL CELL CARCINOMA WITH SQUAMOUS FEATURES AND FOCAL NEUROENDOCRINE DIFFERENTIATION. CARCINOMA IS 6.9 CM IN GREATEST DIMENSION. MARGINS ARE NEGATIVE FOR CARCINOMA. FIVE LYMPH NODES NEGATIVE FOR METASTATIC CARCINOMA (0/5). SEE ATTACHED CANCER CHECKLIST.   sms/5/24/2019   Electronically signed out on 5/28/2019 12:00 by Greta Wright MD       Condition at Discharge: Good    Discharge Medications:   Current Discharge Medication List      START taking these medications    Details   !! amiodarone (CORDARONE) 200 mg tablet Take 1 Tab by mouth daily. Qty: 30 Tab, Refills: 0      !! amiodarone (PACERONE) 400 mg tablet Take 1 Tab by mouth two (2) times a day. Qty: 30 Tab, Refills: 0      budesonide (PULMICORT) 0.5 mg/2 mL nbsp 2 mL by Nebulization route two (2) times a day for 30 days. Qty: 60 Each, Refills: 0      cefTRIAXone 1 gram 1 g, ADDaptor 1 Device IVPB 1 g by IntraVENous route every twenty-four (24) hours for 5 days. Qty: 5 Dose, Refills: 0      celecoxib (CELEBREX) 100 mg capsule Take 1 Cap by mouth two (2) times a day for 90 days. Qty: 60 Cap, Refills: 2      senna-docusate (PERICOLACE) 8.6-50 mg per tablet Take 2 Tabs by mouth two (2) times a day. tiotropium (SPIRIVA) 18 mcg inhalation capsule Take 1 Cap by inhalation daily. Qty: 30 Cap, Refills: 0      pantoprazole (PROTONIX) 40 mg tablet Take 1 Tab by mouth Daily (before breakfast) for 30 days. Qty: 30 Tab, Refills: 0      magnesium hydroxide (MILK OF MAGNESIA) 400 mg/5 mL suspension Take 30 mL by mouth daily. enoxaparin (LOVENOX) 40 mg/0.4 mL 0.4 mL by SubCUTAneous route every twenty-four (24) hours for 21 days. Qty: 8.4 mL, Refills: 0      furosemide (LASIX) 40 mg tablet Take 1 Tab by mouth daily.   Qty: 30 Tab, Refills: 0      gabapentin (NEURONTIN) 300 mg capsule Take 1 Cap by mouth three (3) times daily. Qty: 90 Cap, Refills: 0      HYDROcodone-acetaminophen (NORCO) 5-325 mg per tablet Take 1 Tab by mouth every four (4) hours as needed for Pain for up to 3 days. Max Daily Amount: 6 Tabs. Qty: 25 Tab, Refills: 0    Associated Diagnoses: Right lower lobe lung mass       !! - Potential duplicate medications found. Please discuss with provider. CONTINUE these medications which have NOT CHANGED    Details   benzonatate (TESSALON) 100 mg capsule Take 1 Cap by mouth three (3) times daily as needed for Cough. Qty: 90 Cap, Refills: 1    Associated Diagnoses: Malignant neoplasm of lower lobe of right lung (HCC)      albuterol (PROVENTIL HFA, VENTOLIN HFA, PROAIR HFA) 90 mcg/actuation inhaler Take 1 Puff by inhalation every six (6) hours as needed for Wheezing or Shortness of Breath. Qty: 1 Inhaler, Refills: 0      calc-D3-magnes-Z9-Wt-Ea-jake 250 mg-400 unit -40 mg-5 mg tab Take 1 Tab by mouth daily. omega 3-dha-epa-fish oil (FISH OIL) 100-160-1,000 mg cap Take 1 Cap by mouth daily. MAGNESIUM PO Take 100 mg by mouth daily. VITAMIN B COMPLEX PO Take 1 Tab by mouth daily. atorvastatin (LIPITOR) 10 mg tablet Take 10 mg by mouth daily. cholecalciferol (VITAMIN D3) 1,000 unit tablet Take 1,000 Units by mouth daily. levothyroxine (SYNTHROID) 75 mcg tablet Take 75 mcg by mouth daily. losartan (COZAAR) 25 mg tablet Take 25 mg by mouth daily. therapeutic multivitamin (THERAGRAN) tablet Take 1 Tab by mouth daily. ascorbic acid, vitamin C, (VITAMIN C) 500 mg tablet Take  by mouth. STOP taking these medications       zolpidem (AMBIEN) 5 mg tablet Comments:   Reason for Stopping:                 Disposition: good    Discharge Instructions/Follow-up Care:          Discharge Instructions/Follow-up Plans:   MD Instructions:     Follow-up with Dr. Librado Nunn in 1 week. Keep incisions clean and dry, may remain uncovered.   Wet to dry dressing changes to chest wound daily. Complete a 5 day course of Rocephin for UTI. Do not apply lotions, creams or ointments to incisions.     Diet - as tolerated. Activity - ambulate - as tolerated - no heavy lifting >10lb. May shower - no tub baths or soaking/submerging.     No driving while taking narcotics. Do not drink alcohol while taking narcotics.   Resume other home medications.      If problems or questions arise, please call our office at [phone].     Greater than 30 minutes were spent discharging the patient  Signed:  Santos Martines, [id number]   5/31/2019  11:29 AM

## 2019-05-31 NOTE — PROGRESS NOTES
Dressing changed per order- moderate amount of foul smelling purulent drainage.  Wound packed wet to dry and pt tolerated well-reports pain is 4/10 at this time

## 2019-05-31 NOTE — PROGRESS NOTES
Problem: Falls - Risk of  Goal: *Absence of Falls  Description  Document Cyndi Stewart Fall Risk and appropriate interventions in the flowsheet.   Outcome: Progressing Towards Goal     Problem: Pain  Goal: *Control of Pain  Outcome: Progressing Towards Goal     Problem: Breathing Pattern - Ineffective  Goal: *Absence of hypoxia  Outcome: Progressing Towards Goal

## 2019-05-31 NOTE — PROGRESS NOTES
Inga Santos  Admission Date: 5/22/2019             Daily Progress Note: 5/31/2019    The patient's chart is reviewed and the patient is discussed with the staff. Pt is a 79 yo  female with a history of tobacco abuse(1.5 ppd x 45 years - quit 2004), emphysema, hypothyroidism, subarachnoid hemorrhage, anterior cerebral aneurysm s/p brain coil, HTN, HLD, and lung mass.  PET CT for staging was ordered and performed on 3/28/19, demonstrating moderate to intense activity within the patient's known right lower lobe mass measuring 5.4 cm x 4.4 cm with hypermetabolic tumor appearing to extend to the right inferior hilum; no evidence for metastatic disease seen in the neck, chest, abdomen, or pelvis; however, focal activity is seen in the right posterior 10th rib which is felt to be due to a rib fracture without aggressive features appreciated. Bronch with EBUS and fine needle aspiration of hilar/mediastinal LN and TBBx with final path on RLL mass consistent with poorly differentiated carcinoma. She was admitted for tx of aspiration PNA post bronch.  Patient opted to pursue surgical intervention and was seen by Heme/Onc with recommendation for adjuvant chemo.  Pre-op PFTs showing mild obstruction with reduced DLCO suggestive of COPD.  Pt was admitted for R thoracoscopy switched to thoracotomy with extensive pneumonolysis with RLL lobectomy, diaphragm resection with primary repair, chest wall resection, mediastinal lymphadenectomy with intercostal nerve cryoablation by Dr. Thomas Hunter on 5/22/19. Pt developed post op afib and transitioned to [facility]. She was not started on NOAC secondary to recent surgery. She is on O2. She has UTI and is on Rocephin. Subjective:     Pt on 5L O2. Pt's daughter is at bedside. Pt is transferring to the 9th floor later today. She is coughing up brown phlegm. She denies wheezing. She reports breathing is good.    She tells me that she has no pain and is \"stoned\". She then tells me that she has pain 6/10 in the R chest wall.      Current Facility-Administered Medications   Medication Dose Route Frequency    celecoxib (CELEBREX) capsule 100 mg  100 mg Oral BID    [START ON 6/4/2019] amiodarone (CORDARONE) tablet 200 mg  200 mg Oral DAILY    albuterol (PROVENTIL VENTOLIN) nebulizer solution 2.5 mg  2.5 mg Nebulization QID RT    tiotropium (SPIRIVA) inhalation capsule 18 mcg  1 Cap Inhalation DAILY    amiodarone (CORDARONE) tablet 400 mg  400 mg Oral BID    cefTRIAXone (ROCEPHIN) 1 g in 0.9% sodium chloride (MBP/ADV) 50 mL  1 g IntraVENous Q24H    enoxaparin (LOVENOX) injection 40 mg  40 mg SubCUTAneous Q24H    furosemide (LASIX) tablet 40 mg  40 mg Oral DAILY    magnesium hydroxide (MILK OF MAGNESIA) 400 mg/5 mL oral suspension 30 mL  30 mL Oral DAILY PRN    magnesium hydroxide (MILK OF MAGNESIA) 400 mg/5 mL oral suspension 30 mL  30 mL Oral DAILY    budesonide (PULMICORT) 500 mcg/2 ml nebulizer suspension  500 mcg Nebulization BID RT    HYDROmorphone (PF) (DILAUDID) injection 0.5 mg  0.5 mg IntraVENous Q4H PRN    HYDROcodone-acetaminophen (NORCO) 5-325 mg per tablet 1 Tab  1 Tab Oral Q4H PRN    levothyroxine (SYNTHROID) tablet 75 mcg  75 mcg Oral DAILY    senna-docusate (PERICOLACE) 8.6-50 mg per tablet 2 Tab  2 Tab Oral BID    gabapentin (NEURONTIN) capsule 300 mg  300 mg Oral TID    pantoprazole (PROTONIX) tablet 40 mg  40 mg Oral ACB    sodium chloride (NS) flush 5-40 mL  5-40 mL IntraVENous Q8H    sodium chloride (NS) flush 5-40 mL  5-40 mL IntraVENous PRN    naloxone (NARCAN) injection 0.4 mg  0.4 mg IntraVENous PRN    ondansetron (ZOFRAN) injection 4 mg  4 mg IntraVENous Q4H PRN       Review of Systems  +cough  +R chest wall pain  Constitutional: negative for fever, chills, sweats  Cardiovascular: negative for chest pain, palpitations, syncope, edema  Gastrointestinal:  negative for dysphagia, reflux, vomiting, diarrhea, abdominal pain, or melena  Neurologic:  negative for focal weakness, numbness, headache    Objective:     Vitals:    05/31/19 0111 05/31/19 0435 05/31/19 0702 05/31/19 0802   BP: 94/54 103/53  114/68   Pulse: 71 91  83   Resp: 19 19 20   Temp: 98.8 °F (37.1 °C) 97.9 °F (36.6 °C)  99.7 °F (37.6 °C)   SpO2: 98% 92% 94% 91%   Weight:  140 lb 4.8 oz (63.6 kg)     Height:         Intake and Output:   05/29 1901 - 05/31 0700  In: 880 [P.O.:880]  Out: 200 [Urine:200]  No intake/output data recorded. Physical Exam:   Constitution:  the patient is well developed and in no acute distress, on 5L HF   EENMT:  Sclera clear, pupils equal, oral mucosa moist  Respiratory: decreased R base  Cardiovascular:  RRR without M,G,R  Gastrointestinal: soft and non-tender; with positive bowel sounds. Musculoskeletal: warm without cyanosis. There is no lower extremity edema. Skin:  no jaundice or rashes  Neurologic: no gross neuro deficits     Psychiatric:  alert and oriented x 3    CXR:       LAB  No results for input(s): GLUCPOC in the last 72 hours. No lab exists for component: [place]   Recent Labs     05/31/19  0510 05/30/19  0453 05/29/19  0503   WBC 14.0* 17.5* 20.5*   HGB 8.3* 8.7* 8.7*   HCT 26.9* 26.8* 27.4*    415 367     Recent Labs     05/31/19  0510 05/30/19  0453 05/29/19  0503    136 134*   K 3.6 3.5 3.3*   CL 99 98 97*   CO2 31 31 29   * 103* 123*   BUN 11 12 11   CREA 0.57* 0.52* 0.58*   CA 7.8* 8.1* 7.6*     No results for input(s): PH, PCO2, PO2, HCO3, PHI, PCO2I, PO2I, HCO3I in the last 72 hours. No results for input(s): LCAD, LAC in the last 72 hours.       Assessment:  (Medical Decision Making)     Hospital Problems  Date Reviewed: 5/31/2019          Codes Class Noted POA    * (Principal) Non-small cell lung cancer ([facility]) [facility] ICD-10-CM: C34.90  ICD-9-CM: 162.9  5/30/2019 Yes    S/p R VATS     UTI (urinary tract infection) ICD-10-CM: N39.0  ICD-9-CM: 599.0  5/29/2019 Yes    On rocephin     Atrial fibrillation with rapid ventricular response ([facility] ICD-10-CM: I48.91  ICD-9-CM: 427.31  5/26/2019 Yes    Cardiology following     Normochromic anemia (Chronic) ICD-10-CM: D64.9  ICD-9-CM: 285.9  5/26/2019 Yes    Monitor     Hypoxia ICD-10-CM: R09.02  ICD-9-CM: 799.02  5/24/2019 Yes    Wean O2 as tolerated     COPD (chronic obstructive pulmonary disease) (HCC) (Chronic) ICD-10-CM: J44.9  ICD-9-CM: 039  5/24/2019 Yes    Continue nebs     Aftercare following surgery ICD-10-CM: Z48.89  ICD-9-CM: V58.89  5/22/2019 Yes    Healing well     Right lower lobe lung mass ICD-10-CM: R91.8  ICD-9-CM: 786.6  5/22/2019 Yes    S/p excision           Plan:  (Medical Decision Making)     --wean O2 as tolerated  --continue rocephin for UTI  --continue nebs  --continue spiriva  --continue lasix   --pain control  --pt reports minimal BMs but refuses miralax, milk of magnesium, etc. She agrees to colace     More than 50% of the time documented was spent in face-to-face contact with the patient and in the care of the patient on the floor/unit where the patient is located.     JONNY Zarate

## 2019-05-31 NOTE — H&P
HISTORY AND PHYSICAL  IRC       Admit Date: 5/31/2019  Surgeon:  Lai Limon MD   Primary Care Physician: Zahida Walton MD  Specialty Group: general surgery    Chief Complaint : Gait dysfunction secondary to below. Admit Diagnosis: Lung mass [R91.8]; Lung mass [R91.8]; Aftercare following surgery [Z48.89]; Right lower lobe lung mass [R91.8]; Right lower lobe lung mass [R91.8]  PREOPERATIVE DIAGNOSIS:  Right lung cancer.     POSTOPERATIVE DIAGNOSIS:  Right lung cancer.     PROCEDURE PERFORMED:  1.  Right thoracoscopy switched to right thoracotomy with extensive pneumonolysis. 2.  Right lower lobe lobectomy. 3.  Right upper lobe blebectomy x2.  4.  Diaphragm resection with primary repair. 5.  Chest wall resection. 6.  Mediastinal lymphadenectomy.   7.  Intercostal nerve cryoablation.     weakness  Pain  DVT risk  Non-small cell lung cancer (NSCLC) ([address] (5/30/2019)  Aftercare following surgery (5/22/2019)  Right lower lobe lung mass (5/22/2019)  Hypoxia (5/24/2019)  COPD (chronic obstructive pulmonary disease) ([address] (5/24/2019)  Atrial fibrillation with rapid ventricular response ([address] (5/26/2019)  Normochromic anemia (5/26/2019)  leukocytosis  UTI (urinary tract infection) (5/29/2019)  Acute Rehab Dx:  weakness  Debility    deconditioning  Mobility and ambulation deficits  Self Care/ADL deficits     Medical Dx:  Past Medical History:   Diagnosis Date    Aspiration pneumonia ([address] 4/12/2019    Cancer ([address]     Lung cancer    Chronic obstructive pulmonary disease ([address]     Hypertension     Severe sepsis with acute organ dysfunction ([address] 4/12/2019    Subarachnoid hemorrhage ([address]     Thyroid disease        Date of Evaluation:  May 31, 2019    HPI: Linus Rush is a 78 y.o. female patient at [address] [place] [address] who was admitted on 5/22/2019  by Lai Limon MD with below mentioned medical history ,is being seen for Physical Medicine and Rehabilitation consult.       Linus Rush who has history of emphysema, hypothyroidism, subarachnoid hemorrhage, anterior cerebral aneurysm s/p brain coil, HTN, HLD was recently diagnosed with lung cancer. Tumor noted to be in right lower lobe mass measuring 5.4 cm x 4.4 cm and was appearing to extend to the right inferior hilum. patient underwent a right thoracoscopy switched to right thoracotomy with extensive pneumonolysis, right lower lobe lobectomy, right upper lobe blebectomy x2, diaphragm resection with primary repair, chest wall resection, mediastinal lymphadenectomy and intercostal nerve cryoablation per Dr. Sanam Sanchez MD on 5/22/2019. Post op patient continued to be hypoxic, requiring high flow O2 to maintain sat of 90%. Patient developed atrial fibrillation with RVR, 5/26/2019. HR, BP control treated per cardiology. Patient continued on amiodarone, continued on telemetry. She is not on [address] due to recent lung surgery. CxR 5/30 demonstrates still bibasilar opacities, likely  combination of small effusion and atelectasis, and vascular congestion. Patient continued on pulmicort and duoneb for COPD. Patient is continued on Rocephin for UTI. WBC trending down, 17.5 on 5/30. Patient has started to participate in therapies with acute PT, OT and ST. Patient shows mild generalized weakness, deconditioned state, poor activity tolerance due to hypoxia,limiting her mobility, ambulation and ADLs. Patient is managing her gait with minimal assist.    Lena Chavira is seen and examined. Medical Records reviewed. Patient lives alone, has been independent prior to this admission. She has a daughter she she has been staying with recently. Our service was consulted for rehab needs and we recommended inpatient hospital rehabilitation is reasonable and necessary due to ongoing acute medical issues which have not changed since initial pre-admission evaluation. I reviewed the preadmission screening and have approved for admission to the [facility].  The patient was cleared for transfer to rehab today. Patient continues to have ongoing  medical issues outlined above requiring physician medical supervision and functional deficits which would benefit from continued intensive therapies. Current Level of Function: (evaluated by acute therapy staff, with bed mobility, transfers, balance personally observed post-admission in the IRF setting minutes prior to submission of document) bathing - mod A, lower body dressing - mod A, toileting - min A, bed mobility - mod A, transfers- min A, poor balance , ambulation- 60'/short distances with min assist using a RW - 1 (Dependent/total assistance)(MOD A HHA x1, w/c follow with 2nd person for safety)    Prior Level of Function/Home Situation: Independent, lives alone normally. She had been at her daughters house since her recent diagnosis, needed some assist, set up with meals, shopping. Past Medical History:   Diagnosis Date    Aspiration pneumonia ([facility] 4/12/2019    Cancer ([facility]     Lung cancer    Chronic obstructive pulmonary disease ([facility]     Hypertension     Severe sepsis with acute organ dysfunction ([facility] 4/12/2019    Subarachnoid hemorrhage ([facility]     Thyroid disease       Past Surgical History:   Procedure Laterality Date    HX OTHER SURGICAL      coil in brain    HX WRIST FRACTURE TX      bilat wrist     Allergies   Allergen Reactions    Penicillins Unknown (comments)     Hives. Tolerated ceftriaxone May 2019    Percocet [Oxycodone-Acetaminophen] Unknown (comments)    Prednisone Other (comments)     Tachycardia      No family history on file.    Social History     Tobacco Use    Smoking status: Former Smoker     Packs/day: 1.50     Years: 45.00     Pack years: 67.50     Types: Cigarettes     Last attempt to quit: 2004     Years since quitting: 15.4    Smokeless tobacco: Never Used   Substance Use Topics    Alcohol use: Not Currently      Current Facility-Administered Medications   Medication Dose Route Frequency    naloxone (NARCAN) injection 0.4 mg  0.4 mg IntraVENous PRN    ondansetron (ZOFRAN) injection 4 mg  4 mg IntraVENous Q4H PRN    sodium chloride (NS) flush 5-40 mL  5-40 mL IntraVENous Q8H    sodium chloride (NS) flush 5-40 mL  5-40 mL IntraVENous PRN    albuterol (PROVENTIL VENTOLIN) nebulizer solution 2.5 mg  2.5 mg Nebulization QID RT    [START ON 6/4/2019] amiodarone (CORDARONE) tablet 200 mg  200 mg Oral DAILY    amiodarone (CORDARONE) tablet 400 mg  400 mg Oral BID    budesonide (PULMICORT) 500 mcg/2 ml nebulizer suspension  500 mcg Nebulization BID RT    cefTRIAXone (ROCEPHIN) 1 g in 0.9% sodium chloride (MBP/ADV) 50 mL  1 g IntraVENous Q24H    enoxaparin (LOVENOX) injection 40 mg  40 mg SubCUTAneous Q24H    [START ON 6/1/2019] furosemide (LASIX) tablet 40 mg  40 mg Oral DAILY    gabapentin (NEURONTIN) capsule 300 mg  300 mg Oral TID    HYDROcodone-acetaminophen (NORCO) 5-325 mg per tablet 1 Tab  1 Tab Oral Q4H PRN    [START ON 6/1/2019] levothyroxine (SYNTHROID) tablet 75 mcg  75 mcg Oral DAILY    magnesium hydroxide (MILK OF MAGNESIA) 400 mg/5 mL oral suspension 30 mL  30 mL Oral DAILY PRN    [START ON 6/1/2019] magnesium hydroxide (MILK OF MAGNESIA) 400 mg/5 mL oral suspension 30 mL  30 mL Oral DAILY    [START ON 6/1/2019] pantoprazole (PROTONIX) tablet 40 mg  40 mg Oral ACB    senna-docusate (PERICOLACE) 8.6-50 mg per tablet 2 Tab  2 Tab Oral BID    [START ON 6/1/2019] tiotropium (SPIRIVA) inhalation capsule 18 mcg  1 Cap Inhalation DAILY       Review of Systems:  Denies: fevers, chills, sweats, malaise, anorexia  Denies: blurry vision, loss of vision, eye pain, photophobia   Denies: hearing loss, ringing in the ears, earache, epistaxis   Denies: chest pain, palpitations, syncope, orthopnea, paroxysmal nocturnal dyspnea, claudication   Denies: dysphagia, odynophagia, nausea, vomiting, diarrhea, constipation, abdominal pain, jaundice, melena   Denies: frequency, dysuria, nocturia, urinary incontinence, stones, hematuria   Denies: polydipsia/polyuria, skin changes, temperature intolerance, unexpected weight gain   Denies: pain, joint pain, joint swelling, muscle pain  Denies: bleeding problems, blood transfusions, bruising, pallor, swollen lymph nodes   Denies: headache, dysarthria, blurred vision, diplopia,seizure      Objective:     Visit Vitals  /71 (BP 1 Location: Right arm, BP Patient Position: At rest)   Pulse 80   Temp 98.2 °F (36.8 °C)   Resp 18   SpO2 94%      Intake and Output:  No intake/output data recorded. Physical Exam:   Psych: Patient was oriented to person, place, and time. Without hallucinations, abnormal affect or abnormal behaviors during the examination. General:   Alert, appears stated age, No acute distress. HEENT:  Normocephalic, EOMI, facial symmetry  Intact. Oral mucosa pink and moist.     Lungs:  Crackles thoughout B lung fields. Respiration even and unlabored   No apparent use of accessory muscles for respiration. Heart:  Regular rate and rhythm, S1, S2, No obvious murmur, click, rub or gallop. Genitourinary: defered   Abdomen:  Soft, non-tender to palpation in all four quadrants. Bowel sounds present. No obvious masses palpated. Neuromuscular:   PERRL, EOMI  Speech is clear. Follows simple commands consistently. Able to identify, recall repeat. Mood appropriate.   + generalized weakness 4 to 5-/5 throughout major muscle groups. Sensory - intact  No cerebellar signs. No atrophy, no fasciculations, no tremors. Skin:  Intact, dry, good skin turgor, age related changes present   Edema: 1+ BLE edema   Incision:  Calf non tender BLE. Right chest wall, thorax covered.          Lab Review:    Recent Results (from the past 72 hour(s))   CBC WITH AUTOMATED DIFF    Collection Time: 05/29/19  5:03 AM   Result Value Ref Range    WBC 20.5 (H) 4.3 - 11.1 K/uL    RBC 3.01 (L) 4.05 - 5.2 M/uL    HGB 8.7 (L) 11.7 - 15.4 g/dL    HCT 27.4 (L) 35.8 - 46.3 % MCV 91.0 79.6 - 97.8 FL    MCH 28.9 26.1 - 32.9 PG    MCHC 31.8 31.4 - 35.0 g/dL    RDW 15.6 (H) 11.9 - 14.6 %    PLATELET 752 866 - 627 K/uL    MPV 9.7 9.4 - 12.3 FL    ABSOLUTE NRBC 0.00 0.0 - 0.2 K/uL    DF AUTOMATED      NEUTROPHILS 86 (H) 43 - 78 %    LYMPHOCYTES 5 (L) 13 - 44 %    MONOCYTES 8 4.0 - 12.0 %    EOSINOPHILS 0 (L) 0.5 - 7.8 %    BASOPHILS 0 0.0 - 2.0 %    IMMATURE GRANULOCYTES 1 0.0 - 5.0 %    ABS. NEUTROPHILS 17.6 (H) 1.7 - 8.2 K/UL    ABS. LYMPHOCYTES 1.0 0.5 - 4.6 K/UL    ABS. MONOCYTES 1.6 (H) 0.1 - 1.3 K/UL    ABS. EOSINOPHILS 0.0 0.0 - 0.8 K/UL    ABS. BASOPHILS 0.1 0.0 - 0.2 K/UL    ABS. IMM.  GRANS. 0.2 0.0 - 0.5 K/UL   METABOLIC PANEL, BASIC    Collection Time: 05/29/19  5:03 AM   Result Value Ref Range    Sodium 134 (L) 136 - 145 mmol/L    Potassium 3.3 (L) 3.5 - 5.1 mmol/L    Chloride 97 (L) 98 - 107 mmol/L    CO2 29 21 - 32 mmol/L    Anion gap 8 7 - 16 mmol/L    Glucose 123 (H) 65 - 100 mg/dL    BUN 11 8 - 23 MG/DL    Creatinine 0.58 (L) 0.6 - 1.0 MG/DL    GFR est AA >60 >60 ml/min/1.73m2    GFR est non-AA >60 >60 ml/min/1.73m2    Calcium 7.6 (L) 8.3 - 04.5 MG/DL   METABOLIC PANEL, BASIC    Collection Time: 05/30/19  4:53 AM   Result Value Ref Range    Sodium 136 136 - 145 mmol/L    Potassium 3.5 3.5 - 5.1 mmol/L    Chloride 98 98 - 107 mmol/L    CO2 31 21 - 32 mmol/L    Anion gap 7 7 - 16 mmol/L    Glucose 103 (H) 65 - 100 mg/dL    BUN 12 8 - 23 MG/DL    Creatinine 0.52 (L) 0.6 - 1.0 MG/DL    GFR est AA >60 >60 ml/min/1.73m2    GFR est non-AA >60 >60 ml/min/1.73m2    Calcium 8.1 (L) 8.3 - 10.4 MG/DL   CBC WITH AUTOMATED DIFF    Collection Time: 05/30/19  4:53 AM   Result Value Ref Range    WBC 17.5 (H) 4.3 - 11.1 K/uL    RBC 2.99 (L) 4.05 - 5.2 M/uL    HGB 8.7 (L) 11.7 - 15.4 g/dL    HCT 26.8 (L) 35.8 - 46.3 %    MCV 89.6 79.6 - 97.8 FL    MCH 29.1 26.1 - 32.9 PG    MCHC 32.5 31.4 - 35.0 g/dL    RDW 15.4 (H) 11.9 - 14.6 %    PLATELET 897 498 - 472 K/uL    MPV 9.6 9.4 - 12.3 FL ABSOLUTE NRBC 0.00 0.0 - 0.2 K/uL    DF AUTOMATED      NEUTROPHILS 86 (H) 43 - 78 %    LYMPHOCYTES 6 (L) 13 - 44 %    MONOCYTES 7 4.0 - 12.0 %    EOSINOPHILS 1 0.5 - 7.8 %    BASOPHILS 0 0.0 - 2.0 %    IMMATURE GRANULOCYTES 1 0.0 - 5.0 %    ABS. NEUTROPHILS 15.0 (H) 1.7 - 8.2 K/UL    ABS. LYMPHOCYTES 1.0 0.5 - 4.6 K/UL    ABS. MONOCYTES 1.2 0.1 - 1.3 K/UL    ABS. EOSINOPHILS 0.1 0.0 - 0.8 K/UL    ABS. BASOPHILS 0.0 0.0 - 0.2 K/UL    ABS. IMM. GRANS. 0.2 0.0 - 0.5 K/UL   CBC WITH AUTOMATED DIFF    Collection Time: 05/31/19  5:10 AM   Result Value Ref Range    WBC 14.0 (H) 4.3 - 11.1 K/uL    RBC 2.93 (L) 4.05 - 5.2 M/uL    HGB 8.3 (L) 11.7 - 15.4 g/dL    HCT 26.9 (L) 35.8 - 46.3 %    MCV 91.8 79.6 - 97.8 FL    MCH 28.3 26.1 - 32.9 PG    MCHC 30.9 (L) 31.4 - 35.0 g/dL    RDW 15.4 (H) 11.9 - 14.6 %    PLATELET 130 642 - 078 K/uL    MPV 9.5 9.4 - 12.3 FL    ABSOLUTE NRBC 0.00 0.0 - 0.2 K/uL    DF AUTOMATED      NEUTROPHILS 82 (H) 43 - 78 %    LYMPHOCYTES 7 (L) 13 - 44 %    MONOCYTES 8 4.0 - 12.0 %    EOSINOPHILS 2 0.5 - 7.8 %    BASOPHILS 0 0.0 - 2.0 %    IMMATURE GRANULOCYTES 1 0.0 - 5.0 %    ABS. NEUTROPHILS 11.6 (H) 1.7 - 8.2 K/UL    ABS. LYMPHOCYTES 1.0 0.5 - 4.6 K/UL    ABS. MONOCYTES 1.1 0.1 - 1.3 K/UL    ABS. EOSINOPHILS 0.2 0.0 - 0.8 K/UL    ABS. BASOPHILS 0.1 0.0 - 0.2 K/UL    ABS. IMM.  GRANS. 0.1 0.0 - 0.5 K/UL   METABOLIC PANEL, BASIC    Collection Time: 05/31/19  5:10 AM   Result Value Ref Range    Sodium 136 136 - 145 mmol/L    Potassium 3.6 3.5 - 5.1 mmol/L    Chloride 99 98 - 107 mmol/L    CO2 31 21 - 32 mmol/L    Anion gap 6 (L) 7 - 16 mmol/L    Glucose 104 (H) 65 - 100 mg/dL    BUN 11 8 - 23 MG/DL    Creatinine 0.57 (L) 0.6 - 1.0 MG/DL    GFR est AA >60 >60 ml/min/1.73m2    GFR est non-AA >60 >60 ml/min/1.73m2    Calcium 7.8 (L) 8.3 - 10.4 MG/DL       Functional Assessment:  Balance  Sitting - Static: Good (unsupported) (05/31/19 1500)  Sitting - Dynamic: Fair (occasional) (05/31/19 1500)  Standing - Static: Fair (05/31/19 1500)   Bed Mobility:  Rolling: Contact guard assistance  Supine to Sit: Minimum assistance  Scooting: Minimum assistance  Interventions: Safety awareness training;Verbal cues; Visual cues  Wheelchair Mobility:  Transfers:  Sit to Stand: Minimum assistance  Stand to Sit: Minimum assistance  Bed to Chair: Minimum assistance  Interventions: Safety awareness training;Verbal cues; Visual cues    Ambulation:  Gait  Distance (ft): 5 Feet (ft) (05/31/19 1500)  Assistive Device: Other (comment)(No device) (05/31/19 1500)   Base of Support: Center of gravity altered;Narrowed  Speed/Aracelis: Slow;Shuffled;Pace decreased (<100 feet/min)  Step Length: Right shortened;Left shortened  Assistive Device: Walker, rolling  Ambulation - Level of Assistance: Minimal assistance      Condition on Admission: Good      Assessment:    The Post Assessment Physician Evaluation (SEE) found the current functional status to be comparable with the Pre-admission Screening. The Patient is a good candidate for acute inpatient rehabilitation. Nothing since the Pre-admission screen has changed that determination. Rehabilitation Plan  The patient has shown the ability to tolerate and benefit from 3 hours of therapy daily and is being admitted to a comprehensive acute inpatient rehabilitation program consisting of at least 3 hours of combined physical and occupational therapies. Begin intensive Physical Therapy for a minimum of 1.5 hours a day, at least 5 out of 7 days per week to address bed mobility, transfers, ambulation, strengthening, balance, and endurance. Begin intensive Occupational Therapy for a minimum of 1.5 hours a day, at least 5 out of 7 days per week to address ADL ( bathing, LE dressing, toileting) and adaptive equipment as needed. - patient will be limited due to pain, hypoxia, fatigue. Will assess and treat.      The patient will also require 24-hour skilled rehabilitation nursing for bowel and bladder management, skin care for decubitus ulcer prevention , pain management and ongoing medication administration     The patient may benefit from a psychology consult for depression, anxiety and adjustment disorder. Continue daily physician medical management:  POSTOPERATIVE DIAGNOSIS:  Right lung cancer.     PROCEDURE PERFORMED:  1.  Right thoracoscopy switched to right thoracotomy with extensive pneumonolysis. 2.  Right lower lobe lobectomy. 3.  Right upper lobe blebectomy x2.  4.  Diaphragm resection with primary repair. 5.  Chest wall resection. 6.  Mediastinal lymphadenectomy. 7.  Intercostal nerve cryoablation.     - Non-small cell lung cancer (NSCLC) ([facility] (5/30/2019)  - Aftercare following surgery -Wound Care: Monitor wound status daily per staff and physician. At risk for failure. Will require 24/7 rehab nursing. Surgery to direct, follow. Keep incisions clean and dry, may remain uncovered. - Wet to dry dressing changes to chest wound daily.       -     Hypoxia (5/24/2019)/COPD (chronic obstructive pulmonary disease) ([facility] (5/24/2019)  - proventil scheduled 4x/day  - spiriva  - monitor SaO2. O2 a via NC to keep SaO2 >90%. Atrial fibrillation with rapid ventricular response ([facility] (5/26/2019)  - amiodarone. Will reduce dose as scheduled. - lasix 40 mg daily. Monitor renal function. Normochromic anemia (5/26/2019)- s/p transfusions. - monitor. Transfuse as needed. Leukocytosis/UTI (urinary tract infection) (5/29/2019) - Complete a 5 day course of Rocephin for UTI. Pneumonia prophylaxis- Insentive spirometer every hour while awake    DVT risk / DVT Prophylaxis- Will require daily physician exam to assess for signs and symptoms as patient is at increased risk for of thromboembolism. Mobilization as tolerated. Intermittent pneumatic compression devices when in bed Thigh-high or knee-high thromboembolic deterrent hose when out of bed. - Lovenox subq daily.      Pain Control: no current joint or muscle symptoms, essentially pain-free. Will require regular pain assessment and comprenhensive pain management. - pain due to chest wound, and during dressing changes, packing.   - norco prn. Dilaudid iv if needed. - gabapentin 300mg tid    Potential urinary retention/  - schedule voids q6-8 hrs. Check post-void residual every shift; In and Out catheterize if post-void residual is more than 400 cc.    bowel program - erica-colace. MOM prn    GERD - resume PPI. At times may need additional antacids, Maalox prn. The patient's prognosis for significant practical improvement within a reasonable period of time appears good and the estimated length of stay is 14 days and patient is expected to return home with spouse and continued rehabilitation with home health therapy. Given the patient's complex neurologic/medical condition and the risk of further medical complications including: DVT, PE, skin breakdown, pneumonia due to decreased mobility, infection at surgical site. Patient will require continued cardiac monitoring/ remote telemetry for a.fib, with recurrent RVR, treatment for HR control, BP management, . Patient will need closepulmonary monitoing, management of hypoxia, respiratory compromise, on high flow O2 following lobectomy. She will be followed by pulmonary, general surgery for post op surgical care, pulmonary management. For these ongoing medical issues, rehabilitation services could not be safely provided at a lower level of care such as a skilled nursing facility or nursing home.         Signed By: Greg Davidson MD     May 31, 2019

## 2019-05-31 NOTE — PROGRESS NOTES
Care Management Interventions  PCP Verified by CM: Yes  Palliative Care Criteria Met (RRAT>21 & CHF Dx)?: No(RRAT 29 Dx S/P Lobectomy)  Mode of Transport at Discharge: Other (see comment)(transport to 9th floor )  Transition of Care Consult (CM Consult): Other(9th floor inpatient rehab)  Discharge Durable Medical Equipment: [phone] for life O2 at 2.4 liter NC)  Physical Therapy Consult: Yes  Occupational Therapy Consult: Yes  Speech Therapy Consult: No  Current Support Network: Lives with Spouse  Confirm Follow Up Transport: Family  Plan discussed with Pt/Family/Caregiver: Yes  Freedom of Choice Offered: Yes  Discharge Location  Discharge Placement: Rehab hospital/unit acute(9th floor rehab [facility])  Patient has been approved for admission to [facility] 9th floor today room 908. Notified surgery and they are in agreement with d/c today. Spoke with patient and daughter of d/c and they are in agreement with transfer to [address] [place] [address] today.

## 2019-05-31 NOTE — PROGRESS NOTES
TRANSFER - IN REPORT:    Verbal report received from [address]  being received from 3rd Floor for routine progression of care      Report consisted of patients Situation, Background, Assessment and   Recommendations(SBAR). Information from the following report(s) SBAR, Kardex, Procedure Summary, Intake/Output, MAR, Accordion, Recent Results and Med Rec Status was reviewed with the receiving nurse. Opportunity for questions and clarification was provided. Assessment completed upon patients arrival to unit and care assumed.

## 2019-05-31 NOTE — PROGRESS NOTES
PHYSICAL THERAPY EXAMINATION    Time in: 1435  Time out: 712 [facility]    Patient Name: Nelida Vogt  Patient Age: 78 y.o.   Past Medical History:   Past Medical History:   Diagnosis Date    Aspiration pneumonia ([facility] 4/12/2019    Cancer ([facility]     Lung cancer    Chronic obstructive pulmonary disease ([facility]     Hypertension     Severe sepsis with acute organ dysfunction ([facility] 4/12/2019    Subarachnoid hemorrhage ([facility]     Thyroid disease        Medical Diagnosis:  Lung cancer ([facility] [C34.90]  History of thoracotomy [Z98.890]  Hypoxia [R09.02]  Pain [R52]  Debility [R53.81]  Small cell carcinoma (HCC) [C80.1]  Atrial fibrillation with RVR (HCC) [I48.91]  COPD (chronic obstructive pulmonary disease) (HCC) [J44.9]  Gait difficulty [R26.9] <principal problem not specified>    Precautions at Admission: Other (comment)(fall risk)    Therapy Diagnosis:   Difficulty with bed mobility  [x]     Difficulty with functional transfers  [x]     Difficulty with ambulation  [x]     Difficulty with stair negotiations  [x]       Problem List:    Decreased strength B LE  [x]     Decreased strength trunk/core  [x]     Decreased AROM   [x]     Decreased PROM  []    Decreased endurance  [x]     Decreased balance sitting  [x]     Decreased balance standing  [x]     Pain   [x]     Slow ambulation velocity  [x]    Decreased coordination  [x]    Decreased safety awareness  [x]      Functional Limitations:   Decreased independence with bed mobility  [x]     Decreased independence with functional transfers  [x]     Decreased independence with ambulation  [x]     Decreased independence with stair negotiation  [x]       Previous Functional Level: Patient was independent with mobility, ambulated without assistive device    Home Environment: Home Environment: Private residence  # Steps to Enter: 2  Rails to Enter: Yes  Hand Rails : Right  One/Two Story Residence: One story  Living Alone: No  Support Systems: Family member(s)  Patient Expects to be Discharged to[de-identified] Private residence  Current DME Used/Available at Home: Walker, rolling  Tub or Shower Type: Shower         Outcome Measures: Vital Signs:   Patient Vitals for the past 8 hrs:   Temp Pulse Resp BP SpO2   05/31/19 1401 98.2 °F (36.8 °C) 80 18 121/71 94 %       Pain level: 6/10  Pain location: Back, incision site  Pain interventions: Positioned for comfort, provided rest breaks, pain medication    Patient education: Oriented patient to [facility] and daily schedule. Interdisciplinary Communication: Communicated with Michelle Arreola RN regarding patient's safety with transfers. Cognition: Impaired safety awareness.       MMT Initial Asssessment   Right Lower Extremity Left Lower Extremity   Hip Flexion 4- 4-   Knee Extension 4 4   Knee Flexion 4- 4-   Ankle Dorsiflexion 4 4   0/5 No palpable muscle contraction  1/5 Palpable muscle contraction, no joint movement  2-/5 Less than full range of motion in gravity eliminated position  2/5 Able to complete full range of motion in gravity eliminated position  2+/5 Able to initiate movement against gravity  3-/5 More than half but not full range of motion against gravity  3/5 Able to complete full range of motion against gravity  3+/5 Completes full range of motion against gravity with minimal resistance  4-/5 Completes full range of motion against gravity with minimal-moderate resistance  4/5 Completes full range of motion against gravity with moderate resistance  4+/5 Completes full range of motion against gravity with moderate-maximum resistance  5/5 Completes full range of motion against gravity with maximum resistance     AROM: Generally decreased, functional    FIM SCORES Initial Assessment   Bed/Chair/Wheelchair Transfers 3   Wheelchair Mobility 0(Secondary to patient fatigue)   Walking [place] 1   Steps/Stairs 0(Secondary to decreased functional strength and balance)   PRIMARY MODE OF LOCOMOTION: Ambulation  Please see IRC Interdisciplinary Eval: Coordination/Balance Section for details regarding FIM score description.     BED/CHAIR/WHEELCHAIR TRANSFERS Initial Assessment   Rolling Right 4 (Minimal assistance)   Rolling Left 4 (Minimal assistance)   Supine to Sit 4 (Minimal assistance)   Sit to Stand Moderate assistance   Sit to Supine 4 (Minimal assistance)   Transfer Assist Score 3   Transfer Type SPT without device   Comments Increased time and effort with decreased anterior weight shift and slow shuffled step from bed<>w/c   Car Transfer Not tested(Secondary to decreased functional strength and balance)   Car Type         WHEELCHAIR MOBILITY/MANAGEMENT Initial Assessment   Able to Propel     Functional Level 0(Secondary to patient fatigue)   Curbs/ramps assistance required     Wheelchair set up assistance required     Wheelchair management         WALKING INDEPENDENCE Initial Assessment   Assistive device Other (comment)(No device)   Ambulation assistance - level surface 1 (Dependent/total assistance)(MOD A HHA x1, w/c follow with 2nd person for safety)   Distance 5 Feet (ft)   Functional Level 1   Comments Patient took slow shuffled step with increased trunk sway to ipsilateral side during single limb stance, narrowd CLAUDIO   Ambulation assistance - unlevel surface 0 (Not tested)(Secondary to decreased dynamic balance)       STEPS/STAIRS Initial Assessment   Steps/Stairs ambulated 0   Rail Use     Functional Level 0(Secondary to decreased functional strength and balance)   Comments     Curbs/Ramps 0 (Not tested)       QUALITY INDICATOR ASSIST COMMENTS   Walk 10 feet NT Secondary to impaired dynamic standing balance and fatigue   Walk 50 feet with 2 turns NT Secondary to patient fatigue   Walk 150 feet NT Secondary to patient fatigue   Walk 10 feet on uneven  NT Secondary to impaired dynamic standing balance   1 step/curb NT Secondary to decreased functional strength and balance   4 steps NT Secondary to decreased functional strength and balance   12 steps NT Secondary to decreased functional strength and balance    object NT Secondary to impaired standing balance   Wheel 50' w/2 turns NT Secondary to patient fatigue   Wheel 150' NT Secondary to patient fatigue     Patient returned to room lying supine in bed with all needs in reach. PHYSICAL THERAPY PLAN OF CARE    Therapy Diagnosis:   Please see table above    Order received from MD for physical therapy services and chart reviewed. Pt to be seen at least 5 times per week for at least 1.5 hours of physical therapy per day for 10 days. Thank you for the referral.    LTGs:  LTG 1. Patient transfer supine<>sit with Supervision in 10 days  LTG 2. Patient transfer sit<>stand and perform stand pivot transfer with LRAD and supervision in 10 days  LTG 3. Patient ambulate 150 feet with LRAD and Supervision in 10 days  LTG 4. Patient ambulate up/down 4 steps with handrails and supervision in 10 days  LTG 5. Patient perform HEP with supervision in 10 days      Pt would benefit from skilled physical therapy in order to improve independent functional mobility within the home. Interventions may include range of motion (AROM, PROM B LE/trunk), motor function (B LE/trunk strengthening/coordination), activity tolerance (vitals, oxygen saturation levels), bed mobility training, balance activities, gait training (progressive ambulation program), and functional transfer training. Please see IRC; Interdisciplinary Eval, Care Plan, and Patient Education for further information regarding physical therapy examination and plan of care.      Anotlin Adkins  5/31/2019

## 2019-05-31 NOTE — PROGRESS NOTES
5/31/2019    PLAN:  Diet- Regular Diet  OOB/ Ambulate with assist  DVT Prophylactics- SCD/Lovenox  Pain control- Dilaudid/neurontin  SUP prophylaxis- Protonix  Encourage C/DB/IS  Posterior CT removed 5/29  Anterior CT removed 5/28  Donald out   Pulmonary following--will likely need home O2  Cardiology following--off Amio gtt 5/29  Pack right chest incision wet to dry twice daily with gauze, cover with dry gauze/tape  SW following  9th floor today      ASSESSMENT:  Admit Date: 5/22/2019   8 Day Post-Op  Procedure(s):   1. RIGHT THORACOSCOPY switched to thoracotomy with extensive pneumonolysis 2. LOWER LOBECTOMY  3. Upper lobe blebectomy x 2 4. Diaphragm resection with primary repair 5. Chest wall resection 6. MEDISTINAL LYMPHADENECTOMY 7. Intercostal nerve cryoablation        SUBJECTIVE: Awake in bed. C/o right shoulder pain. Oxygenating well on O2 5LNC. AF, VSS, NAD. Path clear. CTs out. Chest incision with soiled dressing. Pathology 5/22    DIAGNOSIS   A: #9 LYMPH NODE X2:   TWO LYMPH NODES NEGATIVE FOR METASTATIC CARCINOMA (0/2). B: #7 LYMPH NODE X5:   FIVE LYMPH NODES NEGATIVE FOR METASTATIC CARCINOMA (0/5). C: PERIHILAR LYMPH NODE:   ONE LYMPH NODE NEGATIVE FOR METASTATIC CARCINOMA (0/1). D: 4R LYMPH NODE X2:   TWO LYMPH NODES NEGATIVE FOR METASTATIC CARCINOMA (0/2). E: UPPER LOBE BLEB X2:   FINDINGS CONSISTENT WITH PULMONARY BLEBS. F: RIGHT LOWER LOBE, CHEST WALL, DIAPHRAGM:   POORLY DIFFERENTIATED NON-SMALL CELL CARCINOMA WITH SQUAMOUS FEATURES AND FOCAL NEUROENDOCRINE DIFFERENTIATION. CARCINOMA IS 6.9 CM IN GREATEST DIMENSION. MARGINS ARE NEGATIVE FOR CARCINOMA. FIVE LYMPH NODES NEGATIVE FOR METASTATIC CARCINOMA (0/5).    SEE ATTACHED CANCER CHECKLIST.   sms/5/24/2019   Electronically signed out on 5/28/2019 12:00 by Nicholas Mercer MD     OBJECTIVE:  Patient Vitals for the past 24 hrs:   Temp Pulse Resp BP SpO2 05/31/19 0802 99.7 °F (37.6 °C) 83 20 114/68 91 %   05/31/19 0702 -- -- -- -- 94 %   05/31/19 0435 97.9 °F (36.6 °C) 91 19 103/53 92 %   05/31/19 0111 98.8 °F (37.1 °C) 71 19 94/54 98 %   05/30/19 2053 98.5 °F (36.9 °C) 81 19 97/51 92 %   05/30/19 1942 -- -- -- -- 95 %   05/30/19 1728 98.3 °F (36.8 °C) 81 17 109/61 97 %   05/30/19 1354 -- -- -- -- 95 %   05/30/19 1158 98.6 °F (37 °C) 76 18 95/51 100 %         Date 05/30/19 0700 - 05/31/19 0659 05/31/19 0700 - 06/01/19 0659   Shift 5053-6382 7179-4557 24 Hour Total 3558-8672 9126-8160 24 Hour Total   INTAKE   P.O. 880  880        P. O. 880  880      Shift Total(mL/kg) 880(13.2)  880(13.8)      OUTPUT   Urine(mL/kg/hr) 0(0) 200(0.3) 200(0.1)        Urine Voided 0 200 200        Urine Occurrence(s) 1 x  1 x      Shift Total(mL/kg) 0(0) 200(3.1) 200(3.1)       -200 680      Weight (kg) 66.9 63.6 63.6 63.6 63.6 63.6            General:          No acute distress    Lungs:             no audible wheezes, on NC  Chest:   Incision with seropurulent drainage  Abdomen:        Non distended,   Extremities:     No cyanosis, clubbing or edema  Neurologic:      No focal deficits           Labs:    Recent Labs     05/31/19  0510   WBC 14.0*   HGB 8.3*         K 3.6   CL 99   CO2 31   BUN 11   CREA 0.57*   *         [place], [place]

## 2019-05-31 NOTE — PROGRESS NOTES
CASE MANAGEMENT ASSESSMENT      AGE:79   DIAGNOSIS:  Debility; Lung CA  DPOA/ LIVING WILL:  Yes, Padmini Dunn  PCP? LAST VISIT?  seen regular    CURRENT FAMILY SUPPORT:  Yoanna Ortega [phone]  2 grandchildren and four great grand children  CURRENT LIVING ARRANGEMENTS:  Lives with yoanna Blakely APARTMENT/ TRAILER:  House multilevel with walk in shower. 2 steps up to shower.   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      FINANCIAL ASSESSMENT:  nursing home and social security  ARE [address]:  No, covered by medicare A,B, and [place] [id number] CAREGIVER ABILITY:  Good     LEARNING CAPABILITY WITH PREFERRED METHOD OF LEARNING AND COMMUNITY REINTEGRATION POTENTIAL:  Patient enjoys the senior center in [place], [address] [facility], and [facility]       CM to follow patient     Care Management Interventions  PCP Verified by CM: Yes( seen regular)  Mode of Transport at Discharge: Self  Transition of Care Consult (CM Consult): Discharge Planning  Physical Therapy Consult: Yes  Occupational Therapy Consult: Yes  Current Support Network: Lives with Caregiver(Lives with yoanna Ortega [phone] and has son  Beryle Gray lives in [facility])  Confirm Follow Up Transport: Family  Plan discussed with Pt/Family/Caregiver: Yes  Freedom of Choice Offered: Yes  Discharge Location  Discharge Placement: Unable to determine at this time

## 2019-05-31 NOTE — PROGRESS NOTES
TRANSFER - OUT REPORT:    Verbal report given to [facility] RN on Jemal Bates  being transferred to 9th floor for routine progression of care       Report consisted of patients Situation, Background, Assessment and Recommendations(SBAR). Information from the following report(s) SBAR, Kardex and MAR was reviewed with the receiving nurse. Opportunity for questions and clarification was provided.

## 2019-06-01 LAB
ANION GAP SERPL CALC-SCNC: 7 MMOL/L (ref 7–16)
BASOPHILS # BLD: 0.1 K/UL (ref 0–0.2)
BASOPHILS NFR BLD: 0 % (ref 0–2)
BUN SERPL-MCNC: 15 MG/DL (ref 8–23)
CALCIUM SERPL-MCNC: 8.3 MG/DL (ref 8.3–10.4)
CHLORIDE SERPL-SCNC: 99 MMOL/L (ref 98–107)
CO2 SERPL-SCNC: 33 MMOL/L (ref 21–32)
CREAT SERPL-MCNC: 0.64 MG/DL (ref 0.6–1)
DIFFERENTIAL METHOD BLD: ABNORMAL
EOSINOPHIL # BLD: 0.1 K/UL (ref 0–0.8)
EOSINOPHIL NFR BLD: 1 % (ref 0.5–7.8)
ERYTHROCYTE [DISTWIDTH] IN BLOOD BY AUTOMATED COUNT: 15.1 % (ref 11.9–14.6)
GLUCOSE SERPL-MCNC: 113 MG/DL (ref 65–100)
HCT VFR BLD AUTO: 26.6 % (ref 35.8–46.3)
HGB BLD-MCNC: 8.3 G/DL (ref 11.7–15.4)
IMM GRANULOCYTES # BLD AUTO: 0.1 K/UL (ref 0–0.5)
IMM GRANULOCYTES NFR BLD AUTO: 1 % (ref 0–5)
INR PPP: 1.1
LYMPHOCYTES # BLD: 0.8 K/UL (ref 0.5–4.6)
LYMPHOCYTES NFR BLD: 5 % (ref 13–44)
MAGNESIUM SERPL-MCNC: 2 MG/DL (ref 1.8–2.4)
MCH RBC QN AUTO: 28.5 PG (ref 26.1–32.9)
MCHC RBC AUTO-ENTMCNC: 31.2 G/DL (ref 31.4–35)
MCV RBC AUTO: 91.4 FL (ref 79.6–97.8)
MONOCYTES # BLD: 1.2 K/UL (ref 0.1–1.3)
MONOCYTES NFR BLD: 8 % (ref 4–12)
NEUTS SEG # BLD: 13.3 K/UL (ref 1.7–8.2)
NEUTS SEG NFR BLD: 85 % (ref 43–78)
NRBC # BLD: 0 K/UL (ref 0–0.2)
PLATELET # BLD AUTO: 441 K/UL (ref 150–450)
PMV BLD AUTO: 9.1 FL (ref 9.4–12.3)
POTASSIUM SERPL-SCNC: 3.5 MMOL/L (ref 3.5–5.1)
PROTHROMBIN TIME: 14.5 SEC (ref 11.7–14.5)
RBC # BLD AUTO: 2.91 M/UL (ref 4.05–5.2)
SODIUM SERPL-SCNC: 139 MMOL/L (ref 136–145)
WBC # BLD AUTO: 15.7 K/UL (ref 4.3–11.1)

## 2019-06-01 PROCEDURE — 97116 GAIT TRAINING THERAPY: CPT

## 2019-06-01 PROCEDURE — 74011000250 HC RX REV CODE- 250: Performed by: PHYSICAL MEDICINE & REHABILITATION

## 2019-06-01 PROCEDURE — 74011250636 HC RX REV CODE- 250/636: Performed by: PHYSICAL MEDICINE & REHABILITATION

## 2019-06-01 PROCEDURE — 94640 AIRWAY INHALATION TREATMENT: CPT

## 2019-06-01 PROCEDURE — 74011000258 HC RX REV CODE- 258: Performed by: SURGERY

## 2019-06-01 PROCEDURE — 94760 N-INVAS EAR/PLS OXIMETRY 1: CPT

## 2019-06-01 PROCEDURE — 77030032490 HC SLV COMPR SCD KNE COVD -B

## 2019-06-01 PROCEDURE — 80048 BASIC METABOLIC PNL TOTAL CA: CPT

## 2019-06-01 PROCEDURE — 74011250637 HC RX REV CODE- 250/637: Performed by: PHYSICAL MEDICINE & REHABILITATION

## 2019-06-01 PROCEDURE — 85025 COMPLETE CBC W/AUTO DIFF WBC: CPT

## 2019-06-01 PROCEDURE — 85610 PROTHROMBIN TIME: CPT

## 2019-06-01 PROCEDURE — 77010033678 HC OXYGEN DAILY

## 2019-06-01 PROCEDURE — 97530 THERAPEUTIC ACTIVITIES: CPT

## 2019-06-01 PROCEDURE — 97150 GROUP THERAPEUTIC PROCEDURES: CPT

## 2019-06-01 PROCEDURE — 97166 OT EVAL MOD COMPLEX 45 MIN: CPT

## 2019-06-01 PROCEDURE — 97110 THERAPEUTIC EXERCISES: CPT

## 2019-06-01 PROCEDURE — 74011250636 HC RX REV CODE- 250/636: Performed by: SURGERY

## 2019-06-01 PROCEDURE — 77030019605

## 2019-06-01 PROCEDURE — 99232 SBSQ HOSP IP/OBS MODERATE 35: CPT | Performed by: PHYSICAL MEDICINE & REHABILITATION

## 2019-06-01 PROCEDURE — 36415 COLL VENOUS BLD VENIPUNCTURE: CPT

## 2019-06-01 PROCEDURE — 97535 SELF CARE MNGMENT TRAINING: CPT

## 2019-06-01 PROCEDURE — 65310000000 HC RM PRIVATE REHAB

## 2019-06-01 PROCEDURE — 74011250637 HC RX REV CODE- 250/637: Performed by: SURGERY

## 2019-06-01 PROCEDURE — 83735 ASSAY OF MAGNESIUM: CPT

## 2019-06-01 RX ORDER — POTASSIUM CHLORIDE 20 MEQ/1
40 TABLET, EXTENDED RELEASE ORAL DAILY
Status: DISCONTINUED | OUTPATIENT
Start: 2019-06-01 | End: 2019-06-12 | Stop reason: HOSPADM

## 2019-06-01 RX ADMIN — Medication 5 ML: at 05:48

## 2019-06-01 RX ADMIN — AMIODARONE HYDROCHLORIDE 400 MG: 200 TABLET ORAL at 08:54

## 2019-06-01 RX ADMIN — HYDROCODONE BITARTRATE AND ACETAMINOPHEN 1 TABLET: 5; 325 TABLET ORAL at 05:43

## 2019-06-01 RX ADMIN — HYDROCODONE BITARTRATE AND ACETAMINOPHEN 1 TABLET: 5; 325 TABLET ORAL at 14:55

## 2019-06-01 RX ADMIN — LEVOTHYROXINE SODIUM 75 MCG: 75 TABLET ORAL at 08:54

## 2019-06-01 RX ADMIN — HYDROCODONE BITARTRATE AND ACETAMINOPHEN 1 TABLET: 5; 325 TABLET ORAL at 20:35

## 2019-06-01 RX ADMIN — TIOTROPIUM BROMIDE 18 MCG: 18 CAPSULE ORAL; RESPIRATORY (INHALATION) at 10:23

## 2019-06-01 RX ADMIN — ALBUTEROL SULFATE 2.5 MG: 2.5 SOLUTION RESPIRATORY (INHALATION) at 10:50

## 2019-06-01 RX ADMIN — ENOXAPARIN SODIUM 40 MG: 40 INJECTION SUBCUTANEOUS at 15:08

## 2019-06-01 RX ADMIN — GABAPENTIN 300 MG: 300 CAPSULE ORAL at 20:35

## 2019-06-01 RX ADMIN — HYDROCODONE BITARTRATE AND ACETAMINOPHEN 1 TABLET: 5; 325 TABLET ORAL at 10:15

## 2019-06-01 RX ADMIN — GABAPENTIN 300 MG: 300 CAPSULE ORAL at 15:08

## 2019-06-01 RX ADMIN — BUDESONIDE 500 MCG: 0.5 INHALANT RESPIRATORY (INHALATION) at 05:47

## 2019-06-01 RX ADMIN — ALBUTEROL SULFATE 2.5 MG: 2.5 SOLUTION RESPIRATORY (INHALATION) at 05:47

## 2019-06-01 RX ADMIN — ALBUTEROL SULFATE 2.5 MG: 2.5 SOLUTION RESPIRATORY (INHALATION) at 14:14

## 2019-06-01 RX ADMIN — CEFTRIAXONE SODIUM 1 G: 1 INJECTION, POWDER, FOR SOLUTION INTRAMUSCULAR; INTRAVENOUS at 15:10

## 2019-06-01 RX ADMIN — POTASSIUM CHLORIDE 40 MEQ: 20 TABLET, EXTENDED RELEASE ORAL at 14:51

## 2019-06-01 RX ADMIN — GABAPENTIN 300 MG: 300 CAPSULE ORAL at 08:56

## 2019-06-01 RX ADMIN — BUDESONIDE 500 MCG: 0.5 INHALANT RESPIRATORY (INHALATION) at 17:43

## 2019-06-01 RX ADMIN — PANTOPRAZOLE SODIUM 40 MG: 40 TABLET, DELAYED RELEASE ORAL at 05:43

## 2019-06-01 RX ADMIN — Medication 10 ML: at 20:37

## 2019-06-01 RX ADMIN — Medication 10 ML: at 14:06

## 2019-06-01 RX ADMIN — ALBUTEROL SULFATE 2.5 MG: 2.5 SOLUTION RESPIRATORY (INHALATION) at 17:43

## 2019-06-01 RX ADMIN — FUROSEMIDE 40 MG: 20 TABLET ORAL at 08:53

## 2019-06-01 RX ADMIN — AMIODARONE HYDROCHLORIDE 400 MG: 200 TABLET ORAL at 17:15

## 2019-06-01 NOTE — PROGRESS NOTES
PHYSICAL THERAPY DAILY NOTE  Time In: 1125  Time Out: 1210  Patient Seen For: AM;Balance activities;Gait training;Patient education; Therapeutic exercise;Transfer training; Other (see progress notes)    Subjective: Patient reporting her back is hurting pretty bad. Reports she is not sure if she will be able to get out of bed and walk on her own in just 2 weeks. Expressing frustration regarding back pain limiting her progress. Objective:Vital Signs:02 at 5 lpm, 02 sat 93 to 97%, HR 84 to 101  Patient Vitals for the past 12 hrs:   Temp Pulse Resp BP SpO2   06/01/19 1414 -- -- -- -- 95 %   06/01/19 1050 -- -- -- -- 96 %   06/01/19 0751 98 °F (36.7 °C) 77 17 103/60 98 %   06/01/19 0750 -- -- -- -- 94 %   06/01/19 0548 -- -- -- -- 92 %   06/01/19 0525 98.1 °F (36.7 °C) 80 18 98/50 90 %     Pain level:4 to 9 out of 10  Pain location:right posterior rib cage incision area  Pain interventions:medication per RN, rest, positioning,relaxation, Body mechanics    Patient education:Bed mobility training,transfer training, gait training, fall precautions, activity pacing, body mechanics,Patient verbalizing understanding and demonstrating partial understanding of patient education. Recommend follow up education.     Interdisciplinary Communication:spoke with MD regarding current medical status, spoke with RN regarding pain medication schedule      Other (comment)(fall risk)  GROSS ASSESSMENT Daily Assessment     NA       COGNITION Daily Assessment    Alert, able to follow commands, cues for safe body mechanics during mobility       BED/MAT MOBILITY Daily Assessment   Increased time and effort to complete with cues for body mechanics   Rolling Right : 4 (Minimal assistance)  Rolling Left : 0 (Not tested)  Supine to Sit : 3 (Moderate assistance)  Sit to Supine : 4 (Minimal assistance)       TRANSFERS Daily Assessment   Increased time and effort to complete with cues for body mechanics   Transfer Type: SPT with walker  Transfer Assistance : 4 (Minimal assistance)  Sit to Stand Assistance: Moderate assistance  Car Transfers: Not tested       GAIT Daily Assessment    Amount of Assistance: 4 (Minimal assistance)  Distance (ft): 60 Feet (ft)(60ft x 2 with 5 minute sitting rest break)  Assistive Device: Walker, rolling   Gait training with cues for posture correction and to control gait speed with RR.    STEPS or STAIRS Daily Assessment    Steps/Stairs Ambulated (#): 0  Level of Assist : 0 (Not tested)       BALANCE Daily Assessment   Static standing with RW and SBA and CGA, cues for posture correction Sitting - Static: Good (unsupported)  Sitting - Dynamic: Fair (occasional)  Standing - Static: Fair(with RW)  Standing - Dynamic : Impaired       WHEELCHAIR MOBILITY Daily Assessment    Curbs/Ramps Assist Required (FIM Score): 0 (Not tested)  Wheelchair Setup Assist Required : 0 (Not tested)       LOWER EXTREMITY EXERCISES Daily Assessment   Increased time and effort to complete with multiple and frequent rest breaks. Cues for breathing pattern and correct form   Extremity: Both  Exercise Type #1: Supine lower extremity strengthening  Sets Performed: 2  Reps Performed: 10  Level of Assist: Minimal assistance     SUPINE EXERCISES Sets Reps Comments   Ankle Pumps 2 10    Heel Slides 2 10    Hip Abduction 2 10    Short Arc Quad 2 10    Straight Leg Raise 2 10             Assessment: Progress towards goals will be slow due to pain from surgery. 02 sat stable on 02 at 5 lpm during functional mobility. Requires multiple and frequent rest breaks during treatment       Rehab tech assisted patient back to room at end of treatment    Plan of Care: Continue with POC and progress as tolerated.      Gi Bond, PT  6/1/2019

## 2019-06-01 NOTE — PROGRESS NOTES
Problem: Falls - Risk of  Goal: *Absence of Falls  Description  Document Sintia Fernando Fall Risk and appropriate interventions in the flowsheet. Outcome: Progressing Towards Goal     Problem: Patient Education: Go to Patient Education Activity  Goal: Patient/Family Education  Outcome: Progressing Towards Goal     Problem: Patient Education: Go to Patient Education Activity  Goal: Patient/Family Education  Description  Patient / Patient?s family will verbalize understanding of PT safety recommendations, demonstrate appropriate assist for current functional mobility status, safety, and home exercise program by time of discharge.      Outcome: Progressing Towards Goal

## 2019-06-01 NOTE — PROGRESS NOTES
SFD PROGRESS NOTE    Anthony Islas  Admit Date: 5/31/2019  Admit Diagnosis: Lung cancer ([facility] [C34.90]; History of thoracotomy [Z98.890]; Hypoxia [R09.02]; Pain [R52]; Debility [R53.81]; Small cell carcinoma (HCC) [C80.1]; Atrial fibrillation with RVR (HCC) [I48.91];COPD (chronic obstructive pulmonary disease) ([facility] [J44.9]; Gait difficulty [R26.9]    Subjective     Vss. Afebrile. C/o pain at surgical incision. Requires encouragement to participate in therapies, states she did not get much sleep. PT, OT well tolerated once up Steady gains made. No new barrier to progress noted.      Objective:     Current Facility-Administered Medications   Medication Dose Route Frequency    potassium chloride (K-DUR, KLOR-CON) SR tablet 40 mEq  40 mEq Oral DAILY    naloxone (NARCAN) injection 0.4 mg  0.4 mg IntraVENous PRN    ondansetron (ZOFRAN) injection 4 mg  4 mg IntraVENous Q4H PRN    sodium chloride (NS) flush 5-40 mL  5-40 mL IntraVENous Q8H    sodium chloride (NS) flush 5-40 mL  5-40 mL IntraVENous PRN    albuterol (PROVENTIL VENTOLIN) nebulizer solution 2.5 mg  2.5 mg Nebulization QID RT    [START ON 6/4/2019] amiodarone (CORDARONE) tablet 200 mg  200 mg Oral DAILY    amiodarone (CORDARONE) tablet 400 mg  400 mg Oral BID    budesonide (PULMICORT) 500 mcg/2 ml nebulizer suspension  500 mcg Nebulization BID RT    cefTRIAXone (ROCEPHIN) 1 g in 0.9% sodium chloride (MBP/ADV) 50 mL  1 g IntraVENous Q24H    enoxaparin (LOVENOX) injection 40 mg  40 mg SubCUTAneous Q24H    furosemide (LASIX) tablet 40 mg  40 mg Oral DAILY    gabapentin (NEURONTIN) capsule 300 mg  300 mg Oral TID    HYDROcodone-acetaminophen (NORCO) 5-325 mg per tablet 1 Tab  1 Tab Oral Q4H PRN    levothyroxine (SYNTHROID) tablet 75 mcg  75 mcg Oral DAILY    magnesium hydroxide (MILK OF MAGNESIA) 400 mg/5 mL oral suspension 30 mL  30 mL Oral DAILY PRN    magnesium hydroxide (MILK OF MAGNESIA) 400 mg/5 mL oral suspension 30 mL  30 mL Oral DAILY  pantoprazole (PROTONIX) tablet 40 mg  40 mg Oral ACB    senna-docusate (PERICOLACE) 8.6-50 mg per tablet 2 Tab  2 Tab Oral BID    tiotropium (SPIRIVA) inhalation capsule 18 mcg  1 Cap Inhalation DAILY     Review of Systems:Denies chest pain, shortness of breath, cough, headache, visual problems, abdominal pain, dysurea, calf pain. Pertinent positives are as noted in the medical records and unremarkable otherwise. Visit Vitals  /62   Pulse (!) 101   Temp 98.4 °F (36.9 °C)   Resp 20   SpO2 93%      Physical Exam:   Psych: Patient was oriented to person, place, and time. Without hallucinations, abnormal affect or abnormal behaviors during the examination. General:   Alert, appears stated age, No acute distress. HEENT:  Normocephalic, EOMI, facial symmetry  Intact. Oral mucosa pink and moist.  no JVD   Lungs:  Crackles thoughout B lung fields. Respiration even and unlabored   No apparent use of accessory muscles for respiration. Heart:  Regular rate and rhythm, S1, S2, No obvious murmur, click, rub or gallop. Genitourinary: defered   Abdomen:  Soft, non-tender to palpation in all four quadrants. Bowel sounds present. No obvious masses palpated. Neuromuscular:   PERRL, EOMI  Speech is clear.   Follows simple commands consistently. Able to identify, recall repeat. Mood appropriate.   + generalized weakness 4 to 5-/5 throughout major muscle groups.   Sensory - intact   Skin:  Intact, dry, good skin turgor, age related changes present   Edema: 1+ BLE edema   Incision:  Calf non tender BLE. Right chest wall, thorax covered.                                                                                      Functional Assessment:          Balance  Sitting - Static: Good (unsupported) (06/01/19 1400)  Sitting - Dynamic: Fair (occasional) (06/01/19 1400)  Standing - Static: Fair(with RW) (06/01/19 1400)  Standing - Dynamic : Impaired (06/01/19 1400)                     Edgar Brunner Fall Risk Assessment:  Yulisa Hare Fall Risk  Mobility: Ambulates or transfers with assist devices or assistance (06/01/19 0710)  Mobility Interventions: OT consult for ADLs; Patient to call before getting OOB;PT Consult for mobility concerns;PT Consult for assist device competence;Utilize walker, cane, or other assistive device (06/01/19 0710)  Mentation: Alert, oriented x 3 (06/01/19 0710)  Medication: Patient receiving anticonvulsants, sedatives(tranquilizers), psychotropics or hypnotics, hypoglycemics, narcotics, sleep aids, antihypertensives, laxatives, or diuretics (06/01/19 0710)  Medication Interventions: Patient to call before getting OOB; Evaluate medications/consider consulting pharmacy (06/01/19 0710)  Elimination: Needs assistance with toileting (06/01/19 0710)  Elimination Interventions: Call light in reach; Patient to call for help with toileting needs (06/01/19 0710)  Prior Fall History: Before admission in past 12 months _home or previous inpatient care) (06/01/19 0710)  History of Falls Interventions: Evaluate medications/consider consulting pharmacy; Consult care management for discharge planning;Door open when patient unattended (06/01/19 0710)  Total Score: 4 (06/01/19 0710)  Standard Fall Precautions: Yes (06/01/19 0710)  High Fall Risk: Yes (05/31/19 2000)     Speech Assessment:         Ambulation:  Gait  Distance (ft): 60 Feet (ft)(60ft x 2 with 5 minute sitting rest break) (06/01/19 1400)  Assistive Device: Walker, rolling (06/01/19 1400)     Labs/Studies:  Recent Results (from the past 72 hour(s))   METABOLIC PANEL, BASIC    Collection Time: 05/30/19  4:53 AM   Result Value Ref Range    Sodium 136 136 - 145 mmol/L    Potassium 3.5 3.5 - 5.1 mmol/L    Chloride 98 98 - 107 mmol/L    CO2 31 21 - 32 mmol/L    Anion gap 7 7 - 16 mmol/L    Glucose 103 (H) 65 - 100 mg/dL    BUN 12 8 - 23 MG/DL    Creatinine 0.52 (L) 0.6 - 1.0 MG/DL    GFR est AA >60 >60 ml/min/1.73m2    GFR est non-AA >60 >60 ml/min/1.73m2 Calcium 8.1 (L) 8.3 - 10.4 MG/DL   CBC WITH AUTOMATED DIFF    Collection Time: 05/30/19  4:53 AM   Result Value Ref Range    WBC 17.5 (H) 4.3 - 11.1 K/uL    RBC 2.99 (L) 4.05 - 5.2 M/uL    HGB 8.7 (L) 11.7 - 15.4 g/dL    HCT 26.8 (L) 35.8 - 46.3 %    MCV 89.6 79.6 - 97.8 FL    MCH 29.1 26.1 - 32.9 PG    MCHC 32.5 31.4 - 35.0 g/dL    RDW 15.4 (H) 11.9 - 14.6 %    PLATELET 350 078 - 020 K/uL    MPV 9.6 9.4 - 12.3 FL    ABSOLUTE NRBC 0.00 0.0 - 0.2 K/uL    DF AUTOMATED      NEUTROPHILS 86 (H) 43 - 78 %    LYMPHOCYTES 6 (L) 13 - 44 %    MONOCYTES 7 4.0 - 12.0 %    EOSINOPHILS 1 0.5 - 7.8 %    BASOPHILS 0 0.0 - 2.0 %    IMMATURE GRANULOCYTES 1 0.0 - 5.0 %    ABS. NEUTROPHILS 15.0 (H) 1.7 - 8.2 K/UL    ABS. LYMPHOCYTES 1.0 0.5 - 4.6 K/UL    ABS. MONOCYTES 1.2 0.1 - 1.3 K/UL    ABS. EOSINOPHILS 0.1 0.0 - 0.8 K/UL    ABS. BASOPHILS 0.0 0.0 - 0.2 K/UL    ABS. IMM. GRANS. 0.2 0.0 - 0.5 K/UL   CBC WITH AUTOMATED DIFF    Collection Time: 05/31/19  5:10 AM   Result Value Ref Range    WBC 14.0 (H) 4.3 - 11.1 K/uL    RBC 2.93 (L) 4.05 - 5.2 M/uL    HGB 8.3 (L) 11.7 - 15.4 g/dL    HCT 26.9 (L) 35.8 - 46.3 %    MCV 91.8 79.6 - 97.8 FL    MCH 28.3 26.1 - 32.9 PG    MCHC 30.9 (L) 31.4 - 35.0 g/dL    RDW 15.4 (H) 11.9 - 14.6 %    PLATELET 678 342 - 658 K/uL    MPV 9.5 9.4 - 12.3 FL    ABSOLUTE NRBC 0.00 0.0 - 0.2 K/uL    DF AUTOMATED      NEUTROPHILS 82 (H) 43 - 78 %    LYMPHOCYTES 7 (L) 13 - 44 %    MONOCYTES 8 4.0 - 12.0 %    EOSINOPHILS 2 0.5 - 7.8 %    BASOPHILS 0 0.0 - 2.0 %    IMMATURE GRANULOCYTES 1 0.0 - 5.0 %    ABS. NEUTROPHILS 11.6 (H) 1.7 - 8.2 K/UL    ABS. LYMPHOCYTES 1.0 0.5 - 4.6 K/UL    ABS. MONOCYTES 1.1 0.1 - 1.3 K/UL    ABS. EOSINOPHILS 0.2 0.0 - 0.8 K/UL    ABS. BASOPHILS 0.1 0.0 - 0.2 K/UL    ABS. IMM.  GRANS. 0.1 0.0 - 0.5 K/UL   METABOLIC PANEL, BASIC    Collection Time: 05/31/19  5:10 AM   Result Value Ref Range    Sodium 136 136 - 145 mmol/L    Potassium 3.6 3.5 - 5.1 mmol/L    Chloride 99 98 - 107 mmol/L    CO2 31 21 - 32 mmol/L    Anion gap 6 (L) 7 - 16 mmol/L    Glucose 104 (H) 65 - 100 mg/dL    BUN 11 8 - 23 MG/DL    Creatinine 0.57 (L) 0.6 - 1.0 MG/DL    GFR est AA >60 >60 ml/min/1.73m2    GFR est non-AA >60 >60 ml/min/1.73m2    Calcium 7.8 (L) 8.3 - 10.4 MG/DL   CBC WITH AUTOMATED DIFF    Collection Time: 06/01/19  7:07 AM   Result Value Ref Range    WBC 15.7 (H) 4.3 - 11.1 K/uL    RBC 2.91 (L) 4.05 - 5.2 M/uL    HGB 8.3 (L) 11.7 - 15.4 g/dL    HCT 26.6 (L) 35.8 - 46.3 %    MCV 91.4 79.6 - 97.8 FL    MCH 28.5 26.1 - 32.9 PG    MCHC 31.2 (L) 31.4 - 35.0 g/dL    RDW 15.1 (H) 11.9 - 14.6 %    PLATELET 318 684 - 276 K/uL    MPV 9.1 (L) 9.4 - 12.3 FL    ABSOLUTE NRBC 0.00 0.0 - 0.2 K/uL    DF AUTOMATED      NEUTROPHILS 85 (H) 43 - 78 %    LYMPHOCYTES 5 (L) 13 - 44 %    MONOCYTES 8 4.0 - 12.0 %    EOSINOPHILS 1 0.5 - 7.8 %    BASOPHILS 0 0.0 - 2.0 %    IMMATURE GRANULOCYTES 1 0.0 - 5.0 %    ABS. NEUTROPHILS 13.3 (H) 1.7 - 8.2 K/UL    ABS. LYMPHOCYTES 0.8 0.5 - 4.6 K/UL    ABS. MONOCYTES 1.2 0.1 - 1.3 K/UL    ABS. EOSINOPHILS 0.1 0.0 - 0.8 K/UL    ABS. BASOPHILS 0.1 0.0 - 0.2 K/UL    ABS. IMM.  GRANS. 0.1 0.0 - 0.5 K/UL   MAGNESIUM    Collection Time: 06/01/19  7:07 AM   Result Value Ref Range    Magnesium 2.0 1.8 - 2.4 mg/dL   METABOLIC PANEL, BASIC    Collection Time: 06/01/19  7:07 AM   Result Value Ref Range    Sodium 139 136 - 145 mmol/L    Potassium 3.5 3.5 - 5.1 mmol/L    Chloride 99 98 - 107 mmol/L    CO2 33 (H) 21 - 32 mmol/L    Anion gap 7 7 - 16 mmol/L    Glucose 113 (H) 65 - 100 mg/dL    BUN 15 8 - 23 MG/DL    Creatinine 0.64 0.6 - 1.0 MG/DL    GFR est AA >60 >60 ml/min/1.73m2    GFR est non-AA >60 >60 ml/min/1.73m2    Calcium 8.3 8.3 - 10.4 MG/DL   PROTHROMBIN TIME + INR    Collection Time: 06/01/19  7:07 AM   Result Value Ref Range    Prothrombin time 14.5 11.7 - 14.5 sec    INR 1.1         Assessment:     Problem List as of 6/1/2019 Date Reviewed: 5/31/2019          Codes Class Noted - Resolved    Debility ICD-10-CM: R53.81  ICD-9-CM: 799.3  5/31/2019 - Present        Lung cancer ([facility] ICD-10-CM: C34.90  ICD-9-CM: 162.9  5/31/2019 - Present        Gait difficulty ICD-10-CM: R26.9  ICD-9-CM: 781.2  5/31/2019 - Present        Pain ICD-10-CM: R52  ICD-9-CM: 780.96  5/31/2019 - Present        Small cell carcinoma (HCC) ICD-10-CM: C80.1  ICD-9-CM: 199.1  5/31/2019 - Present        Atrial fibrillation with RVR (HCC) ICD-10-CM: I48.91  ICD-9-CM: 427.31  5/31/2019 - Present        History of thoracotomy ICD-10-CM: Z98.890  ICD-9-CM: V45.89  5/31/2019 - Present        Non-small cell lung cancer (NSCLC) ([facility] ICD-10-CM: C34.90  ICD-9-CM: 162.9  5/30/2019 - Present        UTI (urinary tract infection) ICD-10-CM: N39.0  ICD-9-CM: 599.0  5/29/2019 - Present        Atrial fibrillation with rapid ventricular response (HCC) ICD-10-CM: I48.91  ICD-9-CM: 427.31  5/26/2019 - Present        Normochromic anemia (Chronic) ICD-10-CM: D64.9  ICD-9-CM: 285.9  5/26/2019 - Present        Hypoxia ICD-10-CM: R09.02  ICD-9-CM: 799.02  5/24/2019 - Present        COPD (chronic obstructive pulmonary disease) (HCC) (Chronic) ICD-10-CM: J44.9  ICD-9-CM: 785  5/24/2019 - Present        Aftercare following surgery ICD-10-CM: Z48.89  ICD-9-CM: V58.89  5/22/2019 - Present        Right lower lobe lung mass ICD-10-CM: R91.8  ICD-9-CM: 786.6  5/22/2019 - Present        HTN (hypertension) (Chronic) ICD-10-CM: I10  ICD-9-CM: 401.9  4/12/2019 - Present        Acquired hypothyroidism (Chronic) ICD-10-CM: E03.9  ICD-9-CM: 244.9  4/12/2019 - Present        Personal history of tobacco use, presenting hazards to health (Chronic) ICD-10-CM: J01.722  ICD-9-CM: V15.82  3/18/2019 - Present        Liver lesion ICD-10-CM: K76.9  ICD-9-CM: 573.8  3/18/2019 - Present    Overview Signed 5/30/2019 11:14 AM by WILLIAN Escalante.  No uptake per PET             Centrilobular emphysema (HCC) (Chronic) ICD-10-CM: J43.2  ICD-9-CM: 492.8  3/18/2019 - Present RESOLVED: Atrial fibrillation with RVR (HCC) ICD-10-CM: I48.91  ICD-9-CM: 427.31  5/26/2019 - 5/29/2019        RESOLVED: Acute respiratory failure with hypoxia [facility]) ICD-10-CM: J96.01  ICD-9-CM: 518.81  4/12/2019 - 5/30/2019              Plan:   intensive Physical Therapy for a minimum of 1.5 hours a day, at least 5 out of 7 days per week to address bed mobility, transfers, ambulation, strengthening, balance, and endurance. intensive Occupational Therapy for a minimum of 1.5 hours a day, at least 5 out of 7 days per week to address ADL ( bathing, LE dressing, toileting) and adaptive equipment as needed. - patient will be limited due to pain, hypoxia, fatigue.   - focus on endurance, strength, energy conservation techniques.     The patient will also require 24-hour skilled rehabilitation nursing for bowel and bladder management, skin care for decubitus ulcer prevention , pain management and ongoing medication administration      The patient may benefit from a psychology consult for depression, anxiety and adjustment disorder.     Continue daily physician medical management:  POSTOPERATIVE DIAGNOSIS:  Right lung cancer.     PROCEDURE PERFORMED:  1.  Right thoracoscopy switched to right thoracotomy with extensive pneumonolysis. 2.  Right lower lobe lobectomy. 3.  Right upper lobe blebectomy x2.  4.  Diaphragm resection with primary repair. 5.  Chest wall resection. 6.  Mediastinal lymphadenectomy. 7.  Intercostal nerve cryoablation.     - Non-small cell lung cancer (NSCLC) ([facility] (5/30/2019)  - Aftercare following surgery -Wound Care: Monitor wound status daily per staff and physician. At risk for failure. Will require 24/7 rehab nursing. Surgery to direct, follow. Keep incisions clean and dry, may remain uncovered. - Wet to dry dressing changes to chest wound daily.    - 6/1 stable wound.      Hypoxia (5/24/2019)/COPD (chronic obstructive pulmonary disease) ([facility] (5/24/2019)  - proventil scheduled 4x/day  - spiriva  - monitor SaO2. O2 a via NC to keep SaO2 >90%. - continue O2, 4-5L/min needed presently     Atrial fibrillation with rapid ventricular response ([address] (5/26/2019)  - amiodarone. Will reduce dose as scheduled. - lasix 40 mg daily. Monitor renal function. 6/1 -HR in control. Continue amiodarone 400 bid, wean to 200 daily in 2 days.        Normochromic anemia (5/26/2019)- s/p transfusions. - monitor. Transfuse as needed. 6/1- hgb 8.3     Leukocytosis/UTI (urinary tract infection) (5/29/2019) - Complete a 5 day course of Rocephin for UTI.  - 6/1 - still mild leukocytosis. Rocephin until 6/3     Pneumonia prophylaxis- Insentive spirometer every hour while awake     DVT risk / DVT Prophylaxis-    - Lovenox subq daily.      Pain Control: no current joint or muscle symptoms, essentially pain-free. Will require regular pain assessment and comprenhensive pain management. - pain due to chest wound, and during dressing changes, packing.   - norco prn. Dilaudid iv if needed. - gabapentin 300mg tid     Potential urinary retention/  - schedule voids q6-8 hrs. Check post-void residual every shift; In and Out catheterize if post-void residual is more than 400 cc.     bowel program - erica-colace. MOM prn     GERD - resume PPI. At times may need additional antacids, Maalox prn. Time spent was 25 minutes with over 1/2 in direct patient care/examination, consultation and coordination of care.      Signed By: Shabnam Lopez MD     June 1, 2019

## 2019-06-01 NOTE — PROGRESS NOTES
OT Daily Note  [facility] OCCUPATIONAL THERAPY INITIAL EVALUATION    Time In: 0725  Time Out: 0912    Precautions: Falls and Oxygen Hi flow, 5 l/min    Vitals:   Patient Vitals for the past 8 hrs:   Temp Pulse Resp BP SpO2   06/01/19 1050 -- -- -- -- 96 %   06/01/19 0751 98 °F (36.7 °C) 77 17 103/60 98 %   06/01/19 0750 -- -- -- -- 94 %   06/01/19 0548 -- -- -- -- 92 %   06/01/19 0525 98.1 °F (36.7 °C) 80 18 98/50 90 %         Pain: Patient rated pain 5 out of 10 located at surgery incision. Patient was repositioned with patient reporting a decrease in pain after intervention. History of Presenting Illness (per previous reports): Adilene Reardon has history of emphysema, hypothyroidism, subarachnoid hemorrhage, anterior cerebral aneurysm s/p brain coil, HTN, HLD was recently diagnosed with lung cancer. Tumor noted to be in right lower lobe mass measuring 5.4 cm x 4.4 cm and was appearing to extend to the right inferior hilum. patient underwent a right thoracoscopy switched to right thoracotomy with extensive pneumonolysis, right lower lobe lobectomy, right upper lobe blebectomy x2, diaphragm resection with primary repair, chest wall resection, mediastinal lymphadenectomy and intercostal nerve cryoablation per Cheryl Crow MD on 5/22/2019. Post op patient continued to be hypoxic, requiring high flow O2 to maintain sat of 90%. Patient developed atrial fibrillation with RVR, 5/26/2019. HR, BP control treated per cardiology. Patient continued on amiodarone, continued on telemetry.  She is not on OAC due to recent lung surgery. CxR 5/30 demonstrates still bibasilar opacities, likely        Past Medical History/ Co-morbidities:   Past Medical History:   Diagnosis Date    Aspiration pneumonia ([address] 4/12/2019    Cancer ([address]     Lung cancer    Chronic obstructive pulmonary disease ([address]     Hypertension     Severe sepsis with acute organ dysfunction ([address] 4/12/2019    Subarachnoid hemorrhage ([address]     Thyroid disease        Patient's Goal: To return to her condo independently    Previous Level of Function:  [address] [facility] residence    Lives Alone No    Support Systems Family member(s)    Shower Situation      Current DME Walker, rolling    Lift Chair      Stairs to [employer] 2       Rails         W/C Ramp      Interior Steps        Upper Extremity Function   YAMILET RO   [facility]  Intact  Intact   [facility]  Intact  Intact   Light Touch No apparent deficit No apparent deficit   Sharp/Dull Discrimination   No apparent deficit   Hot/Cold No apparent deficit No apparent deficit   Proprioception No apparent deficit     Stereognosis No apparent deficit No apparent deficit   9 Hole Peg Test       General Comments        YAMILET RO   General Evalutaion       AROM (L UE AROM WDLs) Exceptions to WDL   Shoulder Flexion 3+ 2-   Shoulder Extension  4- 2-   Shoulder Abduction   2-   Shoulder Adduction   2-   Elbow Flexion 4- 3   Elbow Extension  4- 3   Wrist Flexion/Extension        4- 4-   General Comments     0/5 No palpable muscle contraction  1/5 Palpable muscle contraction, no joint movement  2-/5 Less than full range of motion in gravity eliminated position  2/5 Able to complete full range of motion in gravity eliminated position  2+/5 Able to initiate movement against gravity  3-/5 More than half but not full range of motion against gravity  3/5 Able to complete full range of motion against gravity  3+/5 Completes full range of motion against gravity with minimal resistance  4-/5 Completes full range of motion against gravity with minimal-moderate resistance  4/5 Completes full range of motion against gravity with moderate resistance  4+/5 Completes full range of motion against gravity with moderate-maximum resistance  5/5 Completes full range of motion against gravity with maximum resistance      Functional Mobility   Performance Comments   Sitting: Static Good (unsupported) Sitting: Dynamic Fair (occasional)    Standing: Static Fair    Standing: Dynamic Poor (constant support)    Supine to Sit 4 (Minimal assistance)    Sit to Stand Assistance Moderate assistance    Transfer Assist Score 3    Transfer Type SPT without device      Cognition   Performance Comments   Orientation Level Oriented X4    Comprehension Level 7(Pt comprehends complex ideas) Mode: Auditory  Hearing Aid:None  Glasses:Glasses   Expression (Native Language) 7 Mode: Verbal  (Pt able to express complex ideas)   Social Interaction/Pragmatics 6 (Pt needs to be redirected at times)   Problem Solving 6 (Pt able to solve basic routine problems consistently)   Memory 6 (Recognizes familiar faces; knows daily routines)   Comments       Activities of Daily Living    Score Comments   Self-Feeding 6 (Extra time required)   Grooming 5 Tasks completed by patient:    (set up assist sitting w extra time due to fatigue)   Bathing 4 Body Parts Bathe: Abdomen, Arm, left, Arm, right, Chest, Nicol area, Thigh, left, Thigh, right  (Pt bathed 8 of 11 parts. sponge bath required )   Tub/Shower Transfer [place] 0 Type of Shower:Bed bath  Adaptive  Equipment:Walker   Upper Body  Dressing/Undressing 4 Items Applied:Pullover (4 steps)  (Assist w 1 of 4 parts)   Lower Body Dressing/Undressing 2 Items Applied:   (Assist w >50% this date)   Toileting 2 (Pt performed 1 of 3 parts)   Toilet Transfer 2 (Patient required 70% assist for sit to stand)     Vision/Perception: No deficits noted.     Instrumental Activities of Daily Living   Performance Comments   Meal Preperation Maximum assistance    Homemaking Maximum assistance    Medication Management Moderate assistance    Financial Management Moderate assistance        Session: OT Initial Evaluation, ADL retraining, Patient education, Functional mobility skills, and patient educaiton    Interdisciplinary Communication: PT, RN     Patient/Family Education: Patient was/were educated On the role of OT, On POC and On IRC expectations. Problem List: Activity Tolerance, Safety Awareness, Strength, Pain and Standing Balance    Functional Limitations: ADL, IADL, Functional Transfers and Functional Mobility    Goals: Please see Care Plan    OT order received and chart reviewed. OT orders have been acknowledged. Patient will benefit from skilled OT services to address ADL, functional transfers, UE strength, balance and activity tolerance to maximize functional performance with daily self-care tasks and functional mobility. Treatment is likely to include ADL, Balance, Strength, Activity tolerance, Neuro-muscular re-education, Visual perceptual, Cognitive, DME, AE, Family  and Safety awareness training to increase independence with self-care. Patient will be seen for 1.5-2 hours of skilled OT services 5-6 days a week.      Kathie Portillo, OT

## 2019-06-01 NOTE — PROGRESS NOTES
Dressing change to Rt thoracotomy site complete per MD order at 2330. Has had some complaints of pain at surgical site. Prn pain med given as ordered.

## 2019-06-02 ENCOUNTER — APPOINTMENT (OUTPATIENT)
Dept: GENERAL RADIOLOGY | Age: 79
DRG: 181 | End: 2019-06-02
Attending: PHYSICAL MEDICINE & REHABILITATION
Payer: MEDICARE

## 2019-06-02 LAB
APPEARANCE UR: CLEAR
BACTERIA URNS QL MICRO: 0 /HPF
BILIRUB UR QL: NEGATIVE
CASTS URNS QL MICRO: ABNORMAL /LPF
COLOR UR: ABNORMAL
EPI CELLS #/AREA URNS HPF: ABNORMAL /HPF
GLUCOSE UR STRIP.AUTO-MCNC: NEGATIVE MG/DL
HGB UR QL STRIP: NEGATIVE
KETONES UR QL STRIP.AUTO: NEGATIVE MG/DL
LEUKOCYTE ESTERASE UR QL STRIP.AUTO: ABNORMAL
NITRITE UR QL STRIP.AUTO: NEGATIVE
PH UR STRIP: 5.5 [PH] (ref 5–9)
PROT UR STRIP-MCNC: 30 MG/DL
RBC #/AREA URNS HPF: ABNORMAL /HPF
SP GR UR REFRACTOMETRY: 1.02 (ref 1–1.02)
UROBILINOGEN UR QL STRIP.AUTO: 0.2 EU/DL (ref 0.2–1)
WBC URNS QL MICRO: ABNORMAL /HPF

## 2019-06-02 PROCEDURE — 74011250637 HC RX REV CODE- 250/637: Performed by: SURGERY

## 2019-06-02 PROCEDURE — 74011250636 HC RX REV CODE- 250/636: Performed by: SURGERY

## 2019-06-02 PROCEDURE — 99232 SBSQ HOSP IP/OBS MODERATE 35: CPT | Performed by: PHYSICAL MEDICINE & REHABILITATION

## 2019-06-02 PROCEDURE — 74011000258 HC RX REV CODE- 258: Performed by: SURGERY

## 2019-06-02 PROCEDURE — 87086 URINE CULTURE/COLONY COUNT: CPT

## 2019-06-02 PROCEDURE — 71045 X-RAY EXAM CHEST 1 VIEW: CPT

## 2019-06-02 PROCEDURE — 74011250636 HC RX REV CODE- 250/636: Performed by: PHYSICAL MEDICINE & REHABILITATION

## 2019-06-02 PROCEDURE — 94760 N-INVAS EAR/PLS OXIMETRY 1: CPT

## 2019-06-02 PROCEDURE — 65310000000 HC RM PRIVATE REHAB

## 2019-06-02 PROCEDURE — 77010033678 HC OXYGEN DAILY

## 2019-06-02 PROCEDURE — 81001 URINALYSIS AUTO W/SCOPE: CPT

## 2019-06-02 PROCEDURE — 94640 AIRWAY INHALATION TREATMENT: CPT

## 2019-06-02 PROCEDURE — 74011250637 HC RX REV CODE- 250/637: Performed by: PHYSICAL MEDICINE & REHABILITATION

## 2019-06-02 PROCEDURE — 87205 SMEAR GRAM STAIN: CPT

## 2019-06-02 PROCEDURE — 74011000250 HC RX REV CODE- 250: Performed by: PHYSICAL MEDICINE & REHABILITATION

## 2019-06-02 RX ORDER — HYDROMORPHONE HYDROCHLORIDE 2 MG/1
2 TABLET ORAL
Status: DISCONTINUED | OUTPATIENT
Start: 2019-06-02 | End: 2019-06-12 | Stop reason: HOSPADM

## 2019-06-02 RX ADMIN — HYDROMORPHONE HYDROCHLORIDE 2 MG: 2 TABLET ORAL at 17:45

## 2019-06-02 RX ADMIN — ALBUTEROL SULFATE 2.5 MG: 2.5 SOLUTION RESPIRATORY (INHALATION) at 05:35

## 2019-06-02 RX ADMIN — HYDROCODONE BITARTRATE AND ACETAMINOPHEN 1 TABLET: 5; 325 TABLET ORAL at 06:35

## 2019-06-02 RX ADMIN — HYDROCODONE BITARTRATE AND ACETAMINOPHEN 1 TABLET: 5; 325 TABLET ORAL at 22:01

## 2019-06-02 RX ADMIN — ALBUTEROL SULFATE 2.5 MG: 2.5 SOLUTION RESPIRATORY (INHALATION) at 20:12

## 2019-06-02 RX ADMIN — Medication 10 ML: at 22:02

## 2019-06-02 RX ADMIN — POTASSIUM CHLORIDE 40 MEQ: 20 TABLET, EXTENDED RELEASE ORAL at 09:00

## 2019-06-02 RX ADMIN — CEFTRIAXONE SODIUM 1 G: 1 INJECTION, POWDER, FOR SOLUTION INTRAMUSCULAR; INTRAVENOUS at 16:10

## 2019-06-02 RX ADMIN — HYDROCODONE BITARTRATE AND ACETAMINOPHEN 1 TABLET: 5; 325 TABLET ORAL at 10:52

## 2019-06-02 RX ADMIN — TIOTROPIUM BROMIDE 18 MCG: 18 CAPSULE ORAL; RESPIRATORY (INHALATION) at 05:36

## 2019-06-02 RX ADMIN — FUROSEMIDE 40 MG: 20 TABLET ORAL at 09:01

## 2019-06-02 RX ADMIN — AMIODARONE HYDROCHLORIDE 400 MG: 200 TABLET ORAL at 09:01

## 2019-06-02 RX ADMIN — ALBUTEROL SULFATE 2.5 MG: 2.5 SOLUTION RESPIRATORY (INHALATION) at 11:30

## 2019-06-02 RX ADMIN — Medication 10 ML: at 14:38

## 2019-06-02 RX ADMIN — BUDESONIDE 500 MCG: 0.5 INHALANT RESPIRATORY (INHALATION) at 20:12

## 2019-06-02 RX ADMIN — HYDROCODONE BITARTRATE AND ACETAMINOPHEN 1 TABLET: 5; 325 TABLET ORAL at 16:04

## 2019-06-02 RX ADMIN — LEVOTHYROXINE SODIUM 75 MCG: 75 TABLET ORAL at 09:01

## 2019-06-02 RX ADMIN — BUDESONIDE 500 MCG: 0.5 INHALANT RESPIRATORY (INHALATION) at 05:35

## 2019-06-02 RX ADMIN — GABAPENTIN 300 MG: 300 CAPSULE ORAL at 22:01

## 2019-06-02 RX ADMIN — GABAPENTIN 300 MG: 300 CAPSULE ORAL at 16:05

## 2019-06-02 RX ADMIN — SENNOSIDES AND DOCUSATE SODIUM 2 TABLET: 8.6; 5 TABLET ORAL at 09:01

## 2019-06-02 RX ADMIN — PANTOPRAZOLE SODIUM 40 MG: 40 TABLET, DELAYED RELEASE ORAL at 06:35

## 2019-06-02 RX ADMIN — ALBUTEROL SULFATE 2.5 MG: 2.5 SOLUTION RESPIRATORY (INHALATION) at 15:55

## 2019-06-02 RX ADMIN — Medication 10 ML: at 06:36

## 2019-06-02 RX ADMIN — ENOXAPARIN SODIUM 40 MG: 40 INJECTION SUBCUTANEOUS at 16:18

## 2019-06-02 RX ADMIN — GABAPENTIN 300 MG: 300 CAPSULE ORAL at 09:01

## 2019-06-02 RX ADMIN — HYDROCODONE BITARTRATE AND ACETAMINOPHEN 1 TABLET: 5; 325 TABLET ORAL at 00:20

## 2019-06-02 RX ADMIN — SENNOSIDES AND DOCUSATE SODIUM 2 TABLET: 8.6; 5 TABLET ORAL at 17:45

## 2019-06-02 RX ADMIN — AMIODARONE HYDROCHLORIDE 400 MG: 200 TABLET ORAL at 17:44

## 2019-06-02 NOTE — PROGRESS NOTES
SFD PROGRESS NOTE    Regina Nath  Admit Date: 5/31/2019  Admit Diagnosis: Lung cancer ([facility] [C34.90]; History of thoracotomy [Z98.890]; Hypoxia [R09.02]; Pain [R52]; Debility [R53.81]; Small cell carcinoma (HCC) [C80.1]; Atrial fibrillation with RVR (HCC) [I48.91];COPD (chronic obstructive pulmonary disease) ([facility] [J44.9]; Gait difficulty [R26.9]    Subjective     Vss. Afebrile. Nurse reports more purulent drainage. continued on 3-5L/min O2,.c/o chest wall pain. Pt wound dressing changed,dressing is soaked with grey, greenish foul smelling drainage. Wound cultured.     Objective:     Current Facility-Administered Medications   Medication Dose Route Frequency    phenol throat spray (CHLORASEPTIC) 1 Spray  1 Spray Oral PRN    potassium chloride (K-DUR, KLOR-CON) SR tablet 40 mEq  40 mEq Oral DAILY    naloxone (NARCAN) injection 0.4 mg  0.4 mg IntraVENous PRN    ondansetron (ZOFRAN) injection 4 mg  4 mg IntraVENous Q4H PRN    sodium chloride (NS) flush 5-40 mL  5-40 mL IntraVENous Q8H    sodium chloride (NS) flush 5-40 mL  5-40 mL IntraVENous PRN    albuterol (PROVENTIL VENTOLIN) nebulizer solution 2.5 mg  2.5 mg Nebulization QID RT    [START ON 6/4/2019] amiodarone (CORDARONE) tablet 200 mg  200 mg Oral DAILY    amiodarone (CORDARONE) tablet 400 mg  400 mg Oral BID    budesonide (PULMICORT) 500 mcg/2 ml nebulizer suspension  500 mcg Nebulization BID RT    cefTRIAXone (ROCEPHIN) 1 g in 0.9% sodium chloride (MBP/ADV) 50 mL  1 g IntraVENous Q24H    enoxaparin (LOVENOX) injection 40 mg  40 mg SubCUTAneous Q24H    furosemide (LASIX) tablet 40 mg  40 mg Oral DAILY    gabapentin (NEURONTIN) capsule 300 mg  300 mg Oral TID    HYDROcodone-acetaminophen (NORCO) 5-325 mg per tablet 1 Tab  1 Tab Oral Q4H PRN    levothyroxine (SYNTHROID) tablet 75 mcg  75 mcg Oral DAILY    magnesium hydroxide (MILK OF MAGNESIA) 400 mg/5 mL oral suspension 30 mL  30 mL Oral DAILY PRN    magnesium hydroxide (MILK OF MAGNESIA) 400 mg/5 mL oral suspension 30 mL  30 mL Oral DAILY    pantoprazole (PROTONIX) tablet 40 mg  40 mg Oral ACB    senna-docusate (PERICOLACE) 8.6-50 mg per tablet 2 Tab  2 Tab Oral BID    tiotropium (SPIRIVA) inhalation capsule 18 mcg  1 Cap Inhalation DAILY     Review of Systems:Denies chest pain, shortness of breath, cough, headache, visual problems, abdominal pain, dysurea, calf pain. Pertinent positives are as noted in the medical records and unremarkable otherwise. Visit Vitals  BP 96/59 (BP 1 Location: Left arm, BP Patient Position: At rest)   Pulse 80   Temp 98.3 °F (36.8 °C)   Resp 16   SpO2 90%      Physical Exam:   Psych: Patient was oriented to person, place, and time. NAD on 3L O2 via NC. General:   Alert, appears stated age, No acute distress. HEENT:  Normocephalic, EOMI, facial symmetry  Intact. no JVD   Lungs:  Crackles thoughout B lung fields. Respiration even and unlabored   No apparent use of accessory muscles for respiration. Heart:  Regular rate and rhythm, S1, S2, No obvious murmur    Genitourinary: defered   Abdomen:  Soft, non-tender to palpation in all four quadrants. Neuromuscular:   PERRL, EOMI  Speech is clear.   Follows simple commands consistently. Able to identify, recall repeat. Mood appropriate.   + generalized weakness 4 to 5-/5 throughout major muscle groups.   Sensory - intact   Skin:  Intact, dry, good skin turgor, age related changes present   Edema: 1+ BLE edema   Incision:  Calf non tender BLE. Right chest wallvincision partly open, draining greyish, drainage.                                                                                      Functional Assessment:          Balance  Sitting - Static: Good (unsupported) (06/01/19 1400)  Sitting - Dynamic: Fair (occasional) (06/01/19 1400)  Standing - Static: Fair(with RW) (06/01/19 1400)  Standing - Dynamic : Impaired (06/01/19 1400)                     Ruby Frias Fall Risk Assessment:  Ana Hoff Risk  Mobility: Ambulates or transfers with assist devices or assistance (06/02/19 0700)  Mobility Interventions: Patient to call before getting OOB;Utilize walker, cane, or other assistive device (06/02/19 0700)  Mentation: Alert, oriented x 3 (06/02/19 0700)  Medication: Patient receiving anticonvulsants, sedatives(tranquilizers), psychotropics or hypnotics, hypoglycemics, narcotics, sleep aids, antihypertensives, laxatives, or diuretics (06/02/19 0700)  Medication Interventions: Evaluate medications/consider consulting pharmacy; Patient to call before getting OOB (06/02/19 0700)  Elimination: Needs assistance with toileting (06/02/19 0700)  Elimination Interventions: Call light in reach; Patient to call for help with toileting needs (06/02/19 0700)  Prior Fall History: Before admission in past 12 months _home or previous inpatient care) (06/02/19 0700)  History of Falls Interventions: Consult care management for discharge planning;Door open when patient unattended (06/02/19 0700)  Total Score: 4 (06/02/19 0700)  Standard Fall Precautions: Yes (06/01/19 0710)  High Fall Risk: Yes (06/02/19 0700)     Speech Assessment:         Ambulation:  Gait  Distance (ft): 60 Feet (ft)(60ft x 2 with 5 minute sitting rest break) (06/01/19 1400)  Assistive Device: Walker, rolling (06/01/19 1400)     Labs/Studies:  Recent Results (from the past 72 hour(s))   CBC WITH AUTOMATED DIFF    Collection Time: 05/31/19  5:10 AM   Result Value Ref Range    WBC 14.0 (H) 4.3 - 11.1 K/uL    RBC 2.93 (L) 4.05 - 5.2 M/uL    HGB 8.3 (L) 11.7 - 15.4 g/dL    HCT 26.9 (L) 35.8 - 46.3 %    MCV 91.8 79.6 - 97.8 FL    MCH 28.3 26.1 - 32.9 PG    MCHC 30.9 (L) 31.4 - 35.0 g/dL    RDW 15.4 (H) 11.9 - 14.6 %    PLATELET 257 203 - 301 K/uL    MPV 9.5 9.4 - 12.3 FL    ABSOLUTE NRBC 0.00 0.0 - 0.2 K/uL    DF AUTOMATED      NEUTROPHILS 82 (H) 43 - 78 %    LYMPHOCYTES 7 (L) 13 - 44 %    MONOCYTES 8 4.0 - 12.0 %    EOSINOPHILS 2 0.5 - 7.8 %    BASOPHILS 0 0.0 - 2.0 %    IMMATURE GRANULOCYTES 1 0.0 - 5.0 %    ABS. NEUTROPHILS 11.6 (H) 1.7 - 8.2 K/UL    ABS. LYMPHOCYTES 1.0 0.5 - 4.6 K/UL    ABS. MONOCYTES 1.1 0.1 - 1.3 K/UL    ABS. EOSINOPHILS 0.2 0.0 - 0.8 K/UL    ABS. BASOPHILS 0.1 0.0 - 0.2 K/UL    ABS. IMM. GRANS. 0.1 0.0 - 0.5 K/UL   METABOLIC PANEL, BASIC    Collection Time: 05/31/19  5:10 AM   Result Value Ref Range    Sodium 136 136 - 145 mmol/L    Potassium 3.6 3.5 - 5.1 mmol/L    Chloride 99 98 - 107 mmol/L    CO2 31 21 - 32 mmol/L    Anion gap 6 (L) 7 - 16 mmol/L    Glucose 104 (H) 65 - 100 mg/dL    BUN 11 8 - 23 MG/DL    Creatinine 0.57 (L) 0.6 - 1.0 MG/DL    GFR est AA >60 >60 ml/min/1.73m2    GFR est non-AA >60 >60 ml/min/1.73m2    Calcium 7.8 (L) 8.3 - 10.4 MG/DL   CBC WITH AUTOMATED DIFF    Collection Time: 06/01/19  7:07 AM   Result Value Ref Range    WBC 15.7 (H) 4.3 - 11.1 K/uL    RBC 2.91 (L) 4.05 - 5.2 M/uL    HGB 8.3 (L) 11.7 - 15.4 g/dL    HCT 26.6 (L) 35.8 - 46.3 %    MCV 91.4 79.6 - 97.8 FL    MCH 28.5 26.1 - 32.9 PG    MCHC 31.2 (L) 31.4 - 35.0 g/dL    RDW 15.1 (H) 11.9 - 14.6 %    PLATELET 285 489 - 881 K/uL    MPV 9.1 (L) 9.4 - 12.3 FL    ABSOLUTE NRBC 0.00 0.0 - 0.2 K/uL    DF AUTOMATED      NEUTROPHILS 85 (H) 43 - 78 %    LYMPHOCYTES 5 (L) 13 - 44 %    MONOCYTES 8 4.0 - 12.0 %    EOSINOPHILS 1 0.5 - 7.8 %    BASOPHILS 0 0.0 - 2.0 %    IMMATURE GRANULOCYTES 1 0.0 - 5.0 %    ABS. NEUTROPHILS 13.3 (H) 1.7 - 8.2 K/UL    ABS. LYMPHOCYTES 0.8 0.5 - 4.6 K/UL    ABS. MONOCYTES 1.2 0.1 - 1.3 K/UL    ABS. EOSINOPHILS 0.1 0.0 - 0.8 K/UL    ABS. BASOPHILS 0.1 0.0 - 0.2 K/UL    ABS. IMM.  GRANS. 0.1 0.0 - 0.5 K/UL   MAGNESIUM    Collection Time: 06/01/19  7:07 AM   Result Value Ref Range    Magnesium 2.0 1.8 - 2.4 mg/dL   METABOLIC PANEL, BASIC    Collection Time: 06/01/19  7:07 AM   Result Value Ref Range    Sodium 139 136 - 145 mmol/L    Potassium 3.5 3.5 - 5.1 mmol/L    Chloride 99 98 - 107 mmol/L    CO2 33 (H) 21 - 32 mmol/L    Anion gap 7 7 - 16 mmol/L Glucose 113 (H) 65 - 100 mg/dL    BUN 15 8 - 23 MG/DL    Creatinine 0.64 0.6 - 1.0 MG/DL    GFR est AA >60 >60 ml/min/1.73m2    GFR est non-AA >60 >60 ml/min/1.73m2    Calcium 8.3 8.3 - 10.4 MG/DL   PROTHROMBIN TIME + INR    Collection Time: 06/01/19  7:07 AM   Result Value Ref Range    Prothrombin time 14.5 11.7 - 14.5 sec    INR 1.1     URINALYSIS W/ RFLX MICROSCOPIC    Collection Time: 06/02/19  9:52 AM   Result Value Ref Range    Color DARK      Appearance CLEAR      Specific gravity 1.020 1.001 - 1.023      pH (UA) 5.5 5.0 - 9.0      Protein 30 (A) NEG mg/dL    Glucose NEGATIVE  mg/dL    Ketone NEGATIVE  NEG mg/dL    Bilirubin NEGATIVE  NEG      Blood NEGATIVE  NEG      Urobilinogen 0.2 0.2 - 1.0 EU/dL    Nitrites NEGATIVE  NEG      Leukocyte Esterase TRACE (A) NEG      WBC 5-10 0 /hpf    RBC 0-3 0 /hpf    Epithelial cells 5-10 0 /hpf    Bacteria 0 0 /hpf    Casts 5-10 0 /lpf       Assessment:     Problem List as of 6/2/2019 Date Reviewed: 5/31/2019          Codes Class Noted - Resolved    Debility ICD-10-CM: R53.81  ICD-9-CM: 799.3  5/31/2019 - Present        Lung cancer ([facility] ICD-10-CM: C34.90  ICD-9-CM: 162.9  5/31/2019 - Present        Gait difficulty ICD-10-CM: R26.9  ICD-9-CM: 781.2  5/31/2019 - Present        Pain ICD-10-CM: R52  ICD-9-CM: 780.96  5/31/2019 - Present        Small cell carcinoma (HCC) ICD-10-CM: C80.1  ICD-9-CM: 199.1  5/31/2019 - Present        Atrial fibrillation with RVR (HCC) ICD-10-CM: I48.91  ICD-9-CM: 427.31  5/31/2019 - Present        History of thoracotomy ICD-10-CM: Z98.890  ICD-9-CM: V45.89  5/31/2019 - Present        Non-small cell lung cancer (NSCLC) (HCC) ICD-10-CM: C34.90  ICD-9-CM: 162.9  5/30/2019 - Present        UTI (urinary tract infection) ICD-10-CM: N39.0  ICD-9-CM: 599.0  5/29/2019 - Present        Atrial fibrillation with rapid ventricular response (HCC) ICD-10-CM: I48.91  ICD-9-CM: 427.31  5/26/2019 - Present        Normochromic anemia (Chronic) ICD-10-CM: D64.9  ICD-9-CM: 285.9  5/26/2019 - Present        Hypoxia ICD-10-CM: R09.02  ICD-9-CM: 799.02  5/24/2019 - Present        COPD (chronic obstructive pulmonary disease) (HCC) (Chronic) ICD-10-CM: J44.9  ICD-9-CM: 496  5/24/2019 - Present        Aftercare following surgery ICD-10-CM: Z48.89  ICD-9-CM: V58.89  5/22/2019 - Present        Right lower lobe lung mass ICD-10-CM: R91.8  ICD-9-CM: 786.6  5/22/2019 - Present        HTN (hypertension) (Chronic) ICD-10-CM: I10  ICD-9-CM: 401.9  4/12/2019 - Present        Acquired hypothyroidism (Chronic) ICD-10-CM: E03.9  ICD-9-CM: 244.9  4/12/2019 - Present        Personal history of tobacco use, presenting hazards to health (Chronic) ICD-10-CM: S29.044  ICD-9-CM: V15.82  3/18/2019 - Present        Liver lesion ICD-10-CM: K76.9  ICD-9-CM: 573.8  3/18/2019 - Present    Overview Signed 5/30/2019 11:14 AM by WILLIAN Rees. No uptake per PET             Centrilobular emphysema (HCC) (Chronic) ICD-10-CM: J43.2  ICD-9-CM: 492.8  3/18/2019 - Present        RESOLVED: Atrial fibrillation with RVR (HCC) ICD-10-CM: I48.91  ICD-9-CM: 427.31  5/26/2019 - 5/29/2019        RESOLVED: Acute respiratory failure with hypoxia [facility]) ICD-10-CM: J96.01  ICD-9-CM: 518.81  4/12/2019 - 5/30/2019              Plan:   intensive Physical Therapy for a minimum of 1.5 hours a day, at least 5 out of 7 days per week to address bed mobility, transfers, ambulation, strengthening, balance, and endurance. intensive Occupational Therapy for a minimum of 1.5 hours a day, at least 5 out of 7 days per week to address ADL ( bathing, LE dressing, toileting) and adaptive equipment as needed.   - patient will be limited due to pain, hypoxia, fatigue.   - focus on endurance, strength, energy conservation techniques.     The patient will also require 24-hour skilled rehabilitation nursing for bowel and bladder management, skin care for decubitus ulcer prevention , pain management and ongoing medication administration      The patient may benefit from a psychology consult for depression, anxiety and adjustment disorder.     Continue daily physician medical management:  POSTOPERATIVE DIAGNOSIS:  Right lung cancer.     PROCEDURE PERFORMED:  1.  Right thoracoscopy switched to right thoracotomy with extensive pneumonolysis. 2.  Right lower lobe lobectomy. 3.  Right upper lobe blebectomy x2.  4.  Diaphragm resection with primary repair. 5.  Chest wall resection. 6.  Mediastinal lymphadenectomy. 7.  Intercostal nerve cryoablation.     - Non-small cell lung cancer (NSCLC) ([facility] (5/30/2019)  - Aftercare following surgery -Wound Care: Monitor wound status daily per staff and physician. At risk for failure. Will require 24/7 rehab nursing. Surgery to direct, follow. Keep incisions clean and dry, may remain uncovered. - Wet to dry dressing changes to chest wound daily. - 6/1 stable wound. 6/2  Continued malodorous drainage. Will send for cx.     Hypoxia (5/24/2019)/COPD (chronic obstructive pulmonary disease) ([facility] (5/24/2019)  - proventil scheduled 4x/day  - spiriva  - monitor SaO2. O2 a via NC to keep SaO2 >90%. - continue O2, 4-5L/min needed presently     Atrial fibrillation with rapid ventricular response ([facility] (5/26/2019)  - amiodarone. Will reduce dose as scheduled. - lasix 40 mg daily. Monitor renal function. 6/1 -HR in control. Continue amiodarone 400 bid, wean to 200 daily in 2 days.          Normochromic anemia (5/26/2019)- s/p transfusions. - monitor. Transfuse as needed. 6/1- hgb 8.3     Leukocytosis/UTI (urinary tract infection) (5/29/2019) - Complete a 5 day course of Rocephin for UTI.  - 6/1 - still mild leukocytosis. Rocephin until 6/3  6/2 - continued on Rocephin for UTI, however still R thoracotomy concern for conurrent infection. cx sent.  May need reconsideration for antibiotic therapy for thoracotomy, surgical bed.     Pneumonia prophylaxis- Insentive spirometer every hour while awake     DVT risk / DVT Prophylaxis-    - Lovenox subq daily.      Pain Control: no current joint or muscle symptoms, essentially pain-free. Will require regular pain assessment and comprenhensive pain management. - pain due to chest wound, and during dressing changes, packing.   - norco prn. Dilaudid iv if needed. - gabapentin 300mg tid     Potential urinary retention/  - schedule voids q6-8 hrs. Check post-void residual every shift; In and Out catheterize if post-void residual is more than 400 cc.     bowel program - erica-colace. MOM prn     GERD - resume PPI. At times may need additional antacids, Maalox prn. Time spent was 25 minutes with over 1/2 in direct patient care/examination, consultation and coordination of care. Signed By: Alexandra Yang MD     June 2, 2019     notified of hypoxia in evening. Pt requiring highr flow O2 to keep SaO2 >90%. Pt noted to be sleeping comfortably, not tachypneic.    Cxr to evaluate for increased congestion/ infiltrate

## 2019-06-02 NOTE — PROGRESS NOTES
AM PT: Spoke with MD and RN regarding patient's wound status and pain level. Discussed with patient scheduled PT for this AM. Patient reporting she had just had a breathing treatment and prior to that a dressing change and bed bath. Reports dressing change and bath took over 20 minutes and she is exhausted  And pain is a 9 out of 10. Reports her wound has been drainage more and they are having to change the dressing more.  Treatment deferred per patient request. Attempt to resume treatment tomorrow AM  Mariam Reyes, PT  6/2/2019

## 2019-06-02 NOTE — PROGRESS NOTES
Problem: Falls - Risk of  Goal: *Absence of Falls  Description  Document [facility] Fall Risk and appropriate interventions in the flowsheet.   Outcome: Progressing Towards Goal     Problem: Patient Education: Go to Patient Education Activity  Goal: Patient/Family Education  Outcome: Progressing Towards Goal

## 2019-06-02 NOTE — PROGRESS NOTES
R thoracotomy dressing changed. Old dressing with large amount of tan drainage. Old gauze packing removed from incision. New wet to dry guaze packed in both incision wounds. ABD pads placed over incision. Foul smell noted. MD notified of incision condition and called in to assess. MD ordered culture to be done next dressing change. Pt complaining of severe pain during this dressing change. Pain medicine administered.

## 2019-06-02 NOTE — PROGRESS NOTES
Reviewed notes for spiritual concerns   knows patient from previous floor  Will follow    Pablito Muniz, staff Ankit duncan [address], [address]  /   [email or web address]

## 2019-06-02 NOTE — PROGRESS NOTES
Culture from incision site sent to lab. Dressing repacked with new wet to dry dressing and dry abd pad placed over incision.

## 2019-06-02 NOTE — PROGRESS NOTES
Problem: Falls - Risk of  Goal: *Absence of Falls  Description  Document Tia Manus Fall Risk and appropriate interventions in the flowsheet.   Outcome: Progressing Towards Goal  Note:   Fall Risk Interventions:  Mobility Interventions: Patient to call before getting OOB, Utilize walker, cane, or other assistive device         Medication Interventions: Evaluate medications/consider consulting pharmacy, Patient to call before getting OOB    Elimination Interventions: Call light in reach, Patient to call for help with toileting needs    History of Falls Interventions: Consult care management for discharge planning, Door open when patient unattended

## 2019-06-03 LAB
ANION GAP SERPL CALC-SCNC: 7 MMOL/L (ref 7–16)
BASOPHILS # BLD: 0.1 K/UL (ref 0–0.2)
BASOPHILS NFR BLD: 0 % (ref 0–2)
BUN SERPL-MCNC: 14 MG/DL (ref 8–23)
CALCIUM SERPL-MCNC: 8.2 MG/DL (ref 8.3–10.4)
CHLORIDE SERPL-SCNC: 98 MMOL/L (ref 98–107)
CO2 SERPL-SCNC: 31 MMOL/L (ref 21–32)
CREAT SERPL-MCNC: 0.58 MG/DL (ref 0.6–1)
DIFFERENTIAL METHOD BLD: ABNORMAL
EOSINOPHIL # BLD: 0 K/UL (ref 0–0.8)
EOSINOPHIL NFR BLD: 0 % (ref 0.5–7.8)
ERYTHROCYTE [DISTWIDTH] IN BLOOD BY AUTOMATED COUNT: 15.5 % (ref 11.9–14.6)
GLUCOSE SERPL-MCNC: 102 MG/DL (ref 65–100)
HCT VFR BLD AUTO: 26.1 % (ref 35.8–46.3)
HGB BLD-MCNC: 8.3 G/DL (ref 11.7–15.4)
IMM GRANULOCYTES # BLD AUTO: 0.1 K/UL (ref 0–0.5)
IMM GRANULOCYTES NFR BLD AUTO: 1 % (ref 0–5)
LYMPHOCYTES # BLD: 0.9 K/UL (ref 0.5–4.6)
LYMPHOCYTES NFR BLD: 7 % (ref 13–44)
MCH RBC QN AUTO: 28.6 PG (ref 26.1–32.9)
MCHC RBC AUTO-ENTMCNC: 31.8 G/DL (ref 31.4–35)
MCV RBC AUTO: 90 FL (ref 79.6–97.8)
MONOCYTES # BLD: 1.3 K/UL (ref 0.1–1.3)
MONOCYTES NFR BLD: 10 % (ref 4–12)
NEUTS SEG # BLD: 10.9 K/UL (ref 1.7–8.2)
NEUTS SEG NFR BLD: 82 % (ref 43–78)
NRBC # BLD: 0 K/UL (ref 0–0.2)
PLATELET # BLD AUTO: 544 K/UL (ref 150–450)
PMV BLD AUTO: 8.9 FL (ref 9.4–12.3)
POTASSIUM SERPL-SCNC: 3.9 MMOL/L (ref 3.5–5.1)
RBC # BLD AUTO: 2.9 M/UL (ref 4.05–5.2)
SODIUM SERPL-SCNC: 136 MMOL/L (ref 136–145)
WBC # BLD AUTO: 13.3 K/UL (ref 4.3–11.1)

## 2019-06-03 PROCEDURE — 97535 SELF CARE MNGMENT TRAINING: CPT

## 2019-06-03 PROCEDURE — 74011000258 HC RX REV CODE- 258: Performed by: PHYSICAL MEDICINE & REHABILITATION

## 2019-06-03 PROCEDURE — 74750000023 HC WOUND THERAPY

## 2019-06-03 PROCEDURE — 85025 COMPLETE CBC W/AUTO DIFF WBC: CPT

## 2019-06-03 PROCEDURE — 94760 N-INVAS EAR/PLS OXIMETRY 1: CPT

## 2019-06-03 PROCEDURE — 65310000000 HC RM PRIVATE REHAB

## 2019-06-03 PROCEDURE — 74011000250 HC RX REV CODE- 250: Performed by: PHYSICAL MEDICINE & REHABILITATION

## 2019-06-03 PROCEDURE — 97116 GAIT TRAINING THERAPY: CPT

## 2019-06-03 PROCEDURE — 74011250636 HC RX REV CODE- 250/636: Performed by: PHYSICAL MEDICINE & REHABILITATION

## 2019-06-03 PROCEDURE — 97605 NEG PRS WND THER DME<=50SQCM: CPT

## 2019-06-03 PROCEDURE — 77030019952 HC CANSTR VAC ASST KCON -B

## 2019-06-03 PROCEDURE — 80048 BASIC METABOLIC PNL TOTAL CA: CPT

## 2019-06-03 PROCEDURE — 77030019934 HC DRSG VAC ASST KCON -B

## 2019-06-03 PROCEDURE — 99232 SBSQ HOSP IP/OBS MODERATE 35: CPT | Performed by: PHYSICAL MEDICINE & REHABILITATION

## 2019-06-03 PROCEDURE — 74011250637 HC RX REV CODE- 250/637: Performed by: SURGERY

## 2019-06-03 PROCEDURE — 97530 THERAPEUTIC ACTIVITIES: CPT

## 2019-06-03 PROCEDURE — 74011250637 HC RX REV CODE- 250/637: Performed by: PHYSICAL MEDICINE & REHABILITATION

## 2019-06-03 PROCEDURE — 97110 THERAPEUTIC EXERCISES: CPT

## 2019-06-03 PROCEDURE — 94640 AIRWAY INHALATION TREATMENT: CPT

## 2019-06-03 PROCEDURE — 94762 N-INVAS EAR/PLS OXIMTRY CONT: CPT

## 2019-06-03 PROCEDURE — 87040 BLOOD CULTURE FOR BACTERIA: CPT

## 2019-06-03 PROCEDURE — 36415 COLL VENOUS BLD VENIPUNCTURE: CPT

## 2019-06-03 PROCEDURE — 77010033678 HC OXYGEN DAILY

## 2019-06-03 RX ORDER — LORAZEPAM 1 MG/1
1 TABLET ORAL
Status: DISCONTINUED | OUTPATIENT
Start: 2019-06-03 | End: 2019-06-10

## 2019-06-03 RX ORDER — VANCOMYCIN/0.9 % SOD CHLORIDE 1.5G/250ML
1500 PLASTIC BAG, INJECTION (ML) INTRAVENOUS ONCE
Status: COMPLETED | OUTPATIENT
Start: 2019-06-03 | End: 2019-06-04

## 2019-06-03 RX ORDER — ACETAMINOPHEN 325 MG/1
650 TABLET ORAL
Status: DISCONTINUED | OUTPATIENT
Start: 2019-06-03 | End: 2019-06-12 | Stop reason: HOSPADM

## 2019-06-03 RX ADMIN — POTASSIUM CHLORIDE 40 MEQ: 20 TABLET, EXTENDED RELEASE ORAL at 08:02

## 2019-06-03 RX ADMIN — BUDESONIDE 500 MCG: 0.5 INHALANT RESPIRATORY (INHALATION) at 05:27

## 2019-06-03 RX ADMIN — Medication: at 17:21

## 2019-06-03 RX ADMIN — PANTOPRAZOLE SODIUM 40 MG: 40 TABLET, DELAYED RELEASE ORAL at 06:26

## 2019-06-03 RX ADMIN — ENOXAPARIN SODIUM 40 MG: 40 INJECTION SUBCUTANEOUS at 17:20

## 2019-06-03 RX ADMIN — VANCOMYCIN HYDROCHLORIDE 1500 MG: 10 INJECTION, POWDER, LYOPHILIZED, FOR SOLUTION INTRAVENOUS at 23:00

## 2019-06-03 RX ADMIN — ALBUTEROL SULFATE 2.5 MG: 2.5 SOLUTION RESPIRATORY (INHALATION) at 17:12

## 2019-06-03 RX ADMIN — Medication 10 ML: at 20:40

## 2019-06-03 RX ADMIN — ACETAMINOPHEN 650 MG: 325 TABLET, FILM COATED ORAL at 18:31

## 2019-06-03 RX ADMIN — FUROSEMIDE 40 MG: 20 TABLET ORAL at 08:02

## 2019-06-03 RX ADMIN — CEFEPIME 2 G: 2 INJECTION, POWDER, FOR SOLUTION INTRAVENOUS at 22:00

## 2019-06-03 RX ADMIN — GABAPENTIN 300 MG: 300 CAPSULE ORAL at 20:39

## 2019-06-03 RX ADMIN — ALBUTEROL SULFATE 2.5 MG: 2.5 SOLUTION RESPIRATORY (INHALATION) at 14:13

## 2019-06-03 RX ADMIN — TIOTROPIUM BROMIDE 18 MCG: 18 CAPSULE ORAL; RESPIRATORY (INHALATION) at 09:56

## 2019-06-03 RX ADMIN — ALBUTEROL SULFATE 2.5 MG: 2.5 SOLUTION RESPIRATORY (INHALATION) at 05:27

## 2019-06-03 RX ADMIN — Medication 5 ML: at 14:05

## 2019-06-03 RX ADMIN — HYDROCODONE BITARTRATE AND ACETAMINOPHEN 1 TABLET: 5; 325 TABLET ORAL at 10:26

## 2019-06-03 RX ADMIN — SENNOSIDES AND DOCUSATE SODIUM 2 TABLET: 8.6; 5 TABLET ORAL at 17:20

## 2019-06-03 RX ADMIN — SENNOSIDES AND DOCUSATE SODIUM 2 TABLET: 8.6; 5 TABLET ORAL at 08:02

## 2019-06-03 RX ADMIN — HYDROCODONE BITARTRATE AND ACETAMINOPHEN 1 TABLET: 5; 325 TABLET ORAL at 20:39

## 2019-06-03 RX ADMIN — ALBUTEROL SULFATE 2.5 MG: 2.5 SOLUTION RESPIRATORY (INHALATION) at 09:48

## 2019-06-03 RX ADMIN — LEVOTHYROXINE SODIUM 75 MCG: 75 TABLET ORAL at 08:02

## 2019-06-03 RX ADMIN — PANTOPRAZOLE SODIUM 40 MG: 40 TABLET, DELAYED RELEASE ORAL at 07:30

## 2019-06-03 RX ADMIN — GABAPENTIN 300 MG: 300 CAPSULE ORAL at 17:20

## 2019-06-03 RX ADMIN — BUDESONIDE 500 MCG: 0.5 INHALANT RESPIRATORY (INHALATION) at 09:56

## 2019-06-03 RX ADMIN — HYDROCODONE BITARTRATE AND ACETAMINOPHEN 1 TABLET: 5; 325 TABLET ORAL at 06:28

## 2019-06-03 RX ADMIN — GABAPENTIN 300 MG: 300 CAPSULE ORAL at 08:02

## 2019-06-03 RX ADMIN — TIOTROPIUM BROMIDE 18 MCG: 18 CAPSULE ORAL; RESPIRATORY (INHALATION) at 05:27

## 2019-06-03 RX ADMIN — LORAZEPAM 1 MG: 1 TABLET ORAL at 15:38

## 2019-06-03 NOTE — PROGRESS NOTES
6/3/2019    PLAN:  Consult wound care for possible wound vac to right chest              ASSESSMENT:  Admit Date: 5/31/2019   Surgery 5/22/19  Procedure(s):  1. RIGHT THORACOSCOPY switched to thoracotomy with extensive pneumonolysis  2. LOWER LOBECTOMY   3. Upper lobe blebectomy x 2  4. Diaphragm resection with primary repair  5. Chest wall resection  6. MEDISTINAL LYMPHADENECTOMY  7. Intercostal nerve cryoablation            SUBJECTIVE: Patient denies SOB. Currently O2 sat 93% with 8LNC, Nursing staff reports temp of 100F this AM.  Wound dressing changed. Old dressing with tan drainage noted. Will reinforce with staff dressing needs to be wet to dry packing. Will consult wound care for possible Vac. OBJECTIVE:  Patient Vitals for the past 24 hrs:   Temp Pulse Resp BP SpO2   06/03/19 0527 -- -- -- -- 92 %   06/02/19 2026 -- -- -- -- 91 %   06/02/19 2012 -- -- -- -- (!) 86 %   06/02/19 2010 -- -- -- -- (!) 86 %   06/02/19 1947 98.8 °F (37.1 °C) 85 16 97/51 90 %   06/02/19 1555 -- -- -- -- 94 %   06/02/19 1444 98.9 °F (37.2 °C) 74 16 97/62 90 %   06/02/19 1130 -- -- -- -- 97 %         Date 06/02/19 0700 - 06/03/19 0659 06/03/19 0700 - 06/04/19 0659   Shift 9284-9618 8114-4843 24 Hour Total 1570-1299 9100-3132 24 Hour Total   INTAKE   P.O. 240  240        P. O. 240  240      Shift Total 240  240      OUTPUT   Urine           Urine Occurrence(s) 4 x  4 x      Shift Total           240      Weight (kg)                  General:          No acute distress    Lungs:             CTA Bilaterally.  O2 8LNC, Dressing to right chest removed wound opened with few staples remaining, continues with tan drainage   Heart:              RRR  Abdomen:        Soft, Non distended, Non tender, BS+  Extremities:     No cyanosis, clubbing or edema  Neurologic:      No focal deficits           Labs:    Recent Labs     06/03/19  0640 06/01/19  0707   WBC 13.3* 15.7* HGB 8.3* 8.3*   * 441    139   K 3.9 3.5   CL 98 99   CO2 31 33*   BUN 14 15   CREA 0.58* 0.64   * 113*   PTP  --  14.5   INR  --  1.1         Abimael Nance, NP

## 2019-06-03 NOTE — PROGRESS NOTES
Pharmacokinetic Consult to Pharmacist    Elena Munozole is a 78 y.o. female being treated empirically with vancomycin and cefepime. Lab Results   Component Value Date/Time    BUN 14 06/03/2019 06:40 AM    Creatinine 0.58 (L) 06/03/2019 06:40 AM    WBC 13.3 (H) 06/03/2019 06:40 AM    Procalcitonin 0.3 05/28/2019 12:56 PM    Lactic Acid (POC) 0.87 04/25/2019 05:01 AM      CrCl cannot be calculated (Unknown ideal weight.). CULTURES:  All Micro Results     Procedure Component Value Units Date/Time    CULTURE, BLOOD [[id number]]     Order Status:  Sent Specimen:  Blood     CULTURE, BLOOD [[id number]]     Order Status:  Sent Specimen:  Blood     CULTURE, URINE [[id number]] Collected:  06/02/19 0952    Order Status:  Completed Specimen:  Urine from Clean catch Updated:  06/03/19 0945     Special Requests: NO SPECIAL REQUESTS        Culture result:       <10,000 COLONIES/mL MIXED SKIN ZHANNA ISOLATED          CULTURE, May Hernandez STAIN [[id number]] Collected:  06/02/19 1650    Order Status:  Completed Specimen:  Wound Drainage Updated:  06/03/19 0849     Special Requests: NO SPECIAL REQUESTS        GRAM STAIN PENDING     Culture result:       NO GROWTH AFTER SHORT PERIOD OF INCUBATION. FURTHER RESULTS TO FOLLOW AFTER OVERNIGHT INCUBATION. CULTURE, Terranceno Dudley [[id number]]     Order Status:  Canceled Specimen:  Wound Drainage           Day 1 of vancomycin. Goal trough is 15-20. Vancomycin dose initiated at 1500 mg IV x 1 followed by 1000 mg IV q18h. Levels will be ordered as clinically indicated. Pharmacy will continue to follow. Please call with any questions.     Thank you,  Paulette Lim, PharmD  Clinical Pharmacist  [phone]

## 2019-06-03 NOTE — PROGRESS NOTES
Pt with O2 sats in 80s. Unable to get sat above 86% on 10L high flow. . Call to Resp therapy and Dr. Scooby Jones. Order for stat CXR.

## 2019-06-03 NOTE — PROGRESS NOTES
SFD PROGRESS NOTE    Saturnino Sanchez  Admit Date: 5/31/2019  Admit Diagnosis: Lung cancer ([facility] [C34.90]; History of thoracotomy [Z98.890]; Hypoxia [R09.02]; Pain [R52]; Debility [R53.81]; Small cell carcinoma (HCC) [C80.1]; Atrial fibrillation with RVR (HCC) [I48.91];COPD (chronic obstructive pulmonary disease) ([facility] [J44.9]; Gait difficulty [R26.9]    Subjective     Vss. Afebrile. Nurse reports last night patient required higher flow O2, up to 9L/min. This morning at 5L/min. Still continued pain in chest wall. Surgery follow up appreciated. Wound cx without growth x 1 day. Patient having difficulty with participating in therapy due to on-going fatigue and pain.      Objective:     Current Facility-Administered Medications   Medication Dose Route Frequency    phenol throat spray (CHLORASEPTIC) 1 Spray  1 Spray Oral PRN    HYDROmorphone (DILAUDID) tablet 2 mg  2 mg Oral Q6H PRN    potassium chloride (K-DUR, KLOR-CON) SR tablet 40 mEq  40 mEq Oral DAILY    naloxone (NARCAN) injection 0.4 mg  0.4 mg IntraVENous PRN    ondansetron (ZOFRAN) injection 4 mg  4 mg IntraVENous Q4H PRN    sodium chloride (NS) flush 5-40 mL  5-40 mL IntraVENous Q8H    sodium chloride (NS) flush 5-40 mL  5-40 mL IntraVENous PRN    albuterol (PROVENTIL VENTOLIN) nebulizer solution 2.5 mg  2.5 mg Nebulization QID RT    [START ON 6/4/2019] amiodarone (CORDARONE) tablet 200 mg  200 mg Oral DAILY    amiodarone (CORDARONE) tablet 400 mg  400 mg Oral BID    budesonide (PULMICORT) 500 mcg/2 ml nebulizer suspension  500 mcg Nebulization BID RT    enoxaparin (LOVENOX) injection 40 mg  40 mg SubCUTAneous Q24H    furosemide (LASIX) tablet 40 mg  40 mg Oral DAILY    gabapentin (NEURONTIN) capsule 300 mg  300 mg Oral TID    HYDROcodone-acetaminophen (NORCO) 5-325 mg per tablet 1 Tab  1 Tab Oral Q4H PRN    levothyroxine (SYNTHROID) tablet 75 mcg  75 mcg Oral DAILY    magnesium hydroxide (MILK OF MAGNESIA) 400 mg/5 mL oral suspension 30 mL  30 mL Oral DAILY PRN    magnesium hydroxide (MILK OF MAGNESIA) 400 mg/5 mL oral suspension 30 mL  30 mL Oral DAILY    pantoprazole (PROTONIX) tablet 40 mg  40 mg Oral ACB    senna-docusate (PERICOLACE) 8.6-50 mg per tablet 2 Tab  2 Tab Oral BID    tiotropium (SPIRIVA) inhalation capsule 18 mcg  1 Cap Inhalation DAILY     Review of Systems: + chest wall pain. Denies chest pain, shortness of breath, cough, headache, visual problems, abdominal pain, dysurea, calf pain. Pertinent positives are as noted in the medical records and unremarkable otherwise. Visit Vitals  /62   Pulse 79   Temp 99.5 °F (37.5 °C)   Resp 22   SpO2 93%      Physical Exam:   Psych: Patient was oriented to person, place, and time. NAD on 5L O2 via NC. General:   Alert, appears stated age, No acute distress. HEENT:  Normocephalic, EOMI, facial symmetry  Intact. no JVD   Lungs:  + crackles thoughout R base. Respiration even and unlabored   No apparent use of accessory muscles for respiration. Heart:  Regular rate and rhythm, S1, S2, No obvious murmur    Genitourinary: defered   Abdomen:  Soft, non-tender to palpation in all four quadrants. Neuromuscular:   PERRL, EOMI  Speech is clear.   Follows simple commands consistently. Able to identify, recall repeat. Mood appropriate.   + generalized weakness   Sensory - intact   Skin:  Intact, dry, good skin turgor, age related changes present   Edema: none   Incision:  Calf non tender BLE. Right chest wall incision partly open, draining greyish, drainage.                                                                                      Functional Assessment:  Gross Assessment  AROM: Generally decreased, functional (06/03/19 1000)  Strength: Generally decreased, functional (06/03/19 1000)       Balance  Sitting - Static: Good (unsupported) (06/03/19 1000)  Sitting - Dynamic: Fair (occasional) (06/03/19 1000)  Standing - Static: Fair (06/03/19 1000)  Standing - Dynamic : Impaired (06/03/19 1000)           Toileting  Cues: Physical assistance for pants down;Physical assistance for pants up;Verbal cues provided (06/03/19 0701)         Jesus Hernandez Fall Risk Assessment:  Jesus Hernandez Fall Risk  Mobility: Ambulates or transfers with assist devices or assistance (06/03/19 0720)  Mobility Interventions: Utilize walker, cane, or other assistive device (06/02/19 2050)  Mentation: Alert, oriented x 3 (06/03/19 0720)  Medication: Patient receiving anticonvulsants, sedatives(tranquilizers), psychotropics or hypnotics, hypoglycemics, narcotics, sleep aids, antihypertensives, laxatives, or diuretics (06/03/19 0720)  Medication Interventions: Patient to call before getting OOB (06/02/19 2050)  Elimination: Needs assistance with toileting (06/03/19 0720)  Elimination Interventions: Call light in reach (06/02/19 2050)  Prior Fall History: Before admission in past 12 months _home or previous inpatient care) (06/03/19 0720)  History of Falls Interventions: Door open when patient unattended (06/02/19 2050)  Total Score: 4 (06/03/19 0720)  Standard Fall Precautions: Yes (06/01/19 0710)  High Fall Risk: Yes (06/03/19 0720)     Speech Assessment:         Ambulation:  Gait  Distance (ft): 15 Feet (ft) (06/03/19 1000)  Assistive Device: Walker, rolling (06/03/19 1000)     Labs/Studies:  Recent Results (from the past 72 hour(s))   CBC WITH AUTOMATED DIFF    Collection Time: 06/01/19  7:07 AM   Result Value Ref Range    WBC 15.7 (H) 4.3 - 11.1 K/uL    RBC 2.91 (L) 4.05 - 5.2 M/uL    HGB 8.3 (L) 11.7 - 15.4 g/dL    HCT 26.6 (L) 35.8 - 46.3 %    MCV 91.4 79.6 - 97.8 FL    MCH 28.5 26.1 - 32.9 PG    MCHC 31.2 (L) 31.4 - 35.0 g/dL    RDW 15.1 (H) 11.9 - 14.6 %    PLATELET 736 249 - 560 K/uL    MPV 9.1 (L) 9.4 - 12.3 FL    ABSOLUTE NRBC 0.00 0.0 - 0.2 K/uL    DF AUTOMATED      NEUTROPHILS 85 (H) 43 - 78 %    LYMPHOCYTES 5 (L) 13 - 44 %    MONOCYTES 8 4.0 - 12.0 %    EOSINOPHILS 1 0.5 - 7.8 %    BASOPHILS 0 0.0 - 2.0 % IMMATURE GRANULOCYTES 1 0.0 - 5.0 %    ABS. NEUTROPHILS 13.3 (H) 1.7 - 8.2 K/UL    ABS. LYMPHOCYTES 0.8 0.5 - 4.6 K/UL    ABS. MONOCYTES 1.2 0.1 - 1.3 K/UL    ABS. EOSINOPHILS 0.1 0.0 - 0.8 K/UL    ABS. BASOPHILS 0.1 0.0 - 0.2 K/UL    ABS. IMM. GRANS. 0.1 0.0 - 0.5 K/UL   MAGNESIUM    Collection Time: 06/01/19  7:07 AM   Result Value Ref Range    Magnesium 2.0 1.8 - 2.4 mg/dL   METABOLIC PANEL, BASIC    Collection Time: 06/01/19  7:07 AM   Result Value Ref Range    Sodium 139 136 - 145 mmol/L    Potassium 3.5 3.5 - 5.1 mmol/L    Chloride 99 98 - 107 mmol/L    CO2 33 (H) 21 - 32 mmol/L    Anion gap 7 7 - 16 mmol/L    Glucose 113 (H) 65 - 100 mg/dL    BUN 15 8 - 23 MG/DL    Creatinine 0.64 0.6 - 1.0 MG/DL    GFR est AA >60 >60 ml/min/1.73m2    GFR est non-AA >60 >60 ml/min/1.73m2    Calcium 8.3 8.3 - 10.4 MG/DL   PROTHROMBIN TIME + INR    Collection Time: 06/01/19  7:07 AM   Result Value Ref Range    Prothrombin time 14.5 11.7 - 14.5 sec    INR 1.1     URINALYSIS W/ RFLX MICROSCOPIC    Collection Time: 06/02/19  9:52 AM   Result Value Ref Range    Color DARK      Appearance CLEAR      Specific gravity 1.020 1.001 - 1.023      pH (UA) 5.5 5.0 - 9.0      Protein 30 (A) NEG mg/dL    Glucose NEGATIVE  mg/dL    Ketone NEGATIVE  NEG mg/dL    Bilirubin NEGATIVE  NEG      Blood NEGATIVE  NEG      Urobilinogen 0.2 0.2 - 1.0 EU/dL    Nitrites NEGATIVE  NEG      Leukocyte Esterase TRACE (A) NEG      WBC 5-10 0 /hpf    RBC 0-3 0 /hpf    Epithelial cells 5-10 0 /hpf    Bacteria 0 0 /hpf    Casts 5-10 0 /lpf   CULTURE, URINE    Collection Time: 06/02/19  9:52 AM   Result Value Ref Range    Special Requests: NO SPECIAL REQUESTS      Culture result: <10,000 COLONIES/mL MIXED SKIN ZHANNA ISOLATED     CULTURE, WOUND W GRAM STAIN    Collection Time: 06/02/19  4:50 PM   Result Value Ref Range    Special Requests: NO SPECIAL REQUESTS      GRAM STAIN PENDING     Culture result:        NO GROWTH AFTER SHORT PERIOD OF INCUBATION.  FURTHER RESULTS TO FOLLOW AFTER OVERNIGHT INCUBATION. CBC WITH AUTOMATED DIFF    Collection Time: 06/03/19  6:40 AM   Result Value Ref Range    WBC 13.3 (H) 4.3 - 11.1 K/uL    RBC 2.90 (L) 4.05 - 5.2 M/uL    HGB 8.3 (L) 11.7 - 15.4 g/dL    HCT 26.1 (L) 35.8 - 46.3 %    MCV 90.0 79.6 - 97.8 FL    MCH 28.6 26.1 - 32.9 PG    MCHC 31.8 31.4 - 35.0 g/dL    RDW 15.5 (H) 11.9 - 14.6 %    PLATELET 036 (H) 989 - 450 K/uL    MPV 8.9 (L) 9.4 - 12.3 FL    ABSOLUTE NRBC 0.00 0.0 - 0.2 K/uL    DF AUTOMATED      NEUTROPHILS 82 (H) 43 - 78 %    LYMPHOCYTES 7 (L) 13 - 44 %    MONOCYTES 10 4.0 - 12.0 %    EOSINOPHILS 0 (L) 0.5 - 7.8 %    BASOPHILS 0 0.0 - 2.0 %    IMMATURE GRANULOCYTES 1 0.0 - 5.0 %    ABS. NEUTROPHILS 10.9 (H) 1.7 - 8.2 K/UL    ABS. LYMPHOCYTES 0.9 0.5 - 4.6 K/UL    ABS. MONOCYTES 1.3 0.1 - 1.3 K/UL    ABS. EOSINOPHILS 0.0 0.0 - 0.8 K/UL    ABS. BASOPHILS 0.1 0.0 - 0.2 K/UL    ABS. IMM.  GRANS. 0.1 0.0 - 0.5 K/UL   METABOLIC PANEL, BASIC    Collection Time: 06/03/19  6:40 AM   Result Value Ref Range    Sodium 136 136 - 145 mmol/L    Potassium 3.9 3.5 - 5.1 mmol/L    Chloride 98 98 - 107 mmol/L    CO2 31 21 - 32 mmol/L    Anion gap 7 7 - 16 mmol/L    Glucose 102 (H) 65 - 100 mg/dL    BUN 14 8 - 23 MG/DL    Creatinine 0.58 (L) 0.6 - 1.0 MG/DL    GFR est AA >60 >60 ml/min/1.73m2    GFR est non-AA >60 >60 ml/min/1.73m2    Calcium 8.2 (L) 8.3 - 10.4 MG/DL       Assessment:     Problem List as of 6/3/2019 Date Reviewed: 5/31/2019          Codes Class Noted - Resolved    Debility ICD-10-CM: R53.81  ICD-9-CM: 799.3  5/31/2019 - Present        Lung cancer ([facility] ICD-10-CM: C34.90  ICD-9-CM: 162.9  5/31/2019 - Present        Gait difficulty ICD-10-CM: R26.9  ICD-9-CM: 781.2  5/31/2019 - Present        Pain ICD-10-CM: R52  ICD-9-CM: 780.96  5/31/2019 - Present        Small cell carcinoma (HCC) ICD-10-CM: C80.1  ICD-9-CM: 199.1  5/31/2019 - Present        Atrial fibrillation with RVR (HCC) ICD-10-CM: I48.91  ICD-9-CM: 427.31  5/31/2019 - Present        History of thoracotomy ICD-10-CM: Z98.890  ICD-9-CM: V45.89  5/31/2019 - Present        Non-small cell lung cancer (NSCLC) ([facility] ICD-10-CM: C34.90  ICD-9-CM: 162.9  5/30/2019 - Present        UTI (urinary tract infection) ICD-10-CM: N39.0  ICD-9-CM: 599.0  5/29/2019 - Present        Atrial fibrillation with rapid ventricular response ([facility] ICD-10-CM: I48.91  ICD-9-CM: 427.31  5/26/2019 - Present        Normochromic anemia (Chronic) ICD-10-CM: D64.9  ICD-9-CM: 285.9  5/26/2019 - Present        Hypoxia ICD-10-CM: R09.02  ICD-9-CM: 799.02  5/24/2019 - Present        COPD (chronic obstructive pulmonary disease) (HCC) (Chronic) ICD-10-CM: J44.9  ICD-9-CM: 042  5/24/2019 - Present        Aftercare following surgery ICD-10-CM: Z48.89  ICD-9-CM: V58.89  5/22/2019 - Present        Right lower lobe lung mass ICD-10-CM: R91.8  ICD-9-CM: 786.6  5/22/2019 - Present        HTN (hypertension) (Chronic) ICD-10-CM: I10  ICD-9-CM: 401.9  4/12/2019 - Present        Acquired hypothyroidism (Chronic) ICD-10-CM: E03.9  ICD-9-CM: 244.9  4/12/2019 - Present        Personal history of tobacco use, presenting hazards to health (Chronic) ICD-10-CM: G37.127  ICD-9-CM: V15.82  3/18/2019 - Present        Liver lesion ICD-10-CM: K76.9  ICD-9-CM: 573.8  3/18/2019 - Present    Overview Signed 5/30/2019 11:14 AM by WILLIAN Aviles. No uptake per PET             Centrilobular emphysema (HCC) (Chronic) ICD-10-CM: J43.2  ICD-9-CM: 492.8  3/18/2019 - Present        RESOLVED: Atrial fibrillation with RVR (HCC) ICD-10-CM: I48.91  ICD-9-CM: 427.31  5/26/2019 - 5/29/2019        RESOLVED: Acute respiratory failure with hypoxia [facility]) ICD-10-CM: J96.01  ICD-9-CM: 518.81  4/12/2019 - 5/30/2019              Plan:   intensive Physical Therapy for a minimum of 1.5 hours a day, at least 5 out of 7 days per week to address bed mobility, transfers, ambulation, strengthening, balance, and endurance.    intensive Occupational Therapy for a minimum of 1.5 hours a day, at least 5 out of 7 days per week to address ADL ( bathing, LE dressing, toileting) and adaptive equipment as needed. - patient will be limited due to pain, hypoxia, fatigue.   - focus on endurance, strength, energy conservation techniques.     The patient will also require 24-hour skilled rehabilitation nursing for bowel and bladder management, skin care for decubitus ulcer prevention , pain management and ongoing medication administration      The patient may benefit from a psychology consult for depression, anxiety and adjustment disorder.     Continue daily physician medical management:  POSTOPERATIVE DIAGNOSIS:  Right lung cancer.     PROCEDURE PERFORMED:  1.  Right thoracoscopy switched to right thoracotomy with extensive pneumonolysis. 2.  Right lower lobe lobectomy. 3.  Right upper lobe blebectomy x2.  4.  Diaphragm resection with primary repair. 5.  Chest wall resection. 6.  Mediastinal lymphadenectomy. 7.  Intercostal nerve cryoablation.     - Non-small cell lung cancer (NSCLC) ([facility] (5/30/2019)  - Aftercare following surgery -Wound Care: Monitor wound status daily per staff and physician. At risk for failure. Will require 24/7 rehab nursing. Surgery to direct, follow. Keep incisions clean and dry, may remain uncovered. - Wet to dry dressing changes to chest wound daily. - 6/1 stable wound. 6/2  Continued malodorous drainage. Will send for cx. 6/3 - follow cx, stain. Wound vac to be placed. Wound care to follow.      Hypoxia (5/24/2019)/COPD (chronic obstructive pulmonary disease) ([facility] (5/24/2019)  - proventil scheduled 4x/day  - spiriva  - monitor SaO2. O2 a via NC to keep SaO2 >90%. - continue O2 as needed.     Atrial fibrillation with rapid ventricular response ([facility] (5/26/2019)  - amiodarone. Will reduce dose as scheduled. - lasix 40 mg daily. Monitor renal function. 6/1 -HR in control. Continue amiodarone 400 bid, wean to 200 daily in 2 days.   6/3 - BP borderline low. No antihypertensives.         Normochromic anemia (5/26/2019)- s/p transfusions. - monitor. Transfuse as needed. 6/1- hgb 8.3  6/3 - hgb 8.3     Leukocytosis/UTI (urinary tract infection) (5/29/2019) - Complete a 5 day course of Rocephin for UTI.  - 6/1 - still mild leukocytosis. Rocephin until 6/3  6/2 - continued on Rocephin for UTI, however still R thoracotomy concern for conurrent infection. cx sent. May need reconsideration for antibiotic therapy for thoracotomy, surgical bed.     Pneumonia prophylaxis- Insentive spirometer every hour while awake     DVT risk / DVT Prophylaxis-    - Lovenox subq daily.      Pain Control: no current joint or muscle symptoms, essentially pain-free. Will require regular pain assessment and comprenhensive pain management. - pain due to chest wound, and during dressing changes, packing.   - norco prn. Dilaudid iv if needed. - gabapentin 300mg tid     Potential urinary retention/  - schedule voids q6-8 hrs. Check post-void residual every shift; In and Out catheterize if post-void residual is more than 400 cc.     bowel program - erica-colace. MOM prn     GERD - resume PPI. At times may need additional antacids, Maalox prn. Time spent was 25 minutes with over 1/2 in direct patient care/examination, consultation and coordination of care. Signed By: Jacinda Dukes MD     April 3, 2019    Notified of Fever, T102.0 at~ 5:30 pm . Bcx to be sent. Wound cx pending. Will start empiric abx, due to continued purulence form surgical incision, possible source of infection.   Will ask ID to assist in am.

## 2019-06-03 NOTE — PROGRESS NOTES
PM Physical Therapy: attempted to see patient for second AM treatment at 1115. Patient declined secondary to fatigue and increased pain at surgical site. Wound vac had just been placed over surgical wound. Attempted to see patient at 100 PM and patient declined secondary to increased pain and fatigue. Patient now on continuous 02 sat montor. Daughter in room visiting with patient. Patient requesting to do therapy in room later this PM. Returned at 230PM and patient sleeping soundly. 02 sat ranging from 87 to 97%. Spoke with RN and stating patient did not sleep well and agreed with plan to see patient later in PM. Returned to room at 400PM. RN stating that patient had just been helped to [facility] and back to bed. Patient c/o increased pain and problems with RUE. RN reporting patient was very nervous/anxious. RN reporting MD ordered Ativan. Patient in left sidelying and falling asleep, 02 sat at 96%. Discussed plan to defer PM treatment today and attempt to resume tomorrow AM. RN agreed with plan.   Handy Arriola, PT  6/3/2019

## 2019-06-03 NOTE — PROGRESS NOTES
Breathing tx improved some. Patient is now on 9L HFNC. BBS: diminished.       06/02/19 2026   Oxygen Therapy   O2 Sat (%) 91 %   Pulse via Oximetry 81 beats per minute   O2 Device Hi flow nasal cannula   O2 Flow Rate (L/min) 9 l/min

## 2019-06-03 NOTE — PROGRESS NOTES
Notified MD of patients temperature 101.1 patient is resting quietly. New orders received consult to I&D called.

## 2019-06-03 NOTE — PROGRESS NOTES
OT Daily Note  Time In 0916   Time Out 0959     Subjective: \"I'd like to brush my teeth now. \"  Pain: reported in R thoracic region and R shoulder    Education: on deep breathing  Interdisciplinary Communication: with Dr. Scooby Jones and Respiratory therapist concerning SOB. Precautions: Other (comment)(fall risk)  Location on arrival: semi supine in bed     Self-Care Daily Assessment   Pt completed toothbrushing with setup assist to gather supplies and manage spit cup and water cup. Setup assist to locate cotton balls for ear care. Visual-Perceptual Daily Assessment   Pt engaged in Connect 4 game in order to work on visual perceptual skills. Pt completed activity while long sitting in bed in order to work on activity tolerance while sitting up. Educated pt on rules of game and scanning horizontally, vertically, and diagonally. Difficulty detecting diagonals during game with extra time and mod verbal cues required. Decreased energy noted during game with rest breaks required and breathing treatment with respiratory therapist initiated during task. SpO2 level ranged from 90-93% on 9 L of O2 during session. Decreased activity tolerance and intense pain in incision site radiating to R shoulder. Required frequent breaks and repositioning of R side with pillows. Cognition Daily Assessment   Orientation: alert and oriented x 4  Expression: able to express needs verbally  Comprehension: understands basic instructions, min assist with complex instructions  Social Interaction: cooperating, appropriate with OT, participating, motivated to improve  Problem Solving: solves routine problems, min assist with complex problems   Memory: recalls people frequently seen, able to complete 3/3 step commands       Patient was left semi supine with call light/all needs in reach and in no distress. Assessment: Making progress. Continues to be limited by pain, activity tolerance, and SOB.    Plan: Continue OT POC with focus on ADL/IADL skills, functional transfers, functional mobility, coordination, strength, static and dynamic balance, and activity tolerance to maximize safety and independence with ADLs and functional transfers.      Harry Gutierrez, OTR/L  6/3/2019        Note may contain the following abbreviations:   Abbreviation Explanation   AROM Active range of motion   PROM Passive range of motion   SPT Stand pivot transfer   LPT Lateral pivot transfer   WC wheelchair   RW Rolling walker    BUE Bilateral upper extremities   BLE Bilateral lower extremities    WBAT Weight bearing as tolerated    TTWB Toe-touch weight bearing    AD Adaptive device   AE Adaptive equipment    NMES Neuro muscular electrical stimulation   UBE Upper body ergometer

## 2019-06-03 NOTE — WOUND CARE
Right chest wound 1.5x17x1. 8cm with approximated staples on each end and in middle, with 2 open areas that communicate, fascia over intercostal space and ribs noted in base with scattered slough, moderate amount thick yellow/ tan drainage, mild odor. Wound vac applied. Will monitor.

## 2019-06-03 NOTE — PROGRESS NOTES
Call from monitor room. O2 sat 84% . O2 increased to 5L NC. Remains in NSR on tele. No SOB or c/o of pain.

## 2019-06-03 NOTE — PROGRESS NOTES
Problem: Falls - Risk of  Goal: *Absence of Falls  Description  Document Cherry Anderson Fall Risk and appropriate interventions in the flowsheet. Outcome: Progressing Towards Goal     Problem: Patient Education: Go to Patient Education Activity  Goal: Patient/Family Education  Outcome: Progressing Towards Goal     Problem: Pressure Injury - Risk of  Goal: *Prevention of pressure injury  Description  Document Nir Scale and appropriate interventions in the flowsheet.   Outcome: Progressing Towards Goal     Problem: Patient Education: Go to Patient Education Activity  Goal: Patient/Family Education  Outcome: Progressing Towards Goal

## 2019-06-03 NOTE — PROGRESS NOTES
Increased pt's O2 from 5L to 8L, SAT's still in the mid 80's. Breathing treatment started, RN is aware and contacted MD of patients status.       06/02/19 2010   Oxygen Therapy   O2 Sat (%) (!) 86 %   Pulse via Oximetry 81 beats per minute   O2 Device Hi flow nasal cannula   O2 Flow Rate (L/min) 8 l/min

## 2019-06-03 NOTE — PROGRESS NOTES
Spoke with patient, she just finished a respiratory tx. Stating she is exhausted, and wanted to sleep. No needs expressed at this time. CM will continue to follow.

## 2019-06-03 NOTE — PROGRESS NOTES
Pt up to [facility] w/mod assistance. Pt SOB w/exertion. O2 sat monitor alarming with sats b/t 78-84. Call to ICU charge nurse to evaluate pt. O2 sat strip applied to forehead and O2 sat increased to 93% on 3L . Juan José Plough Dressing change to back packed w/4x4 and ABD applied. . Pt with severe pain with dressings.

## 2019-06-03 NOTE — PROGRESS NOTES
06/03/19 0701   Time Spent With Patient   Time In 0701   Time Out 0746   Patient Seen For: AM;ADLs   Feeding/Eating   Feeding/Eating Assistance S   Grooming   Grooming Assistance  SBA   Upper Body Bathing   Bathing Assistance, Upper SBA   Position Performed Other (comment)  (semi supine in bed)   Comments setup assist   Lower Body Bathing   Bathing Assistance, Lower  Min A   Position Performed Other (comment)  (Semi supine in bed)   Comments Pt washed B thighs and legs with verbal cues. Assist to roll on side to wash buttocks. Toileting   Toileting Assistance (FIM Score) Dep   Cues Physical assistance for pants down;Physical assistance for pants up;Verbal cues provided     S: \"I really need to use the bathroom. \" Agreeable to therapy. Focus of session was on morning ADL routine. Completed toileting using bed pan with assist rolling on and off of bed lu. Pascale QUEEN assisted completion of dressing due to time restraints. SpO2 level ranging from 89-92% during bed bath on 9 L of O2. Patient was able to roll on L side with mod assist x2. Pain severe in R side of back and up through R shoulder. Collaborated with Pascale QUEEN and confirmed patient is on track to reach goals as documented in the care plan. Patient tolerated session well, but activity tolerance, SOB, pain, and functional mobility are still below baseline and requires skilled facilitation to successfully and safely complete ADL's and transfers. Patient ended session in bed with call remote and phone within reach.      Harry Mc, ANGEL/ARTEMIO

## 2019-06-03 NOTE — PROGRESS NOTES
PHYSICAL THERAPY DAILY NOTE  Time In: 0830  Time Out: 8486  Patient Seen For: AM;Transfer training; Therapeutic exercise;Gait training    Subjective: \"I am in so much pain all the time so it is hard for me to particiapte\"         Objective:  Vital Signs:    Patient Vitals for the past 12 hrs:   Temp Pulse Resp BP SpO2   06/03/19 0957 -- -- -- -- 93 %   06/03/19 0812 99.5 °F (37.5 °C) 79 22 104/62 97 %   06/03/19 0527 -- -- -- -- 92 %       Pain level:   Patient states pain at 9/10 most of the treatment on her Right side/back and she is very sensitive to any type of touch along her back     Patient education: Patient was encouraged to participate more with therapy to increase her strength and stay functional with her mobility. Interdisciplinary Communication:  Alerted nursing that the patients bowels moved. Other (comment)(fall risk)  GROSS ASSESSMENT Daily Assessment    AROM: Generally decreased, functional  Strength: Generally decreased, functional       COGNITION Daily Assessment    Patient is alert, oriented and demanding in her manner. She needs much encouragement to participate secondary to pain. She follows directions well and converses appropriately. BED/MAT MOBILITY Daily Assessment    Rolling Right : 4 (Minimal assistance)  Supine to Sit : 3 (Moderate assistance)  Sit to Supine : 4 (Minimal assistance)       TRANSFERS Daily Assessment    Transfer Type: SPT with walker  Other: bedside commode  Transfer Assistance : 4 (Minimal assistance)  Sit to Stand Assistance: Minimal assistance  Car Transfers: Not tested       GAIT Daily Assessment   Patient only agreed to walk from the [facility] to her w/c in the doorway. She managed the r/walker well and navigated the room well today.    Amount of Assistance: 4 (Minimal assistance)  Distance (ft): 15 Feet (ft)  Assistive Device: Walker, rolling       BALANCE Daily Assessment    Sitting - Static: Good (unsupported)  Sitting - Dynamic: Fair (occasional)  Standing - Static: Fair  Standing - Dynamic : Impaired       LOWER EXTREMITY EXERCISES Daily Assessment    Extremity: Both  Exercise Type #1: Seated lower extremity strengthening(Motomed x 6 minutes level 3)  Level of Assist: Supervision  Exercise Type #2: Other (comment)(Sit to stand x 4 times with slight bed elevation )  Level of Assist: Contact guard assistance          Assessment: Ms. Francisco Javier Gunter appears to be limiting herself in regard to therapy participation secondary to her pain with movement. She states she does not want to be here and cannot tolerate all this therapy. She requires much encouragement to participate in therapy at this time. Patient was taken back to her room and assisted back into the bed. She was positioned for comfort with the call light within reach and nursing at the bedside. Plan of Care: Continue to treat and progress as tolerated.      Angel Barrera  6/3/2019

## 2019-06-04 LAB
ANION GAP SERPL CALC-SCNC: 7 MMOL/L (ref 7–16)
BACTERIA SPEC CULT: NORMAL
BASOPHILS # BLD: 0.1 K/UL (ref 0–0.2)
BASOPHILS NFR BLD: 1 % (ref 0–2)
BUN SERPL-MCNC: 14 MG/DL (ref 8–23)
CALCIUM SERPL-MCNC: 8.1 MG/DL (ref 8.3–10.4)
CHLORIDE SERPL-SCNC: 102 MMOL/L (ref 98–107)
CO2 SERPL-SCNC: 29 MMOL/L (ref 21–32)
CREAT SERPL-MCNC: 0.62 MG/DL (ref 0.6–1)
DIFFERENTIAL METHOD BLD: ABNORMAL
EOSINOPHIL # BLD: 0.1 K/UL (ref 0–0.8)
EOSINOPHIL NFR BLD: 0 % (ref 0.5–7.8)
ERYTHROCYTE [DISTWIDTH] IN BLOOD BY AUTOMATED COUNT: 15.8 % (ref 11.9–14.6)
GLUCOSE SERPL-MCNC: 94 MG/DL (ref 65–100)
HCT VFR BLD AUTO: 27.8 % (ref 35.8–46.3)
HGB BLD-MCNC: 8.7 G/DL (ref 11.7–15.4)
IMM GRANULOCYTES # BLD AUTO: 0.1 K/UL (ref 0–0.5)
IMM GRANULOCYTES NFR BLD AUTO: 1 % (ref 0–5)
LYMPHOCYTES # BLD: 1 K/UL (ref 0.5–4.6)
LYMPHOCYTES NFR BLD: 7 % (ref 13–44)
MCH RBC QN AUTO: 28.5 PG (ref 26.1–32.9)
MCHC RBC AUTO-ENTMCNC: 31.3 G/DL (ref 31.4–35)
MCV RBC AUTO: 91.1 FL (ref 79.6–97.8)
MONOCYTES # BLD: 1.2 K/UL (ref 0.1–1.3)
MONOCYTES NFR BLD: 8 % (ref 4–12)
NEUTS SEG # BLD: 12.4 K/UL (ref 1.7–8.2)
NEUTS SEG NFR BLD: 83 % (ref 43–78)
NRBC # BLD: 0 K/UL (ref 0–0.2)
PLATELET # BLD AUTO: 536 K/UL (ref 150–450)
PMV BLD AUTO: 9.1 FL (ref 9.4–12.3)
POTASSIUM SERPL-SCNC: 4.1 MMOL/L (ref 3.5–5.1)
RBC # BLD AUTO: 3.05 M/UL (ref 4.05–5.2)
SERVICE CMNT-IMP: NORMAL
SODIUM SERPL-SCNC: 138 MMOL/L (ref 136–145)
WBC # BLD AUTO: 14.9 K/UL (ref 4.3–11.1)

## 2019-06-04 PROCEDURE — 65310000000 HC RM PRIVATE REHAB

## 2019-06-04 PROCEDURE — 74011000250 HC RX REV CODE- 250: Performed by: PHYSICAL MEDICINE & REHABILITATION

## 2019-06-04 PROCEDURE — 97150 GROUP THERAPEUTIC PROCEDURES: CPT

## 2019-06-04 PROCEDURE — 97110 THERAPEUTIC EXERCISES: CPT

## 2019-06-04 PROCEDURE — 74750000023 HC WOUND THERAPY

## 2019-06-04 PROCEDURE — 94760 N-INVAS EAR/PLS OXIMETRY 1: CPT

## 2019-06-04 PROCEDURE — 97530 THERAPEUTIC ACTIVITIES: CPT

## 2019-06-04 PROCEDURE — 80048 BASIC METABOLIC PNL TOTAL CA: CPT

## 2019-06-04 PROCEDURE — 74011250636 HC RX REV CODE- 250/636: Performed by: PHYSICAL MEDICINE & REHABILITATION

## 2019-06-04 PROCEDURE — 97535 SELF CARE MNGMENT TRAINING: CPT

## 2019-06-04 PROCEDURE — 74011250637 HC RX REV CODE- 250/637: Performed by: PHYSICAL MEDICINE & REHABILITATION

## 2019-06-04 PROCEDURE — 85025 COMPLETE CBC W/AUTO DIFF WBC: CPT

## 2019-06-04 PROCEDURE — 36415 COLL VENOUS BLD VENIPUNCTURE: CPT

## 2019-06-04 PROCEDURE — 74011250637 HC RX REV CODE- 250/637: Performed by: SURGERY

## 2019-06-04 PROCEDURE — 99232 SBSQ HOSP IP/OBS MODERATE 35: CPT | Performed by: PHYSICAL MEDICINE & REHABILITATION

## 2019-06-04 PROCEDURE — 94640 AIRWAY INHALATION TREATMENT: CPT

## 2019-06-04 PROCEDURE — 74011000258 HC RX REV CODE- 258: Performed by: PHYSICAL MEDICINE & REHABILITATION

## 2019-06-04 PROCEDURE — 74011250637 HC RX REV CODE- 250/637: Performed by: NURSE PRACTITIONER

## 2019-06-04 RX ORDER — CIPROFLOXACIN 500 MG/1
500 TABLET ORAL EVERY 12 HOURS
Status: DISCONTINUED | OUTPATIENT
Start: 2019-06-04 | End: 2019-06-06

## 2019-06-04 RX ADMIN — HYDROCODONE BITARTRATE AND ACETAMINOPHEN 1 TABLET: 5; 325 TABLET ORAL at 09:49

## 2019-06-04 RX ADMIN — POTASSIUM CHLORIDE 40 MEQ: 20 TABLET, EXTENDED RELEASE ORAL at 08:26

## 2019-06-04 RX ADMIN — ALBUTEROL SULFATE 2.5 MG: 2.5 SOLUTION RESPIRATORY (INHALATION) at 06:08

## 2019-06-04 RX ADMIN — ALBUTEROL SULFATE 2.5 MG: 2.5 SOLUTION RESPIRATORY (INHALATION) at 17:15

## 2019-06-04 RX ADMIN — Medication 5 ML: at 21:46

## 2019-06-04 RX ADMIN — CIPROFLOXACIN HYDROCHLORIDE 500 MG: 500 TABLET, FILM COATED ORAL at 21:38

## 2019-06-04 RX ADMIN — HYDROCODONE BITARTRATE AND ACETAMINOPHEN 1 TABLET: 5; 325 TABLET ORAL at 21:38

## 2019-06-04 RX ADMIN — ALBUTEROL SULFATE 2.5 MG: 2.5 SOLUTION RESPIRATORY (INHALATION) at 12:15

## 2019-06-04 RX ADMIN — VANCOMYCIN HYDROCHLORIDE 1000 MG: 1 INJECTION, POWDER, LYOPHILIZED, FOR SOLUTION INTRAVENOUS at 13:12

## 2019-06-04 RX ADMIN — ENOXAPARIN SODIUM 40 MG: 40 INJECTION SUBCUTANEOUS at 16:42

## 2019-06-04 RX ADMIN — CIPROFLOXACIN HYDROCHLORIDE 500 MG: 500 TABLET, FILM COATED ORAL at 13:01

## 2019-06-04 RX ADMIN — SENNOSIDES AND DOCUSATE SODIUM 2 TABLET: 8.6; 5 TABLET ORAL at 08:26

## 2019-06-04 RX ADMIN — Medication 5 ML: at 13:12

## 2019-06-04 RX ADMIN — GABAPENTIN 300 MG: 300 CAPSULE ORAL at 16:42

## 2019-06-04 RX ADMIN — CEFEPIME 2 G: 2 INJECTION, POWDER, FOR SOLUTION INTRAVENOUS at 09:49

## 2019-06-04 RX ADMIN — AMIODARONE HYDROCHLORIDE 200 MG: 200 TABLET ORAL at 08:28

## 2019-06-04 RX ADMIN — GABAPENTIN 300 MG: 300 CAPSULE ORAL at 08:27

## 2019-06-04 RX ADMIN — TIOTROPIUM BROMIDE 18 MCG: 18 CAPSULE ORAL; RESPIRATORY (INHALATION) at 06:11

## 2019-06-04 RX ADMIN — FUROSEMIDE 40 MG: 20 TABLET ORAL at 08:27

## 2019-06-04 RX ADMIN — HYDROCODONE BITARTRATE AND ACETAMINOPHEN 1 TABLET: 5; 325 TABLET ORAL at 14:33

## 2019-06-04 RX ADMIN — BUDESONIDE 500 MCG: 0.5 INHALANT RESPIRATORY (INHALATION) at 06:08

## 2019-06-04 RX ADMIN — HYDROCODONE BITARTRATE AND ACETAMINOPHEN 1 TABLET: 5; 325 TABLET ORAL at 05:32

## 2019-06-04 RX ADMIN — PANTOPRAZOLE SODIUM 40 MG: 40 TABLET, DELAYED RELEASE ORAL at 05:32

## 2019-06-04 RX ADMIN — BUDESONIDE 500 MCG: 0.5 INHALANT RESPIRATORY (INHALATION) at 17:15

## 2019-06-04 RX ADMIN — SENNOSIDES AND DOCUSATE SODIUM 2 TABLET: 8.6; 5 TABLET ORAL at 16:42

## 2019-06-04 RX ADMIN — LEVOTHYROXINE SODIUM 75 MCG: 75 TABLET ORAL at 08:27

## 2019-06-04 RX ADMIN — GABAPENTIN 300 MG: 300 CAPSULE ORAL at 21:38

## 2019-06-04 NOTE — PROGRESS NOTES
Interdisciplinary Conference Notes    Interdisciplinary conference completed to discuss plan of care. Estimated D/C Date: 06/12/2019    Recommended Equipment: FWW and Oxygen    Recommended Follow-Up Therapy: Home Health  PT, OT, Aide and Social Work    Communication with family/caregivers: Spoke with patient and daughter in length  Regarding discharge, daughter concerned about IVAB and peripheral access, wound vac. \"I will do whatever I need to do to take care of my mom. \"

## 2019-06-04 NOTE — PROGRESS NOTES
SFD PROGRESS NOTE    Garry Villeda  Admit Date: 5/31/2019  Admit Diagnosis: Lung cancer ([facility] [C34.90]; History of thoracotomy [Z98.890]; Hypoxia [R09.02]; Pain [R52]; Debility [R53.81]; Small cell carcinoma (HCC) [C80.1]; Atrial fibrillation with RVR (HCC) [I48.91];COPD (chronic obstructive pulmonary disease) ([facility] [J44.9]; Gait difficulty [R26.9]    Subjective     + fever, Tm 101.7. Started on empiric antibiotics. Bcx sent. Wound cx growing multiple organisms. Patient continues to have pain, malaise, limiting therapies as well, understandably. Will ask ID to guide antimicrobial treatment.     Objective:     Current Facility-Administered Medications   Medication Dose Route Frequency    LORazepam (ATIVAN) tablet 1 mg  1 mg Oral Q8H PRN    zinc oxide-cod liver oil (DESITIN) 40 % paste   Topical PRN    cefepime (MAXIPIME) 2 g in 0.9% sodium chloride (MBP/ADV) 100 mL  2 g IntraVENous Q12H    vancomycin (VANCOCIN) 1,000 mg in 0.9% sodium chloride (MBP/ADV) 250 mL  1 g IntraVENous Q18H    acetaminophen (TYLENOL) tablet 650 mg  650 mg Oral Q6H PRN    phenol throat spray (CHLORASEPTIC) 1 Spray  1 Spray Oral PRN    HYDROmorphone (DILAUDID) tablet 2 mg  2 mg Oral Q6H PRN    potassium chloride (K-DUR, KLOR-CON) SR tablet 40 mEq  40 mEq Oral DAILY    naloxone (NARCAN) injection 0.4 mg  0.4 mg IntraVENous PRN    ondansetron (ZOFRAN) injection 4 mg  4 mg IntraVENous Q4H PRN    sodium chloride (NS) flush 5-40 mL  5-40 mL IntraVENous Q8H    sodium chloride (NS) flush 5-40 mL  5-40 mL IntraVENous PRN    albuterol (PROVENTIL VENTOLIN) nebulizer solution 2.5 mg  2.5 mg Nebulization QID RT    amiodarone (CORDARONE) tablet 200 mg  200 mg Oral DAILY    budesonide (PULMICORT) 500 mcg/2 ml nebulizer suspension  500 mcg Nebulization BID RT    enoxaparin (LOVENOX) injection 40 mg  40 mg SubCUTAneous Q24H    furosemide (LASIX) tablet 40 mg  40 mg Oral DAILY    gabapentin (NEURONTIN) capsule 300 mg  300 mg Oral TID    HYDROcodone-acetaminophen (NORCO) 5-325 mg per tablet 1 Tab  1 Tab Oral Q4H PRN    levothyroxine (SYNTHROID) tablet 75 mcg  75 mcg Oral DAILY    magnesium hydroxide (MILK OF MAGNESIA) 400 mg/5 mL oral suspension 30 mL  30 mL Oral DAILY PRN    magnesium hydroxide (MILK OF MAGNESIA) 400 mg/5 mL oral suspension 30 mL  30 mL Oral DAILY    pantoprazole (PROTONIX) tablet 40 mg  40 mg Oral ACB    senna-docusate (PERICOLACE) 8.6-50 mg per tablet 2 Tab  2 Tab Oral BID    tiotropium (SPIRIVA) inhalation capsule 18 mcg  1 Cap Inhalation DAILY     Review of Systems: + chest wall pain. Denies chest pain, shortness of breath, cough, headache, visual problems, abdominal pain, dysurea, calf pain. Pertinent positives are as noted in the medical records and unremarkable otherwise. Visit Vitals  /65   Pulse 85   Temp 98.8 °F (37.1 °C)   Resp 22   SpO2 90%      Physical Exam:   Psych: Patient was oriented to person, place, and time. NAD on 5L O2 via NC. General:   Alert, appears stated age, No acute distress. HEENT:  Normocephalic, EOMI, facial symmetry  Intact. no JVD   Lungs:  + crackles thoughout R base. Respiration even and unlabored   No apparent use of accessory muscles for respiration. Heart:  Regular rate and rhythm, S1, S2, No obvious murmur    Genitourinary: defered   Abdomen:  Soft, non-tender to palpation in all four quadrants. Neuromuscular:   PERRL, EOMI  Speech is clear.   Follows simple commands consistently. Able to identify, recall repeat. Mood appropriate.   + generalized weakness   Sensory - intact   Skin:  Intact, dry, good skin turgor, age related changes present   Edema: none   Incision:  Calf non tender BLE. Right chest wall incision partly open, draining greyish, drainage.                                                                                      Functional Assessment:  Gross Assessment  AROM: Generally decreased, functional (06/03/19 1000)  Strength: Generally decreased, functional (06/03/19 1000)       Balance  Sitting - Static: Good (unsupported) (06/03/19 1000)  Sitting - Dynamic: Fair (occasional) (06/03/19 1000)  Standing - Static: Fair (06/03/19 1000)  Standing - Dynamic : Impaired (06/03/19 1000)           Toileting  Cues: Physical assistance for pants down;Physical assistance for pants up;Verbal cues provided (06/03/19 0701)         Milo Silva Fall Risk Assessment:  Milo Silva Fall Risk  Mobility: Ambulates or transfers with assist devices or assistance (06/04/19 0700)  Mobility Interventions: Communicate number of staff needed for ambulation/transfer;OT consult for ADLs; Patient to call before getting OOB;PT Consult for mobility concerns (06/04/19 0700)  Mentation: Alert, oriented x 3 (06/04/19 0700)  Medication: Patient receiving anticonvulsants, sedatives(tranquilizers), psychotropics or hypnotics, hypoglycemics, narcotics, sleep aids, antihypertensives, laxatives, or diuretics (06/04/19 0700)  Medication Interventions: Evaluate medications/consider consulting pharmacy; Patient to call before getting OOB; Teach patient to arise slowly (06/04/19 0700)  Elimination: Needs assistance with toileting (06/04/19 0700)  Elimination Interventions: Call light in reach; Patient to call for help with toileting needs (06/04/19 0700)  Prior Fall History: Before admission in past 12 months _home or previous inpatient care) (06/04/19 0700)  History of Falls Interventions: Door open when patient unattended;Evaluate medications/consider consulting pharmacy (06/04/19 0700)  Total Score: 4 (06/04/19 0700)  Standard Fall Precautions: Yes (06/01/19 0710)  High Fall Risk: Yes (06/04/19 0700)     Speech Assessment:         Ambulation:  Gait  Distance (ft): 15 Feet (ft) (06/03/19 1000)  Assistive Device: Walker, rolling (06/03/19 1000)     Labs/Studies:  Recent Results (from the past 72 hour(s))   URINALYSIS W/ RFLX MICROSCOPIC    Collection Time: 06/02/19  9:52 AM   Result Value Ref Range Color DARK      Appearance CLEAR      Specific gravity 1.020 1.001 - 1.023      pH (UA) 5.5 5.0 - 9.0      Protein 30 (A) NEG mg/dL    Glucose NEGATIVE  mg/dL    Ketone NEGATIVE  NEG mg/dL    Bilirubin NEGATIVE  NEG      Blood NEGATIVE  NEG      Urobilinogen 0.2 0.2 - 1.0 EU/dL    Nitrites NEGATIVE  NEG      Leukocyte Esterase TRACE (A) NEG      WBC 5-10 0 /hpf    RBC 0-3 0 /hpf    Epithelial cells 5-10 0 /hpf    Bacteria 0 0 /hpf    Casts 5-10 0 /lpf   CULTURE, URINE    Collection Time: 06/02/19  9:52 AM   Result Value Ref Range    Special Requests: NO SPECIAL REQUESTS      Culture result: <10,000 COLONIES/mL MIXED SKIN ZHANNA ISOLATED     CULTURE, WOUND W GRAM STAIN    Collection Time: 06/02/19  4:50 PM   Result Value Ref Range    Special Requests: NO SPECIAL REQUESTS      GRAM STAIN 4 TO 45 WBC'S/OIF     GRAM STAIN NO EPITHELIAL CELLS SEEN      GRAM STAIN MODERATE GRAM POS COCCI IN CLUSTERS      GRAM STAIN MODERATE GRAM POS COCCI IN CHAINS      GRAM STAIN MODERATE GRAM POSITIVE RODS      Culture result: LIGHT ALPHA STREPTOCOCCUS (A)      Culture result: SCANT STAPHYLOCOCCUS SPECIES (A)      Culture result:        THIS ORGANISM WILL BE HELD FOR 7 DAYS. IF FURTHER TESTING IS REQUIRED PLEASE NOTIFY MICROBIOLOGY    Culture result: CULTURE IN PROGRESS,FURTHER UPDATES TO FOLLOW     CBC WITH AUTOMATED DIFF    Collection Time: 06/03/19  6:40 AM   Result Value Ref Range    WBC 13.3 (H) 4.3 - 11.1 K/uL    RBC 2.90 (L) 4.05 - 5.2 M/uL    HGB 8.3 (L) 11.7 - 15.4 g/dL    HCT 26.1 (L) 35.8 - 46.3 %    MCV 90.0 79.6 - 97.8 FL    MCH 28.6 26.1 - 32.9 PG    MCHC 31.8 31.4 - 35.0 g/dL    RDW 15.5 (H) 11.9 - 14.6 %    PLATELET 814 (H) 844 - 450 K/uL    MPV 8.9 (L) 9.4 - 12.3 FL    ABSOLUTE NRBC 0.00 0.0 - 0.2 K/uL    DF AUTOMATED      NEUTROPHILS 82 (H) 43 - 78 %    LYMPHOCYTES 7 (L) 13 - 44 %    MONOCYTES 10 4.0 - 12.0 %    EOSINOPHILS 0 (L) 0.5 - 7.8 %    BASOPHILS 0 0.0 - 2.0 %    IMMATURE GRANULOCYTES 1 0.0 - 5.0 %    ABS. NEUTROPHILS 10.9 (H) 1.7 - 8.2 K/UL    ABS. LYMPHOCYTES 0.9 0.5 - 4.6 K/UL    ABS. MONOCYTES 1.3 0.1 - 1.3 K/UL    ABS. EOSINOPHILS 0.0 0.0 - 0.8 K/UL    ABS. BASOPHILS 0.1 0.0 - 0.2 K/UL    ABS. IMM. GRANS. 0.1 0.0 - 0.5 K/UL   METABOLIC PANEL, BASIC    Collection Time: 06/03/19  6:40 AM   Result Value Ref Range    Sodium 136 136 - 145 mmol/L    Potassium 3.9 3.5 - 5.1 mmol/L    Chloride 98 98 - 107 mmol/L    CO2 31 21 - 32 mmol/L    Anion gap 7 7 - 16 mmol/L    Glucose 102 (H) 65 - 100 mg/dL    BUN 14 8 - 23 MG/DL    Creatinine 0.58 (L) 0.6 - 1.0 MG/DL    GFR est AA >60 >60 ml/min/1.73m2    GFR est non-AA >60 >60 ml/min/1.73m2    Calcium 8.2 (L) 8.3 - 10.4 MG/DL   CULTURE, BLOOD    Collection Time: 06/03/19 10:02 PM   Result Value Ref Range    Special Requests: LEFT  HAND        Culture result: NO GROWTH AFTER 10 HOURS     CULTURE, BLOOD    Collection Time: 06/03/19 10:02 PM   Result Value Ref Range    Special Requests: RIGHT  HAND        Culture result: NO GROWTH AFTER 10 HOURS     CBC WITH AUTOMATED DIFF    Collection Time: 06/04/19  6:20 AM   Result Value Ref Range    WBC 14.9 (H) 4.3 - 11.1 K/uL    RBC 3.05 (L) 4.05 - 5.2 M/uL    HGB 8.7 (L) 11.7 - 15.4 g/dL    HCT 27.8 (L) 35.8 - 46.3 %    MCV 91.1 79.6 - 97.8 FL    MCH 28.5 26.1 - 32.9 PG    MCHC 31.3 (L) 31.4 - 35.0 g/dL    RDW 15.8 (H) 11.9 - 14.6 %    PLATELET 062 (H) 529 - 450 K/uL    MPV 9.1 (L) 9.4 - 12.3 FL    ABSOLUTE NRBC 0.00 0.0 - 0.2 K/uL    DF AUTOMATED      NEUTROPHILS 83 (H) 43 - 78 %    LYMPHOCYTES 7 (L) 13 - 44 %    MONOCYTES 8 4.0 - 12.0 %    EOSINOPHILS 0 (L) 0.5 - 7.8 %    BASOPHILS 1 0.0 - 2.0 %    IMMATURE GRANULOCYTES 1 0.0 - 5.0 %    ABS. NEUTROPHILS 12.4 (H) 1.7 - 8.2 K/UL    ABS. LYMPHOCYTES 1.0 0.5 - 4.6 K/UL    ABS. MONOCYTES 1.2 0.1 - 1.3 K/UL    ABS. EOSINOPHILS 0.1 0.0 - 0.8 K/UL    ABS. BASOPHILS 0.1 0.0 - 0.2 K/UL    ABS. IMM.  GRANS. 0.1 0.0 - 0.5 K/UL   METABOLIC PANEL, BASIC    Collection Time: 06/04/19  6:20 AM   Result Value Ref Range    Sodium 138 136 - 145 mmol/L    Potassium 4.1 3.5 - 5.1 mmol/L    Chloride 102 98 - 107 mmol/L    CO2 29 21 - 32 mmol/L    Anion gap 7 7 - 16 mmol/L    Glucose 94 65 - 100 mg/dL    BUN 14 8 - 23 MG/DL    Creatinine 0.62 0.6 - 1.0 MG/DL    GFR est AA >60 >60 ml/min/1.73m2    GFR est non-AA >60 >60 ml/min/1.73m2    Calcium 8.1 (L) 8.3 - 10.4 MG/DL       Assessment:     Problem List as of 6/4/2019 Date Reviewed: 5/31/2019          Codes Class Noted - Resolved    Debility ICD-10-CM: R53.81  ICD-9-CM: 799.3  5/31/2019 - Present        Lung cancer ([facility] ICD-10-CM: C34.90  ICD-9-CM: 162.9  5/31/2019 - Present        Gait difficulty ICD-10-CM: R26.9  ICD-9-CM: 781.2  5/31/2019 - Present        Pain ICD-10-CM: R52  ICD-9-CM: 780.96  5/31/2019 - Present        Small cell carcinoma (HCC) ICD-10-CM: C80.1  ICD-9-CM: 199.1  5/31/2019 - Present        Atrial fibrillation with RVR (HCC) ICD-10-CM: I48.91  ICD-9-CM: 427.31  5/31/2019 - Present        History of thoracotomy ICD-10-CM: Z98.890  ICD-9-CM: V45.89  5/31/2019 - Present        Non-small cell lung cancer (NSCLC) (HCC) ICD-10-CM: C34.90  ICD-9-CM: 162.9  5/30/2019 - Present        UTI (urinary tract infection) ICD-10-CM: N39.0  ICD-9-CM: 599.0  5/29/2019 - Present        Atrial fibrillation with rapid ventricular response (HCC) ICD-10-CM: I48.91  ICD-9-CM: 427.31  5/26/2019 - Present        Normochromic anemia (Chronic) ICD-10-CM: D64.9  ICD-9-CM: 285.9  5/26/2019 - Present        Hypoxia ICD-10-CM: R09.02  ICD-9-CM: 799.02  5/24/2019 - Present        COPD (chronic obstructive pulmonary disease) (HCC) (Chronic) ICD-10-CM: J44.9  ICD-9-CM: 496  5/24/2019 - Present        Aftercare following surgery ICD-10-CM: Z48.89  ICD-9-CM: V58.89  5/22/2019 - Present        Right lower lobe lung mass ICD-10-CM: R91.8  ICD-9-CM: 786.6  5/22/2019 - Present        HTN (hypertension) (Chronic) ICD-10-CM: I10  ICD-9-CM: 401.9  4/12/2019 - Present        Acquired hypothyroidism (Chronic) ICD-10-CM: E03.9  ICD-9-CM: 244.9  4/12/2019 - Present        Personal history of tobacco use, presenting hazards to health (Chronic) ICD-10-CM: R73.844  ICD-9-CM: V15.82  3/18/2019 - Present        Liver lesion ICD-10-CM: K76.9  ICD-9-CM: 573.8  3/18/2019 - Present    Overview Signed 5/30/2019 11:14 AM by WILLIAN Grier. No uptake per PET             Centrilobular emphysema (HCC) (Chronic) ICD-10-CM: J43.2  ICD-9-CM: 492.8  3/18/2019 - Present        RESOLVED: Atrial fibrillation with RVR (HCC) ICD-10-CM: I48.91  ICD-9-CM: 427.31  5/26/2019 - 5/29/2019        RESOLVED: Acute respiratory failure with hypoxia [facility]) ICD-10-CM: J96.01  ICD-9-CM: 518.81  4/12/2019 - 5/30/2019              Plan:   intensive Physical Therapy for a minimum of 1.5 hours a day, at least 5 out of 7 days per week to address bed mobility, transfers, ambulation, strengthening, balance, and endurance. intensive Occupational Therapy for a minimum of 1.5 hours a day, at least 5 out of 7 days per week to address ADL ( bathing, LE dressing, toileting) and adaptive equipment as needed. - patient will be limited due to pain, hypoxia, fatigue.   - focus on endurance, strength, energy conservation techniques.     The patient will also require 24-hour skilled rehabilitation nursing for bowel and bladder management, skin care for decubitus ulcer prevention , pain management and ongoing medication administration      The patient may benefit from a psychology consult for depression, anxiety and adjustment disorder.     Continue daily physician medical management:  POSTOPERATIVE DIAGNOSIS:  Right lung cancer.     PROCEDURE PERFORMED:  1.  Right thoracoscopy switched to right thoracotomy with extensive pneumonolysis. 2.  Right lower lobe lobectomy. 3.  Right upper lobe blebectomy x2.  4.  Diaphragm resection with primary repair. 5.  Chest wall resection. 6.  Mediastinal lymphadenectomy.   7.  Intercostal nerve cryoablation.     - Non-small cell lung cancer (NSCLC) ([facility] (5/30/2019)  - Aftercare following surgery -Wound Care: Monitor wound status daily per staff and physician. At risk for failure. Will require 24/7 rehab nursing. Surgery to direct, follow. Keep incisions clean and dry, may remain uncovered. - Wet to dry dressing changes to chest wound daily. - 6/1 stable wound. 6/2  Continued malodorous drainage. Will send for cx. 6/3 - follow cx, stain. Wound vac to be placed. Wound care to follow. 6/4 - wound vac placed. + fever, started on vanc, cefepime( pen allergy) . ID consult.     Hypoxia (5/24/2019)/COPD (chronic obstructive pulmonary disease) ([facility] (5/24/2019)  - proventil scheduled 4x/day  - spiriva  - monitor SaO2. O2 a via NC to keep SaO2 >90%. - continue O2 as needed. 6/4 - on 5L/min.      Atrial fibrillation with rapid ventricular response ([facility] (5/26/2019)  - amiodarone. Will reduce dose as scheduled. - lasix 40 mg daily. Monitor renal function. 6/1 -HR in control. Continue amiodarone 400 bid, wean to 200 daily in 2 days. 6/3 - BP borderline low. No antihypertensives. 6/4 - now on amiodarone 200 daily. Appears on NSR.         Normochromic anemia (5/26/2019)- s/p transfusions. - monitor. Transfuse as needed. 6/1- hgb 8.3  6/3 - hgb 8.3  6/4 - hgb 8.7     Leukocytosis/UTI (urinary tract infection) (5/29/2019) - Complete a 5 day course of Rocephin for UTI.  - 6/1 - still mild leukocytosis. Rocephin until 6/3  6/2 - continued on Rocephin for UTI, however still R thoracotomy concern for conurrent infection. cx sent. May need reconsideration for antibiotic therapy for thoracotomy, surgical bed.     Pneumonia prophylaxis- Insentive spirometer every hour while awake     DVT risk / DVT Prophylaxis-    - Lovenox subq daily.      Pain Control: no current joint or muscle symptoms, essentially pain-free. Will require regular pain assessment and comprenhensive pain management.   - pain due to chest wound, and during dressing changes, packing.   - norco prn. Dilaudid iv if needed. - gabapentin 300mg tid  6/4 - ativan added for anxiety. Pt tolerated well.            bowel program - erica-colace. MOM prn     GERD - resume PPI. At times may need additional antacids, Maalox prn. Time spent was 25 minutes with over 1/2 in direct patient care/examination, consultation and coordination of care. Signed By: Dorothea Kimble MD     June 4, 2019    Notified of Fever, T102.0 at~ 5:30 pm . Bcx to be sent. Wound cx pending. Will start empiric abx, due to continued purulence form surgical incision, possible source of infection.   Will ask ID to assist in am.

## 2019-06-04 NOTE — PROGRESS NOTES
PHYSICAL THERAPY DAILY NOTE  Time In: 1582  Time Out: 1430  Patient Seen For: PM;Patient education;Transfer training    Subjective: \"I don't want to go home. \" Patient agreeable to therapy. Objective: Vital Signs:   Patient Vitals for the past 8 hrs:   SpO2   06/04/19 1218 91 %         Pain level: 7/10  Pain location: R flank  Pain interventions: Positioned for comfort, provided rest breaks, pain medication per nursing. Patient education: Educated patient on benefits of physical therapy. Interdisciplinary Communication: Communicated with Ashtyn Hayes regarding patient's POC. Other (comment)(fall risk)  GROSS ASSESSMENT Daily Assessment            COGNITION Daily Assessment    Verbal cues for safety with mobility. BED/MAT MOBILITY Daily Assessment   Increased time and effort. Supine to Sit : 0 (Not tested)  Sit to Supine : 3 (Moderate assistance)       TRANSFERS Daily Assessment   Patient performed sit> parallel bars with MOD A x 2. Patient able to stand for 15 seconds before requesting return to sitting. O2 sat monitored when return to sitting, reading 91% on 5 L O2. Transfer Type: SPT with walker  Transfer Assistance : 3 (Moderate assistance )  Sit to Stand Assistance: Moderate assistance(in parallel bars x2, with RW x 1)  Car Transfers: Not tested       GAIT Daily Assessment    Amount of Assistance: 0 (Not tested)  Distance (ft): 0 Feet (ft)       STEPS or STAIRS Daily Assessment    Steps/Stairs Ambulated (#): 0  Level of Assist : 0 (Not tested)       BALANCE Daily Assessment    Sitting - Static: Good (unsupported)  Sitting - Dynamic: Fair (occasional)  Standing - Static: Not tested       WHEELCHAIR MOBILITY Daily Assessment    Able to Propel (ft): 0 feet     Patient returned to room lying supine in bed with all needs in reach. Assessment: Patient making slow progress towards goals. Patient requires motivation and encouragement for participation with therapy.          Plan of Care: Continue with POC and progress as tolerated.        Gilbert Fat  6/4/2019

## 2019-06-04 NOTE — PROGRESS NOTES
PHYSICAL THERAPY DAILY NOTE  Time In: 0830  Time Out: 0915  Patient Seen For: AM;Balance activities; Patient education; Therapeutic exercise;Transfer training    Subjective: \"I've been hurting in my right side. I don't think I can handle this floor. \" Patient agreeable to therapy. Objective: Vital Signs:  Patient on 5 L O2 via hi flow nasal cannula. Patient Vitals for the past 8 hrs:   Temp Pulse Resp BP SpO2   06/04/19 0803 98.8 °F (37.1 °C) 85 22 101/65 90 %   06/04/19 0613 -- -- -- -- 97 %       Pain level: 6/10  Pain location: R flank  Pain interventions: Positioned for comfort, provided rest breaks, pain medication per nursing. Patient education: Educated patient on benefits of therapeutic exercise. Interdisciplinary Communication: Communicated with Zaki Potter regarding patient's POC. Other (comment)(fall risk)  GROSS ASSESSMENT Daily Assessment            COGNITION Daily Assessment    Impulsive, verbal cues for pacing with exercise and verbalizing due to repeated O2 sat decrease. BED/MAT MOBILITY Daily Assessment   Required for safety. Supine to Sit : 4 (Minimal assistance)  Sit to Supine : 4 (Minimal assistance)       TRANSFERS Daily Assessment            GAIT Daily Assessment    Amount of Assistance: 0 (Not tested)  Distance (ft): 0 Feet (ft)       STEPS or STAIRS Daily Assessment    Steps/Stairs Ambulated (#): 0  Level of Assist : 0 (Not tested)       BALANCE Daily Assessment   Patient sat on edge of bed with SBA with moderate forward head posture and shallow breathing. Verbal cues for upright posture and deep breathing to return O2 sat >90%.  Sitting - Static: Good (unsupported)  Sitting - Dynamic: Fair (occasional)  Standing - Static: Not tested       LOWER EXTREMITY EXERCISES Daily Assessment    Extremity: Both  Exercise Type #1: Supine lower extremity strengthening  Sets Performed: 2  Reps Performed: 10  Level of Assist: Minimal assistance     Patient performed the following B LE exercises:  SUPINE EXERCISES Sets Reps Comments   Ankle Pumps 1 20    Quad Sets 2 10    Glut Sets 2 10    Heel Slides 2 10    Hip Abduction 2 10    Short Arc Quad 2 10      Patient returned to lying supine in bed with all needs in reach. Assessment: Patient only agreeable to physical therapy in bed this AM. Patient making slow progress towards goals. Patient repeatedly required verbal cues for keeping O2 sat >90% as patient's O2 sat dropped with therapeutic exercise and speaking. Plan of Care: Continue with POC and progress as tolerated.        Jeffrey Aburto  6/4/2019

## 2019-06-04 NOTE — CONSULTS
Infectious Disease Consult    Today's Date: 6/4/2019   Admit Date: 5/31/2019    Impression:   · Small cell carcinoma lung ca s/p right thoracoscopy switched to right thoracotomy with extensive pneumonolysis, right lower lobe lobectomy, right upper lobe blebectomy x2, diaphragm resection with primary repair, chest wall resection, mediastinal lymphadenectomy and intercostal nerve cryoablation per Lolly Lopez MD on 5/22/2019. · Fever  · Enterobacter cloacae UTI (5/28) treated with Rocephin (5/31-6/2)    Plan:   · Continue Vancomycin  · Start PO Cipro to cover Enterobacter grown in urine 5/28; ? Resistance developing? Never has symptoms but U/A was dirty with >100 WBC   · Stop Cefepime  · Monitor QT with serial EKG   · Right flank CT site draining; operative wd with wd vac without surrounding erythema  · Follow fever curve and cultures  · WBC appears to be trending down    Anti-infectives:   · Vancomycin (6/3-  · Cefepime (6/3-  · Zosyn (6/3)  · Rocephin (5/31-6/2)    Subjective:   Date of Consultation:  June 4, 2019  Referring Physician: Dr. Walter Vera    Patient is a 78 y.o. female  has history of emphysema, hypothyroidism, subarachnoid hemorrhage, anterior cerebral aneurysm s/p brain coil, HTN, HLD was recently diagnosed with lung cancer. Tumor noted to be in right lower lobe mass measuring 5.4 cm x 4.4 cm and was appearing to extend to the right inferior hilum. Patient underwent a right thoracoscopy switched to right thoracotomy with extensive pneumonolysis, right lower lobe lobectomy, right upper lobe blebectomy x2, diaphragm resection with primary repair, chest wall resection, mediastinal lymphadenectomy and intercostal nerve cryoablation per Lolly Lopez MD on 5/22/2019. Post op patient continued to be hypoxic, requiring high flow O2 to maintain sat of 90%. Patient developed atrial fibrillation with RVR, 5/26/2019. HR, BP control treated per cardiology.  Patient continued on amiodarone, continued on telemetry.  CxR 5/30 demonstrates still bibasilar opacities, likely combination of small effusion and atelectasis, and vascular congestion. Patient continued on pulmicort and duoneb for COPD. Patient was continued on Rocephin for UTI. WBC trending down, 17.5 on 5/30. Patient has started to participate in therapies with acute PT, OT and ST. Patient shows mild generalized weakness, deconditioned state, poor activity tolerance due to hypoxia,limiting her mobility, ambulation and ADLs. Sent to 9th floor rehab 5/31/19. On 6/3/19  nurse reported patient required higher flow O2, up to 9L/min. Still continued pain in chest wall. Wound swab cx polymicrobial on stain w/o growth to date. . Patient having difficulty with participating in therapy due to on-going fatigue and pain. Fever spiked to 102.0 @ 1730 yesterday. Blood cultures obtained yesterday NGTD X 2.  IV Vancomycin and Cefepime were started 6/3/19. ID is asked to evaluate patient for post-op fever.           Patient Active Problem List   Diagnosis Code    Personal history of tobacco use, presenting hazards to health Z87.891    Liver lesion K76.9    Centrilobular emphysema ([facility] [address] J43.2    HTN (hypertension) I10    Acquired hypothyroidism E03.9    Aftercare following surgery Z48.89    Right lower lobe lung mass R91.8    Hypoxia R09.02    COPD (chronic obstructive pulmonary disease) (HCC) J44.9    Atrial fibrillation with rapid ventricular response (HCC) I48.91    Normochromic anemia D64.9    UTI (urinary tract infection) N39.0    Non-small cell lung cancer (NSCLC) (HCC) C34.90    Debility R53.81    Lung cancer (HCC) C34.90    Gait difficulty R26.9    Pain R52    Small cell carcinoma (HCC) C80.1    Atrial fibrillation with RVR (HCC) I48.91    History of thoracotomy Z98.890     Past Medical History:   Diagnosis Date    Aspiration pneumonia (HCC) 4/12/2019    Cancer (HCC)     Lung cancer    Chronic obstructive pulmonary disease ([facility] [address]     Hypertension     Severe sepsis with acute organ dysfunction ([facility] [address] 4/12/2019    Subarachnoid hemorrhage (HCC)     Thyroid disease       No family history on file. Social History     Tobacco Use    Smoking status: Former Smoker     Packs/day: 1.50     Years: 45.00     Pack years: 67.50     Types: Cigarettes     Last attempt to quit: 2004     Years since quitting: 15.4    Smokeless tobacco: Never Used   Substance Use Topics    Alcohol use: Not Currently     Past Surgical History:   Procedure Laterality Date    HX OTHER SURGICAL      coil in brain    HX WRIST FRACTURE [place]      bilat wrist      Prior to Admission medications    Medication Sig Start Date End Date Taking? Authorizing Provider   amiodarone (CORDARONE) 200 mg tablet Take 1 Tab by mouth daily. 6/4/19   Marko Hathaway MD   amiodarone (PACERONE) 400 mg tablet Take 1 Tab by mouth two (2) times a day. 5/31/19   Marko Hathaway MD   budesonide (PULMICORT) 0.5 mg/2 mL nbsp 2 mL by Nebulization route two (2) times a day for 30 days. 5/31/19 6/30/19  Marko Hathaway MD   cefTRIAXone 1 gram 1 g, ADDaptor 1 Device IVPB 1 g by IntraVENous route every twenty-four (24) hours for 5 days. 5/31/19 6/5/19  Marko Hathaway MD   celecoxib (CELEBREX) 100 mg capsule Take 1 Cap by mouth two (2) times a day for 90 days. 5/31/19 8/29/19  Marko Hathaway MD   senna-docusate (PERICOLACE) 8.6-50 mg per tablet Take 2 Tabs by mouth two (2) times a day. 5/31/19   Marko Hathaway MD   tiotropium [facility]) 18 mcg inhalation capsule Take 1 Cap by inhalation daily. 5/31/19   Marko Hathaway MD   pantoprazole (PROTONIX) 40 mg tablet Take 1 Tab by mouth Daily (before breakfast) for 30 days. 6/1/19 7/1/19  Marko Hathaway MD   magnesium hydroxide (MILK OF MAGNESIA) 400 mg/5 mL suspension Take 30 mL by mouth daily. 6/1/19   Marko Hathaway MD   enoxaparin (LOVENOX) 40 mg/0.4 mL 0.4 mL by SubCUTAneous route every twenty-four (24) hours for 21 days. 5/31/19 6/21/19  Marko Hathaway MD   furosemide (LASIX) 40 mg tablet Take 1 Tab by mouth daily. 5/31/19   Lb Mcrae MD   gabapentin (NEURONTIN) 300 mg capsule Take 1 Cap by mouth three (3) times daily. 5/31/19   Lb Mcrae MD   HYDROcodone-acetaminophen [facility]) 5-325 mg per tablet Take 1 Tab by mouth every four (4) hours as needed for Pain for up to 3 days. Max Daily Amount: 6 Tabs. 5/31/19 6/3/19  Lb Mcrae MD   benzonatate (TESSALON) 100 mg capsule Take 1 Cap by mouth three (3) times daily as needed for Cough. 5/6/19   Blaine Priest MD   albuterol (PROVENTIL HFA, VENTOLIN HFA, PROAIR HFA) 90 mcg/actuation inhaler Take 1 Puff by inhalation every six (6) hours as needed for Wheezing or Shortness of Breath. 4/16/19   JONNY Andujar   ascorbic acid, vitamin C, (VITAMIN C) 500 mg tablet Take  by mouth. Provider, Historical   calc-D3-magnes-A2-Eu-Iw-jake 250 mg-400 unit -40 mg-5 mg tab Take 1 Tab by mouth daily. Provider, Historical   omega 3-dha-epa-fish oil (FISH OIL) 100-160-1,000 mg cap Take 1 Cap by mouth daily. Provider, Historical   MAGNESIUM PO Take 100 mg by mouth daily. Provider, Historical   VITAMIN B COMPLEX PO Take 1 Tab by mouth daily. Provider, Historical   atorvastatin (LIPITOR) 10 mg tablet Take 10 mg by mouth daily. Provider, Historical   cholecalciferol (VITAMIN D3) 1,000 unit tablet Take 1,000 Units by mouth daily. Provider, Historical   levothyroxine (SYNTHROID) 75 mcg tablet Take 75 mcg by mouth daily. Provider, Historical   losartan (COZAAR) 25 mg tablet Take 25 mg by mouth daily. Provider, Historical   therapeutic multivitamin (THERAGRAN) tablet Take 1 Tab by mouth daily. Provider, Historical       Allergies   Allergen Reactions    Penicillins Unknown (comments)     Hives.     Tolerated ceftriaxone May 2019    Percocet [Oxycodone-Acetaminophen] Unknown (comments)    Prednisone Other (comments)     Tachycardia        Review of Systems:  A comprehensive review of systems was negative except for that written in the History of Present Illness. Objective:     Visit Vitals  /65   Pulse 85   Temp 98.8 °F (37.1 °C)   Resp 22   SpO2 90%     Temp (24hrs), Av.7 °F (37.6 °C), Min:98.8 °F (37.1 °C), Max:101.1 °F (38.4 °C)       Lines:  Peripheral IV:       Physical Exam:    General:  Alert, cooperative, well noursished, well developed, appears stated age   Eyes:  Sclera anicteric. Pupils equally round and reactive to light. Mouth/Throat: Mucous membranes normal, oral pharynx clear   Neck: Supple   Lungs:   Clear to auscultation, decreased RLL, good effort   CV:  Regular rate and rhythm,no murmur, click, rub or gallop   Abdomen:   Soft, non-tender. bowel sounds normal. non-distended   Extremities: No cyanosis or edema   Skin: Skin color, texture, turgor normal. no acute rash, post-op wd vac and right anterior CT site with copious drainage   Lymph nodes: Cervical and supraclavicular normal   Musculoskeletal: No swelling or deformity   Lines/Devices:  Intact, no erythema, drainage or tenderness   Psych: Alert and oriented, normal mood affect given the setting     Data Review:     CBC:  Recent Labs     19  0620 19  0640   WBC 14.9* 13.3*   GRANS 83* 82*   MONOS 8 10   EOS 0* 0*   ANEU 12.4* 10.9*   ABL 1.0 0.9   HGB 8.7* 8.3*   HCT 27.8* 26.1*   * 544*       BMP:  Recent Labs     19  0620 19  0640   CREA 0.62 0.58*   BUN 14 14    136   K 4.1 3.9    98   CO2 29 31   AGAP 7 7   GLU 94 102*       LFTS:  No results for input(s): TBILI, ALT, SGOT, AP, TP, ALB in the last 72 hours.     Microbiology:     All Micro Results     Procedure Component Value Units Date/Time    CULTURE, Ana Islas STAIN [[id number]]  (Abnormal) Collected:  19 1650    Order Status:  Completed Specimen:  Wound Drainage Updated:  19 0886     Special Requests: NO SPECIAL REQUESTS        GRAM STAIN 4 TO 45 WBC'S/OIF      NO EPITHELIAL CELLS SEEN               MODERATE GRAM POS COCCI IN CLUSTERS                  MODERATE GRAM POS COCCI IN CHAINS                  MODERATE GRAM POSITIVE RODS           Culture result: LIGHT ALPHA STREPTOCOCCUS               SCANT STAPHYLOCOCCUS SPECIES                  THIS ORGANISM WILL BE HELD FOR 7 DAYS.  IF FURTHER TESTING IS REQUIRED PLEASE NOTIFY MICROBIOLOGY                  CULTURE IN PROGRESS,FURTHER UPDATES TO FOLLOW          CULTURE, URINE [[id number]] Collected:  19 0952    Order Status:  Completed Specimen:  Urine from Clean catch Updated:  19     Special Requests: NO SPECIAL REQUESTS        Culture result:       <10,000 COLONIES/mL MIXED SKIN ZHANNA ISOLATED          CULTURE, BLOOD [[id number]] Collected:  19    Order Status:  Completed Specimen:  Blood Updated:  19    CULTURE, BLOOD [[id number]] Collected:  19    Order Status:  Completed Specimen:  Blood Updated:  19    CULTURE, [place] STAIN [[id number]]     Order Status:  Canceled Specimen:  Wound Drainage           Imagin/2 CXR  (-) acute    Signed By: Sofie Welch NP     2019

## 2019-06-04 NOTE — PROGRESS NOTES
OT Daily Note  Time In 1040   Time Out 1115     Pain: Pt reported less pain in RUE with movement, but pain was still present  Functional Mobility   Pt walked to the bathroom with mod A for lifting and line management. Self-Care   Pt went supine to sit with min A for managing trunk. Pt toileted with mod A for A for pants management up. Pt required increased time. Activity Tolerance   Pt was brought to gym engaged in a puzzle, but pt did not want to complete. Switched to theraputty (see OT Fidelia's note). Education   ID doctors on new antibiotics and suspicions of where the infection might be. Interdisciplinary Communication: PT Fer Chauhan on performance    Plan: Continue with POC. Pt was left in gym with OT Cuba Peters.      Abimbola Turner OTR/L  6/4/2019

## 2019-06-04 NOTE — PROGRESS NOTES
6/4/2019    PLAN:  Continue current care  Wound vac to right chest maintained per wound care team.              ASSESSMENT:  Admit Date: 5/31/2019   Surgery 5/22/19  Procedure(s):  1. RIGHT THORACOSCOPY switched to thoracotomy with extensive pneumonolysis  2. LOWER LOBECTOMY   3. Upper lobe blebectomy x 2  4. Diaphragm resection with primary repair  5. Chest wall resection  6. MEDISTINAL LYMPHADENECTOMY  7. Intercostal nerve cryoablation       DIAGNOSIS   A: #9 LYMPH NODE X2:   TWO LYMPH NODES NEGATIVE FOR METASTATIC CARCINOMA (0/2). B: #7 LYMPH NODE X5:   FIVE LYMPH NODES NEGATIVE FOR METASTATIC CARCINOMA (0/5). C: PERIHILAR LYMPH NODE:   ONE LYMPH NODE NEGATIVE FOR METASTATIC CARCINOMA (0/1). D: 4R LYMPH NODE X2:   TWO LYMPH NODES NEGATIVE FOR METASTATIC CARCINOMA (0/2). E: UPPER LOBE BLEB X2:   FINDINGS CONSISTENT WITH PULMONARY BLEBS. F: RIGHT LOWER LOBE, CHEST WALL, DIAPHRAGM:   POORLY DIFFERENTIATED NON-SMALL CELL CARCINOMA WITH SQUAMOUS FEATURES AND FOCAL NEUROENDOCRINE DIFFERENTIATION. CARCINOMA IS 6.9 CM IN GREATEST DIMENSION. MARGINS ARE NEGATIVE FOR CARCINOMA. FIVE LYMPH NODES NEGATIVE FOR METASTATIC CARCINOMA (0/5). SUBJECTIVE: Patient denies SOB. Currently O2 sat 97% with 5LNC, Temp max 101.1  Wound Vac maintained to right chest with purulent appearing drainage in canister. Patient with complaints of pain with dressing changed.        OBJECTIVE:  Patient Vitals for the past 24 hrs:   Temp Pulse Resp BP SpO2   06/04/19 0803 98.8 °F (37.1 °C) 85 22 101/65 90 %   06/04/19 0613 -- -- -- -- 97 %   06/03/19 2055 99.3 °F (37.4 °C) -- -- -- --   06/03/19 2001 -- (!) 101 24 109/61 91 %   06/03/19 1752 (!) 101.1 °F (38.4 °C) 89 20 94/56 95 %   06/03/19 1712 -- -- -- -- 93 %   06/03/19 1621 -- -- -- -- 95 %   06/03/19 1414 -- -- -- -- 96 %   06/03/19 1126 -- -- -- -- 97 %         Date 06/03/19 0700 - 06/04/19 9177 06/04/19 0700 - 06/05/19 0659   Shift 4026-4110 0972-4894 24 Hour Total 5135-9459 4724-2000 24 Hour Total   INTAKE   P.O. 480  480        P. O. 480  480      Shift Total 480  480      OUTPUT   Urine           Urine Occurrence(s) 2 x 2 x 4 x      Stool           Stool Occurrence(s) 2 x  2 x      Shift Total           480      Weight (kg)                  General:          No acute distress    Lungs:             CTA Bilaterally.  O2 5LNC, Wound vac to right chest   Heart:              RRR  Abdomen:        Soft, Non distended, Non tender, BS+  Extremities:     No cyanosis, clubbing or edema  Neurologic:      No focal deficits           Labs:    Recent Labs     06/04/19  0620   WBC 14.9*   HGB 8.7*   *      K 4.1      CO2 29   BUN 14   CREA 0.62   GLU 94         Crystal Jimena Ill, NP

## 2019-06-04 NOTE — PROGRESS NOTES
Time In 0655   Time Out 0740     Mobility   Score Comments   Supine to Sit 1: Dependent A for all parts   Sit to Supine 4: Supervision or touching A S   Transfer Assist 3: Partial/Moderate A Transfer Type: SPT   Equipment: Rolling Walker   Comments: Lifting A     Activities of Daily Living    Score Comments   Oral Hyigene 5: S/U or clean-up assist S/U   Bathing 3: Partial/Moderate A Type of Shower: Shower  Position: Standing PRN and Unsupported Sitting   Adaptive  Equipment: N/A  Comments: A to get feet   Upper Body  Dressing 4: Supervision or touching A Items Applied: Pullover  Position: Unsupported Sitting  Comments: Cueing to complete task, A to manage lines   Lower Body Dressing 1: Dependent Items Applied: Underwear and Elastic Pants  Adaptive Equipment: RW  Comments: A for all parts, pt putting poor effort towards   Donning/Rains Footwear 1: Dependent Items Applied: Socks  Adaptive Equipment: N/A  Comments: A for all parts   Toilet Transfer 3: Partial/Moderate A Transfer Type: SPT   Equipment: Rolling Walker   Comments: Lifting A   Toileting Hygiene 2: Substantial/Maximal A Output: Urine, reported to BRET Mott that it had odor, she reported that she is being treated for UTI  Comments: A for pants management   Education  Importance of therapy     Pt was in bed and at times agreeable to therapy. Pt had poor attention to task at hand and perseverated on pain in RUE. Pt educated on wound vac and BRET Mott was asked for pain meds, but pt could not have it at this time. Decided against ice secondary to actively draining wound. Attempted positioning. Pt sometimes could complete active movement with RUE and other times refused to do any movement. Positioned pt appropriately at end of session to reduce pain. Pt had poor attention towards task and kept repeating that she should have not come up here and should have stayed on the 2nd floor. Will reports comments to Dr. Sonya Major in conference.  Pt was left in bed with all needs within reach. Continue with POC.      Tanya Street OTR/L  6/4/2019

## 2019-06-04 NOTE — PROGRESS NOTES
Attempted to see pt, but she refused saying she could not do it again (pt had just finished PT session). Will attempt tomorrow as appropriate.      Monica Ennis OTR/ARTEMIO  6/4/2019

## 2019-06-04 NOTE — PROGRESS NOTES
Hourly rounds completed this shift. Patient resting in bed, watching TV. O2 93% on 5LNC. No s/sx of distress at this time. Will continue to monitor and give report to oncoming RN.

## 2019-06-04 NOTE — PROGRESS NOTES
OT Daily Note  Time In 1117   Time Out 1155     Subjective: \"If I went down to the second floor I would be a lot better. I wasn't hurting down there, and I was moving better. \" [multiple complaints throughout session; patient with limited understanding of [facility] expectations and benefits of rehab despite education]  Pain: none reported, but reports she \"will scream my head off\" when wound vac is changed tomorrow   Education: [facility] expectations, benefits of rehab  Interdisciplinary Communication: handoff from primary treating OT   Precautions: Other (comment)(fall risk)  Location on arrival: handoff from primary treating OT     Activity Tolerance Daily Assessment   Patient participated in theraputty activity using medium resistance putty, working on  strengthening and fine motor coordination. Required encouragement to persist with task. Noted decreased BUE strength and endurance and would benefit from further strengthening tasks. Education Daily Assessment   Educated patient on [facility] expectations, therapy schedule, benefits of rehab, and importance of regaining as much independence as possible due to patient's daughter watches over 4 grandchildren and can provide limited assistance at discharge. Patient reporting she thinks she would do better on the second floor or going home and hiring help. Limited understanding of education as related to [facility] expectations and benefits of rehab; would benefit from further education. Patient was transported back to room by rehab tech. Assessment: See above. Does not appear to be putting forth full effort during therapy tasks. Plan: Continue OT POC with focus on ADL/IADL skills, increasing participation, education, cognition, functional transfers, functional mobility, coordination, strength, static and dynamic balance, and activity tolerance to maximize safety and independence with ADLs and functional transfers.      Darshan Chang MS, OTR/L  6/4/2019        Note may contain the following abbreviations:   Abbreviation Explanation   AROM Active range of motion   PROM Passive range of motion   SPT Stand pivot transfer   LPT Lateral pivot transfer   WC wheelchair   RW Rolling walker    BUE Bilateral upper extremities   BLE Bilateral lower extremities    WBAT Weight bearing as tolerated    TTWB Toe-touch weight bearing    AD Adaptive device   AE Adaptive equipment    NMES Neuro muscular electrical stimulation   UBE Upper body ergometer

## 2019-06-04 NOTE — PROGRESS NOTES
Problem: Falls - Risk of  Goal: *Absence of Falls  Description  Document Rodrigo President Fall Risk and appropriate interventions in the flowsheet. Outcome: Progressing Towards Goal     Problem: Patient Education: Go to Patient Education Activity  Goal: Patient/Family Education  Outcome: Progressing Towards Goal     Problem: Pressure Injury - Risk of  Goal: *Prevention of pressure injury  Description  Document Nir Scale and appropriate interventions in the flowsheet.   Outcome: Progressing Towards Goal     Problem: Patient Education: Go to Patient Education Activity  Goal: Patient/Family Education  Outcome: Progressing Towards Goal     Problem: Gas Exchange - Impaired  Goal: *Absence of hypoxia  Outcome: Progressing Towards Goal     Problem: Patient Education: Go to Patient Education Activity  Goal: Patient/Family Education  Outcome: Progressing Towards Goal

## 2019-06-05 LAB
BACTERIA SPEC CULT: ABNORMAL
GRAM STN SPEC: ABNORMAL
SERVICE CMNT-IMP: ABNORMAL
VANCOMYCIN SERPL-MCNC: 11.7 UG/ML

## 2019-06-05 PROCEDURE — 74011250637 HC RX REV CODE- 250/637: Performed by: PHYSICAL MEDICINE & REHABILITATION

## 2019-06-05 PROCEDURE — 97530 THERAPEUTIC ACTIVITIES: CPT

## 2019-06-05 PROCEDURE — 92610 EVALUATE SWALLOWING FUNCTION: CPT

## 2019-06-05 PROCEDURE — 94640 AIRWAY INHALATION TREATMENT: CPT

## 2019-06-05 PROCEDURE — 97116 GAIT TRAINING THERAPY: CPT

## 2019-06-05 PROCEDURE — 74011250637 HC RX REV CODE- 250/637: Performed by: NURSE PRACTITIONER

## 2019-06-05 PROCEDURE — 99232 SBSQ HOSP IP/OBS MODERATE 35: CPT | Performed by: PHYSICAL MEDICINE & REHABILITATION

## 2019-06-05 PROCEDURE — 65310000000 HC RM PRIVATE REHAB

## 2019-06-05 PROCEDURE — 97110 THERAPEUTIC EXERCISES: CPT

## 2019-06-05 PROCEDURE — 74011250637 HC RX REV CODE- 250/637: Performed by: SURGERY

## 2019-06-05 PROCEDURE — 74750000023 HC WOUND THERAPY

## 2019-06-05 PROCEDURE — 97605 NEG PRS WND THER DME<=50SQCM: CPT

## 2019-06-05 PROCEDURE — 36415 COLL VENOUS BLD VENIPUNCTURE: CPT

## 2019-06-05 PROCEDURE — 80202 ASSAY OF VANCOMYCIN: CPT

## 2019-06-05 PROCEDURE — 74011000250 HC RX REV CODE- 250: Performed by: PHYSICAL MEDICINE & REHABILITATION

## 2019-06-05 PROCEDURE — 97535 SELF CARE MNGMENT TRAINING: CPT

## 2019-06-05 PROCEDURE — 94762 N-INVAS EAR/PLS OXIMTRY CONT: CPT

## 2019-06-05 PROCEDURE — 74011250636 HC RX REV CODE- 250/636: Performed by: PHYSICAL MEDICINE & REHABILITATION

## 2019-06-05 PROCEDURE — 77010033678 HC OXYGEN DAILY

## 2019-06-05 PROCEDURE — 94760 N-INVAS EAR/PLS OXIMETRY 1: CPT

## 2019-06-05 RX ADMIN — GABAPENTIN 300 MG: 300 CAPSULE ORAL at 17:13

## 2019-06-05 RX ADMIN — Medication 10 ML: at 21:56

## 2019-06-05 RX ADMIN — LEVOTHYROXINE SODIUM 75 MCG: 75 TABLET ORAL at 08:38

## 2019-06-05 RX ADMIN — HYDROCODONE BITARTRATE AND ACETAMINOPHEN 1 TABLET: 5; 325 TABLET ORAL at 13:03

## 2019-06-05 RX ADMIN — BUDESONIDE 500 MCG: 0.5 INHALANT RESPIRATORY (INHALATION) at 06:20

## 2019-06-05 RX ADMIN — ENOXAPARIN SODIUM 40 MG: 40 INJECTION SUBCUTANEOUS at 17:12

## 2019-06-05 RX ADMIN — ALBUTEROL SULFATE 2.5 MG: 2.5 SOLUTION RESPIRATORY (INHALATION) at 06:20

## 2019-06-05 RX ADMIN — HYDROCODONE BITARTRATE AND ACETAMINOPHEN 1 TABLET: 5; 325 TABLET ORAL at 17:13

## 2019-06-05 RX ADMIN — VANCOMYCIN HYDROCHLORIDE 1000 MG: 1 INJECTION, POWDER, LYOPHILIZED, FOR SOLUTION INTRAVENOUS at 07:06

## 2019-06-05 RX ADMIN — ALBUTEROL SULFATE 2.5 MG: 2.5 SOLUTION RESPIRATORY (INHALATION) at 13:50

## 2019-06-05 RX ADMIN — Medication 10 ML: at 06:00

## 2019-06-05 RX ADMIN — Medication 5 ML: at 14:00

## 2019-06-05 RX ADMIN — GABAPENTIN 300 MG: 300 CAPSULE ORAL at 21:39

## 2019-06-05 RX ADMIN — POTASSIUM CHLORIDE 40 MEQ: 20 TABLET, EXTENDED RELEASE ORAL at 08:38

## 2019-06-05 RX ADMIN — HYDROCODONE BITARTRATE AND ACETAMINOPHEN 1 TABLET: 5; 325 TABLET ORAL at 08:38

## 2019-06-05 RX ADMIN — SENNOSIDES AND DOCUSATE SODIUM 2 TABLET: 8.6; 5 TABLET ORAL at 17:13

## 2019-06-05 RX ADMIN — BUDESONIDE 500 MCG: 0.5 INHALANT RESPIRATORY (INHALATION) at 17:48

## 2019-06-05 RX ADMIN — ALBUTEROL SULFATE 2.5 MG: 2.5 SOLUTION RESPIRATORY (INHALATION) at 10:32

## 2019-06-05 RX ADMIN — HYDROCODONE BITARTRATE AND ACETAMINOPHEN 1 TABLET: 5; 325 TABLET ORAL at 02:53

## 2019-06-05 RX ADMIN — FUROSEMIDE 40 MG: 20 TABLET ORAL at 08:39

## 2019-06-05 RX ADMIN — TIOTROPIUM BROMIDE 18 MCG: 18 CAPSULE ORAL; RESPIRATORY (INHALATION) at 06:20

## 2019-06-05 RX ADMIN — CIPROFLOXACIN HYDROCHLORIDE 500 MG: 500 TABLET, FILM COATED ORAL at 08:39

## 2019-06-05 RX ADMIN — SENNOSIDES AND DOCUSATE SODIUM 2 TABLET: 8.6; 5 TABLET ORAL at 08:38

## 2019-06-05 RX ADMIN — GABAPENTIN 300 MG: 300 CAPSULE ORAL at 08:39

## 2019-06-05 RX ADMIN — AMIODARONE HYDROCHLORIDE 200 MG: 200 TABLET ORAL at 08:38

## 2019-06-05 RX ADMIN — ACETAMINOPHEN 650 MG: 325 TABLET, FILM COATED ORAL at 02:53

## 2019-06-05 RX ADMIN — HYDROCODONE BITARTRATE AND ACETAMINOPHEN 1 TABLET: 5; 325 TABLET ORAL at 21:39

## 2019-06-05 RX ADMIN — LORAZEPAM 1 MG: 1 TABLET ORAL at 23:42

## 2019-06-05 RX ADMIN — PANTOPRAZOLE SODIUM 40 MG: 40 TABLET, DELAYED RELEASE ORAL at 05:29

## 2019-06-05 RX ADMIN — VANCOMYCIN HYDROCHLORIDE 1000 MG: 1 INJECTION, POWDER, LYOPHILIZED, FOR SOLUTION INTRAVENOUS at 21:50

## 2019-06-05 RX ADMIN — ALBUTEROL SULFATE 2.5 MG: 2.5 SOLUTION RESPIRATORY (INHALATION) at 17:49

## 2019-06-05 RX ADMIN — CIPROFLOXACIN HYDROCHLORIDE 500 MG: 500 TABLET, FILM COATED ORAL at 21:39

## 2019-06-05 NOTE — PROGRESS NOTES
PHYSICAL THERAPY DAILY NOTE  Time In: 3968  Time Out: 8703  Patient Seen For: PM;Gait training;Patient education; Therapeutic exercise;Transfer training    Subjective: \"Are you going to make me walk? \" Patient agreeable to therapy. Objective: Vital Signs:   Patient Vitals for the past 8 hrs:   BP SpO2   06/05/19 1626 98/65 --   06/05/19 1350 -- 99 %   06/05/19 1032 -- 91 %         Pain level: 7/10  Pain location: R posterior flank  Pain interventions: Positioned for comfort, provided rest breaks, pain medication per nursing    Patient education: Educated patient on safety with ambulation with rolling walker. Interdisciplinary Communication: Communicated with Merle Garcia regarding patient's POC. Other (comment)(fall risk)  GROSS ASSESSMENT Daily Assessment            COGNITION Daily Assessment    Verbal cues for safety with functional mobility. BED/MAT MOBILITY Daily Assessment   Increased time and effort. Supine to Sit : 0 (Not tested)  Sit to Supine : 3 (Moderate assistance)       TRANSFERS Daily Assessment   Increased time and effort. Moderate forward head posture with mobility. Transfer Type: SPT with walker  Transfer Assistance : 3 (Moderate assistance )  Sit to Stand Assistance: Moderate assistance  Car Transfers: Not tested       GAIT Daily Assessment   Patient ambulated with moderate forward head posture, reported dizziness after ambulating 10 ft, returned to sitting, BP taken reading 98/65. O2 sat at 91% on 5 L O2 vi high flow nasal cannula.  Amount of Assistance: 3 (Moderate assistance)  Distance (ft): 15 Feet (ft)  Assistive Device: Gait belt;Walker, rolling       STEPS or STAIRS Daily Assessment    Steps/Stairs Ambulated (#): 0  Level of Assist : 0 (Not tested)       BALANCE Daily Assessment    Sitting - Static: Good (unsupported)  Sitting - Dynamic: Fair (occasional)  Standing - Static: Poor  Standing - Dynamic : Impaired       LOWER EXTREMITY EXERCISES Daily Assessment    Extremity: Both  Exercise Type #1: Seated lower extremity strengthening  Sets Performed: 2  Reps Performed: 10  Level of Assist: Minimal assistance     Patient performed the following B LE exercises:  SEATED EXERCISES Sets Reps Comments   Ankle Pumps 1 20    Hip Flexion 2 10    Long Arc Quads 2 10    Hip Adduction/Ball Squeeze 2 10    Hip Abduction with red Theraband 2 10    Hamstring Curls with red Theraband 2 10      Patient returned to room lying supine in bed with head of bed elevated and all needs in reach. Assessment: Patient making slow progress towards goals. Patient requires extra time for functional mobility secondary to reports of pain. Plan of Care: Continue with POC and progress as tolerated.        Parminder Charter  6/5/2019

## 2019-06-05 NOTE — PROGRESS NOTES
Pharmacokinetic Consult to Pharmacist    Clara Andrew is a 78 y.o. female being treated empirically with vancomycin and ciprofloxacin. Lab Results   Component Value Date/Time    BUN 14 06/04/2019 06:20 AM    Creatinine 0.62 06/04/2019 06:20 AM    WBC 14.9 (H) 06/04/2019 06:20 AM    Procalcitonin 0.3 05/28/2019 12:56 PM    Lactic Acid (POC) 0.87 04/25/2019 05:01 AM      CrCl cannot be calculated (Unknown ideal weight.). Lab Results   Component Value Date/Time    Vancomycin, random 11.7 06/05/2019 06:12 AM       Day 3 of vancomycin. Goal trough is 15-20. Pre-steady state trough is appropriately low at 11.7. Will continue current dose of 1000 mg IV q18h with expected accumulation as drug reaches steady state. Further levels will be ordered as clinically indicated. Pharmacy will continue to follow. Please call with any questions.     Thank you,  Amy Quiroga, PharmD  Clinical Pharmacist  [phone]

## 2019-06-05 NOTE — PROGRESS NOTES
Infectious Disease Progress Note    Today's Date: 2019   Admit Date: 2019    Impression:   · Small cell carcinoma lung ca s/p right thoracoscopy switched to right thoracotomy with extensive pneumonolysis, right lower lobe lobectomy, right upper lobe blebectomy x2, diaphragm resection with primary repair, chest wall resection, mediastinal lymphadenectomy and intercostal nerve cryoablation per Ana Luisa Danielson MD on 2019. · Fever:  Down in past 24 hours; UC and BC negative; wound culture from chest tube site with multple GP organisms. · Enterobacter cloacae UTI () treated with Rocephin (-)    Plan:   · Continue Vancomycin; would probably treat for 7 days (est EOT 06/10/19)  · Continue Cipro for now; if  UC remains negative would discontinue      Anti-infectives:   · Vancomycin (6/3-  · Cefepime (6/3-)  · Cipro 500mg po BID (-)  · Zosyn (6/3)  · Rocephin (-)    Subjective:   Date of Consultation:  2019  Referring Physician: Dr. uJan Johnson    Sitting in wheelchair; still with significant right shoulder pain. Drainage from chest tube site has decreased. Allergies   Allergen Reactions    Penicillins Unknown (comments)     Hives. Tolerated ceftriaxone May 2019    Percocet [Oxycodone-Acetaminophen] Unknown (comments)    Prednisone Other (comments)     Tachycardia        Review of Systems:  A comprehensive review of systems was negative except for that written in the History of Present Illness. Objective:     Visit Vitals  BP 93/54   Pulse 84   Temp 97.5 °F (36.4 °C)   Resp 20   SpO2 99%     Temp (24hrs), Av.1 °F (36.7 °C), Min:97.5 °F (36.4 °C), Max:98.9 °F (37.2 °C)       Lines:  Peripheral IV:       Physical Exam:    General:  Alert, cooperative, well noursished, well developed, appears stated age   Eyes:  Sclera anicteric. Pupils equally round and reactive to light.    Mouth/Throat: Mucous membranes normal, oral pharynx clear   Neck: Supple   Lungs:   Clear to auscultation, decreased RLL, good effort   CV:  Regular rate and rhythm,no murmur, click, rub or gallop   Abdomen:   Soft, non-tender. bowel sounds normal. non-distended   Extremities: No cyanosis or edema   Skin: Skin color, texture, turgor normal. no acute rash, post-op wd vac and right anterior CT site with decreased drainage   Lymph nodes: Cervical and supraclavicular normal   Musculoskeletal: No swelling or deformity   Lines/Devices:  Intact, no erythema, drainage or tenderness   Psych: Alert and oriented, normal mood affect given the setting     Data Review:     CBC:  Recent Labs     06/04/19 0620 06/03/19  0640   WBC 14.9* 13.3*   GRANS 83* 82*   MONOS 8 10   EOS 0* 0*   ANEU 12.4* 10.9*   ABL 1.0 0.9   HGB 8.7* 8.3*   HCT 27.8* 26.1*   * 544*       BMP:  Recent Labs     06/04/19 0620 06/03/19  0640   CREA 0.62 0.58*   BUN 14 14    136   K 4.1 3.9    98   CO2 29 31   AGAP 7 7   GLU 94 102*       LFTS:  No results for input(s): TBILI, ALT, SGOT, AP, TP, ALB in the last 72 hours. Microbiology:     All Micro Results     Procedure Component Value Units Date/Time    CULTURE, Sherman Joshihe STAIN [[id number]]  (Abnormal) Collected:  06/02/19 1650    Order Status:  Completed Specimen:  Wound Drainage Updated:  06/05/19 0812     Special Requests: NO SPECIAL REQUESTS        GRAM STAIN 4 TO 45 WBC'S/OIF      NO EPITHELIAL CELLS SEEN               MODERATE GRAM POS COCCI IN CLUSTERS                  MODERATE GRAM POS COCCI IN CHAINS                  MODERATE GRAM POSITIVE RODS           Culture result: LIGHT ALPHA STREPTOCOCCUS               SCANT STAPHYLOCOCCUS SPECIES, COAGULASE NEGATIVE                  THIS ORGANISM WILL BE HELD FOR 7 DAYS.  IF FURTHER TESTING IS REQUIRED PLEASE NOTIFY MICROBIOLOGY          CULTURE, BLOOD [[id number]] Collected:  06/03/19 2202    Order Status:  Completed Specimen:  Blood Updated:  06/05/19 0408     Special Requests: --        LEFT  HAND       Culture result: NO GROWTH 2 DAYS       CULTURE, BLOOD [[id number]] Collected:  19    Order Status:  Completed Specimen:  Blood Updated:  19 06     Special Requests: --        RIGHT  HAND       Culture result: NO GROWTH 2 DAYS       CULTURE, URINE [[id number]] Collected:  19 0952    Order Status:  Completed Specimen:  Urine from Clean catch Updated:  19 0815     Special Requests: NO SPECIAL REQUESTS        Culture result:       <10,000 COLONIES/mL MIXED SKIN ZHANNA ISOLATED          CULTURE, Larraine Basque STAIN [[id number]]     Order Status:  Canceled Specimen:  Wound Drainage           Imagin/2 CXR  (-) acute    Signed By: Eunice Chavira MD     2019

## 2019-06-05 NOTE — PROGRESS NOTES
..Rounded on pt hourly during night. Slept at intervals after pain medicine. Norco 5/325mg po given. . See MAR for details. .  Skin warm and dry. Respiration even and unlabored when sleeping. . Sats 92-94%. Pt desats when awaken or awake to 84-86%. Reinforced deep breathing. .  No acute distress noted. Safety maintained. Wound vac with purulent drainage. Amanda Lowry Up to [facility] to void. Amanda Lowry

## 2019-06-05 NOTE — PROGRESS NOTES
OT Daily Note  Time In 0918   Time Out 1003     Pain: Pt c/o pain 8/10 in back. RN Cassia Higuera notified, but pt had been given pain medication an hour before. Functional Mobility   Pt transferred supine to sit with CGA and to the w/c with CGA. Pt transferred to bed with mod A for lifting. Pt transferred to toilet with min A. Strengthening   Pt completed prolonged shoulder stabilization task to promote UB strength and activity tolerance for integration into fucntional reach and transfers. Pt could not go as far with LUE and required increased time. Pt completed the following exercises with 5 lb hayley to promote UB strength, activity tolerance, and shoulder stabilization for integration into functional reach:  Exercise Reps Comments   Protraction/Retraction 10    Circles 20 1/2 CW, 1/2 CCW   Vs 10    Pt demonstrated fair quality and some self-limiting during all exercises. Education   Benefits of sx and recover process     Interdisciplinary Communication: Dr. Juan Johnson; pt expressed it is either therapy or suicide. Pt seems daunted by recovery process    Plan: Continue with POC. Pt was left on [facility] with BRET Valdivia.      Raoul Cali OTR/L  6/5/2019

## 2019-06-05 NOTE — PROGRESS NOTES
Hourly rounds completed this shift. Patient resting in bed. All needs met at this time. Will continue to monitor and give report to oncoming RN.

## 2019-06-05 NOTE — PROGRESS NOTES
06/05/19 1516   Mental Status   Neurologic State Alert   Perception Appears intact   Perseveration No perseveration noted   Safety/Judgement Awareness of environment   Oral Assessment   Labial No impairment   Dentition Intact   Lingual No impairment   PO Trials   Assessment Method(s) Observation;Palpation   Vocal Quality No impairment   Consistency Presented Solid   How Presented Self-fed/presented   Bolus Acceptance No impairment   Bolus Formation/Control No impairment   Propulsion No impairment   Oral Residue None   Initiation of Swallow No impairment   Laryngeal Elevation Functional   Aspiration Signs/Symptoms None   Pharyngeal Phase Characteristics No impairment, issues, or problems       06/05/19 1519   Respiratory Training   Spirometer 750ml average over 5 trials; maximum 1000ml   Trained in Use of Lower Abdominal Muscles Yes   Exercise/Activities Tolerance   Exercise Tolerance/Response Cueing required (amount); Poor endurance      06/05/19 1517   Time With Patient   Time In 1243   Time Out 1304     Patient was seen for dysphagia assessment during lunch and voice screening. Currently on 5L of 02 via nasal cannula. Encouraged patient to sit as far upright as comfortable with meals to reduce aspiration risk. No overt signs/sx of aspiration with current regular texture/thin liquid diet. No change in 02 or RR with trials administered. Patient is very talkative but 02 remained between 94-95 throughout assessment. Perceptual speech is grossly within functional limits for loudness, pitch, resonance, and prosody. Encouraged patient to complete deep breathing exercises 3x/day and focus on inhalation via her nose versus mouth. Also encouraged use of her spirometer. She recalled instructions to use it every hour but admitted she has not been using it regularly. Averaged 750ml with a maximum of 1000ml x1. Recommend continue current diet. Continued use of spirometer as instructed and deep breathing exercises. No further swallowing/voice therapy indicated at this time.       Radha Chavez MS, CCC-SLP

## 2019-06-05 NOTE — PROGRESS NOTES
PHYSICAL THERAPY DAILY NOTE  Time In: 1116  Time Out: 1200  Patient Seen For: AM;Patient education; Therapeutic exercise    Subjective: \"I can't get up right now. On days they change my dressing there's no stopping this pain. \" Patient agreeable to therapy. Objective: Vital Signs:   Patient Vitals for the past 8 hrs:   Temp Pulse Resp BP SpO2   06/05/19 1032 -- -- -- -- 91 %   06/05/19 0753 97.5 °F (36.4 °C) 84 20 93/54 95 %   06/05/19 0620 -- -- -- -- 95 %       Patient on 5 L O2 via hi flow nasal cannula, O2 sat remained >88%. Pain level: 7/10  Pain location: R posterior flank  Pain interventions: Positioned for comfort, provided rest breaks    Patient education: Educated patient on benefits of getting out of bed and mobilizing. Patient declined getting out of bed at this time. Interdisciplinary Communication: Communicated with Ashtyn Hayes regarding patient's POC. Other (comment)(fall risk)  GROSS ASSESSMENT Daily Assessment          BED/MAT MOBILITY Daily Assessment            TRANSFERS Daily Assessment   Secondary to patient declining to get out of bed at this time due to pain following dressing change. Transfer Assistance : 0 (Not tested)  Car Transfers: Not tested       GAIT Daily Assessment   Secondary to patient declining to get out of bed at this time due to dressing change.  Amount of Assistance: 0 (Not tested)  Distance (ft): 0 Feet (ft)       STEPS or STAIRS Daily Assessment    Steps/Stairs Ambulated (#): 0  Level of Assist : 0 (Not tested)       BALANCE Daily Assessment            LOWER EXTREMITY EXERCISES Daily Assessment    Extremity: Both  Exercise Type #1: Supine lower extremity strengthening  Sets Performed: 2  Reps Performed: 15  Level of Assist: Minimal assistance     Patient performed the following B LE exercises:  SUPINE EXERCISES Sets Reps Comments   Ankle Pumps 2 20    Quad Sets 2 15    Glut Sets 2 15    Heel Slides 2 15    Hip Abduction 2 15    Short Arc Quad 2 15    Straight Leg Raise 2 10      Patient remained lying supine in bed with head of bed elevated and all needs in reach. Assessment: Patient making slow progress towards goals. Patient limits herself with mobility, requiring encouragement to participate. She is only agreeable to certain aspects of physical therapy given her reported pain. Patient required rest breaks with therapeutic exercise secondary to shortness of breath. Plan of Care: Continue with POC and progress as tolerated.        Tiffany Abdiel  6/5/2019

## 2019-06-05 NOTE — PROGRESS NOTES
Problem: Falls - Risk of  Goal: *Absence of Falls  Description  Document Yanick Joseph Fall Risk and appropriate interventions in the flowsheet. Outcome: Progressing Towards Goal     Problem: Patient Education: Go to Patient Education Activity  Goal: Patient/Family Education  Outcome: Progressing Towards Goal     Problem: Pressure Injury - Risk of  Goal: *Prevention of pressure injury  Description  Document Nir Scale and appropriate interventions in the flowsheet.   Outcome: Progressing Towards Goal     Problem: Patient Education: Go to Patient Education Activity  Goal: Patient/Family Education  Outcome: Progressing Towards Goal     Problem: Gas Exchange - Impaired  Goal: *Absence of hypoxia  Outcome: Progressing Towards Goal     Problem: Patient Education: Go to Patient Education Activity  Goal: Patient/Family Education  Outcome: Progressing Towards Goal

## 2019-06-05 NOTE — PROGRESS NOTES
SFD PROGRESS NOTE    Elena Sams  Admit Date: 5/31/2019  Admit Diagnosis: Lung cancer ([facility].) [C34.90]; History of thoracotomy [Z98.890]; Hypoxia [R09.02]; Pain [R52]; Debility [R53.81]; Small cell carcinoma (HCC) [C80.1]; Atrial fibrillation with RVR (HCC) [I48.91];COPD (chronic obstructive pulmonary disease) ([facility].) [J44.9]; Gait difficulty [R26.9]    Subjective     Afebrile. Continued on empiric antibiotics. Patient expresses she has pain difficulty with therapy. Requires encouragement. Patient does require rest breaks with therapeutic exercise secondary to shortness of breath. Patient desaturates after speaking, slowly recovers afterward.      Objective:     Current Facility-Administered Medications   Medication Dose Route Frequency    ciprofloxacin HCl (CIPRO) tablet 500 mg  500 mg Oral Q12H    LORazepam (ATIVAN) tablet 1 mg  1 mg Oral Q8H PRN    zinc oxide-cod liver oil (DESITIN) 40 % paste   Topical PRN    vancomycin (VANCOCIN) 1,000 mg in 0.9% sodium chloride (MBP/ADV) 250 mL  1 g IntraVENous Q18H    acetaminophen (TYLENOL) tablet 650 mg  650 mg Oral Q6H PRN    phenol throat spray (CHLORASEPTIC) 1 Spray  1 Spray Oral PRN    HYDROmorphone (DILAUDID) tablet 2 mg  2 mg Oral Q6H PRN    potassium chloride (K-DUR, KLOR-CON) SR tablet 40 mEq  40 mEq Oral DAILY    naloxone (NARCAN) injection 0.4 mg  0.4 mg IntraVENous PRN    ondansetron (ZOFRAN) injection 4 mg  4 mg IntraVENous Q4H PRN    sodium chloride (NS) flush 5-40 mL  5-40 mL IntraVENous Q8H    sodium chloride (NS) flush 5-40 mL  5-40 mL IntraVENous PRN    albuterol (PROVENTIL VENTOLIN) nebulizer solution 2.5 mg  2.5 mg Nebulization QID RT    amiodarone (CORDARONE) tablet 200 mg  200 mg Oral DAILY    budesonide (PULMICORT) 500 mcg/2 ml nebulizer suspension  500 mcg Nebulization BID RT    enoxaparin (LOVENOX) injection 40 mg  40 mg SubCUTAneous Q24H    furosemide (LASIX) tablet 40 mg  40 mg Oral DAILY    gabapentin (NEURONTIN) capsule 300 mg  300 mg Oral TID    HYDROcodone-acetaminophen (NORCO) 5-325 mg per tablet 1 Tab  1 Tab Oral Q4H PRN    levothyroxine (SYNTHROID) tablet 75 mcg  75 mcg Oral DAILY    magnesium hydroxide (MILK OF MAGNESIA) 400 mg/5 mL oral suspension 30 mL  30 mL Oral DAILY PRN    magnesium hydroxide (MILK OF MAGNESIA) 400 mg/5 mL oral suspension 30 mL  30 mL Oral DAILY    pantoprazole (PROTONIX) tablet 40 mg  40 mg Oral ACB    senna-docusate (PERICOLACE) 8.6-50 mg per tablet 2 Tab  2 Tab Oral BID    tiotropium (SPIRIVA) inhalation capsule 18 mcg  1 Cap Inhalation DAILY     Review of Systems: + chest wall pain. Denies chest pain, shortness of breath, cough, headache, visual problems, abdominal pain, dysurea, calf pain. Pertinent positives are as noted in the medical records and unremarkable otherwise. Visit Vitals  BP 93/54   Pulse 84   Temp 97.5 °F (36.4 °C)   Resp 20   SpO2 95%      Physical Exam:   Psych: Patient was oriented to person, place, and time. NAD on 4-9L O2 via NC. General:   Alert, appears stated age, No acute distress. HEENT:  Normocephalic, EOMI, facial symmetry  Intact. no JVD   Lungs:  + crackles thoughout R base. Respiration even and unlabored   No apparent use of accessory muscles for respiration. Heart:  Regular rate and rhythm, S1, S2, No obvious murmur    Genitourinary: defered   Abdomen:  Soft, non-tender to palpation in all four quadrants. Neuromuscular:   PERRL, EOMI  Speech is clear.   Follows simple commands consistently. Able to identify, recall repeat. Mood appropriate.   + generalized weakness   Sensory - intact   Skin:  edema: none   Incision:  Calf non tender BLE. Right chest wall with wound vac, good seal.                                                                                  Functional Assessment:  Gross Assessment  AROM: Generally decreased, functional (06/03/19 1000)  Strength: Generally decreased, functional (06/03/19 1000)       Balance  Sitting - Static: Good (unsupported) (06/04/19 1600)  Sitting - Dynamic: Fair (occasional) (06/04/19 1600)  Standing - Static: Not tested (06/04/19 1600)  Standing - Dynamic : Impaired (06/03/19 1000)           Toileting  Cues: Physical assistance for pants down;Physical assistance for pants up;Verbal cues provided (06/03/19 0701)         [facility] Fall Risk Assessment:  [facility] Fall Risk  Mobility: Ambulates or transfers with assist devices or assistance (06/05/19 0700)  Mobility Interventions: Communicate number of staff needed for ambulation/transfer;OT consult for ADLs; Patient to call before getting OOB;PT Consult for mobility concerns (06/05/19 0700)  Mentation: Alert, oriented x 3 (06/05/19 0700)  Medication: Patient receiving anticonvulsants, sedatives(tranquilizers), psychotropics or hypnotics, hypoglycemics, narcotics, sleep aids, antihypertensives, laxatives, or diuretics (06/05/19 0700)  Medication Interventions: Bed/chair exit alarm;Evaluate medications/consider consulting pharmacy; Patient to call before getting OOB; Teach patient to arise slowly (06/05/19 0700)  Elimination: Needs assistance with toileting (06/05/19 0700)  Elimination Interventions: Call light in reach; Patient to call for help with toileting needs; Toileting schedule/hourly rounds (06/05/19 0700)  Prior Fall History: Before admission in past 12 months _home or previous inpatient care) (06/05/19 0700)  History of Falls Interventions: Bed/chair exit alarm; Door open when patient unattended;Evaluate medications/consider consulting pharmacy (06/05/19 0700)  Total Score: 4 (06/05/19 0700)  Standard Fall Precautions: Yes (06/01/19 0710)  High Fall Risk: Yes (06/05/19 0700)     Speech Assessment:         Ambulation:  Gait  Distance (ft): 0 Feet (ft) (06/04/19 1600)  Assistive Device: Walker, rolling (06/03/19 1000)     Labs/Studies:  Recent Results (from the past 72 hour(s))   URINALYSIS W/ RFLX MICROSCOPIC    Collection Time: 06/02/19  9:52 AM   Result Value Ref Range    Color DARK      Appearance CLEAR      Specific gravity 1.020 1.001 - 1.023      pH (UA) 5.5 5.0 - 9.0      Protein 30 (A) NEG mg/dL    Glucose NEGATIVE  mg/dL    Ketone NEGATIVE  NEG mg/dL    Bilirubin NEGATIVE  NEG      Blood NEGATIVE  NEG      Urobilinogen 0.2 0.2 - 1.0 EU/dL    Nitrites NEGATIVE  NEG      Leukocyte Esterase TRACE (A) NEG      WBC 5-10 0 /hpf    RBC 0-3 0 /hpf    Epithelial cells 5-10 0 /hpf    Bacteria 0 0 /hpf    Casts 5-10 0 /lpf   CULTURE, URINE    Collection Time: 06/02/19  9:52 AM   Result Value Ref Range    Special Requests: NO SPECIAL REQUESTS      Culture result: <10,000 COLONIES/mL MIXED SKIN ZHANNA ISOLATED     CULTURE, WOUND W GRAM STAIN    Collection Time: 06/02/19  4:50 PM   Result Value Ref Range    Special Requests: NO SPECIAL REQUESTS      GRAM STAIN 4 TO 45 WBC'S/OIF     GRAM STAIN NO EPITHELIAL CELLS SEEN      GRAM STAIN MODERATE GRAM POS COCCI IN CLUSTERS      GRAM STAIN MODERATE GRAM POS COCCI IN CHAINS      GRAM STAIN MODERATE GRAM POSITIVE RODS      Culture result: LIGHT ALPHA STREPTOCOCCUS (A)      Culture result: SCANT STAPHYLOCOCCUS SPECIES, COAGULASE NEGATIVE (A)      Culture result:        THIS ORGANISM WILL BE HELD FOR 7 DAYS. IF FURTHER TESTING IS REQUIRED PLEASE NOTIFY MICROBIOLOGY   CBC WITH AUTOMATED DIFF    Collection Time: 06/03/19  6:40 AM   Result Value Ref Range    WBC 13.3 (H) 4.3 - 11.1 K/uL    RBC 2.90 (L) 4.05 - 5.2 M/uL    HGB 8.3 (L) 11.7 - 15.4 g/dL    HCT 26.1 (L) 35.8 - 46.3 %    MCV 90.0 79.6 - 97.8 FL    MCH 28.6 26.1 - 32.9 PG    MCHC 31.8 31.4 - 35.0 g/dL    RDW 15.5 (H) 11.9 - 14.6 %    PLATELET 021 (H) 423 - 450 K/uL    MPV 8.9 (L) 9.4 - 12.3 FL    ABSOLUTE NRBC 0.00 0.0 - 0.2 K/uL    DF AUTOMATED      NEUTROPHILS 82 (H) 43 - 78 %    LYMPHOCYTES 7 (L) 13 - 44 %    MONOCYTES 10 4.0 - 12.0 %    EOSINOPHILS 0 (L) 0.5 - 7.8 %    BASOPHILS 0 0.0 - 2.0 %    IMMATURE GRANULOCYTES 1 0.0 - 5.0 %    ABS.  NEUTROPHILS 10.9 (H) 1.7 - 8.2 K/UL ABS. LYMPHOCYTES 0.9 0.5 - 4.6 K/UL    ABS. MONOCYTES 1.3 0.1 - 1.3 K/UL    ABS. EOSINOPHILS 0.0 0.0 - 0.8 K/UL    ABS. BASOPHILS 0.1 0.0 - 0.2 K/UL    ABS. IMM. GRANS. 0.1 0.0 - 0.5 K/UL   METABOLIC PANEL, BASIC    Collection Time: 06/03/19  6:40 AM   Result Value Ref Range    Sodium 136 136 - 145 mmol/L    Potassium 3.9 3.5 - 5.1 mmol/L    Chloride 98 98 - 107 mmol/L    CO2 31 21 - 32 mmol/L    Anion gap 7 7 - 16 mmol/L    Glucose 102 (H) 65 - 100 mg/dL    BUN 14 8 - 23 MG/DL    Creatinine 0.58 (L) 0.6 - 1.0 MG/DL    GFR est AA >60 >60 ml/min/1.73m2    GFR est non-AA >60 >60 ml/min/1.73m2    Calcium 8.2 (L) 8.3 - 10.4 MG/DL   CULTURE, BLOOD    Collection Time: 06/03/19 10:02 PM   Result Value Ref Range    Special Requests: LEFT  HAND        Culture result: NO GROWTH 2 DAYS     CULTURE, BLOOD    Collection Time: 06/03/19 10:02 PM   Result Value Ref Range    Special Requests: RIGHT  HAND        Culture result: NO GROWTH 2 DAYS     CBC WITH AUTOMATED DIFF    Collection Time: 06/04/19  6:20 AM   Result Value Ref Range    WBC 14.9 (H) 4.3 - 11.1 K/uL    RBC 3.05 (L) 4.05 - 5.2 M/uL    HGB 8.7 (L) 11.7 - 15.4 g/dL    HCT 27.8 (L) 35.8 - 46.3 %    MCV 91.1 79.6 - 97.8 FL    MCH 28.5 26.1 - 32.9 PG    MCHC 31.3 (L) 31.4 - 35.0 g/dL    RDW 15.8 (H) 11.9 - 14.6 %    PLATELET 232 (H) 734 - 450 K/uL    MPV 9.1 (L) 9.4 - 12.3 FL    ABSOLUTE NRBC 0.00 0.0 - 0.2 K/uL    DF AUTOMATED      NEUTROPHILS 83 (H) 43 - 78 %    LYMPHOCYTES 7 (L) 13 - 44 %    MONOCYTES 8 4.0 - 12.0 %    EOSINOPHILS 0 (L) 0.5 - 7.8 %    BASOPHILS 1 0.0 - 2.0 %    IMMATURE GRANULOCYTES 1 0.0 - 5.0 %    ABS. NEUTROPHILS 12.4 (H) 1.7 - 8.2 K/UL    ABS. LYMPHOCYTES 1.0 0.5 - 4.6 K/UL    ABS. MONOCYTES 1.2 0.1 - 1.3 K/UL    ABS. EOSINOPHILS 0.1 0.0 - 0.8 K/UL    ABS. BASOPHILS 0.1 0.0 - 0.2 K/UL    ABS. IMM.  GRANS. 0.1 0.0 - 0.5 K/UL   METABOLIC PANEL, BASIC    Collection Time: 06/04/19  6:20 AM   Result Value Ref Range    Sodium 138 136 - 145 mmol/L Potassium 4.1 3.5 - 5.1 mmol/L    Chloride 102 98 - 107 mmol/L    CO2 29 21 - 32 mmol/L    Anion gap 7 7 - 16 mmol/L    Glucose 94 65 - 100 mg/dL    BUN 14 8 - 23 MG/DL    Creatinine 0.62 0.6 - 1.0 MG/DL    GFR est AA >60 >60 ml/min/1.73m2    GFR est non-AA >60 >60 ml/min/1.73m2    Calcium 8.1 (L) 8.3 - 10.4 MG/DL   VANCOMYCIN, RANDOM    Collection Time: 06/05/19  6:12 AM   Result Value Ref Range    Vancomycin, random 11.7 UG/ML       Assessment:     Problem List as of 6/5/2019 Date Reviewed: 5/31/2019          Codes Class Noted - Resolved    Debility ICD-10-CM: R53.81  ICD-9-CM: 799.3  5/31/2019 - Present        Lung cancer ([facility] ICD-10-CM: C34.90  ICD-9-CM: 162.9  5/31/2019 - Present        Gait difficulty ICD-10-CM: R26.9  ICD-9-CM: 781.2  5/31/2019 - Present        Pain ICD-10-CM: R52  ICD-9-CM: 780.96  5/31/2019 - Present        Small cell carcinoma (HCC) ICD-10-CM: C80.1  ICD-9-CM: 199.1  5/31/2019 - Present        Atrial fibrillation with RVR (HCC) ICD-10-CM: I48.91  ICD-9-CM: 427.31  5/31/2019 - Present        History of thoracotomy ICD-10-CM: Z98.890  ICD-9-CM: V45.89  5/31/2019 - Present        Non-small cell lung cancer (NSCLC) ([facility] ICD-10-CM: C34.90  ICD-9-CM: 162.9  5/30/2019 - Present        UTI (urinary tract infection) ICD-10-CM: N39.0  ICD-9-CM: 599.0  5/29/2019 - Present        Atrial fibrillation with rapid ventricular response (HCC) ICD-10-CM: I48.91  ICD-9-CM: 427.31  5/26/2019 - Present        Normochromic anemia (Chronic) ICD-10-CM: D64.9  ICD-9-CM: 285.9  5/26/2019 - Present        Hypoxia ICD-10-CM: R09.02  ICD-9-CM: 799.02  5/24/2019 - Present        COPD (chronic obstructive pulmonary disease) (HCC) (Chronic) ICD-10-CM: J44.9  ICD-9-CM: 485  5/24/2019 - Present        Aftercare following surgery ICD-10-CM: Z48.89  ICD-9-CM: V58.89  5/22/2019 - Present        Right lower lobe lung mass ICD-10-CM: R91.8  ICD-9-CM: 786.6  5/22/2019 - Present        HTN (hypertension) (Chronic) ICD-10-CM: I10  ICD-9-CM: 401.9  4/12/2019 - Present        Acquired hypothyroidism (Chronic) ICD-10-CM: E03.9  ICD-9-CM: 244.9  4/12/2019 - Present        Personal history of tobacco use, presenting hazards to health (Chronic) ICD-10-CM: W56.042  ICD-9-CM: V15.82  3/18/2019 - Present        Liver lesion ICD-10-CM: K76.9  ICD-9-CM: 573.8  3/18/2019 - Present    Overview Signed 5/30/2019 11:14 AM by WILLIAN Grier. No uptake per PET             Centrilobular emphysema (HCC) (Chronic) ICD-10-CM: J43.2  ICD-9-CM: 492.8  3/18/2019 - Present        RESOLVED: Atrial fibrillation with RVR (HCC) ICD-10-CM: I48.91  ICD-9-CM: 427.31  5/26/2019 - 5/29/2019        RESOLVED: Acute respiratory failure with hypoxia [facility]) ICD-10-CM: J96.01  ICD-9-CM: 518.81  4/12/2019 - 5/30/2019              Plan:   intensive Physical Therapy for a minimum of 1.5 hours a day, at least 5 out of 7 days per week to address bed mobility, transfers, ambulation, strengthening, balance, and endurance. intensive Occupational Therapy for a minimum of 1.5 hours a day, at least 5 out of 7 days per week to address ADL ( bathing, LE dressing, toileting) and adaptive equipment as needed. - patient will be limited due to pain, hypoxia, fatigue.   - focus on endurance, strength, energy conservation techniques.     The patient will also require 24-hour skilled rehabilitation nursing for bowel and bladder management, skin care for decubitus ulcer prevention , pain management and ongoing medication administration      The patient may benefit from a psychology consult for depression, anxiety and adjustment disorder.     Continue daily physician medical management:  POSTOPERATIVE DIAGNOSIS:  Right lung cancer.     PROCEDURE PERFORMED:  1.  Right thoracoscopy switched to right thoracotomy with extensive pneumonolysis. 2.  Right lower lobe lobectomy. 3.  Right upper lobe blebectomy x2.  4.  Diaphragm resection with primary repair.   5.  Chest wall resection. 6.  Mediastinal lymphadenectomy. 7.  Intercostal nerve cryoablation.     - Non-small cell lung cancer (NSCLC) ([facility].) (5/30/2019)  - Aftercare following surgery -Wound Care: Monitor wound status daily per staff and physician. At risk for failure. Will require 24/7 rehab nursing. Surgery to direct, follow. Keep incisions clean and dry, may remain uncovered. - Wet to dry dressing changes to chest wound daily. - 6/1 stable wound. 6/2  Continued malodorous drainage. Will send for cx. 6/3 - follow cx, stain. Wound vac to be placed. Wound care to follow. 6/4 - wound vac placed. + fever, started on vanc, cefepime( pen allergy) . ID consult. 6/5 -fever down. Continues to drain from wound vac. Patient more comfortable with wound vac on.   - ID following. Per ID -  Continue Vancomycin x 7 days (est EOT 06/10/19). Continue Cipro for now; if 06/02 UC remains negative would discontinue      Hypoxia (5/24/2019)/COPD (chronic obstructive pulmonary disease) ([facility].) (5/24/2019)  - proventil scheduled 4x/day  - spiriva  - monitor SaO2. O2 a via NC to keep SaO2 >90%. - continue O2 as needed. 6/4 - on 5L/min.   6/5 - cont o2 to keep saO2 acceptable range.     Atrial fibrillation with rapid ventricular response ([facility] (5/26/2019)  - amiodarone. Will reduce dose as scheduled. - lasix 40 mg daily. Monitor renal function. 6/1 -HR in control. Continue amiodarone 400 bid, wean to 200 daily in 2 days. 6/3 - BP borderline low. No antihypertensives. 6/4 - now on amiodarone 200 daily. Appears on NSR.   6/5 -NSR.        Normochromic anemia (5/26/2019)- s/p transfusions. - monitor. Transfuse as needed. 6/1- hgb 8.3  6/3 - hgb 8.3  6/4 - hgb 8.7     Leukocytosis/UTI (urinary tract infection) (5/29/2019) - Complete a 5 day course of Rocephin for UTI.  - 6/1 - still mild leukocytosis. Rocephin until 6/3  6/2 - continued on Rocephin for UTI, however still R thoracotomy concern for conurrent infection. cx sent.  May need reconsideration for antibiotic therapy for thoracotomy, surgical bed.     Pneumonia prophylaxis- Insentive spirometer every hour while awake     DVT risk / DVT Prophylaxis-    - Lovenox subq daily.      Pain Control: no current joint or muscle symptoms, essentially pain-free. Will require regular pain assessment and comprenhensive pain management. - pain due to chest wound, and during dressing changes, packing.   - norco prn. Dilaudid iv if needed. - gabapentin 300mg tid  6/4 - ativan added for anxiety. Pt tolerated well. 6/5 responded well to ativan prn, especially at bedtime.         bowel program - erica-colace. MOM prn     GERD - resume PPI. At times may need additional antacids, Maalox prn. Time spent was 25 minutes with over 1/2 in direct patient care/examination, consultation and coordination of care.      Signed By: Francesco Rodas MD     June 5, 2019

## 2019-06-05 NOTE — PROGRESS NOTES
6/5/2019    PLAN:  Continue current care  Wound vac to right chest maintained per wound care team. Change today  ID consult noted              ASSESSMENT:  Admit Date: 5/31/2019   Surgery 5/22/19  Procedure(s):  1. RIGHT THORACOSCOPY switched to thoracotomy with extensive pneumonolysis  2. LOWER LOBECTOMY   3. Upper lobe blebectomy x 2  4. Diaphragm resection with primary repair  5. Chest wall resection  6. MEDISTINAL LYMPHADENECTOMY  7. Intercostal nerve cryoablation       DIAGNOSIS   A: #9 LYMPH NODE X2:   TWO LYMPH NODES NEGATIVE FOR METASTATIC CARCINOMA (0/2). B: #7 LYMPH NODE X5:   FIVE LYMPH NODES NEGATIVE FOR METASTATIC CARCINOMA (0/5). C: PERIHILAR LYMPH NODE:   ONE LYMPH NODE NEGATIVE FOR METASTATIC CARCINOMA (0/1). D: 4R LYMPH NODE X2:   TWO LYMPH NODES NEGATIVE FOR METASTATIC CARCINOMA (0/2). E: UPPER LOBE BLEB X2:   FINDINGS CONSISTENT WITH PULMONARY BLEBS. F: RIGHT LOWER LOBE, CHEST WALL, DIAPHRAGM:   POORLY DIFFERENTIATED NON-SMALL CELL CARCINOMA WITH SQUAMOUS FEATURES AND FOCAL NEUROENDOCRINE DIFFERENTIATION. CARCINOMA IS 6.9 CM IN GREATEST DIMENSION. MARGINS ARE NEGATIVE FOR CARCINOMA. FIVE LYMPH NODES NEGATIVE FOR METASTATIC CARCINOMA (0/5). 06/04/19: Patient denies SOB. Currently O2 sat 97% with 5LNC, Temp max 101.1  Wound Vac maintained to right chest with purulent appearing drainage in canister. Patient with complaints of pain with dressing changed. 06/05/19 Patient states she is feeling much better today and participating in exercises. VAC with purulent drainage in canister, scheduled for vac dressing change today. ID following, AF. O2 5L 95% sat.     OBJECTIVE:  Patient Vitals for the past 24 hrs:   Temp Pulse Resp BP SpO2   06/05/19 0753 97.5 °F (36.4 °C) 84 20 93/54 95 %   06/05/19 0620 -- -- -- -- 95 %   06/04/19 1946 97.9 °F (36.6 °C) 78 22 95/57 96 %   06/04/19 1716 -- -- -- -- 93 % 06/04/19 1647 98.9 °F (37.2 °C) 82 24 107/53 93 %   06/04/19 1218 -- -- -- -- 91 %         Date 06/04/19 0700 - 06/05/19 0659 06/05/19 0700 - 06/06/19 0659   Shift 2208-6249 7361-8063 24 Hour Total 5879-0103 8991-0831 24 Hour Total   INTAKE   P.O. 480  480        P. O. 480  480      Shift Total 480  480      OUTPUT   Urine           Urine Occurrence(s)  1 x 1 x      Shift Total           480      Weight (kg)                  General:          No acute distress    Lungs:             CTA Bilaterally.  O2 5LNC, Wound vac to right chest with purulent drainage noted  Heart:              RRR  Abdomen:        Soft, Non distended, Non tender, BS+  Extremities:     No cyanosis, clubbing or edema  Neurologic:      No focal deficits           Labs:    Recent Labs     06/04/19  0620   WBC 14.9*   HGB 8.7*   *      K 4.1      CO2 29   BUN 14   CREA 0.62   GLU 94         Crystal Anastasia Reddish, NP

## 2019-06-05 NOTE — WOUND CARE
Right chest wound vac dressing changed wound base with slough, pink mix, 1.5x17x2.5cm, moderate amount thick tan purulent drainage from chest wound: adjacent chest tube site (closer to back) included by bridge to wound vac, this site is 0.3x1. 5x0.5cm, large yellow/ foul smelling drainage from chest tube site. Refugio Green NP notified of above. Plan next change Friday in between therapy. Will monitor.

## 2019-06-06 ENCOUNTER — HOME HEALTH ADMISSION (OUTPATIENT)
Dept: HOME HEALTH SERVICES | Facility: HOME HEALTH | Age: 79
End: 2019-06-06

## 2019-06-06 PROCEDURE — 77010033678 HC OXYGEN DAILY

## 2019-06-06 PROCEDURE — 97110 THERAPEUTIC EXERCISES: CPT

## 2019-06-06 PROCEDURE — 65310000000 HC RM PRIVATE REHAB

## 2019-06-06 PROCEDURE — 94640 AIRWAY INHALATION TREATMENT: CPT

## 2019-06-06 PROCEDURE — 74011250637 HC RX REV CODE- 250/637: Performed by: NURSE PRACTITIONER

## 2019-06-06 PROCEDURE — 77030018836 HC SOL IRR NACL ICUM -A

## 2019-06-06 PROCEDURE — 74011000250 HC RX REV CODE- 250: Performed by: PHYSICAL MEDICINE & REHABILITATION

## 2019-06-06 PROCEDURE — 74750000023 HC WOUND THERAPY

## 2019-06-06 PROCEDURE — 74011250637 HC RX REV CODE- 250/637: Performed by: SURGERY

## 2019-06-06 PROCEDURE — 97116 GAIT TRAINING THERAPY: CPT

## 2019-06-06 PROCEDURE — 97535 SELF CARE MNGMENT TRAINING: CPT

## 2019-06-06 PROCEDURE — 74011250637 HC RX REV CODE- 250/637: Performed by: PHYSICAL MEDICINE & REHABILITATION

## 2019-06-06 PROCEDURE — 94760 N-INVAS EAR/PLS OXIMETRY 1: CPT

## 2019-06-06 PROCEDURE — 74011250636 HC RX REV CODE- 250/636: Performed by: PHYSICAL MEDICINE & REHABILITATION

## 2019-06-06 PROCEDURE — 97530 THERAPEUTIC ACTIVITIES: CPT

## 2019-06-06 PROCEDURE — 99232 SBSQ HOSP IP/OBS MODERATE 35: CPT | Performed by: PHYSICAL MEDICINE & REHABILITATION

## 2019-06-06 PROCEDURE — 94762 N-INVAS EAR/PLS OXIMTRY CONT: CPT

## 2019-06-06 RX ADMIN — ALBUTEROL SULFATE 2.5 MG: 2.5 SOLUTION RESPIRATORY (INHALATION) at 05:57

## 2019-06-06 RX ADMIN — Medication 10 ML: at 14:00

## 2019-06-06 RX ADMIN — ALBUTEROL SULFATE 2.5 MG: 2.5 SOLUTION RESPIRATORY (INHALATION) at 11:51

## 2019-06-06 RX ADMIN — LORAZEPAM 1 MG: 1 TABLET ORAL at 22:05

## 2019-06-06 RX ADMIN — AMIODARONE HYDROCHLORIDE 200 MG: 200 TABLET ORAL at 08:29

## 2019-06-06 RX ADMIN — POTASSIUM CHLORIDE 40 MEQ: 20 TABLET, EXTENDED RELEASE ORAL at 08:28

## 2019-06-06 RX ADMIN — SENNOSIDES AND DOCUSATE SODIUM 2 TABLET: 8.6; 5 TABLET ORAL at 08:29

## 2019-06-06 RX ADMIN — ENOXAPARIN SODIUM 40 MG: 40 INJECTION SUBCUTANEOUS at 16:33

## 2019-06-06 RX ADMIN — Medication 10 ML: at 22:10

## 2019-06-06 RX ADMIN — BUDESONIDE 500 MCG: 0.5 INHALANT RESPIRATORY (INHALATION) at 17:12

## 2019-06-06 RX ADMIN — GABAPENTIN 300 MG: 300 CAPSULE ORAL at 08:28

## 2019-06-06 RX ADMIN — ALBUTEROL SULFATE 2.5 MG: 2.5 SOLUTION RESPIRATORY (INHALATION) at 17:12

## 2019-06-06 RX ADMIN — LEVOTHYROXINE SODIUM 75 MCG: 75 TABLET ORAL at 08:30

## 2019-06-06 RX ADMIN — SENNOSIDES AND DOCUSATE SODIUM 2 TABLET: 8.6; 5 TABLET ORAL at 17:08

## 2019-06-06 RX ADMIN — GABAPENTIN 300 MG: 300 CAPSULE ORAL at 22:05

## 2019-06-06 RX ADMIN — FUROSEMIDE 40 MG: 20 TABLET ORAL at 09:43

## 2019-06-06 RX ADMIN — Medication 10 ML: at 05:44

## 2019-06-06 RX ADMIN — HYDROCODONE BITARTRATE AND ACETAMINOPHEN 1 TABLET: 5; 325 TABLET ORAL at 05:42

## 2019-06-06 RX ADMIN — HYDROCODONE BITARTRATE AND ACETAMINOPHEN 1 TABLET: 5; 325 TABLET ORAL at 11:39

## 2019-06-06 RX ADMIN — VANCOMYCIN HYDROCHLORIDE 1000 MG: 1 INJECTION, POWDER, LYOPHILIZED, FOR SOLUTION INTRAVENOUS at 17:04

## 2019-06-06 RX ADMIN — HYDROCODONE BITARTRATE AND ACETAMINOPHEN 1 TABLET: 5; 325 TABLET ORAL at 22:05

## 2019-06-06 RX ADMIN — PANTOPRAZOLE SODIUM 40 MG: 40 TABLET, DELAYED RELEASE ORAL at 05:42

## 2019-06-06 RX ADMIN — BUDESONIDE 500 MCG: 0.5 INHALANT RESPIRATORY (INHALATION) at 05:56

## 2019-06-06 RX ADMIN — CIPROFLOXACIN HYDROCHLORIDE 500 MG: 500 TABLET, FILM COATED ORAL at 08:29

## 2019-06-06 RX ADMIN — GABAPENTIN 300 MG: 300 CAPSULE ORAL at 16:32

## 2019-06-06 NOTE — PROGRESS NOTES
PHYSICAL THERAPY DAILY NOTE  Time In: 3250  Time Out: 0186  Patient Seen For: AM;Patient education;Transfer training    Subjective: \"I'm not the first one to refuse therapy, am I? \" Patient agreeable to assistance back to bed from w/c. Objective: Vital Signs:   Patient Vitals for the past 8 hrs:   Temp Pulse Resp BP SpO2   06/06/19 1151 -- -- -- -- 94 %   06/06/19 0803 98.9 °F (37.2 °C) 89 18 107/56 92 %   06/06/19 0557 -- -- -- -- 94 %       Pain level: No pain level given. Pain location: R flank and arm. Pain interventions: Positioned for comfort, pain medication per nursing. Patient education: Educated patient on safety with functional mobility. Interdisciplinary Communication: Communicated with Theresa Andrea regarding patient's POC. Other (comment)(fall risk)  GROSS ASSESSMENT Daily Assessment            COGNITION Daily Assessment    Verbal cues for safety with functional mobility. BED/MAT MOBILITY Daily Assessment   Increased time and effort. Supine to Sit : 0 (Not tested)  Sit to Supine : 3 (Moderate assistance)       TRANSFERS Daily Assessment   Increased time and effort, SPT from w/c>bed. Transfer Type: SPT without device  Transfer Assistance : 3 (Moderate assistance )  Sit to Stand Assistance: Moderate assistance  Car Transfers: Not tested       GAIT Daily Assessment    Amount of Assistance: 0 (Not tested)  Distance (ft): 0 Feet (ft)       STEPS or STAIRS Daily Assessment    Steps/Stairs Ambulated (#): 0  Level of Assist : 0 (Not tested)       BALANCE Daily Assessment    Sitting - Static: Good (unsupported)  Sitting - Dynamic: Fair (occasional)  Standing - Static: Poor  Standing - Dynamic : Impaired       LOWER EXTREMITY EXERCISES Daily Assessment          Patient left lying supine in bed with head of bed elevated and all needs in reach. Assessment: Patient only agreeable to assistance with transfer back to bed from w/c.  Patient continues to demonstrate decreased functional strength and endurance with mobility. Plan of Care: Continue with POC and progress as tolerated.        Mariano Sow  6/6/2019

## 2019-06-06 NOTE — PROGRESS NOTES
SFD PROGRESS NOTE    Rickey Villafanauty  Admit Date: 5/31/2019  Admit Diagnosis: Lung cancer ([facility] [C34.90]; History of thoracotomy [Z98.890]; Hypoxia [R09.02]; Pain [R52]; Debility [R53.81]; Small cell carcinoma (HCC) [C80.1]; Atrial fibrillation with RVR (HCC) [I48.91];COPD (chronic obstructive pulmonary disease) ([facility] [J44.9]; Gait difficulty [R26.9]    Subjective     Remains afebrile, continued on antibiotics. Patient has stated to OT she wants to go to \"SNF\". Daughter had contacted me and wanted explaination. They were asked to discuss among them their wishes notify me and staff of their real plans. They expressed they do want to continue [facility] rehab program.   Pptient again expresses she will do her best to participate in rehab therapies as required on out floor. Patient did participate in therapies in [place]ternoon  and showed progress.     Objective:     Current Facility-Administered Medications   Medication Dose Route Frequency    LORazepam (ATIVAN) tablet 1 mg  1 mg Oral Q8H PRN    zinc oxide-cod liver oil (DESITIN) 40 % paste   Topical PRN    vancomycin (VANCOCIN) 1,000 mg in 0.9% sodium chloride (MBP/ADV) 250 mL  1 g IntraVENous Q18H    acetaminophen (TYLENOL) tablet 650 mg  650 mg Oral Q6H PRN    phenol throat spray (CHLORASEPTIC) 1 Spray  1 Spray Oral PRN    HYDROmorphone (DILAUDID) tablet 2 mg  2 mg Oral Q6H PRN    potassium chloride (K-DUR, KLOR-CON) SR tablet 40 mEq  40 mEq Oral DAILY    naloxone (NARCAN) injection 0.4 mg  0.4 mg IntraVENous PRN    ondansetron (ZOFRAN) injection 4 mg  4 mg IntraVENous Q4H PRN    sodium chloride (NS) flush 5-40 mL  5-40 mL IntraVENous Q8H    sodium chloride (NS) flush 5-40 mL  5-40 mL IntraVENous PRN    albuterol (PROVENTIL VENTOLIN) nebulizer solution 2.5 mg  2.5 mg Nebulization QID RT    amiodarone (CORDARONE) tablet 200 mg  200 mg Oral DAILY    budesonide (PULMICORT) 500 mcg/2 ml nebulizer suspension  500 mcg Nebulization BID RT    enoxaparin (LOVENOX) injection 40 mg  40 mg SubCUTAneous Q24H    furosemide (LASIX) tablet 40 mg  40 mg Oral DAILY    gabapentin (NEURONTIN) capsule 300 mg  300 mg Oral TID    HYDROcodone-acetaminophen (NORCO) 5-325 mg per tablet 1 Tab  1 Tab Oral Q4H PRN    levothyroxine (SYNTHROID) tablet 75 mcg  75 mcg Oral DAILY    magnesium hydroxide (MILK OF MAGNESIA) 400 mg/5 mL oral suspension 30 mL  30 mL Oral DAILY PRN    magnesium hydroxide (MILK OF MAGNESIA) 400 mg/5 mL oral suspension 30 mL  30 mL Oral DAILY    pantoprazole (PROTONIX) tablet 40 mg  40 mg Oral ACB    senna-docusate (PERICOLACE) 8.6-50 mg per tablet 2 Tab  2 Tab Oral BID    tiotropium (SPIRIVA) inhalation capsule 18 mcg  1 Cap Inhalation DAILY     Review of Systems: + chest wall pain. Denies chest pain, shortness of breath, cough, headache, visual problems, abdominal pain, dysurea, calf pain. Pertinent positives are as noted in the medical records and unremarkable otherwise. Visit Vitals  /57 (BP 1 Location: Left arm, BP Patient Position: At rest)   Pulse 80   Temp 98.3 °F (36.8 °C)   Resp 18   SpO2 95%      Physical Exam:   Psych: Patient was oriented to person, place, and time. NAD on 4-5L O2 via NC. General:   Alert, appears stated age, No acute distress. HEENT:  Normocephalic, EOMI, facial symmetry  Intact. no JVD   Lungs:  + crackles thoughout R base. Respiration even and unlabored   No apparent use of accessory muscles for respiration. Heart:  Regular rate and rhythm, S1, S2, No obvious murmur    Genitourinary: defered   Abdomen:  Soft, non-tender to palpation in all four quadrants. Neuromuscular:  PERRL, EOMI  Follows simple commands consistently.    + generalized weakness   Sensory - intact   Skin:  edema: none   Incision:  Calf non tender BLE. Right chest wall with wound vac, good seal.                                                                                  Functional Assessment:  Gross Assessment  AROM: Generally decreased, functional (06/03/19 1000)  Strength: Generally decreased, functional (06/03/19 1000)       Balance  Sitting - Static: Good (unsupported) (06/06/19 1600)  Sitting - Dynamic: Fair (occasional) (06/06/19 1600)  Standing - Static: Poor (06/06/19 1600)  Standing - Dynamic : Impaired (06/06/19 1600)           Toileting  Cues: Physical assistance for pants down;Physical assistance for pants up;Verbal cues provided (06/03/19 0701)         Milo Silva Fall Risk Assessment:  Milo Silva Fall Risk  Mobility: Ambulates or transfers with assist devices or assistance (06/06/19 0720)  Mobility Interventions: OT consult for ADLs; Patient to call before getting OOB;PT Consult for mobility concerns;PT Consult for assist device competence (06/06/19 0720)  Mentation: Alert, oriented x 3 (06/06/19 0720)  Medication: Patient receiving anticonvulsants, sedatives(tranquilizers), psychotropics or hypnotics, hypoglycemics, narcotics, sleep aids, antihypertensives, laxatives, or diuretics (06/06/19 0720)  Medication Interventions: Patient to call before getting OOB; Evaluate medications/consider consulting pharmacy (06/06/19 0720)  Elimination: Needs assistance with toileting (06/06/19 0720)  Elimination Interventions: Call light in reach; Patient to call for help with toileting needs (06/06/19 0720)  Prior Fall History: Before admission in past 12 months _home or previous inpatient care) (06/06/19 0720)  History of Falls Interventions: Consult care management for discharge planning;Evaluate medications/consider consulting pharmacy; Door open when patient unattended (06/06/19 0720)  Total Score: 4 (06/06/19 0720)  Standard Fall Precautions: Yes (06/06/19 0720)  High Fall Risk: Yes (06/06/19 0031)     Speech Assessment:  Aspiration Signs/Symptoms: None (06/05/19 1516)      Ambulation:  Gait  Distance (ft): 15 Feet (ft) (06/06/19 1600)  Assistive Device: Gait belt;Walker, rolling (06/06/19 1600)     Labs/Studies:  Recent Results (from the past 72 hour(s))   CULTURE, BLOOD    Collection Time: 06/03/19 10:02 PM   Result Value Ref Range    Special Requests: LEFT  HAND        Culture result: NO GROWTH 3 DAYS     CULTURE, BLOOD    Collection Time: 06/03/19 10:02 PM   Result Value Ref Range    Special Requests: RIGHT  HAND        Culture result: NO GROWTH 3 DAYS     CBC WITH AUTOMATED DIFF    Collection Time: 06/04/19  6:20 AM   Result Value Ref Range    WBC 14.9 (H) 4.3 - 11.1 K/uL    RBC 3.05 (L) 4.05 - 5.2 M/uL    HGB 8.7 (L) 11.7 - 15.4 g/dL    HCT 27.8 (L) 35.8 - 46.3 %    MCV 91.1 79.6 - 97.8 FL    MCH 28.5 26.1 - 32.9 PG    MCHC 31.3 (L) 31.4 - 35.0 g/dL    RDW 15.8 (H) 11.9 - 14.6 %    PLATELET 069 (H) 093 - 450 K/uL    MPV 9.1 (L) 9.4 - 12.3 FL    ABSOLUTE NRBC 0.00 0.0 - 0.2 K/uL    DF AUTOMATED      NEUTROPHILS 83 (H) 43 - 78 %    LYMPHOCYTES 7 (L) 13 - 44 %    MONOCYTES 8 4.0 - 12.0 %    EOSINOPHILS 0 (L) 0.5 - 7.8 %    BASOPHILS 1 0.0 - 2.0 %    IMMATURE GRANULOCYTES 1 0.0 - 5.0 %    ABS. NEUTROPHILS 12.4 (H) 1.7 - 8.2 K/UL    ABS. LYMPHOCYTES 1.0 0.5 - 4.6 K/UL    ABS. MONOCYTES 1.2 0.1 - 1.3 K/UL    ABS. EOSINOPHILS 0.1 0.0 - 0.8 K/UL    ABS. BASOPHILS 0.1 0.0 - 0.2 K/UL    ABS. IMM.  GRANS. 0.1 0.0 - 0.5 K/UL   METABOLIC PANEL, BASIC    Collection Time: 06/04/19  6:20 AM   Result Value Ref Range    Sodium 138 136 - 145 mmol/L    Potassium 4.1 3.5 - 5.1 mmol/L    Chloride 102 98 - 107 mmol/L    CO2 29 21 - 32 mmol/L    Anion gap 7 7 - 16 mmol/L    Glucose 94 65 - 100 mg/dL    BUN 14 8 - 23 MG/DL    Creatinine 0.62 0.6 - 1.0 MG/DL    GFR est AA >60 >60 ml/min/1.73m2    GFR est non-AA >60 >60 ml/min/1.73m2    Calcium 8.1 (L) 8.3 - 10.4 MG/DL   VANCOMYCIN, RANDOM    Collection Time: 06/05/19  6:12 AM   Result Value Ref Range    Vancomycin, random 11.7 UG/ML       Assessment:     Problem List as of 6/6/2019 Date Reviewed: 5/31/2019          Codes Class Noted - Resolved    Debility ICD-10-CM: R53.81  ICD-9-CM: 799.3  5/31/2019 - Present        Lung cancer [facility]) ICD-10-CM: C34.90  ICD-9-CM: 162.9  5/31/2019 - Present        Gait difficulty ICD-10-CM: R26.9  ICD-9-CM: 781.2  5/31/2019 - Present        Pain ICD-10-CM: R52  ICD-9-CM: 780.96  5/31/2019 - Present        Small cell carcinoma (HCC) ICD-10-CM: C80.1  ICD-9-CM: 199.1  5/31/2019 - Present        Atrial fibrillation with RVR (HCC) ICD-10-CM: I48.91  ICD-9-CM: 427.31  5/31/2019 - Present        History of thoracotomy ICD-10-CM: Z98.890  ICD-9-CM: V45.89  5/31/2019 - Present        Non-small cell lung cancer (NSCLC) ([facility] ICD-10-CM: C34.90  ICD-9-CM: 162.9  5/30/2019 - Present        UTI (urinary tract infection) ICD-10-CM: N39.0  ICD-9-CM: 599.0  5/29/2019 - Present        Atrial fibrillation with rapid ventricular response (HCC) ICD-10-CM: I48.91  ICD-9-CM: 427.31  5/26/2019 - Present        Normochromic anemia (Chronic) ICD-10-CM: D64.9  ICD-9-CM: 285.9  5/26/2019 - Present        Hypoxia ICD-10-CM: R09.02  ICD-9-CM: 799.02  5/24/2019 - Present        COPD (chronic obstructive pulmonary disease) (HCC) (Chronic) ICD-10-CM: J44.9  ICD-9-CM: 496  5/24/2019 - Present        Aftercare following surgery ICD-10-CM: Z48.89  ICD-9-CM: V58.89  5/22/2019 - Present        Right lower lobe lung mass ICD-10-CM: R91.8  ICD-9-CM: 786.6  5/22/2019 - Present        HTN (hypertension) (Chronic) ICD-10-CM: I10  ICD-9-CM: 401.9  4/12/2019 - Present        Acquired hypothyroidism (Chronic) ICD-10-CM: E03.9  ICD-9-CM: 244.9  4/12/2019 - Present        Personal history of tobacco use, presenting hazards to health (Chronic) ICD-10-CM: W46.373  ICD-9-CM: V15.82  3/18/2019 - Present        Liver lesion ICD-10-CM: K76.9  ICD-9-CM: 573.8  3/18/2019 - Present    Overview Signed 5/30/2019 11:14 AM by WILLIAN Holly.  No uptake per PET             Centrilobular emphysema (HCC) (Chronic) ICD-10-CM: J43.2  ICD-9-CM: 492.8  3/18/2019 - Present        RESOLVED: Atrial fibrillation with RVR (HCC) ICD-10-CM: I48.91  ICD-9-CM: 427.31  5/26/2019 - 5/29/2019        RESOLVED: Acute respiratory failure with hypoxia [facility]) ICD-10-CM: J96.01  ICD-9-CM: 518.81  4/12/2019 - 5/30/2019              Plan:   intensive Physical Therapy for a minimum of 1.5 hours a day, at least 5 out of 7 days per week to address bed mobility, transfers, ambulation, strengthening, balance, and endurance. intensive Occupational Therapy for a minimum of 1.5 hours a day, at least 5 out of 7 days per week to address ADL ( bathing, LE dressing, toileting) and adaptive equipment as needed. - patient will be limited due to pain, hypoxia, fatigue.   - focus on endurance, strength, energy conservation techniques.     The patient will also require 24-hour skilled rehabilitation nursing for bowel and bladder management, skin care for decubitus ulcer prevention , pain management and ongoing medication administration      The patient may benefit from a psychology consult for depression, anxiety and adjustment disorder.     Continue daily physician medical management:  POSTOPERATIVE DIAGNOSIS:  Right lung cancer.     PROCEDURE PERFORMED:  1.  Right thoracoscopy switched to right thoracotomy with extensive pneumonolysis. 2.  Right lower lobe lobectomy. 3.  Right upper lobe blebectomy x2.  4.  Diaphragm resection with primary repair. 5.  Chest wall resection. 6.  Mediastinal lymphadenectomy. 7.  Intercostal nerve cryoablation.     - Non-small cell lung cancer (NSCLC) ([facility] (5/30/2019)  - Aftercare following surgery -Wound Care: Monitor wound status daily per staff and physician. At risk for failure. Will require 24/7 rehab nursing. Surgery to direct, follow. Keep incisions clean and dry, may remain uncovered. - Wet to dry dressing changes to chest wound daily. - 6/1 stable wound. 6/2  Continued malodorous drainage. Will send for cx. 6/3 - follow cx, stain. Wound vac to be placed. Wound care to follow. 6/4 - wound vac placed.  + fever, started on vanc, cefepime( pen allergy) . ID consult. 6/5 -fever down. Continues to drain from wound vac. Patient more comfortable with wound vac on.   - ID following. Per ID -  Continue Vancomycin x 7 days (est EOT 06/10/19). Continue Cipro for now; if 06/02 UC remains negative would discontinue  6/6 - no fever. Continue wound vac. Pharmacy dose vanco.       Hypoxia (5/24/2019)/COPD (chronic obstructive pulmonary disease) ([facility] (5/24/2019)  - proventil scheduled 4x/day  - spiriva  - monitor SaO2. O2 a via NC to keep SaO2 >90%. - continue O2 as needed. 6/4 - on 5L/min.   6/5 - cont o2 to keep saO2 acceptable range. 6/6 on O2 5L/min.      Atrial fibrillation with rapid ventricular response ([facility] (5/26/2019)  - amiodarone. Will reduce dose as scheduled. - lasix 40 mg daily. Monitor renal function. 6/1 -HR in control. Continue amiodarone 400 bid, wean to 200 daily in 2 days. 6/3 - BP borderline low. No antihypertensives. 6/4 - now on amiodarone 200 daily. Appears on NSR.   6/5 -NSR.        Normochromic anemia (5/26/2019)- s/p transfusions. - monitor. Transfuse as needed. 6/1- hgb 8.3  6/3 - hgb 8.3  6/4 - hgb 8.7     Leukocytosis/UTI (urinary tract infection) (5/29/2019) - Complete a 5 day course of Rocephin for UTI.  - 6/1 - still mild leukocytosis. Rocephin until 6/3  6/2 - continued on Rocephin for UTI, however still R thoracotomy concern for conurrent infection. cx sent. May need reconsideration for antibiotic therapy for thoracotomy, surgical bed.     Pneumonia prophylaxis- Insentive spirometer every hour while awake     DVT risk / DVT Prophylaxis-    - Lovenox subq daily.      Pain Control: no current joint or muscle symptoms, essentially pain-free. Will require regular pain assessment and comprenhensive pain management. - pain due to chest wound, and during dressing changes, packing.   - norco prn. Dilaudid iv if needed. - gabapentin 300mg tid  6/4 - ativan added for anxiety. Pt tolerated well.    6/5 responded well to ativan prn, especially at bedtime.         bowel program - erica-colace. MOM prn     GERD - resume PPI. At times may need additional antacids, Maalox prn. Time spent was 25 minutes with over 1/2 in direct patient care/examination, consultation and coordination of care.      Signed By: Zoraida Qureshi MD     June 6, 2019

## 2019-06-06 NOTE — PROGRESS NOTES
Wound vac to R chest intact w/ suction. Pt reports pain that shoots and feels like a \"cramp\" all across her back and side. Discussed pain meds. Pt has many questions about HH and her discharge tentatively set for June 11. Discussed the [place] process and offered to have discharge planner come back to answer questions. Will leave a note for discharge planner to attempt to visit pt tomorrow. Pt with many questions about going forward and expressed fears and concerns. Questions answered as best a possible and emotional support provided. Pt was allowed to express feelings.

## 2019-06-06 NOTE — PROGRESS NOTES
[address]  Face to Face Encounter    Patients Name: Marcia Aguila    YOB: 1940    Ordering Physician: [address]    Primary Diagnosis: Lung cancer ([facility] [C34.90]  History of thoracotomy [Z98.890]  Hypoxia [R09.02]  Pain [R52]  Debility [R53.81]  Small cell carcinoma ([facility] [C80.1]  Atrial fibrillation with RVR ([facility] [I48.91]  COPD (chronic obstructive pulmonary disease) ([facility] [J44.9]  Gait difficulty [R26.9]    Date of Face to Face:   6/6/2019                                  Face to Face Encounter findings are related to primary reason for home care:   yes. 1. I certify that the patient needs intermittent care as follows: skilled nursing care:  skilled observation/assessment, patient education, complex care plan management, administration of medications, therapeutic drug monitoring and wound care  physical therapy: strengthening, stretching/ROM, transfer training, gait/stair training, balance training and pt/caregiver education  occupational therapy:  ADL safety (ie. cooking, bathing, dressing), ROM and pt/caregiver education    2. I certify that this patient is homebound, that is: 1) patient requires the use of a wheelchair device, special transportation, or assistance of another to leave the home; or 2) patient's condition makes leaving the home medically contraindicated; and 3) patient has a normal inability to leave the home and leaving the home requires considerable and taxing effort. Patient may leave the home for infrequent and short duration for medical reasons, and occasional absences for non-medical reasons. Homebound status is due to the following functional limitations: Patient with strength deficits limiting the performance of all ADL's without caregiver assistance or the use of an assistive device. Patient with poor safety awareness and is at risk for falls without assistance of another person and the use of an assistive device.   Patient with poor ambulation endurance limiting their safe ability to ascend/descend the required number of steps to leave the home. 3. I certify that this patient is under my care and that I, or a nurse practitioner or  [id number], or clinical nurse specialist, or certified nurse midwife, working with me, had a Face-to-Face Encounter that meets the physician Face-to-Face Encounter requirements. The following are the clinical findings from the [address] [place] [address] encounter that support the need for skilled services and is a summary of the encounter: [address] [place] [address]  6/6/2019      THE FOLLOWING TO BE COMPLETED BY THE COMMUNITY PHYSICIAN:    I concur with the findings described above from the F encounter that this patient is homebound and in need of a skilled service.     Certifying Physician: _____________________________________      Printed Certifying Physician Name: _____________________________________    Date: _________________

## 2019-06-06 NOTE — PROGRESS NOTES
Infectious Disease Progress Note    Today's Date: 2019   Admit Date: 2019    Impression:   · Small cell carcinoma lung ca s/p right thoracoscopy switched to right thoracotomy with extensive pneumonolysis, right lower lobe lobectomy, right upper lobe blebectomy x2, diaphragm resection with primary repair, chest wall resection, mediastinal lymphadenectomy and intercostal nerve cryoablation per Toro Espino MD on 2019. · Fever:  Down in past 48 hours; UC and BC negative; wound culture from chest tube site with multple GP organisms. · Enterobacter cloacae UTI () treated with Rocephin (-)    Plan:   · Continue Vancomycin; would probably treat for 7 days (est EOT 06/10/19)  · Discontinue Cipro today    Anti-infectives:   · Vancomycin (6/3-  · Cefepime (6/3-)  · Cipro 500mg po BID (-)  · Zosyn (6/3)  · Rocephin (-)    Subjective:   Date of Consultation:  2019  Referring Physician: Dr. Noel Stiles    Sitting in wheelchair; still with significant right shoulder pain. Drainage from chest tube site now with vac. No fevers. Wants to go home. Allergies   Allergen Reactions    Penicillins Unknown (comments)     Hives. Tolerated ceftriaxone May 2019    Percocet [Oxycodone-Acetaminophen] Unknown (comments)    Prednisone Other (comments)     Tachycardia        Review of Systems:  A comprehensive review of systems was negative except for that written in the History of Present Illness. Objective:     Visit Vitals  /56 (BP 1 Location: Left arm, BP Patient Position: At rest)   Pulse 89   Temp 98.9 °F (37.2 °C)   Resp 18   SpO2 92%     Temp (24hrs), Av.1 °F (36.7 °C), Min:97.6 °F (36.4 °C), Max:98.9 °F (37.2 °C)       Lines:  Peripheral IV:       Physical Exam:    General:  Alert, cooperative, well noursished, well developed, appears stated age   Eyes:  Sclera anicteric. Pupils equally round and reactive to light.    Mouth/Throat: Mucous membranes normal, oral pharynx clear   Neck: Supple   Lungs:   Clear to auscultation, decreased RLL, good effort   CV:  Regular rate and rhythm,no murmur, click, rub or gallop   Abdomen:   Soft, non-tender. bowel sounds normal. non-distended   Extremities: No cyanosis or edema   Skin: Skin color, texture, turgor normal. no acute rash, post-op wd vac and right anterior CT site with decreased drainage   Lymph nodes: Cervical and supraclavicular normal   Musculoskeletal: No swelling or deformity   Lines/Devices:  Intact, no erythema, drainage or tenderness   Psych: Alert and oriented, normal mood affect given the setting     Data Review:     CBC:  Recent Labs     06/04/19 0620   WBC 14.9*   GRANS 83*   MONOS 8   EOS 0*   ANEU 12.4*   ABL 1.0   HGB 8.7*   HCT 27.8*   *       BMP:  Recent Labs     06/04/19 0620   CREA 0.62   BUN 14      K 4.1      CO2 29   AGAP 7   GLU 94       LFTS:  No results for input(s): TBILI, ALT, SGOT, AP, TP, ALB in the last 72 hours.     Microbiology:     All Micro Results     Procedure Component Value Units Date/Time    CULTURE, BLOOD [[id number]] Collected:  06/03/19 2202    Order Status:  Completed Specimen:  Blood Updated:  06/06/19 0810     Special Requests: --        LEFT  HAND       Culture result: NO GROWTH 3 DAYS       CULTURE, BLOOD [[id number]] Collected:  06/03/19 2202    Order Status:  Completed Specimen:  Blood Updated:  06/06/19 0810     Special Requests: --        RIGHT  HAND       Culture result: NO GROWTH 3 DAYS       CULTURE, WOUND [place] STAIN [[id number]]  (Abnormal) Collected:  06/02/19 1650    Order Status:  Completed Specimen:  Wound Drainage Updated:  06/05/19 0812     Special Requests: NO SPECIAL REQUESTS        GRAM STAIN 4 TO 45 WBC'S/OIF      NO EPITHELIAL CELLS SEEN               MODERATE GRAM POS COCCI IN CLUSTERS                  MODERATE GRAM POS COCCI IN CHAINS                  MODERATE GRAM POSITIVE RODS           Culture result: LIGHT ALPHA STREPTOCOCCUS SCANT STAPHYLOCOCCUS SPECIES, COAGULASE NEGATIVE                  THIS ORGANISM WILL BE HELD FOR 7 DAYS.  IF FURTHER TESTING IS REQUIRED PLEASE NOTIFY MICROBIOLOGY          CULTURE, URINE [[id number]] Collected:  19 0952    Order Status:  Completed Specimen:  Urine from Clean catch Updated:  1915     Special Requests: NO SPECIAL REQUESTS        Culture result:       <10,000 COLONIES/mL MIXED SKIN ZHANNA ISOLATED          CULTURE, WOUND [place] STAIN [[id number]]     Order Status:  Canceled Specimen:  Wound Drainage           Imagin/2 CXR  (-) acute    Signed By: Sarah Yang NP     2019

## 2019-06-06 NOTE — PROGRESS NOTES
PHYSICAL THERAPY DAILY NOTE  Time In: 9277  Time Out: 1506  Patient Seen For: PM;Gait training;Patient education; Therapeutic exercise;Transfer training    Subjective: \"What are we going to do today? \" Patient agreeable to therapy. Objective: Vital Signs:   Patient Vitals for the past 8 hrs:   SpO2   06/06/19 1151 94 %       Pain level: No pain level given. Pain location: R posterior flank. Pain interventions: Positioned for comfort, provided rest breaks    Patient education: Educated patient on benefits of physical therapy and therapeutic exercise. Interdisciplinary Communication: Communicated with Jana Eid regarding patient's POC. Other (comment)(fall risk)  GROSS ASSESSMENT Daily Assessment            COGNITION Daily Assessment    Verbal cues for safety with functional mobility. BED/MAT MOBILITY Daily Assessment   Required for safety. Increased time and effort with verbal cues for body mechanics. Supine to Sit : 3 (Moderate assistance)  Sit to Supine : 3 (Moderate assistance)       TRANSFERS Daily Assessment   Required for safety. Increased time and effort. Transfer Type: SPT without device  Transfer Assistance : 3 (Moderate assistance )  Sit to Stand Assistance:  Moderate assistance  Car Transfers: Not tested       GAIT Daily Assessment   Patient ambulated with moderate forward head posture with slow partial step through gait pattern with decreased  Amount of Assistance: 3 (Moderate assistance)  Distance (ft): 15 Feet (ft)  Assistive Device: Gait belt;Walker, rolling       STEPS or STAIRS Daily Assessment    Steps/Stairs Ambulated (#): 0  Level of Assist : 0 (Not tested)       BALANCE Daily Assessment    Sitting - Static: Good (unsupported)  Sitting - Dynamic: Fair (occasional)  Standing - Static: Poor  Standing - Dynamic : Impaired         LOWER EXTREMITY EXERCISES Daily Assessment    Extremity: Both  Exercise Type #1: Seated lower extremity strengthening  Sets Performed: 1  Reps Performed: 10  Level of Assist: Minimal assistance     Patient performed the following B LE exercises:  SEATED EXERCISES Sets Reps Comments   Ankle Pumps 1 20    Hip Flexion 1 10    Long Arc Quads 1 10    Hip Adduction/Ball Squeeze 1 10    Hip Abduction with red Theraband 1 10    Hamstring Curls with red Theraband 1 10      Patient returned to room lying supine in bed with all needs in reach. Assessment: Patient making slow progress towards goals. Patient continues to get fatigued with mobility. Plan of Care: Continue with POC and progress as tolerated.        Amira Blanco  6/6/2019

## 2019-06-06 NOTE — PROGRESS NOTES
Patient agrees with [facility]. Order, referral, face to face, and place on AVS.  Patient has a wound vac, ordered from [facility] returned fax from [facility].

## 2019-06-06 NOTE — WOUND CARE
Alarm for low battery, wound vac machine. Machine is plugged in and shows that it is charging, alarm reset. Nursing to call for further needs.

## 2019-06-06 NOTE — PROGRESS NOTES
Problem: Pressure Injury - Risk of  Goal: *Prevention of pressure injury  Description  Document Nir Scale and appropriate interventions in the flowsheet. Outcome: Progressing Towards Goal  Note:   Pressure Injury Interventions:  Sensory Interventions: Assess changes in LOC, Assess need for specialty bed, Avoid rigorous massage over bony prominences, Chair cushion, Check visual cues for pain, Discuss PT/OT consult with provider, Keep linens dry and wrinkle-free, Maintain/enhance activity level, Minimize linen layers, Pressure redistribution bed/mattress (bed type), Turn and reposition approx. every two hours (pillows and wedges if needed)    Moisture Interventions: Contain wound drainage    Activity Interventions: Assess need for specialty bed, Chair cushion, Increase time out of bed, Pressure redistribution bed/mattress(bed type), PT/OT evaluation    Mobility Interventions: Assess need for specialty bed, Chair cushion, Float heels, Pressure redistribution bed/mattress (bed type), Turn and reposition approx.  every two hours(pillow and wedges)    Nutrition Interventions: Document food/fluid/supplement intake, Offer support with meals,snacks and hydration    Friction and Shear Interventions: Apply protective barrier, creams and emollients, Foam dressings/transparent film/skin sealants, Lift sheet, Minimize layers

## 2019-06-06 NOTE — PROGRESS NOTES
Pt appears to be resting comfortably. Respirations are even and nonlabored. O2 sats are remaining 93-96%.

## 2019-06-06 NOTE — PROGRESS NOTES
Pt reports pain is uncontrolled. Pt reports that she has a sharp pulling sensation in R arm/chest at intervals. Have tried position and heat pack to assist with comfort. Pt will rest at times but then wake up with the sharp pain. Ativan given in attempt to help pt relax. If ativan unsuccessful, will try alternate pain meds, however, will need to monitor respiratory status. Pt with many questions including should she continue to participate in therapy. Encouraged pt to continue to work with therapies as that will actually help the pain in the long run. Pt questioned if she should call her son to come to see her from [place]. Discussed with pt that although we can not predict when a pt will die, her current condition is related more to the healing process from the major surgery she had. Discussed with pt what her goals going forward were and she reports that her goal is to get better and go to her daughter's house and continue to get well. Discussed with pt and will ask physician in morning if palliative care consult appropriate to help pt manage pain and understand diagnosis and determine goals of her care going forward. Pt agreeable.

## 2019-06-06 NOTE — PROGRESS NOTES
6/6/2019    PLAN:  Continue current care  Wound vac to right chest maintained per wound care team.   ID following              ASSESSMENT:  Admit Date: 5/31/2019  To rehab  Surgery 5/22/19  Procedure(s):  1. RIGHT THORACOSCOPY switched to thoracotomy with extensive pneumonolysis  2. LOWER LOBECTOMY   3. Upper lobe blebectomy x 2  4. Diaphragm resection with primary repair  5. Chest wall resection  6. MEDISTINAL LYMPHADENECTOMY  7. Intercostal nerve cryoablation       DIAGNOSIS   A: #9 LYMPH NODE X2:   TWO LYMPH NODES NEGATIVE FOR METASTATIC CARCINOMA (0/2). B: #7 LYMPH NODE X5:   FIVE LYMPH NODES NEGATIVE FOR METASTATIC CARCINOMA (0/5). C: PERIHILAR LYMPH NODE:   ONE LYMPH NODE NEGATIVE FOR METASTATIC CARCINOMA (0/1). D: 4R LYMPH NODE X2:   TWO LYMPH NODES NEGATIVE FOR METASTATIC CARCINOMA (0/2). E: UPPER LOBE BLEB X2:   FINDINGS CONSISTENT WITH PULMONARY BLEBS. F: RIGHT LOWER LOBE, CHEST WALL, DIAPHRAGM:   POORLY DIFFERENTIATED NON-SMALL CELL CARCINOMA WITH SQUAMOUS FEATURES AND FOCAL NEUROENDOCRINE DIFFERENTIATION. CARCINOMA IS 6.9 CM IN GREATEST DIMENSION. MARGINS ARE NEGATIVE FOR CARCINOMA. FIVE LYMPH NODES NEGATIVE FOR METASTATIC CARCINOMA (0/5). 06/04/19: Patient denies SOB. Currently O2 sat 97% with 5LNC, Temp max 101.1  Wound Vac maintained to right chest with purulent appearing drainage in canister. Patient with complaints of pain with dressing changed. 06/05/19 Patient states she is feeling much better today and participating in exercises. VAC with purulent drainage in canister, scheduled for vac dressing change today. ID following, AF. O2 5L 95% sat. 06/06/19 Patient upset this afternoon stating she is going to a nursing home because this rehab is just too hard and it is easier at the nursing home.   States she had a very bad night related to right chest \"pulling\" about every 30 minutes and and unable to get any rest.  She continues with oxygen 4L. No SOB, AF, NAD, drainage to wound vac still thick wiggins appearing. Dr. Eddie Nobles spoke with her at length recommending she stay at rehab and continue the current care. OBJECTIVE:  Patient Vitals for the past 24 hrs:   Temp Pulse Resp BP SpO2   06/06/19 1151 -- -- -- -- 94 %   06/06/19 0803 98.9 °F (37.2 °C) 89 18 107/56 92 %   06/06/19 0557 -- -- -- -- 94 %   06/05/19 2029 97.6 °F (36.4 °C) 94 18 102/58 94 %   06/05/19 1750 -- -- -- -- 93 %   06/05/19 1729 97.8 °F (36.6 °C) 84 20 97/53 93 %   06/05/19 1626 -- -- -- 98/65 --   06/05/19 1350 -- -- -- -- 99 %         Date 06/05/19 0700 - 06/06/19 0659 06/06/19 0700 - 06/07/19 0659   Shift 6085-0789 9331-5681 24 Hour Total 7492-1381 2725-7939 24 Hour Total   INTAKE   P.O. 240  240        P. O. 240  240      Shift Total 240  240      OUTPUT   Urine           Urine Occurrence(s)  1 x 1 x      Shift Total           240      Weight (kg)                  General:          No acute distress    Lungs:             CTA Bilaterally.  O2 5LNC, Wound vac to right chest with purulent drainage noted  Heart:              RRR  Abdomen:        Soft, Non distended, Non tender, BS+  Extremities:     No cyanosis, clubbing or edema  Neurologic:      No focal deficits           Labs:    Recent Labs     06/04/19  0620   WBC 14.9*   HGB 8.7*   *      K 4.1      CO2 29   BUN 14   CREA 0.62   GLU 94         Crystal Michael Montes NP

## 2019-06-06 NOTE — PROGRESS NOTES
Problem: Falls - Risk of  Goal: *Absence of Falls  Description  Document [place] Fall Risk and appropriate interventions in the flowsheet. Outcome: Progressing Towards Goal     Problem: Patient Education: Go to Patient Education Activity  Goal: Patient/Family Education  Outcome: Progressing Towards Goal     Problem: Patient Education: Go to Patient Education Activity  Goal: Patient/Family Education  Description  Patient / Patient?s family will verbalize understanding of PT safety recommendations, demonstrate appropriate assist for current functional mobility status, safety, and home exercise program by time of discharge. Outcome: Progressing Towards Goal     Problem: Patient Education: Go to Patient Education Activity  Goal: Patient/Family Education  Outcome: Progressing Towards Goal     Problem: Patient Education: Go to Patient Education Activity  Goal: Patient/Family Education  Outcome: Progressing Towards Goal     Problem: Patient Education: Go to Patient Education Activity  Goal: Patient/Family Education  Outcome: Progressing Towards Goal     Problem: Pressure Injury - Risk of  Goal: *Prevention of pressure injury  Description  Document Nir Scale and appropriate interventions in the flowsheet.   Outcome: Progressing Towards Goal     Problem: Patient Education: Go to Patient Education Activity  Goal: Patient/Family Education  Outcome: Progressing Towards Goal     Problem: Gas Exchange - Impaired  Goal: *Absence of hypoxia  Outcome: Progressing Towards Goal     Problem: Patient Education: Go to Patient Education Activity  Goal: Patient/Family Education  Outcome: Progressing Towards Goal

## 2019-06-06 NOTE — PROGRESS NOTES
Time In 0915   Time Out 1030     Mobility   Score Comments   Supine to Sit 4: Supervision or touching A CGA   Transfer Assist 3: Partial/Moderate A Transfer Type: SPT   Equipment: N/A   Comments: Lifting A     Activities of Daily Living    Score Comments   Oral Hyigene 6: Independent I   Bathing 4: Supervision or touching A Type of Shower: Bath Pack  Position: Supported sitting and Standing PRN   Adaptive  Equipment: N/A  Comments: Encouragement to get all parts   Upper Body  Dressing 4: Supervision or touching A Items Applied: Pullover  Position: Unsupported Sitting  Comments: Cueing for line management   Lower Body Dressing 3: Partial/Moderate A Items Applied: Underwear and Elastic Pants  Adaptive Equipment: N/A  Comments: A to get pants over waist   Donning/Buford Footwear 5: S/U or clean-up assist Items Applied: Socks  Adaptive Equipment: N/A  Comments: Increased time   Education  Various rehab options     Pt was in bed and agreeable to tx with encouragement. Pt's performance with ADL is reflected in above chart. Pt required multiple rest breaks secondary to desaturation in to the 70's. Pt was able to recover with short rest breaks. Pt was asking multiple questions about other options available to her. KJ Wood and Dr. Ruby Lui were consulted as well as therapy supervisor Jeannette Blake. Pt came out to the gym with encouragement. Pt engaged with the C/ Franci 29 Test-B to improve activity tolerance to utilize during ADL. Pt completed about 10 minutes of activity before asking to be returned to room. Session was terminated and she was returned to room and left with Dc Fraire from [facility]. Continue with TE Byrne OTR/L  6/6/2019

## 2019-06-07 LAB
ANION GAP SERPL CALC-SCNC: 7 MMOL/L (ref 7–16)
BASOPHILS # BLD: 0.1 K/UL (ref 0–0.2)
BASOPHILS NFR BLD: 1 % (ref 0–2)
BUN SERPL-MCNC: 12 MG/DL (ref 8–23)
CALCIUM SERPL-MCNC: 8.2 MG/DL (ref 8.3–10.4)
CHLORIDE SERPL-SCNC: 102 MMOL/L (ref 98–107)
CO2 SERPL-SCNC: 28 MMOL/L (ref 21–32)
CREAT SERPL-MCNC: 0.66 MG/DL (ref 0.6–1)
DIFFERENTIAL METHOD BLD: ABNORMAL
EOSINOPHIL # BLD: 0.1 K/UL (ref 0–0.8)
EOSINOPHIL NFR BLD: 1 % (ref 0.5–7.8)
ERYTHROCYTE [DISTWIDTH] IN BLOOD BY AUTOMATED COUNT: 15.8 % (ref 11.9–14.6)
GLUCOSE SERPL-MCNC: 111 MG/DL (ref 65–100)
HCT VFR BLD AUTO: 27.9 % (ref 35.8–46.3)
HGB BLD-MCNC: 8.5 G/DL (ref 11.7–15.4)
IMM GRANULOCYTES # BLD AUTO: 0.3 K/UL (ref 0–0.5)
IMM GRANULOCYTES NFR BLD AUTO: 2 % (ref 0–5)
LYMPHOCYTES # BLD: 1.2 K/UL (ref 0.5–4.6)
LYMPHOCYTES NFR BLD: 7 % (ref 13–44)
MCH RBC QN AUTO: 27.5 PG (ref 26.1–32.9)
MCHC RBC AUTO-ENTMCNC: 30.5 G/DL (ref 31.4–35)
MCV RBC AUTO: 90.3 FL (ref 79.6–97.8)
MONOCYTES # BLD: 1.1 K/UL (ref 0.1–1.3)
MONOCYTES NFR BLD: 6 % (ref 4–12)
NEUTS SEG # BLD: 13.6 K/UL (ref 1.7–8.2)
NEUTS SEG NFR BLD: 83 % (ref 43–78)
NRBC # BLD: 0 K/UL (ref 0–0.2)
PLATELET # BLD AUTO: 554 K/UL (ref 150–450)
PMV BLD AUTO: 8.7 FL (ref 9.4–12.3)
POTASSIUM SERPL-SCNC: 3.7 MMOL/L (ref 3.5–5.1)
RBC # BLD AUTO: 3.09 M/UL (ref 4.05–5.2)
SODIUM SERPL-SCNC: 137 MMOL/L (ref 136–145)
WBC # BLD AUTO: 16.4 K/UL (ref 4.3–11.1)

## 2019-06-07 PROCEDURE — 36415 COLL VENOUS BLD VENIPUNCTURE: CPT

## 2019-06-07 PROCEDURE — 94762 N-INVAS EAR/PLS OXIMTRY CONT: CPT

## 2019-06-07 PROCEDURE — 77030011254 HC DRSG HYDRGEL S&N -A

## 2019-06-07 PROCEDURE — 77030027688 HC DRSG MEPILEX 16-48IN NO BORD MOLN -A

## 2019-06-07 PROCEDURE — 77010033678 HC OXYGEN DAILY

## 2019-06-07 PROCEDURE — 97110 THERAPEUTIC EXERCISES: CPT

## 2019-06-07 PROCEDURE — 74011250636 HC RX REV CODE- 250/636: Performed by: PHYSICAL MEDICINE & REHABILITATION

## 2019-06-07 PROCEDURE — 97530 THERAPEUTIC ACTIVITIES: CPT

## 2019-06-07 PROCEDURE — 74750000023 HC WOUND THERAPY

## 2019-06-07 PROCEDURE — 65310000000 HC RM PRIVATE REHAB

## 2019-06-07 PROCEDURE — 77030019934 HC DRSG VAC ASST KCON -B

## 2019-06-07 PROCEDURE — 74011000250 HC RX REV CODE- 250: Performed by: PHYSICAL MEDICINE & REHABILITATION

## 2019-06-07 PROCEDURE — 80048 BASIC METABOLIC PNL TOTAL CA: CPT

## 2019-06-07 PROCEDURE — 97150 GROUP THERAPEUTIC PROCEDURES: CPT

## 2019-06-07 PROCEDURE — 94640 AIRWAY INHALATION TREATMENT: CPT

## 2019-06-07 PROCEDURE — 97535 SELF CARE MNGMENT TRAINING: CPT

## 2019-06-07 PROCEDURE — 85025 COMPLETE CBC W/AUTO DIFF WBC: CPT

## 2019-06-07 PROCEDURE — 74011250637 HC RX REV CODE- 250/637: Performed by: PHYSICAL MEDICINE & REHABILITATION

## 2019-06-07 PROCEDURE — 97605 NEG PRS WND THER DME<=50SQCM: CPT

## 2019-06-07 PROCEDURE — 99232 SBSQ HOSP IP/OBS MODERATE 35: CPT | Performed by: PHYSICAL MEDICINE & REHABILITATION

## 2019-06-07 PROCEDURE — 97116 GAIT TRAINING THERAPY: CPT

## 2019-06-07 PROCEDURE — 74011250637 HC RX REV CODE- 250/637: Performed by: SURGERY

## 2019-06-07 RX ADMIN — ENOXAPARIN SODIUM 40 MG: 40 INJECTION SUBCUTANEOUS at 16:29

## 2019-06-07 RX ADMIN — GABAPENTIN 300 MG: 300 CAPSULE ORAL at 08:53

## 2019-06-07 RX ADMIN — BUDESONIDE 500 MCG: 0.5 INHALANT RESPIRATORY (INHALATION) at 18:08

## 2019-06-07 RX ADMIN — PANTOPRAZOLE SODIUM 40 MG: 40 TABLET, DELAYED RELEASE ORAL at 05:03

## 2019-06-07 RX ADMIN — HYDROCODONE BITARTRATE AND ACETAMINOPHEN 1 TABLET: 5; 325 TABLET ORAL at 05:03

## 2019-06-07 RX ADMIN — BUDESONIDE 500 MCG: 0.5 INHALANT RESPIRATORY (INHALATION) at 05:30

## 2019-06-07 RX ADMIN — ALBUTEROL SULFATE 2.5 MG: 2.5 SOLUTION RESPIRATORY (INHALATION) at 09:17

## 2019-06-07 RX ADMIN — ALBUTEROL SULFATE 2.5 MG: 2.5 SOLUTION RESPIRATORY (INHALATION) at 05:30

## 2019-06-07 RX ADMIN — FUROSEMIDE 40 MG: 20 TABLET ORAL at 08:55

## 2019-06-07 RX ADMIN — HYDROCODONE BITARTRATE AND ACETAMINOPHEN 1 TABLET: 5; 325 TABLET ORAL at 14:41

## 2019-06-07 RX ADMIN — LEVOTHYROXINE SODIUM 75 MCG: 75 TABLET ORAL at 08:53

## 2019-06-07 RX ADMIN — GABAPENTIN 300 MG: 300 CAPSULE ORAL at 22:27

## 2019-06-07 RX ADMIN — ALBUTEROL SULFATE 2.5 MG: 2.5 SOLUTION RESPIRATORY (INHALATION) at 15:53

## 2019-06-07 RX ADMIN — HYDROCODONE BITARTRATE AND ACETAMINOPHEN 1 TABLET: 5; 325 TABLET ORAL at 22:27

## 2019-06-07 RX ADMIN — GABAPENTIN 300 MG: 300 CAPSULE ORAL at 16:29

## 2019-06-07 RX ADMIN — AMIODARONE HYDROCHLORIDE 200 MG: 200 TABLET ORAL at 08:54

## 2019-06-07 RX ADMIN — Medication 10 ML: at 08:56

## 2019-06-07 RX ADMIN — HYDROCODONE BITARTRATE AND ACETAMINOPHEN 1 TABLET: 5; 325 TABLET ORAL at 09:05

## 2019-06-07 RX ADMIN — POTASSIUM CHLORIDE 40 MEQ: 20 TABLET, EXTENDED RELEASE ORAL at 08:54

## 2019-06-07 RX ADMIN — Medication 10 ML: at 22:29

## 2019-06-07 RX ADMIN — Medication 10 ML: at 14:46

## 2019-06-07 RX ADMIN — ALBUTEROL SULFATE 2.5 MG: 2.5 SOLUTION RESPIRATORY (INHALATION) at 18:08

## 2019-06-07 RX ADMIN — TIOTROPIUM BROMIDE 18 MCG: 18 CAPSULE ORAL; RESPIRATORY (INHALATION) at 05:36

## 2019-06-07 RX ADMIN — VANCOMYCIN HYDROCHLORIDE 1000 MG: 1 INJECTION, POWDER, LYOPHILIZED, FOR SOLUTION INTRAVENOUS at 11:48

## 2019-06-07 NOTE — PROGRESS NOTES
OT Daily Note  Time In 1300   Time Out 1345     Subjective: \"Can't we just do the OT in the bed? \" [requiring encouragement to get out of bed and participate]  Pain: none reported  Education: benefits of rehab; notifying staff when she has to go to the bathroom prior to it becoming an urgent situation   Interdisciplinary Communication: rehab tech in to assist with toileting due to 3 attached lines (wound vac, IV, oxygen tubing   Precautions: Other (comment)(fall risk)  Location on arrival: supine in bed    Self-Care Daily Assessment   Self Care Task: Toileting  Level of Assist required: 1 (Total assistance)(assist x 2 to manage lines and for pants management up )  Adaptive Equipment:  WC, grab bars   Transfer: Completed transfer with mod A and assist from a  to manage lines. Required two person assist to help manage wound vac, continuous oxygen saturation monitor, and IV pole while also pushing patient in WC to bathroom. Activity Tolerance Daily Assessment   Patient performed reaching activity using different vertical heights and small rings with 1 UE at a time with 4 pound clothespin 10 x 2 sets, no clothespin 10 x 5 sets, working on shoulder stabilization for overhead reaching and  strengthening. Remains with very decreased activity tolerance, but improved ability to participate once up in [facility] and out of bed. Education Daily Assessment   Patient initially requested OT in the bed only; educated patient on the benefits of rehab, [facility] expectations, and on the benefits of getting up out of bed. Patient agreed to participate after encouragement. Patient voiced concerns regarding her daughter's ability to manage her at home. Continues to require encouragement to participate.       Oxygen saturation ranged from low 70s to low/mid 90s; nurse notified and respiratory contacted for new pulse ox for finger due to intermittent readings in 40s/50s, but patient with no visible changes in respiratory status and not in distress. Patient remains on 6L oxygen. Patient was left seated up in [facility] in gym for PT. Assessment: Slow progress but improving with functional transfers. Continue to anticipate patient will require 24/7 physical assistance with ADL tasks and transfers. Plan: Continue OT POC with focus on ADL/IADL skills, participation, functional transfers, functional mobility, coordination, strength, static and dynamic balance, and activity tolerance to maximize safety and independence with ADLs and functional transfers.      Butch Perez MS, OTR/L  6/7/2019        Note may contain the following abbreviations:   Abbreviation Explanation   AROM Active range of motion   PROM Passive range of motion   SPT Stand pivot transfer   LPT Lateral pivot transfer   WC wheelchair   RW Rolling walker    BUE Bilateral upper extremities   BLE Bilateral lower extremities    WBAT Weight bearing as tolerated    TTWB Toe-touch weight bearing    AD Adaptive device   AE Adaptive equipment    NMES Neuro muscular electrical stimulation   UBE Upper body ergometer

## 2019-06-07 NOTE — PROGRESS NOTES
OT WEEKLY PROGRESS NOTE    Time In: 8501  Time Out: 0866    COMPREHENSION MODE Initial Assessment Weekly Progress Assessment 6/7/2019   Score 7(Pt comprehends complex ideas) 5 limited understanding of medical complexity      EXPRESSION Initial Assessment Weekly Progress Assessment 6/7/2019   Primary Mode of Expression Verbal Verbal   Score 7 7   Comments (Pt able to express complex ideas)       SOCIAL INTERACTION/ PRAGMATICS Initial Assessment Weekly Progress Assessment 6/7/2019   Score 6 5   Comments (Pt needs to be redirected at times)  cues for participation      PROBLEM SOLVING Initial Assessment Weekly Progress Assessment 6/7/2019   Score 6 4   Comments (Pt able to solve basic routine problems consistently) solves routine problems 75% of the time      MEMORY Initial Assessment Weekly Progress Assessment 6/7/2019   Score 6 4   Comments (Recognizes familiar faces; knows daily routines) recalls people frequently seen; inconsistent historian      EATING Initial Assessment Weekly Progress Assessment 6/7/2019   Functional Level 6 6   Comments (Extra time required)       GROOMING Initial Assessment Weekly Progress Assessment 6/7/2019   Functional Level 5 5   Tasks completed by patient Washed face, Washed hands, Brushed teeth Washed face; Washed hands;Brushed teeth   Comments (set up assist sitting w extra time due to fatigue) setup assist      BATHING Initial Assessment Weekly Progress Assessment 6/7/2019   Functional Level 4 4   Body parts patient bathed Abdomen, Arm, left, Arm, right, Chest, Erica area, Thigh, left, Thigh, right Abdomen;Arm, left;Arm, right;Buttocks; Chest;Lower leg and foot, left; Lower leg and foot, right;Erica area; Thigh, left; Thigh, right   Comments (Pt bathed 8 of 11 parts.  sponge bath required ) CGA in standing for erica care      TUB/SHOWER TRANSFER INDEPENDENCE Initial Assessment Weekly Progress Assessment 6/7/2019   Score 0     Comments (Unsafe to perform due to sx & poor activity tolerance) unable at this time due to wound vac      UPPER BODY DRESSING/UNDRESSING Initial Assessment Weekly Progress Assessment 6/7/2019   Functional Level 4 4   Items applied/Steps completed Pullover (4 steps) Pullover (4 steps)   Comments (Assist w 1 of 4 parts) maximum verbal cues for technique; assist doffing shirt over head. Limited awareness of whether arms were fully in shirt or in technique for donning standard t-shirt      LOWER BODY DRESSING/UNDRESSING Initial Assessment Weekly Progress Assessment 6/7/2019   Functional Level 2 3   Items applied/Steps completed Elastic waist pants (3 steps), Sock, left (1 step), Sock, right (1 step), Underpants (3 steps) Elastic waist pants (3 steps); Sock, left (1 step); Sock, right (1 step); Underpants (3 steps)   Comments (Assist w >50% this date) assist with B socks and max cueing for participation with tasks      TOILETING Initial Assessment Weekly Progress Assessment 6/7/2019   Functional Level 2     Comments (Pt performed 1 of 3 parts) did not occur during progress note      TOILET TRANSFER INDEPENDENCE Initial Assessment Weekly Progress Assessment 6/7/2019   Transfer score 2     Comments (Patient required 70% assist for sit to stand) did not occur during progress note      Plan of Care: Please see Care Plan for updated LTGs. Family Training:  scheduled for Monday 6/10/19 with daughter     Summary of Progress: Patient is making slow progress with ADL tasks, functional transfers, and activity tolerance. Remains limited by pain at surgical site, decreased activity tolerance, need for continuous supplemental oxygen 5L, decreased BUE AROM due to pain and fatigue, self-limiting behaviors, and cognition. Summary of Session: Patient seated up in recliner on arrival to room. Agreeable to OT ADL with encouragement. Patient completed sponge bath; see above for details.  Patient's oxygen saturation remained 83-93% on 5L oxygen throughout ADL; gerardo quickly into low 90s with rest breaks and cueing for breathing in through nose. Patient with complaint of pain at sacral wound site; nurse Melissa Faria notified and nurse to address. Pants not managed up due to nurse will be addressing sacral wound. Patient tolerated session fairly with complaint of pain at sacral wound, alleviated by repositioning. Goals have been modified to reflect slow progress.      Sadie Davis MS, OTR/L  6/7/2019

## 2019-06-07 NOTE — PROGRESS NOTES
Problem: Falls - Risk of  Goal: *Absence of Falls  Description  Document Oliver Darden Fall Risk and appropriate interventions in the flowsheet. Outcome: Progressing Towards Goal  Note:   Fall Risk Interventions:  Mobility Interventions: Communicate number of staff needed for ambulation/transfer, OT consult for ADLs, Patient to call before getting OOB, PT Consult for mobility concerns, PT Consult for assist device competence, Strengthening exercises (ROM-active/passive), Utilize walker, cane, or other assistive device         Medication Interventions: Evaluate medications/consider consulting pharmacy, Patient to call before getting OOB, Utilize gait belt for transfers/ambulation, Teach patient to arise slowly    Elimination Interventions: Call light in reach, Patient to call for help with toileting needs    History of Falls Interventions: Consult care management for discharge planning, Evaluate medications/consider consulting pharmacy, Investigate reason for fall, Utilize gait belt for transfer/ambulation         Problem: Pressure Injury - Risk of  Goal: *Prevention of pressure injury  Description  Document Nir Scale and appropriate interventions in the flowsheet. Outcome: Progressing Towards Goal  Note:   Pressure Injury Interventions:  Sensory Interventions: Assess changes in LOC, Assess need for specialty bed, Chair cushion, Avoid rigorous massage over bony prominences, Check visual cues for pain, Discuss PT/OT consult with provider, Float heels, Keep linens dry and wrinkle-free, Minimize linen layers, Maintain/enhance activity level, Pressure redistribution bed/mattress (bed type), Turn and reposition approx.  every two hours (pillows and wedges if needed)    Moisture Interventions: Absorbent underpads, Check for incontinence Q2 hours and as needed, Contain wound drainage    Activity Interventions: Assess need for specialty bed, Chair cushion, Increase time out of bed, Pressure redistribution bed/mattress(bed type), PT/OT evaluation    Mobility Interventions: Assess need for specialty bed, Chair cushion, Float heels, HOB 30 degrees or less, PT/OT evaluation, Pressure redistribution bed/mattress (bed type), Turn and reposition approx.  every two hours(pillow and wedges)    Nutrition Interventions: Document food/fluid/supplement intake, Offer support with meals,snacks and hydration    Friction and Shear Interventions: Apply protective barrier, creams and emollients, Foam dressings/transparent film/skin sealants, Lift sheet, Minimize layers

## 2019-06-07 NOTE — PROGRESS NOTES
Problem: Falls - Risk of  Goal: *Absence of Falls  Description  Document Milo Silva Fall Risk and appropriate interventions in the flowsheet. Outcome: Progressing Towards Goal     Problem: Patient Education: Go to Patient Education Activity  Goal: Patient/Family Education  Outcome: Progressing Towards Goal     Problem: Patient Education: Go to Patient Education Activity  Goal: Patient/Family Education  Description  Patient / Patient?s family will verbalize understanding of PT safety recommendations, demonstrate appropriate assist for current functional mobility status, safety, and home exercise program by time of discharge. Outcome: Progressing Towards Goal     Problem: Patient Education: Go to Patient Education Activity  Goal: Patient/Family Education  Outcome: Progressing Towards Goal     Problem: Patient Education: Go to Patient Education Activity  Goal: Patient/Family Education  Outcome: Progressing Towards Goal     Problem: Patient Education: Go to Patient Education Activity  Goal: Patient/Family Education  Outcome: Progressing Towards Goal     Problem: Pressure Injury - Risk of  Goal: *Prevention of pressure injury  Description  Document Nir Scale and appropriate interventions in the flowsheet.   Outcome: Progressing Towards Goal     Problem: Patient Education: Go to Patient Education Activity  Goal: Patient/Family Education  Outcome: Progressing Towards Goal     Problem: Gas Exchange - Impaired  Goal: *Absence of hypoxia  Outcome: Progressing Towards Goal     Problem: Patient Education: Go to Patient Education Activity  Goal: Patient/Family Education  Outcome: Progressing Towards Goal

## 2019-06-07 NOTE — PROGRESS NOTES
Pt was assisted to [facility]. Pt needs frequent orientation to time of day. Pt encouraged and agreeable to sitting up in recliner. Pt medicated for pain.

## 2019-06-07 NOTE — PROGRESS NOTES
Infectious Disease Progress Note    Today's Date: 2019   Admit Date: 2019    Impression:   · Small cell carcinoma lung ca s/p right thoracoscopy switched to right thoracotomy with extensive pneumonolysis, right lower lobe lobectomy, right upper lobe blebectomy x2, diaphragm resection with primary repair, chest wall resection, mediastinal lymphadenectomy and intercostal nerve cryoablation per Alexis Rose MD on 2019. · Fever:  Down in past 48 hours; UC and BC negative; wound culture from chest tube site with multple GP organisms. · Enterobacter cloacae UTI () treated with Rocephin (-)    Plan:   · Continue Vancomycin; would probably treat for 7 days (est EOT 06/10/19)  · ID will follow-up Monday unless called    Anti-infectives:   · Vancomycin (6/3-  · Cefepime (6/3-)  · Cipro 500mg po BID (-)  · Zosyn (6/3)  · Rocephin (-)    Subjective:   Date of Consultation:  2019  Referring Physician: Dr. Sonya Major    Sitting in chair; still with significant right shoulder pain. Drainage from chest tube site now with vac. No fevers. Tolerating therapy w/o difficulty. Allergies   Allergen Reactions    Penicillins Unknown (comments)     Hives. Tolerated ceftriaxone May 2019    Percocet [Oxycodone-Acetaminophen] Unknown (comments)    Prednisone Other (comments)     Tachycardia        Review of Systems:  A comprehensive review of systems was negative except for that written in the History of Present Illness. Objective:     Visit Vitals  /61 (BP 1 Location: Right arm)   Pulse 69   Temp 98.4 °F (36.9 °C)   Resp 16   SpO2 92%     Temp (24hrs), Av.5 °F (36.9 °C), Min:98.3 °F (36.8 °C), Max:98.9 °F (37.2 °C)       Lines:  Peripheral IV:       Physical Exam:    General:  Alert, cooperative, well noursished, well developed, appears stated age   Eyes:  Sclera anicteric. Pupils equally round and reactive to light.    Mouth/Throat: Mucous membranes normal, oral pharynx clear   Neck: Supple   Lungs:   Clear to auscultation, decreased RLL, good effort   CV:  Regular rate and rhythm,no murmur, click, rub or gallop   Abdomen:   Soft, non-tender. bowel sounds normal. non-distended   Extremities: No cyanosis or edema   Skin: Skin color, texture, turgor normal. no acute rash, post-op wd vac and right anterior CT site with decreased drainage   Lymph nodes: Cervical and supraclavicular normal   Musculoskeletal: No swelling or deformity   Lines/Devices:  Intact, no erythema, drainage or tenderness   Psych: Alert and oriented, normal mood affect given the setting     Data Review:     CBC:  Recent Labs     06/07/19 0620   WBC 16.4*   GRANS 83*   MONOS 6   EOS 1   ANEU 13.6*   ABL 1.2   HGB 8.5*   HCT 27.9*   *       BMP:  Recent Labs     06/07/19 0620   CREA 0.66   BUN 12      K 3.7      CO2 28   AGAP 7   *       LFTS:  No results for input(s): TBILI, ALT, SGOT, AP, TP, ALB in the last 72 hours.     Microbiology:     All Micro Results     Procedure Component Value Units Date/Time    CULTURE, BLOOD [[id number]] Collected:  06/03/19 2202    Order Status:  Completed Specimen:  Blood Updated:  06/07/19 0647     Special Requests: --        LEFT  HAND       Culture result: NO GROWTH 4 DAYS       CULTURE, BLOOD [[id number]] Collected:  06/03/19 2202    Order Status:  Completed Specimen:  Blood Updated:  06/07/19 0647     Special Requests: --        RIGHT  HAND       Culture result: NO GROWTH 4 DAYS       CULTURE, Timoteo Baumgarten STAIN [[id number]]  (Abnormal) Collected:  06/02/19 1650    Order Status:  Completed Specimen:  Wound Drainage Updated:  06/05/19 0812     Special Requests: NO SPECIAL REQUESTS        GRAM STAIN 4 TO 45 WBC'S/OIF      NO EPITHELIAL CELLS SEEN               MODERATE GRAM POS COCCI IN CLUSTERS                  MODERATE GRAM POS COCCI IN CHAINS                  MODERATE GRAM POSITIVE RODS           Culture result: LIGHT ALPHA STREPTOCOCCUS SCANT STAPHYLOCOCCUS SPECIES, COAGULASE NEGATIVE                  THIS ORGANISM WILL BE HELD FOR 7 DAYS.  IF FURTHER TESTING IS REQUIRED PLEASE NOTIFY MICROBIOLOGY          CULTURE, URINE [[id number]] Collected:  19 0952    Order Status:  Completed Specimen:  Urine from Clean catch Updated:  19     Special Requests: NO SPECIAL REQUESTS        Culture result:       <10,000 COLONIES/mL MIXED SKIN ZHANNA ISOLATED          CULTURE, WOUND Minnette Hoe STAIN [[id number]]     Order Status:  Canceled Specimen:  Wound Drainage           Imagin/2 CXR  (-) acute    Signed By: Aida Santiago NP     2019

## 2019-06-07 NOTE — PROGRESS NOTES
[facility] PROGRESS NOTE    Jemal Bates  Admit Date: 5/31/2019  Admit Diagnosis: Lung cancer ([facility] [C34.90]; History of thoracotomy [Z98.890]; Hypoxia [R09.02]; Pain [R52]; Debility [R53.81]; Small cell carcinoma (HCC) [C80.1]; Atrial fibrillation with RVR (HCC) [I48.91];COPD (chronic obstructive pulmonary disease) ([facility] [J44.9]; Gait difficulty [R26.9]    Subjective     Afebrile, vss. SaO2 >95% on O2, 4-5L/min. Participating in therapies without much complaint today. Still limited pain surgical site pain, desaturations, fatigue. Slow functional gains noted.     Objective:     Current Facility-Administered Medications   Medication Dose Route Frequency    LORazepam (ATIVAN) tablet 1 mg  1 mg Oral Q8H PRN    zinc oxide-cod liver oil (DESITIN) 40 % paste   Topical PRN    vancomycin (VANCOCIN) 1,000 mg in 0.9% sodium chloride (MBP/ADV) 250 mL  1 g IntraVENous Q18H    acetaminophen (TYLENOL) tablet 650 mg  650 mg Oral Q6H PRN    phenol throat spray (CHLORASEPTIC) 1 Spray  1 Spray Oral PRN    HYDROmorphone (DILAUDID) tablet 2 mg  2 mg Oral Q6H PRN    potassium chloride (K-DUR, KLOR-CON) SR tablet 40 mEq  40 mEq Oral DAILY    naloxone (NARCAN) injection 0.4 mg  0.4 mg IntraVENous PRN    ondansetron (ZOFRAN) injection 4 mg  4 mg IntraVENous Q4H PRN    sodium chloride (NS) flush 5-40 mL  5-40 mL IntraVENous Q8H    sodium chloride (NS) flush 5-40 mL  5-40 mL IntraVENous PRN    albuterol (PROVENTIL VENTOLIN) nebulizer solution 2.5 mg  2.5 mg Nebulization QID RT    amiodarone (CORDARONE) tablet 200 mg  200 mg Oral DAILY    budesonide (PULMICORT) 500 mcg/2 ml nebulizer suspension  500 mcg Nebulization BID RT    enoxaparin (LOVENOX) injection 40 mg  40 mg SubCUTAneous Q24H    furosemide (LASIX) tablet 40 mg  40 mg Oral DAILY    gabapentin (NEURONTIN) capsule 300 mg  300 mg Oral TID    HYDROcodone-acetaminophen (NORCO) 5-325 mg per tablet 1 Tab  1 Tab Oral Q4H PRN    levothyroxine (SYNTHROID) tablet 75 mcg  75 mcg Oral DAILY    magnesium hydroxide (MILK OF MAGNESIA) 400 mg/5 mL oral suspension 30 mL  30 mL Oral DAILY PRN    magnesium hydroxide (MILK OF MAGNESIA) 400 mg/5 mL oral suspension 30 mL  30 mL Oral DAILY    pantoprazole (PROTONIX) tablet 40 mg  40 mg Oral ACB    senna-docusate (PERICOLACE) 8.6-50 mg per tablet 2 Tab  2 Tab Oral BID    tiotropium (SPIRIVA) inhalation capsule 18 mcg  1 Cap Inhalation DAILY     Review of Systems: + chest wall pain. Denies chest pain, shortness of breath, cough, headache, visual problems, abdominal pain, dysurea, calf pain. Pertinent positives are as noted in the medical records and unremarkable otherwise. Visit Vitals  /61 (BP 1 Location: Right arm)   Pulse 69   Temp 98.4 °F (36.9 °C)   Resp 16   SpO2 92%      Physical Exam:   Psych: Patient was oriented to person, place, and time. NAD on 4-5L O2 via NC. General:   Alert, appears stated age, No acute distress. HEENT:  Normocephalic, EOMI, facial symmetry  Intact. no JVD   Lungs:  + crackles thoughout R base. Respiration even and unlabored   No apparent use of accessory muscles for respiration. Heart:  Regular rate and rhythm, S1, S2, No obvious murmur    Genitourinary: defered   Abdomen:  Soft, non-tender to palpation in all four quadrants. Neuromuscular:  PERRL, EOMI  Follows simple commands consistently.    + generalized weakness   Sensory - intact   Skin:  edema: none   Incision:  Calf non tender BLE. Right chest wall with wound vac, good seal.                                                                                  Functional Assessment:  Gross Assessment  AROM: Generally decreased, functional (06/03/19 1000)  Strength: Generally decreased, functional (06/03/19 1000)       Balance  Sitting - Static: Good (unsupported) (06/06/19 1600)  Sitting - Dynamic: Fair (occasional) (06/06/19 1600)  Standing - Static: Poor (06/06/19 1600)  Standing - Dynamic : Impaired (06/06/19 1600) Toileting  Cues: Physical assistance for pants down;Physical assistance for pants up;Verbal cues provided (06/03/19 0701)         Kaylene Hedz Fall Risk Assessment:  Kaylene Hdez Fall Risk  Mobility: Ambulates or transfers with assist devices or assistance (06/07/19 0009)  Mobility Interventions: Communicate number of staff needed for ambulation/transfer;OT consult for ADLs; Patient to call before getting OOB;PT Consult for mobility concerns;PT Consult for assist device competence;Strengthening exercises (ROM-active/passive); Utilize walker, cane, or other assistive device (06/07/19 0009)  Mentation: Alert, oriented x 3 (06/07/19 0009)  Medication: Patient receiving anticonvulsants, sedatives(tranquilizers), psychotropics or hypnotics, hypoglycemics, narcotics, sleep aids, antihypertensives, laxatives, or diuretics (06/07/19 0009)  Medication Interventions: Evaluate medications/consider consulting pharmacy; Patient to call before getting OOB;Utilize gait belt for transfers/ambulation; Teach patient to arise slowly (06/07/19 0009)  Elimination: Needs assistance with toileting (06/07/19 0009)  Elimination Interventions: Call light in reach; Patient to call for help with toileting needs (06/07/19 0009)  Prior Fall History: Before admission in past 12 months _home or previous inpatient care) (06/07/19 0009)  History of Falls Interventions: Consult care management for discharge planning;Evaluate medications/consider consulting pharmacy; Investigate reason for fall;Utilize gait belt for transfer/ambulation (06/07/19 0009)  Total Score: 4 (06/07/19 0009)  Standard Fall Precautions: Yes (06/07/19 0009)  High Fall Risk: Yes (06/07/19 0009)     Speech Assessment:  Aspiration Signs/Symptoms: None (06/05/19 1516)      Ambulation:  Gait  Distance (ft): 15 Feet (ft) (06/06/19 1600)  Assistive Device: Gait belt;Walker, rolling (06/06/19 1600)     Labs/Studies:  Recent Results (from the past 72 hour(s))   VANCOMYCIN, RANDOM    Collection Time: 06/05/19  6:12 AM   Result Value Ref Range    Vancomycin, random 11.7 UG/ML   CBC WITH AUTOMATED DIFF    Collection Time: 06/07/19  6:20 AM   Result Value Ref Range    WBC 16.4 (H) 4.3 - 11.1 K/uL    RBC 3.09 (L) 4.05 - 5.2 M/uL    HGB 8.5 (L) 11.7 - 15.4 g/dL    HCT 27.9 (L) 35.8 - 46.3 %    MCV 90.3 79.6 - 97.8 FL    MCH 27.5 26.1 - 32.9 PG    MCHC 30.5 (L) 31.4 - 35.0 g/dL    RDW 15.8 (H) 11.9 - 14.6 %    PLATELET 750 (H) 992 - 450 K/uL    MPV 8.7 (L) 9.4 - 12.3 FL    ABSOLUTE NRBC 0.00 0.0 - 0.2 K/uL    DF AUTOMATED      NEUTROPHILS 83 (H) 43 - 78 %    LYMPHOCYTES 7 (L) 13 - 44 %    MONOCYTES 6 4.0 - 12.0 %    EOSINOPHILS 1 0.5 - 7.8 %    BASOPHILS 1 0.0 - 2.0 %    IMMATURE GRANULOCYTES 2 0.0 - 5.0 %    ABS. NEUTROPHILS 13.6 (H) 1.7 - 8.2 K/UL    ABS. LYMPHOCYTES 1.2 0.5 - 4.6 K/UL    ABS. MONOCYTES 1.1 0.1 - 1.3 K/UL    ABS. EOSINOPHILS 0.1 0.0 - 0.8 K/UL    ABS. BASOPHILS 0.1 0.0 - 0.2 K/UL    ABS. IMM.  GRANS. 0.3 0.0 - 0.5 K/UL   METABOLIC PANEL, BASIC    Collection Time: 06/07/19  6:20 AM   Result Value Ref Range    Sodium 137 136 - 145 mmol/L    Potassium 3.7 3.5 - 5.1 mmol/L    Chloride 102 98 - 107 mmol/L    CO2 28 21 - 32 mmol/L    Anion gap 7 7 - 16 mmol/L    Glucose 111 (H) 65 - 100 mg/dL    BUN 12 8 - 23 MG/DL    Creatinine 0.66 0.6 - 1.0 MG/DL    GFR est AA >60 >60 ml/min/1.73m2    GFR est non-AA >60 >60 ml/min/1.73m2    Calcium 8.2 (L) 8.3 - 10.4 MG/DL       Assessment:     Problem List as of 6/7/2019 Date Reviewed: 5/31/2019          Codes Class Noted - Resolved    Debility ICD-10-CM: R53.81  ICD-9-CM: 799.3  5/31/2019 - Present        Lung cancer ([facility] ICD-10-CM: C34.90  ICD-9-CM: 162.9  5/31/2019 - Present        Gait difficulty ICD-10-CM: R26.9  ICD-9-CM: 781.2  5/31/2019 - Present        Pain ICD-10-CM: R52  ICD-9-CM: 780.96  5/31/2019 - Present        Small cell carcinoma (HCC) ICD-10-CM: C80.1  ICD-9-CM: 199.1  5/31/2019 - Present        Atrial fibrillation with RVR (HCC) ICD-10-CM: I48.91  ICD-9-CM: 427.31  5/31/2019 - Present        History of thoracotomy ICD-10-CM: Z98.890  ICD-9-CM: V45.89  5/31/2019 - Present        Non-small cell lung cancer (NSCLC) ([facility] ICD-10-CM: C34.90  ICD-9-CM: 162.9  5/30/2019 - Present        UTI (urinary tract infection) ICD-10-CM: N39.0  ICD-9-CM: 599.0  5/29/2019 - Present        Atrial fibrillation with rapid ventricular response ([facility] ICD-10-CM: I48.91  ICD-9-CM: 427.31  5/26/2019 - Present        Normochromic anemia (Chronic) ICD-10-CM: D64.9  ICD-9-CM: 285.9  5/26/2019 - Present        Hypoxia ICD-10-CM: R09.02  ICD-9-CM: 799.02  5/24/2019 - Present        COPD (chronic obstructive pulmonary disease) (HCC) (Chronic) ICD-10-CM: J44.9  ICD-9-CM: 242  5/24/2019 - Present        Aftercare following surgery ICD-10-CM: Z48.89  ICD-9-CM: V58.89  5/22/2019 - Present        Right lower lobe lung mass ICD-10-CM: R91.8  ICD-9-CM: 786.6  5/22/2019 - Present        HTN (hypertension) (Chronic) ICD-10-CM: I10  ICD-9-CM: 401.9  4/12/2019 - Present        Acquired hypothyroidism (Chronic) ICD-10-CM: E03.9  ICD-9-CM: 244.9  4/12/2019 - Present        Personal history of tobacco use, presenting hazards to health (Chronic) ICD-10-CM: G55.730  ICD-9-CM: V15.82  3/18/2019 - Present        Liver lesion ICD-10-CM: K76.9  ICD-9-CM: 573.8  3/18/2019 - Present    Overview Signed 5/30/2019 11:14 AM by WILLIAN Aviles. No uptake per PET             Centrilobular emphysema (HCC) (Chronic) ICD-10-CM: J43.2  ICD-9-CM: 492.8  3/18/2019 - Present        RESOLVED: Atrial fibrillation with RVR (HCC) ICD-10-CM: I48.91  ICD-9-CM: 427.31  5/26/2019 - 5/29/2019        RESOLVED: Acute respiratory failure with hypoxia [facility]) ICD-10-CM: J96.01  ICD-9-CM: 518.81  4/12/2019 - 5/30/2019              Plan:   intensive Physical Therapy for a minimum of 1.5 hours a day, at least 5 out of 7 days per week to address bed mobility, transfers, ambulation, strengthening, balance, and endurance. intensive Occupational Therapy for a minimum of 1.5 hours a day, at least 5 out of 7 days per week to address ADL ( bathing, LE dressing, toileting) and adaptive equipment as needed. - patient will be limited due to pain, hypoxia, fatigue.   - focus on endurance, strength, energy conservation techniques.     The patient will also require 24-hour skilled rehabilitation nursing for bowel and bladder management, skin care for decubitus ulcer prevention , pain management and ongoing medication administration      The patient may benefit from a psychology consult for depression, anxiety and adjustment disorder.     Continue daily physician medical management:  POSTOPERATIVE DIAGNOSIS:  Right lung cancer.     PROCEDURE PERFORMED:  1.  Right thoracoscopy switched to right thoracotomy with extensive pneumonolysis. 2.  Right lower lobe lobectomy. 3.  Right upper lobe blebectomy x2.  4.  Diaphragm resection with primary repair. 5.  Chest wall resection. 6.  Mediastinal lymphadenectomy. 7.  Intercostal nerve cryoablation.     - Non-small cell lung cancer (NSCLC) ([facility] (5/30/2019)  - Aftercare following surgery -Wound Care: Monitor wound status daily per staff and physician. At risk for failure. Will require 24/7 rehab nursing. Surgery to direct, follow. Keep incisions clean and dry, may remain uncovered. - Wet to dry dressing changes to chest wound daily. - 6/1 stable wound. 6/2  Continued malodorous drainage. Will send for cx. 6/3 - follow cx, stain. Wound vac to be placed. Wound care to follow. 6/4 - wound vac placed. + fever, started on vanc, cefepime( pen allergy) . ID consult. 6/5 -fever down. Continues to drain from wound vac. Patient more comfortable with wound vac on.   - ID following. Per ID -  Continue Vancomycin x 7 days (est EOT 06/10/19). Continue Cipro for now; if 06/02 UC remains negative would discontinue  6/6 - no fever. Continue wound vac.  Pharmacy dose vanco.   6/7 - continue wound care/ wound vac. Continue vanco. Responding to abx.       Hypoxia (5/24/2019)/COPD (chronic obstructive pulmonary disease) ([facility] (5/24/2019)  - proventil scheduled 4x/day  - spiriva  - monitor SaO2. O2 a via NC to keep SaO2 >90%. - continue O2 as needed. 6/7 - on 4-5L/min. desats with activity and talking.         Atrial fibrillation with rapid ventricular response ([facility] (5/26/2019)  - amiodarone. Will reduce dose as scheduled. - lasix 40 mg daily. Monitor renal function. 6/1 -HR in control. Continue amiodarone 400 bid, wean to 200 daily in 2 days. 6/3 - BP borderline low. No antihypertensives. 6/4 - now on amiodarone 200 daily. Appears on NSR.   6/5 -NSR. 6/7 - continue amiodarone.         Normochromic anemia (5/26/2019)- s/p transfusions. - monitor. Transfuse as needed. 6/1- hgb 8.3  6/3 - hgb 8.3  6/4 - hgb 8.76/7 - hgb 8.5     Leukocytosis/UTI (urinary tract infection) (5/29/2019) - Complete a 5 day course of Rocephin for UTI.  - 6/1 - still mild leukocytosis. Rocephin until 6/3  6/2 - continued on Rocephin for UTI, however still R thoracotomy concern for conurrent infection. cx sent. May need reconsideration for antibiotic therapy for thoracotomy, surgical bed.6/7 still mild leukocytosis on cipro.      Pneumonia prophylaxis- Insentive spirometer every hour while awake     DVT risk / DVT Prophylaxis-    - Lovenox subq daily.      Pain Control: no current joint or muscle symptoms, essentially pain-free. Will require regular pain assessment and comprenhensive pain management. - pain due to chest wound, and during dressing changes, packing.   - norco prn. Dilaudid iv if needed. - gabapentin 300mg tid  6/4 - ativan added for anxiety. Pt tolerated well. 6/5 responded well to ativan prn, especially at bedtime.         bowel program - erica-colace. MOM prn     GERD - resume PPI. At times may need additional antacids, Maalox prn.       Time spent was 25 minutes with over 1/2 in direct patient care/examination, consultation and coordination of care.      Signed By: Zulma Estevez MD     June 7, 2019

## 2019-06-07 NOTE — PROGRESS NOTES
PHYSICAL THERAPY DAILY NOTE  Time In: 1346  Time Out: 2549  Patient Seen For: PM;Therapeutic exercise;Patient education;Transfer training    Subjective: \"I don't want to do any walking, I'm hurting too bad. \" Patient agreeable to therapy. Objective: Vital Signs:   Patient Vitals for the past 8 hrs:   SpO2   06/07/19 1553 96 %   06/07/19 0917 96 %       Pain level: No pain level given. Pain location: R posterior flank  Pain interventions: Positioned for comfort, provided rest breaks, pain medication per nursing    Patient education: Educated patient on benefits of ambulation and therapeutic exercise. Interdisciplinary Communication: Communicated with Rc Schaefer regarding patient's POC. Other (comment)(fall risk)  GROSS ASSESSMENT Daily Assessment            COGNITION Daily Assessment    Verbal cues for safety. BED/MAT MOBILITY Daily Assessment   Required for safety. Increased time and effort. Supine to Sit : 0 (Not tested)  Sit to Supine : 3 (Moderate assistance)       TRANSFERS Daily Assessment   Required for safety. Increased time and effort. Transfer Type: SPT with walker  Transfer Assistance : 3 (Moderate assistance )  Sit to Stand Assistance: Moderate assistance  Car Transfers: Not tested       GAIT Daily Assessment   Patient declined to ambulate this PM secondary to reported increased pain from dressing change. Amount of Assistance: 0 (Not tested)  Distance (ft): 0 Feet (ft)       STEPS or STAIRS Daily Assessment    Steps/Stairs Ambulated (#): 0  Level of Assist : 0 (Not tested)       BALANCE Daily Assessment    Sitting - Static: Good (unsupported)  Sitting - Dynamic: Good (unsupported)  Standing - Static: Poor  Standing - Dynamic : Impaired       WHEELCHAIR MOBILITY Daily Assessment    Functional Level:  0(Secondary to pain in R UE)       LOWER EXTREMITY EXERCISES Daily Assessment    Extremity: Both  Exercise Type #1: Seated lower extremity strengthening  Sets Performed: 2  Reps Performed: 10  Level of Assist: Minimal assistance     Patient performed the following B LE exercises:  SEATED EXERCISES Sets Reps Comments   Ankle Pumps 2 20    Hip Flexion 2 10    Long Arc Quads 2 10    Hip Adduction/Ball Squeeze 2 10    Hip Abduction with red Theraband 2 10    Hamstring Curls with red Theraband 2 10    Hip Extension with red Theraband 2 10      Patient returned to room lying supine in bed with all needs in reach. Assessment: Patient making slow progress towards goals. Patient limited by pain and fatigue. Plan of Care: Continue with POC and progress as tolerated.        Tanika Patterson  6/7/2019

## 2019-06-07 NOTE — PROGRESS NOTES
PT WEEKLY PROGRESS NOTE   Time In: 0915   Time Out: 1000    Subjective: \"I'll walk but then I need to get back to bed, I was up at 4 o'clock this morning. \" Patient agreeable to therapy. Objective: Other (comment)(fall risk)    Outcome Measures: Vital Signs:   Patient Vitals for the past 8 hrs:   Temp Pulse Resp BP SpO2   06/07/19 0917 -- -- -- -- 96 %   06/07/19 0725 98.4 °F (36.9 °C) 69 16 108/61 92 %   06/07/19 0536 -- -- -- -- 97 %       Pain level: 7/10  Pain location: R posterior flank  Pain interventions: Positioned for comfort, provided rest breaks, pain medication per nursing    Patient education: Educated patient on benefits of ambulation and safety with use of rolling walker. Interdisciplinary Communication: Communicated with Stephanie Gómez regarding patient's POC. Cognition: Impulsive, verbal cues for safety. AROM: [facility]    FIM SCORES Initial Assessment Weekly Progress Assessment 6/7/2019   Bed/Chair/Wheelchair Transfers 3 3   Wheelchair Mobility 0(Secondary to patient fatigue) 0 (secondary to increased pain in R UE)   Walking [place] 1 1   Steps/Stairs 0(Secondary to decreased functional strength and balance) 0 (Secondary to decreased functional strength)   Please see [facility] Interdisciplinary Eval: Coordination/Balance Section for details regarding FIM score description. BED/CHAIR/WHEELCHAIR TRANSFERS Initial Assessment Weekly Progress Assessment 6/7/2019   Rolling Right 4 (Minimal assistance)     Rolling Left 4 (Minimal assistance)     Supine to Sit 4 (Minimal assistance) 3 (Moderate assistance)   Sit to Stand Moderate assistance Moderate assistance   Sit to Supine 4 (Minimal assistance) 3 (Moderate assistance)   Transfer Type SPT without device SPT with walker   Comments Increased time and effort with decreased anterior weight shift and slow shuffled step from bed<>w/c  Increased time and effort with lifting assistance required and verbal cues for anterior weight shift.    Car Transfer Not tested(Secondary to decreased functional strength and balance) Not tested   Car Type         GROSS ASSESSMENT Weekly Progress Assessment 6/7/2019   AROM  WFL   Strength  Generally decreased, functional   Coordination  Normal   Tone  Normal   Sensation  Normal   PROM  Generally decreased R UE secondary to pain     POSTURE Weekly Progress Assessment 6/7/2019   Posture (WDL)  Moderate forward head posture with standing   Posture Assessment       WHEELCHAIR MOBILITY/MANAGEMENT Initial Assessment Weekly Progress Assessment 6/7/2019   Able to Propel 0 feet  0 feet   Curbs/ramps assistance required 0 (Not tested)     Wheelchair set up assistance required 0 (Not tested)     Wheelchair management         WALKING INDEPENDENCE Initial Assessment Weekly Progress Assessment 6/7/2019   Assistive device Other (comment)(No device) Gait belt;Walker, rolling   Ambulation assistance - level surface 1 (Dependent/total assistance)(MOD A HHA x1, w/c follow with 2nd person for safety)  3 (Moderate Assistance)    Distance 5 Feet (ft) 15 Feet (ft)(x2)   Comments Patient took slow shuffled step with increased trunk sway to ipsilateral side during single limb stance, narrowd CLAUDIO Patient ambulated with moderate forward head posture, decreased step clearance and length of B LE. GAIT Weekly Progress Assessment 6/7/2019   Gait Description (WDL)  Patient ambulated with slow partial step through gait pattern with moderate forward head posture and decreased step clearance and length of B LE. Gait Abnormalities       STEPS/STAIRS Initial Assessment Weekly Progress Assessment 6/7/2019   Steps/Stairs ambulated 0 0   Rail Use       Comments       Curbs/Ramps 0 (Not tested)       Patient returned to room lying supine in bed with all needs in reach. Assessment: Patient has met 0/5 LTG's at this point.  Patient's goals to be modified from supervision level of assistance to Minimum assistance required secondary to slow progress with therapy. Patient continues to be limited by pain in R back/shoulder and fatigue with mobility. Patient remains on 5 L O2 via hi flow nasal cannula and O2 sat's continue to fluctuate from 82-94 throughout therapy session. Patient demonstrating slow progress with functional strength and endurance. Patient will benefit from further therapy to improve functional strength and endurance to allow for greater independence with transfers and ambulation. Plan of Care: Please see Care Plan for updated LTGs.     Family Training:   Scheduled for 6/10/19  John Cuello  6/7/2019

## 2019-06-07 NOTE — WOUND CARE
Wound VAC dressing change completed. Wound bases pink, granular base with scattered slough, much improved, 1 purple suture pulled out with vac sponge removal, Dr. Kourtney Luong notified, order to proceed with wound vac. Medial chest tube site included in vac. Will monitor.

## 2019-06-08 LAB
BACTERIA SPEC CULT: NORMAL
BACTERIA SPEC CULT: NORMAL
SERVICE CMNT-IMP: NORMAL
SERVICE CMNT-IMP: NORMAL
VANCOMYCIN TROUGH SERPL-MCNC: 13.6 UG/ML (ref 5–20)

## 2019-06-08 PROCEDURE — 36415 COLL VENOUS BLD VENIPUNCTURE: CPT

## 2019-06-08 PROCEDURE — 97530 THERAPEUTIC ACTIVITIES: CPT

## 2019-06-08 PROCEDURE — 77030027688 HC DRSG MEPILEX 16-48IN NO BORD MOLN -A

## 2019-06-08 PROCEDURE — 74011000250 HC RX REV CODE- 250: Performed by: PHYSICAL MEDICINE & REHABILITATION

## 2019-06-08 PROCEDURE — 97110 THERAPEUTIC EXERCISES: CPT

## 2019-06-08 PROCEDURE — 65310000000 HC RM PRIVATE REHAB

## 2019-06-08 PROCEDURE — 74011250637 HC RX REV CODE- 250/637: Performed by: PHYSICAL MEDICINE & REHABILITATION

## 2019-06-08 PROCEDURE — 77030019952 HC CANSTR VAC ASST KCON -B

## 2019-06-08 PROCEDURE — 74750000023 HC WOUND THERAPY

## 2019-06-08 PROCEDURE — 97535 SELF CARE MNGMENT TRAINING: CPT

## 2019-06-08 PROCEDURE — 94762 N-INVAS EAR/PLS OXIMTRY CONT: CPT

## 2019-06-08 PROCEDURE — 94640 AIRWAY INHALATION TREATMENT: CPT

## 2019-06-08 PROCEDURE — 80202 ASSAY OF VANCOMYCIN: CPT

## 2019-06-08 PROCEDURE — 86580 TB INTRADERMAL TEST: CPT | Performed by: PHYSICAL MEDICINE & REHABILITATION

## 2019-06-08 PROCEDURE — 77010033678 HC OXYGEN DAILY

## 2019-06-08 PROCEDURE — 74011250636 HC RX REV CODE- 250/636: Performed by: PHYSICAL MEDICINE & REHABILITATION

## 2019-06-08 PROCEDURE — 94760 N-INVAS EAR/PLS OXIMETRY 1: CPT

## 2019-06-08 PROCEDURE — 99233 SBSQ HOSP IP/OBS HIGH 50: CPT | Performed by: PHYSICAL MEDICINE & REHABILITATION

## 2019-06-08 PROCEDURE — 74011250637 HC RX REV CODE- 250/637: Performed by: SURGERY

## 2019-06-08 PROCEDURE — 97116 GAIT TRAINING THERAPY: CPT

## 2019-06-08 PROCEDURE — 74011000302 HC RX REV CODE- 302: Performed by: PHYSICAL MEDICINE & REHABILITATION

## 2019-06-08 RX ADMIN — SENNOSIDES AND DOCUSATE SODIUM 2 TABLET: 8.6; 5 TABLET ORAL at 17:14

## 2019-06-08 RX ADMIN — POTASSIUM CHLORIDE 40 MEQ: 20 TABLET, EXTENDED RELEASE ORAL at 08:55

## 2019-06-08 RX ADMIN — ALBUTEROL SULFATE 2.5 MG: 2.5 SOLUTION RESPIRATORY (INHALATION) at 06:02

## 2019-06-08 RX ADMIN — ENOXAPARIN SODIUM 40 MG: 40 INJECTION SUBCUTANEOUS at 17:14

## 2019-06-08 RX ADMIN — VANCOMYCIN HYDROCHLORIDE 1000 MG: 1 INJECTION, POWDER, LYOPHILIZED, FOR SOLUTION INTRAVENOUS at 08:54

## 2019-06-08 RX ADMIN — BUDESONIDE 500 MCG: 0.5 INHALANT RESPIRATORY (INHALATION) at 06:02

## 2019-06-08 RX ADMIN — VANCOMYCIN HYDROCHLORIDE 1000 MG: 1 INJECTION, POWDER, LYOPHILIZED, FOR SOLUTION INTRAVENOUS at 17:17

## 2019-06-08 RX ADMIN — ALBUTEROL SULFATE 2.5 MG: 2.5 SOLUTION RESPIRATORY (INHALATION) at 11:35

## 2019-06-08 RX ADMIN — LEVOTHYROXINE SODIUM 75 MCG: 75 TABLET ORAL at 08:56

## 2019-06-08 RX ADMIN — Medication 10 ML: at 06:00

## 2019-06-08 RX ADMIN — HYDROCODONE BITARTRATE AND ACETAMINOPHEN 1 TABLET: 5; 325 TABLET ORAL at 09:02

## 2019-06-08 RX ADMIN — ALBUTEROL SULFATE 2.5 MG: 2.5 SOLUTION RESPIRATORY (INHALATION) at 14:26

## 2019-06-08 RX ADMIN — TIOTROPIUM BROMIDE 18 MCG: 18 CAPSULE ORAL; RESPIRATORY (INHALATION) at 06:04

## 2019-06-08 RX ADMIN — Medication 5 ML: at 21:20

## 2019-06-08 RX ADMIN — SENNOSIDES AND DOCUSATE SODIUM 2 TABLET: 8.6; 5 TABLET ORAL at 08:56

## 2019-06-08 RX ADMIN — ONDANSETRON 4 MG: 2 INJECTION INTRAMUSCULAR; INTRAVENOUS at 09:17

## 2019-06-08 RX ADMIN — GABAPENTIN 300 MG: 300 CAPSULE ORAL at 21:17

## 2019-06-08 RX ADMIN — PANTOPRAZOLE SODIUM 40 MG: 40 TABLET, DELAYED RELEASE ORAL at 06:22

## 2019-06-08 RX ADMIN — Medication 10 ML: at 14:00

## 2019-06-08 RX ADMIN — HYDROCODONE BITARTRATE AND ACETAMINOPHEN 1 TABLET: 5; 325 TABLET ORAL at 17:17

## 2019-06-08 RX ADMIN — GABAPENTIN 300 MG: 300 CAPSULE ORAL at 17:15

## 2019-06-08 RX ADMIN — HYDROCODONE BITARTRATE AND ACETAMINOPHEN 1 TABLET: 5; 325 TABLET ORAL at 03:59

## 2019-06-08 RX ADMIN — TUBERCULIN PURIFIED PROTEIN DERIVATIVE 5 UNITS: 5 INJECTION, SOLUTION INTRADERMAL at 09:11

## 2019-06-08 RX ADMIN — FUROSEMIDE 40 MG: 20 TABLET ORAL at 08:56

## 2019-06-08 RX ADMIN — AMIODARONE HYDROCHLORIDE 200 MG: 200 TABLET ORAL at 08:55

## 2019-06-08 RX ADMIN — LORAZEPAM 1 MG: 1 TABLET ORAL at 21:17

## 2019-06-08 RX ADMIN — GABAPENTIN 300 MG: 300 CAPSULE ORAL at 08:55

## 2019-06-08 NOTE — PROGRESS NOTES
PHYSICAL THERAPY DAILY NOTE  Time In: 1401  Time Out: 1852  Patient Seen For: PM;Gait training;Patient education; Therapeutic exercise;Transfer training; Other (see progress notes)    Subjective: patient reporting she was nauseated and throwing up after breakfast. Reports she feels a little better now and agreeable to therapy. Reports she is disappointed on the way she is walking. Reports she spoke with rehab MD this AM and she has decided to go to a SNF and may go Monday         Objective:Vital Signs:02 at 5 lpm 02 sat per constant monitoring device showing sat fluctuating from 78% to 97%. Spot checking 02 sat ranging from 91 to 97%  Patient Vitals for the past 12 hrs:   Temp Pulse Resp BP SpO2   06/08/19 1601 98.4 °F (36.9 °C) 80 18 102/59 94 %   06/08/19 1426 -- -- -- -- 94 %   06/08/19 1135 -- -- -- -- 94 %   06/08/19 0815 98.8 °F (37.1 °C) 79 18 111/60 90 %   06/08/19 0606 -- -- -- -- 94 %     Pain level:2 to 5 out of 10  Pain location:right posterior rib cage area and right shoulder  Pain interventions:medication per RN, rest, positioning    Patient education:Bed mobility training,transfer training, gait training, fall precautions, activity pacing, body mechanics,energy conservation, breathing techniques during mobility, Patient verbalizing understanding and demonstrating partial understanding of patient education. Recommend follow up education. Interdisciplinary Communication:spoke with RN and RT regarding sat readings. RT in to administer breathing treatment during PT treatment    Other (comment)(fall risk)  GROSS ASSESSMENT Daily Assessment     NA       COGNITION Daily Assessment    Orientation:A+O x 3  Communication:able to express needs verbally, able to follow multi step commands  Social Interaction:cooperating, appropriate with PT, participating, motivated to improve  Problem Solving:difficulty with managing body mechanics during functional mobility due to decreased strength and endurance, right rib cage pain  Memory:cues to recall body mechanics for bed mobility and transfers, cues to recall breathing techniques during mobility           BED/MAT MOBILITY Daily Assessment   Increased time and effort to complete with cues for body mechanics   Rolling Right : 0 (Not tested)  Rolling Left : 4 (Minimal assistance)  Supine to Sit : 3 (Moderate assistance)  Sit to Supine : 3 (Moderate assistance)       TRANSFERS Daily Assessment   Increased time and effort to complete with cues for body mechanics   Transfer Type: SPT with walker  Transfer Assistance : 3 (Moderate assistance )  Sit to Stand Assistance: Moderate assistance  Car Transfers: Not tested       GAIT Daily Assessment    Amount of Assistance: 1 (Dependent/total assistance)(min assist x 1, second person assist to follow w/ wc)  Distance (ft): 20 Feet (ft)  Assistive Device: Walker, rolling   Gait training with cues for posture correction and to control gait speed with RR    STEPS or STAIRS Daily Assessment    Steps/Stairs Ambulated (#): 0  Level of Assist : 0 (Not tested)       BALANCE Daily Assessment   Static standing with RW and CGA to min assist with cues for balance control Sitting - Static: Good (unsupported)  Sitting - Dynamic: Good (unsupported)  Standing - Static: Poor;Constant support(with RW)  Standing - Dynamic : Impaired       WHEELCHAIR MOBILITY Daily Assessment    Curbs/Ramps Assist Required (FIM Score): 0 (Not tested)  Wheelchair Setup Assist Required : 0 (Not tested)       LOWER EXTREMITY EXERCISES Daily Assessment   Increased time and effort to complete with multiple and frequent rest breaks.  Cues for breathing pattern and correct form   Extremity: Both  Exercise Type #1: Seated lower extremity strengthening  Sets Performed: 2  Reps Performed: 10  Level of Assist: Minimal assistance     SEATED EXERCISES Sets Reps Comments   Ankle Pumps 1 20    Hip Flexion 2 10    Long Arc Quads 2 10    Hip Adduction/Ball Squeeze 2 10    Hip Abduction with red Theraband 2 10    Hamstring Curls with red Theraband 2 10    Hip Extension with red Theraband 2 10             Assessment: Progress towards goals is slow due to decreased endurance. Tolerance to treatment and willingness to participate slowly improving. Patient returned to room at end of treatment. Patient supine in bed with head of bed elevated and bed rails up x 2. Needs placed in reach of patient. 02 at 5 lpm  Plan of Care: Continue with POC and progress as tolerated.      Wilver Morris, PT  6/8/2019

## 2019-06-08 NOTE — PROGRESS NOTES
Hourly rounds completed this shift. Patient resting in bed, all needs met at this time. Will continue to monitor and give report to oncoming RN.

## 2019-06-08 NOTE — PROGRESS NOTES
Pharmacokinetic Consult to Pharmacist    Maggie Corona is a 78 y.o. female being treated for with vancomycin. Lab Results   Component Value Date/Time    BUN 12 06/07/2019 06:20 AM    Creatinine 0.66 06/07/2019 06:20 AM    WBC 16.4 (H) 06/07/2019 06:20 AM    Procalcitonin 0.3 05/28/2019 12:56 PM    Lactic Acid (POC) 0.87 04/25/2019 05:01 AM      CrCl cannot be calculated (Unknown ideal weight.). Lab Results   Component Value Date/Time    Vancomycin,trough 13.6 06/08/2019 07:08 AM    Vancomycin, random 11.7 06/05/2019 06:12 AM       Day 6 of vancomycin. Goal trough is 15-20. Continue 1g q18h. Move next dose to 1800. Plan for therapy through 6/10   Will continue to follow patient. Thank you,  Elena Mercer, Pharm. D.   Clinical Pharmacist  [phone]

## 2019-06-08 NOTE — PROGRESS NOTES
Song Sales MD,   Medical Director  [address] [facility], [address] [facility]  Tel: [phone]       SFD PROGRESS NOTE    Blaine Zamarripa  Admit Date: 5/31/2019  Admit Diagnosis: Lung cancer ([facility] [C34.90]; History of thoracotomy [Z98.890]; Hypoxia [R09.02]; Pain [R52]; Debility [R53.81]; Small cell carcinoma (HCC) [C80.1]; Atrial fibrillation with RVR (HCC) [I48.91];COPD (chronic obstructive pulmonary disease) ([facility] [J44.9]; Gait difficulty [R26.9]    Subjective   Very frustrated. Feels tired. Choking on cereal; spit up in cup. Anxious this morning. Aches \"all over. \" Looks better than she has in the past week per nursing. Denies sob. Fatigued, mild incisional chest pain  sats 90% this a.m.  On 4L at rest  Objective:     Current Facility-Administered Medications   Medication Dose Route Frequency    tuberculin injection 5 Units  5 Units IntraDERMal ONCE    LORazepam (ATIVAN) tablet 1 mg  1 mg Oral Q8H PRN    zinc oxide-cod liver oil (DESITIN) 40 % paste   Topical PRN    vancomycin (VANCOCIN) 1,000 mg in 0.9% sodium chloride (MBP/ADV) 250 mL  1 g IntraVENous Q18H    acetaminophen (TYLENOL) tablet 650 mg  650 mg Oral Q6H PRN    phenol throat spray (CHLORASEPTIC) 1 Spray  1 Spray Oral PRN    HYDROmorphone (DILAUDID) tablet 2 mg  2 mg Oral Q6H PRN    potassium chloride (K-DUR, KLOR-CON) SR tablet 40 mEq  40 mEq Oral DAILY    naloxone (NARCAN) injection 0.4 mg  0.4 mg IntraVENous PRN    ondansetron (ZOFRAN) injection 4 mg  4 mg IntraVENous Q4H PRN    sodium chloride (NS) flush 5-40 mL  5-40 mL IntraVENous Q8H    sodium chloride (NS) flush 5-40 mL  5-40 mL IntraVENous PRN    albuterol (PROVENTIL VENTOLIN) nebulizer solution 2.5 mg  2.5 mg Nebulization QID RT    amiodarone (CORDARONE) tablet 200 mg  200 mg Oral DAILY    budesonide (PULMICORT) 500 mcg/2 ml nebulizer suspension  500 mcg Nebulization BID RT    enoxaparin (LOVENOX) injection 40 mg  40 mg SubCUTAneous Q24H    furosemide (LASIX) tablet 40 mg  40 mg Oral DAILY    gabapentin (NEURONTIN) capsule 300 mg  300 mg Oral TID    HYDROcodone-acetaminophen (NORCO) 5-325 mg per tablet 1 Tab  1 Tab Oral Q4H PRN    levothyroxine (SYNTHROID) tablet 75 mcg  75 mcg Oral DAILY    magnesium hydroxide (MILK OF MAGNESIA) 400 mg/5 mL oral suspension 30 mL  30 mL Oral DAILY PRN    magnesium hydroxide (MILK OF MAGNESIA) 400 mg/5 mL oral suspension 30 mL  30 mL Oral DAILY    pantoprazole (PROTONIX) tablet 40 mg  40 mg Oral ACB    senna-docusate (PERICOLACE) 8.6-50 mg per tablet 2 Tab  2 Tab Oral BID    tiotropium (SPIRIVA) inhalation capsule 18 mcg  1 Cap Inhalation DAILY     Review of Systems:Denies shortness of breath, cough, headache, visual problems, abdominal pain, dysurea, calf pain. Pertinent positives are as noted in the medical records and unremarkable otherwise. Visit Vitals  /60   Pulse 79   Temp 98.8 °F (37.1 °C)   Resp 18   SpO2 90%        Physical Exam:   General: Alert and age appropriately oriented. No acute cardio respiratory distress. HEENT: Normocephalic,no scleral icterus  Oral mucosa moist without cyanosis   Lungs: Clear to auscultation upper lobes, few crackles  Respiration even and unlabored   Heart: Regular rate and rhythm, S1, S2   No  murmurs, clicks, rub or gallops   Abdomen: Soft, non-tender, nondistended. Bowel sounds present. No organomegaly. Genitourinary: deferred . Neuromuscular:      Very talkative and clear headed this am  Generalized prox>distal weakness but only fair effort. Skin/extremity: No rashes, no erythema.  No calf tenderness BLE  Wound covered; wound vac; no periph edema                                                                            Functional Assessment:  Gross Assessment  AROM: Generally decreased, functional (06/03/19 1000)  Strength: Generally decreased, functional (06/03/19 1000)       Balance  Sitting - Static: Good (unsupported) (06/07/19 1600)  Sitting - Dynamic: Good (unsupported) (06/07/19 1600)  Standing - Static: Poor (06/07/19 1600)  Standing - Dynamic : Impaired (06/07/19 1600)           Toileting  Cues: Physical assistance for pants up;Physical assistance for pants down (06/07/19 1358)         [facility] Fall Risk Assessment:  [facility] Fall Risk  Mobility: Ambulates or transfers with assist devices or assistance (06/08/19 0700)  Mobility Interventions: Communicate number of staff needed for ambulation/transfer;OT consult for ADLs; Patient to call before getting OOB;PT Consult for mobility concerns (06/08/19 0700)  Mentation: Alert, oriented x 3 (06/08/19 0700)  Medication: Patient receiving anticonvulsants, sedatives(tranquilizers), psychotropics or hypnotics, hypoglycemics, narcotics, sleep aids, antihypertensives, laxatives, or diuretics (06/08/19 0700)  Medication Interventions: Evaluate medications/consider consulting pharmacy; Patient to call before getting OOB; Teach patient to arise slowly (06/08/19 0700)  Elimination: Needs assistance with toileting (06/08/19 0700)  Elimination Interventions: Call light in reach; Patient to call for help with toileting needs; Toileting schedule/hourly rounds (06/08/19 0700)  Prior Fall History: Before admission in past 12 months _home or previous inpatient care) (06/08/19 0700)  History of Falls Interventions: Consult care management for discharge planning;Evaluate medications/consider consulting pharmacy (06/08/19 0700)  Total Score: 4 (06/08/19 0700)  Standard Fall Precautions: Yes (06/08/19 0111)  High Fall Risk: Yes (06/08/19 0700)     Speech Assessment:  Aspiration Signs/Symptoms: None (06/05/19 1516)      Ambulation:  Gait  Speed/Aracelis: Slow;Shuffled (06/07/19 1000)  Step Length: Left shortened;Right shortened (06/07/19 1000)  Distance (ft): 0 Feet (ft) (06/07/19 1600)  Assistive Device: Gait belt;Walker, rolling (06/07/19 1000)     Labs/Studies:  Recent Results (from the past 72 hour(s))   CBC WITH AUTOMATED DIFF    Collection Time: 06/07/19  6:20 AM   Result Value Ref Range    WBC 16.4 (H) 4.3 - 11.1 K/uL    RBC 3.09 (L) 4.05 - 5.2 M/uL    HGB 8.5 (L) 11.7 - 15.4 g/dL    HCT 27.9 (L) 35.8 - 46.3 %    MCV 90.3 79.6 - 97.8 FL    MCH 27.5 26.1 - 32.9 PG    MCHC 30.5 (L) 31.4 - 35.0 g/dL    RDW 15.8 (H) 11.9 - 14.6 %    PLATELET 715 (H) 173 - 450 K/uL    MPV 8.7 (L) 9.4 - 12.3 FL    ABSOLUTE NRBC 0.00 0.0 - 0.2 K/uL    DF AUTOMATED      NEUTROPHILS 83 (H) 43 - 78 %    LYMPHOCYTES 7 (L) 13 - 44 %    MONOCYTES 6 4.0 - 12.0 %    EOSINOPHILS 1 0.5 - 7.8 %    BASOPHILS 1 0.0 - 2.0 %    IMMATURE GRANULOCYTES 2 0.0 - 5.0 %    ABS. NEUTROPHILS 13.6 (H) 1.7 - 8.2 K/UL    ABS. LYMPHOCYTES 1.2 0.5 - 4.6 K/UL    ABS. MONOCYTES 1.1 0.1 - 1.3 K/UL    ABS. EOSINOPHILS 0.1 0.0 - 0.8 K/UL    ABS. BASOPHILS 0.1 0.0 - 0.2 K/UL    ABS. IMM.  GRANS. 0.3 0.0 - 0.5 K/UL   METABOLIC PANEL, BASIC    Collection Time: 06/07/19  6:20 AM   Result Value Ref Range    Sodium 137 136 - 145 mmol/L    Potassium 3.7 3.5 - 5.1 mmol/L    Chloride 102 98 - 107 mmol/L    CO2 28 21 - 32 mmol/L    Anion gap 7 7 - 16 mmol/L    Glucose 111 (H) 65 - 100 mg/dL    BUN 12 8 - 23 MG/DL    Creatinine 0.66 0.6 - 1.0 MG/DL    GFR est AA >60 >60 ml/min/1.73m2    GFR est non-AA >60 >60 ml/min/1.73m2    Calcium 8.2 (L) 8.3 - 10.4 MG/DL   VANCOMYCIN, TROUGH    Collection Time: 06/08/19  7:08 AM   Result Value Ref Range    Vancomycin,trough 13.6 5 - 20 ug/mL       Assessment:     Problem List as of 6/8/2019 Date Reviewed: 5/31/2019          Codes Class Noted - Resolved    Debility ICD-10-CM: R53.81  ICD-9-CM: 799.3  5/31/2019 - Present        Lung cancer ([facility] ICD-10-CM: C34.90  ICD-9-CM: 162.9  5/31/2019 - Present        Gait difficulty ICD-10-CM: R26.9  ICD-9-CM: 781.2  5/31/2019 - Present        Pain ICD-10-CM: R52  ICD-9-CM: 780.96  5/31/2019 - Present        Small cell carcinoma (HCC) ICD-10-CM: C80.1  ICD-9-CM: 199.1  5/31/2019 - Present        Atrial fibrillation with RVR ([facility] ICD-10-CM: I48.91  ICD-9-CM: 427.31  5/31/2019 - Present        History of thoracotomy ICD-10-CM: Z98.890  ICD-9-CM: V45.89  5/31/2019 - Present        Non-small cell lung cancer (NSCLC) ([facility] ICD-10-CM: C34.90  ICD-9-CM: 162.9  5/30/2019 - Present        UTI (urinary tract infection) ICD-10-CM: N39.0  ICD-9-CM: 599.0  5/29/2019 - Present        Atrial fibrillation with rapid ventricular response ([facility] ICD-10-CM: I48.91  ICD-9-CM: 427.31  5/26/2019 - Present        Normochromic anemia (Chronic) ICD-10-CM: D64.9  ICD-9-CM: 285.9  5/26/2019 - Present        Hypoxia ICD-10-CM: R09.02  ICD-9-CM: 799.02  5/24/2019 - Present        COPD (chronic obstructive pulmonary disease) (HCC) (Chronic) ICD-10-CM: J44.9  ICD-9-CM: 311  5/24/2019 - Present        Aftercare following surgery ICD-10-CM: Z48.89  ICD-9-CM: V58.89  5/22/2019 - Present        Right lower lobe lung mass ICD-10-CM: R91.8  ICD-9-CM: 786.6  5/22/2019 - Present        HTN (hypertension) (Chronic) ICD-10-CM: I10  ICD-9-CM: 401.9  4/12/2019 - Present        Acquired hypothyroidism (Chronic) ICD-10-CM: E03.9  ICD-9-CM: 244.9  4/12/2019 - Present        Personal history of tobacco use, presenting hazards to health (Chronic) ICD-10-CM: A61.635  ICD-9-CM: V15.82  3/18/2019 - Present        Liver lesion ICD-10-CM: K76.9  ICD-9-CM: 573.8  3/18/2019 - Present    Overview Signed 5/30/2019 11:14 AM by WILLIAN Kebede. No uptake per PET             Centrilobular emphysema (HCC) (Chronic) ICD-10-CM: J43.2  ICD-9-CM: 492.8  3/18/2019 - Present        RESOLVED: Atrial fibrillation with RVR (HCC) ICD-10-CM: I48.91  ICD-9-CM: 427.31  5/26/2019 - 5/29/2019        RESOLVED: Acute respiratory failure with hypoxia [facility]) ICD-10-CM: J96.01  ICD-9-CM: 518.81  4/12/2019 - 5/30/2019            NSCLC of the lung s/p thoracoscopy, RLL/RUL   Lobectomies, diaphragm resection, mediastinal lymphadenectomy; with Profound Debilitation    1.  Difficulty tolerating therapies. PT reporting that she has never met her therapy minutes. From my discussion with pt, she doesn't lack motivation, she lacks the energy to perform. She has had an arduous medical coarse with complications. She discussed dc plan; considering STR in a SNF at [place]. Doesn't feel dtg can care for her. A lot of social dynamics I believe. Long d/w dtg as well. They are going to discuss further. Pt likes the option of more time in a skilled setting rather than going home right away. 2. Pulmonary; stable on 4L O2 at rest but desats with activity. Limiting progress; enc IS; hx COPD    3. ID; continues on Vanc thru [facility] 6/10. Appears to be responding to abx. bld cxs neg. OFF Cipro. Wbc remain elevated but drainage in wound vac improving and pt is afebrile. ID following; wound cx with lt grownth of coag neg Alpha strep  -s/p Rocephin for UTI (Enterobacter)     4. A fib; rate controlled . Cont Amiodarone. NSR    5. Cont wound care; wound vac    6. Pain mgt  7. DVT prophyl; renew Lovenox  8. Blood loss anemia s/p transfusions; 8.5 on 6/7 stable; ? Iron studies    * spoke to dtg for 20min by phone regarding their dc plan. Encouraged them to discuss openly b/n themselves and decide b/n home with [facility] or Skilled rehab [facility]);patient doesn't feel dtg can care for her and want to be able to do more for self before she goes home. Ppd placed; CM to f/u Mond. Time spent was 35 minutes with over 1/2 in direct patient care/examination, consultation and coordination of care.      Signed By: Mariella Freitas MD     June 8, 2019

## 2019-06-08 NOTE — PROGRESS NOTES
Problem: Falls - Risk of  Goal: *Absence of Falls  Description  Document [place] Fall Risk and appropriate interventions in the flowsheet. Outcome: Progressing Towards Goal     Problem: Patient Education: Go to Patient Education Activity  Goal: Patient/Family Education  Outcome: Progressing Towards Goal     Problem: Pressure Injury - Risk of  Goal: *Prevention of pressure injury  Description  Document Nir Scale and appropriate interventions in the flowsheet.   Outcome: Progressing Towards Goal     Problem: Patient Education: Go to Patient Education Activity  Goal: Patient/Family Education  Outcome: Progressing Towards Goal     Problem: Gas Exchange - Impaired  Goal: *Absence of hypoxia  Outcome: Progressing Towards Goal     Problem: Patient Education: Go to Patient Education Activity  Goal: Patient/Family Education  Outcome: Progressing Towards Goal

## 2019-06-09 ENCOUNTER — APPOINTMENT (OUTPATIENT)
Dept: GENERAL RADIOLOGY | Age: 79
DRG: 181 | End: 2019-06-09
Attending: PHYSICAL MEDICINE & REHABILITATION
Payer: MEDICARE

## 2019-06-09 LAB
MM INDURATION POC: 0 MM (ref 0–5)
PPD POC: NORMAL NEGATIVE

## 2019-06-09 PROCEDURE — 99232 SBSQ HOSP IP/OBS MODERATE 35: CPT | Performed by: PHYSICAL MEDICINE & REHABILITATION

## 2019-06-09 PROCEDURE — 65310000000 HC RM PRIVATE REHAB

## 2019-06-09 PROCEDURE — 74750000023 HC WOUND THERAPY

## 2019-06-09 PROCEDURE — 74011250636 HC RX REV CODE- 250/636: Performed by: PHYSICAL MEDICINE & REHABILITATION

## 2019-06-09 PROCEDURE — 94760 N-INVAS EAR/PLS OXIMETRY 1: CPT

## 2019-06-09 PROCEDURE — 71045 X-RAY EXAM CHEST 1 VIEW: CPT

## 2019-06-09 PROCEDURE — 74011250637 HC RX REV CODE- 250/637: Performed by: PHYSICAL MEDICINE & REHABILITATION

## 2019-06-09 PROCEDURE — 74011250637 HC RX REV CODE- 250/637: Performed by: SURGERY

## 2019-06-09 PROCEDURE — 74011000250 HC RX REV CODE- 250: Performed by: PHYSICAL MEDICINE & REHABILITATION

## 2019-06-09 PROCEDURE — 94640 AIRWAY INHALATION TREATMENT: CPT

## 2019-06-09 PROCEDURE — 77010033678 HC OXYGEN DAILY

## 2019-06-09 RX ADMIN — LORAZEPAM 1 MG: 1 TABLET ORAL at 21:12

## 2019-06-09 RX ADMIN — BUDESONIDE 500 MCG: 0.5 INHALANT RESPIRATORY (INHALATION) at 06:00

## 2019-06-09 RX ADMIN — ALBUTEROL SULFATE 2.5 MG: 2.5 SOLUTION RESPIRATORY (INHALATION) at 14:06

## 2019-06-09 RX ADMIN — ENOXAPARIN SODIUM 40 MG: 40 INJECTION SUBCUTANEOUS at 16:21

## 2019-06-09 RX ADMIN — GABAPENTIN 300 MG: 300 CAPSULE ORAL at 08:04

## 2019-06-09 RX ADMIN — PANTOPRAZOLE SODIUM 40 MG: 40 TABLET, DELAYED RELEASE ORAL at 05:52

## 2019-06-09 RX ADMIN — Medication 10 ML: at 21:09

## 2019-06-09 RX ADMIN — ACETAMINOPHEN 650 MG: 325 TABLET, FILM COATED ORAL at 22:14

## 2019-06-09 RX ADMIN — SENNOSIDES AND DOCUSATE SODIUM 2 TABLET: 8.6; 5 TABLET ORAL at 08:03

## 2019-06-09 RX ADMIN — FUROSEMIDE 40 MG: 20 TABLET ORAL at 08:04

## 2019-06-09 RX ADMIN — AMIODARONE HYDROCHLORIDE 200 MG: 200 TABLET ORAL at 08:03

## 2019-06-09 RX ADMIN — ALBUTEROL SULFATE 2.5 MG: 2.5 SOLUTION RESPIRATORY (INHALATION) at 18:11

## 2019-06-09 RX ADMIN — LEVOTHYROXINE SODIUM 75 MCG: 75 TABLET ORAL at 08:04

## 2019-06-09 RX ADMIN — POTASSIUM CHLORIDE 40 MEQ: 20 TABLET, EXTENDED RELEASE ORAL at 08:03

## 2019-06-09 RX ADMIN — Medication 5 ML: at 05:53

## 2019-06-09 RX ADMIN — BUDESONIDE 500 MCG: 0.5 INHALANT RESPIRATORY (INHALATION) at 18:11

## 2019-06-09 RX ADMIN — GABAPENTIN 300 MG: 300 CAPSULE ORAL at 16:22

## 2019-06-09 RX ADMIN — GABAPENTIN 300 MG: 300 CAPSULE ORAL at 21:09

## 2019-06-09 RX ADMIN — ALBUTEROL SULFATE 2.5 MG: 2.5 SOLUTION RESPIRATORY (INHALATION) at 06:00

## 2019-06-09 RX ADMIN — SENNOSIDES AND DOCUSATE SODIUM 2 TABLET: 8.6; 5 TABLET ORAL at 16:21

## 2019-06-09 RX ADMIN — VANCOMYCIN HYDROCHLORIDE 1000 MG: 1 INJECTION, POWDER, LYOPHILIZED, FOR SOLUTION INTRAVENOUS at 11:53

## 2019-06-09 RX ADMIN — ACETAMINOPHEN 650 MG: 325 TABLET, FILM COATED ORAL at 08:03

## 2019-06-09 RX ADMIN — Medication 10 ML: at 14:00

## 2019-06-09 RX ADMIN — ONDANSETRON 4 MG: 2 INJECTION INTRAMUSCULAR; INTRAVENOUS at 13:37

## 2019-06-09 RX ADMIN — HYDROCODONE BITARTRATE AND ACETAMINOPHEN 1 TABLET: 5; 325 TABLET ORAL at 05:55

## 2019-06-09 RX ADMIN — TIOTROPIUM BROMIDE 18 MCG: 18 CAPSULE ORAL; RESPIRATORY (INHALATION) at 06:00

## 2019-06-09 NOTE — PROGRESS NOTES
Problem: Falls - Risk of  Goal: *Absence of Falls  Description  Document Catherene Bunting Fall Risk and appropriate interventions in the flowsheet. Outcome: Progressing Towards Goal     Problem: Patient Education: Go to Patient Education Activity  Goal: Patient/Family Education  Outcome: Progressing Towards Goal     Problem: Pressure Injury - Risk of  Goal: *Prevention of pressure injury  Description  Document Nir Scale and appropriate interventions in the flowsheet.   Outcome: Progressing Towards Goal     Problem: Patient Education: Go to Patient Education Activity  Goal: Patient/Family Education  Outcome: Progressing Towards Goal     Problem: Gas Exchange - Impaired  Goal: *Absence of hypoxia  Outcome: Progressing Towards Goal     Problem: Patient Education: Go to Patient Education Activity  Goal: Patient/Family Education  Outcome: Progressing Towards Goal

## 2019-06-09 NOTE — PROGRESS NOTES
Joselito Jarrett MD,   Medical Director  [address] [facility], [address] [facility]  Tel: [phone]       SFD PROGRESS NOTE    Keena Payment  Admit Date: 5/31/2019  Admit Diagnosis: Lung cancer ([facility] [C34.90]; History of thoracotomy [Z98.890]; Hypoxia [R09.02]; Pain [R52]; Debility [R53.81]; Small cell carcinoma (HCC) [C80.1]; Atrial fibrillation with RVR (HCC) [I48.91];COPD (chronic obstructive pulmonary disease) ([facility] [J44.9]; Gait difficulty [R26.9]    Subjective     Slept fine, appetite pretty good. Reports pain mainly in right shoulder. sats 87% lying in bed on 5L. + jacobson, no further n/v  Says that her dtg is \"about to lose it. \" she feels dtg is smothering her now to make up for all the years of trouble.      Objective:     Current Facility-Administered Medications   Medication Dose Route Frequency    LORazepam (ATIVAN) tablet 1 mg  1 mg Oral Q8H PRN    zinc oxide-cod liver oil (DESITIN) 40 % paste   Topical PRN    vancomycin (VANCOCIN) 1,000 mg in 0.9% sodium chloride (MBP/ADV) 250 mL  1 g IntraVENous Q18H    acetaminophen (TYLENOL) tablet 650 mg  650 mg Oral Q6H PRN    phenol throat spray (CHLORASEPTIC) 1 Spray  1 Spray Oral PRN    HYDROmorphone (DILAUDID) tablet 2 mg  2 mg Oral Q6H PRN    potassium chloride (K-DUR, KLOR-CON) SR tablet 40 mEq  40 mEq Oral DAILY    naloxone (NARCAN) injection 0.4 mg  0.4 mg IntraVENous PRN    ondansetron (ZOFRAN) injection 4 mg  4 mg IntraVENous Q4H PRN    sodium chloride (NS) flush 5-40 mL  5-40 mL IntraVENous Q8H    sodium chloride (NS) flush 5-40 mL  5-40 mL IntraVENous PRN    albuterol (PROVENTIL VENTOLIN) nebulizer solution 2.5 mg  2.5 mg Nebulization QID RT    amiodarone (CORDARONE) tablet 200 mg  200 mg Oral DAILY    budesonide (PULMICORT) 500 mcg/2 ml nebulizer suspension  500 mcg Nebulization BID RT    enoxaparin (LOVENOX) injection 40 mg  40 mg SubCUTAneous Q24H    furosemide (LASIX) tablet 40 mg  40 mg Oral DAILY    gabapentin (NEURONTIN) capsule 300 mg  300 mg Oral TID    HYDROcodone-acetaminophen (NORCO) 5-325 mg per tablet 1 Tab  1 Tab Oral Q4H PRN    levothyroxine (SYNTHROID) tablet 75 mcg  75 mcg Oral DAILY    magnesium hydroxide (MILK OF MAGNESIA) 400 mg/5 mL oral suspension 30 mL  30 mL Oral DAILY PRN    magnesium hydroxide (MILK OF MAGNESIA) 400 mg/5 mL oral suspension 30 mL  30 mL Oral DAILY    pantoprazole (PROTONIX) tablet 40 mg  40 mg Oral ACB    senna-docusate (PERICOLACE) 8.6-50 mg per tablet 2 Tab  2 Tab Oral BID    tiotropium (SPIRIVA) inhalation capsule 18 mcg  1 Cap Inhalation DAILY     Review of Systems:Denies chest pain,  headache, visual problems, abdominal pain, dysurea, calf pain. Pertinent positives are as noted in the medical records and unremarkable otherwise. Visit Vitals  /59   Pulse 93   Temp 98.3 °F (36.8 °C)   Resp 18   SpO2 92%        Physical Exam:   General: Alert and age appropriately oriented. No acute cardio respiratory distress. HEENT: Normocephalic,no scleral icterus  Oral mucosa moist without cyanosis   Lungs: Bilateral bs dec bases, no w/r/r this a.m. Poor inspiratory effort. Respiration even and unlabored   Heart: Regular rate and rhythm, S1, S2   No  murmurs, clicks, rub or gallops   Abdomen: Soft, non-tender, nondistended. Bowel sounds present. No organomegaly. Genitourinary: Benign . Neuromuscular:      Distal LEs 4+, sens intact  No focal weakness but limited by right shoulder pain. Skin/extremity: No rashes, no erythema.  No calf tenderness BLE  Wound vac right chest with good seal; no edema                                                                            Functional Assessment:  Gross Assessment  AROM: Generally decreased, functional (06/03/19 1000)  Strength: Generally decreased, functional (06/03/19 1000)       Balance  Sitting - Static: Good (unsupported) (06/08/19 1700)  Sitting - Dynamic: Good (unsupported) (06/08/19 1700)  Standing - Static: Poor;Constant support(with RW) (06/08/19 1700)  Standing - Dynamic : Impaired (06/08/19 1700)           Toileting  Cues: Physical assistance for pants up;Physical assistance for pants down (06/07/19 1358)         Nabil Owens Fall Risk Assessment:  Nabil Owens Fall Risk  Mobility: Ambulates or transfers with assist devices or assistance (06/09/19 0700)  Mobility Interventions: Communicate number of staff needed for ambulation/transfer;OT consult for ADLs; Patient to call before getting OOB;PT Consult for mobility concerns (06/09/19 0700)  Mentation: Alert, oriented x 3 (06/09/19 0700)  Medication: Patient receiving anticonvulsants, sedatives(tranquilizers), psychotropics or hypnotics, hypoglycemics, narcotics, sleep aids, antihypertensives, laxatives, or diuretics (06/09/19 0700)  Medication Interventions: Evaluate medications/consider consulting pharmacy; Patient to call before getting OOB; Teach patient to arise slowly (06/09/19 0700)  Elimination: Needs assistance with toileting (06/09/19 0700)  Elimination Interventions: Call light in reach; Patient to call for help with toileting needs; Toilet paper/wipes in reach (06/09/19 0700)  Prior Fall History: Before admission in past 12 months _home or previous inpatient care) (06/09/19 0700)  History of Falls Interventions: Bed/chair exit alarm; Door open when patient unattended;Evaluate medications/consider consulting pharmacy (06/09/19 0700)  Total Score: 4 (06/09/19 0700)  Standard Fall Precautions: Yes (06/08/19 0111)  High Fall Risk: Yes (06/09/19 0700)     Speech Assessment:  Aspiration Signs/Symptoms: None (06/05/19 1516)      Ambulation:  Gait  Speed/Aracelis: Slow;Shuffled (06/07/19 1000)  Step Length: Left shortened;Right shortened (06/07/19 1000)  Distance (ft): 20 Feet (ft) (06/08/19 1700)  Assistive Device: Walker, rolling (06/08/19 1700)     Labs/Studies:  Recent Results (from the past 72 hour(s))   CBC WITH AUTOMATED DIFF    Collection Time: 06/07/19  6:20 AM   Result Value Ref Range    WBC 16.4 (H) 4.3 - 11.1 K/uL    RBC 3.09 (L) 4.05 - 5.2 M/uL    HGB 8.5 (L) 11.7 - 15.4 g/dL    HCT 27.9 (L) 35.8 - 46.3 %    MCV 90.3 79.6 - 97.8 FL    MCH 27.5 26.1 - 32.9 PG    MCHC 30.5 (L) 31.4 - 35.0 g/dL    RDW 15.8 (H) 11.9 - 14.6 %    PLATELET 343 (H) 811 - 450 K/uL    MPV 8.7 (L) 9.4 - 12.3 FL    ABSOLUTE NRBC 0.00 0.0 - 0.2 K/uL    DF AUTOMATED      NEUTROPHILS 83 (H) 43 - 78 %    LYMPHOCYTES 7 (L) 13 - 44 %    MONOCYTES 6 4.0 - 12.0 %    EOSINOPHILS 1 0.5 - 7.8 %    BASOPHILS 1 0.0 - 2.0 %    IMMATURE GRANULOCYTES 2 0.0 - 5.0 %    ABS. NEUTROPHILS 13.6 (H) 1.7 - 8.2 K/UL    ABS. LYMPHOCYTES 1.2 0.5 - 4.6 K/UL    ABS. MONOCYTES 1.1 0.1 - 1.3 K/UL    ABS. EOSINOPHILS 0.1 0.0 - 0.8 K/UL    ABS. BASOPHILS 0.1 0.0 - 0.2 K/UL    ABS. IMM.  GRANS. 0.3 0.0 - 0.5 K/UL   METABOLIC PANEL, BASIC    Collection Time: 06/07/19  6:20 AM   Result Value Ref Range    Sodium 137 136 - 145 mmol/L    Potassium 3.7 3.5 - 5.1 mmol/L    Chloride 102 98 - 107 mmol/L    CO2 28 21 - 32 mmol/L    Anion gap 7 7 - 16 mmol/L    Glucose 111 (H) 65 - 100 mg/dL    BUN 12 8 - 23 MG/DL    Creatinine 0.66 0.6 - 1.0 MG/DL    GFR est AA >60 >60 ml/min/1.73m2    GFR est non-AA >60 >60 ml/min/1.73m2    Calcium 8.2 (L) 8.3 - 10.4 MG/DL   VANCOMYCIN, TROUGH    Collection Time: 06/08/19  7:08 AM   Result Value Ref Range    Vancomycin,trough 13.6 5 - 20 ug/mL       Assessment:     Problem List as of 6/9/2019 Date Reviewed: 5/31/2019          Codes Class Noted - Resolved    Debility ICD-10-CM: R53.81  ICD-9-CM: 799.3  5/31/2019 - Present        Lung cancer ([facility] [address] ICD-10-CM: C34.90  ICD-9-CM: 162.9  5/31/2019 - Present        Gait difficulty ICD-10-CM: R26.9  ICD-9-CM: 781.2  5/31/2019 - Present        Pain ICD-10-CM: R52  ICD-9-CM: 780.96  5/31/2019 - Present        Small cell carcinoma (HCC) ICD-10-CM: C80.1  ICD-9-CM: 199.1  5/31/2019 - Present        Atrial fibrillation with RVR (HCC) ICD-10-CM: I48.91  ICD-9-CM: 427.31  5/31/2019 - Present        History of thoracotomy ICD-10-CM: Z98.890  ICD-9-CM: V45.89  5/31/2019 - Present        Non-small cell lung cancer (NSCLC) ([facility] ICD-10-CM: C34.90  ICD-9-CM: 162.9  5/30/2019 - Present        UTI (urinary tract infection) ICD-10-CM: N39.0  ICD-9-CM: 599.0  5/29/2019 - Present        Atrial fibrillation with rapid ventricular response ([facility] ICD-10-CM: I48.91  ICD-9-CM: 427.31  5/26/2019 - Present        Normochromic anemia (Chronic) ICD-10-CM: D64.9  ICD-9-CM: 285.9  5/26/2019 - Present        Hypoxia ICD-10-CM: R09.02  ICD-9-CM: 799.02  5/24/2019 - Present        COPD (chronic obstructive pulmonary disease) (HCC) (Chronic) ICD-10-CM: J44.9  ICD-9-CM: 707  5/24/2019 - Present        Aftercare following surgery ICD-10-CM: Z48.89  ICD-9-CM: V58.89  5/22/2019 - Present        Right lower lobe lung mass ICD-10-CM: R91.8  ICD-9-CM: 786.6  5/22/2019 - Present        HTN (hypertension) (Chronic) ICD-10-CM: I10  ICD-9-CM: 401.9  4/12/2019 - Present        Acquired hypothyroidism (Chronic) ICD-10-CM: E03.9  ICD-9-CM: 244.9  4/12/2019 - Present        Personal history of tobacco use, presenting hazards to health (Chronic) ICD-10-CM: L21.435  ICD-9-CM: V15.82  3/18/2019 - Present        Liver lesion ICD-10-CM: K76.9  ICD-9-CM: 573.8  3/18/2019 - Present    Overview Signed 5/30/2019 11:14 AM by WILLIAN Larson. No uptake per PET             Centrilobular emphysema (HCC) (Chronic) ICD-10-CM: J43.2  ICD-9-CM: 492.8  3/18/2019 - Present        RESOLVED: Atrial fibrillation with RVR (HCC) ICD-10-CM: I48.91  ICD-9-CM: 427.31  5/26/2019 - 5/29/2019        RESOLVED: Acute respiratory failure with hypoxia [facility]) ICD-10-CM: J96.01  ICD-9-CM: 518.81  4/12/2019 - 5/30/2019               NSCLC of the lung s/p thoracoscopy, RLL/RUL   Lobectomies, diaphragm resection, mediastinal lymphadenectomy; with Profound Debilitation     1. Difficulty tolerating therapies.  PT reporting that she has never met her therapy minutes. From my discussion with pt, she doesn't lack motivation, she lacks the energy to perform. She has had an arduous medical coarse with complications. She discussed dc plan; considering STR in a SNF at [place]. Doesn't feel dtg can care for her. A lot of social dynamics I believe. Long d/w dtg as well. They are going to discuss further. Pt likes the option of more time in a skilled setting rather than going home right away.     2. Pulmonary; stable on 4L O2 at rest but desats with activity. Limiting progress; enc IS; hx COPD; 6/9 5L this am. sats hi 80s in bed but not in any distress.      3. ID; continues on Vanc thru Mond 6/10. Appears to be responding to abx. bld cxs neg. OFF Cipro. Wbc remain elevated but drainage in wound vac improving and pt is afebrile. ID following; wound cx with lt grownth of coag neg Alpha strep  -s/p Rocephin for UTI (Enterobacter)      4. A fib; rate controlled . Cont Amiodarone. NSR     5. Cont wound care; wound vac     6. Pain mgt  7. DVT prophyl; renew Lovenox  8. Blood loss anemia s/p transfusions; 8.5 on 6/7 stable; ? Iron studies     * spoke to dtg for 20min by phone regarding their dc plan. Encouraged them to discuss openly b/n themselves and decide b/n home with [facility] or Skilled rehab [facility] [place] [facility]);patient doesn't feel dtg can care for her and want to be able to do more for self before she goes home. Ppd placed; CM to f/u Mond. * 6/9 pt still set on going to STR in a SNF and not straight to dtgs. dtg will be in for family training Mond. Pt has some resentment towards years of issues b/n them. Offered emotional support. Pt competent to make own decisions. Cannot participate in this level of care, nor does she want to           Time spent was 25 minutes with over 1/2 in direct patient care/examination, consultation and coordination of care.      Signed By: Christina Walters MD     June 9, 2019

## 2019-06-09 NOTE — PROGRESS NOTES
RT notified this primary RN of increase O2 demand for patient. O2 demand increased from 5LNC-10LNC. This primary RN assessed patient VS- Temp- 97.8 O2- 98% on 10LNC, BP- 119/71 RR- 20 even and unlabored, HR- 87. MD paged. Orders received. Will continue to monitor.

## 2019-06-10 LAB
ANION GAP SERPL CALC-SCNC: 8 MMOL/L (ref 7–16)
ARTERIAL PATENCY WRIST A: YES
BASE EXCESS BLD CALC-SCNC: 6 MMOL/L
BASOPHILS # BLD: 0.1 K/UL (ref 0–0.2)
BASOPHILS NFR BLD: 1 % (ref 0–2)
BDY SITE: ABNORMAL
BODY TEMPERATURE: 98.6
BUN SERPL-MCNC: 10 MG/DL (ref 8–23)
CALCIUM SERPL-MCNC: 8 MG/DL (ref 8.3–10.4)
CHLORIDE SERPL-SCNC: 102 MMOL/L (ref 98–107)
CO2 BLD-SCNC: 32 MMOL/L
CO2 SERPL-SCNC: 29 MMOL/L (ref 21–32)
COLLECT TIME,HTIME: 238
CREAT SERPL-MCNC: 0.79 MG/DL (ref 0.6–1)
DIFFERENTIAL METHOD BLD: ABNORMAL
EOSINOPHIL # BLD: 0.2 K/UL (ref 0–0.8)
EOSINOPHIL NFR BLD: 1 % (ref 0.5–7.8)
ERYTHROCYTE [DISTWIDTH] IN BLOOD BY AUTOMATED COUNT: 15.9 % (ref 11.9–14.6)
FLOW RATE ISTAT,IFRATE: 10 L/MIN
GAS FLOW.O2 O2 DELIVERY SYS: ABNORMAL L/MIN
GLUCOSE SERPL-MCNC: 100 MG/DL (ref 65–100)
HCO3 BLD-SCNC: 30.5 MMOL/L (ref 22–26)
HCT VFR BLD AUTO: 31.4 % (ref 35.8–46.3)
HGB BLD-MCNC: 9.1 G/DL (ref 11.7–15.4)
IMM GRANULOCYTES # BLD AUTO: 0.2 K/UL (ref 0–0.5)
IMM GRANULOCYTES NFR BLD AUTO: 1 % (ref 0–5)
LYMPHOCYTES # BLD: 1.6 K/UL (ref 0.5–4.6)
LYMPHOCYTES NFR BLD: 11 % (ref 13–44)
MCH RBC QN AUTO: 27.3 PG (ref 26.1–32.9)
MCHC RBC AUTO-ENTMCNC: 29 G/DL (ref 31.4–35)
MCV RBC AUTO: 94.3 FL (ref 79.6–97.8)
MM INDURATION POC: 0 MM (ref 0–5)
MONOCYTES # BLD: 1.1 K/UL (ref 0.1–1.3)
MONOCYTES NFR BLD: 7 % (ref 4–12)
NEUTS SEG # BLD: 11.7 K/UL (ref 1.7–8.2)
NEUTS SEG NFR BLD: 79 % (ref 43–78)
NRBC # BLD: 0 K/UL (ref 0–0.2)
PCO2 BLD: 44.3 MMHG (ref 35–45)
PH BLD: 7.45 [PH] (ref 7.35–7.45)
PLATELET # BLD AUTO: 408 K/UL (ref 150–450)
PMV BLD AUTO: 9.1 FL (ref 9.4–12.3)
PO2 BLD: 72 MMHG (ref 75–100)
POTASSIUM SERPL-SCNC: 3.6 MMOL/L (ref 3.5–5.1)
PPD POC: NORMAL NEGATIVE
RBC # BLD AUTO: 3.33 M/UL (ref 4.05–5.2)
SAO2 % BLD: 95 % (ref 95–98)
SERVICE CMNT-IMP: ABNORMAL
SODIUM SERPL-SCNC: 139 MMOL/L (ref 136–145)
SPECIMEN TYPE: ABNORMAL
WBC # BLD AUTO: 14.9 K/UL (ref 4.3–11.1)

## 2019-06-10 PROCEDURE — 77030019934 HC DRSG VAC ASST KCON -B

## 2019-06-10 PROCEDURE — 97110 THERAPEUTIC EXERCISES: CPT

## 2019-06-10 PROCEDURE — 77030008031

## 2019-06-10 PROCEDURE — 85025 COMPLETE CBC W/AUTO DIFF WBC: CPT

## 2019-06-10 PROCEDURE — 94640 AIRWAY INHALATION TREATMENT: CPT

## 2019-06-10 PROCEDURE — 77030027688 HC DRSG MEPILEX 16-48IN NO BORD MOLN -A

## 2019-06-10 PROCEDURE — 94760 N-INVAS EAR/PLS OXIMETRY 1: CPT

## 2019-06-10 PROCEDURE — 65310000000 HC RM PRIVATE REHAB

## 2019-06-10 PROCEDURE — 97530 THERAPEUTIC ACTIVITIES: CPT

## 2019-06-10 PROCEDURE — 97605 NEG PRS WND THER DME<=50SQCM: CPT

## 2019-06-10 PROCEDURE — 99232 SBSQ HOSP IP/OBS MODERATE 35: CPT | Performed by: PHYSICAL MEDICINE & REHABILITATION

## 2019-06-10 PROCEDURE — 82803 BLOOD GASES ANY COMBINATION: CPT

## 2019-06-10 PROCEDURE — 74011250636 HC RX REV CODE- 250/636: Performed by: PHYSICAL MEDICINE & REHABILITATION

## 2019-06-10 PROCEDURE — 74750000023 HC WOUND THERAPY

## 2019-06-10 PROCEDURE — 80048 BASIC METABOLIC PNL TOTAL CA: CPT

## 2019-06-10 PROCEDURE — 74011250637 HC RX REV CODE- 250/637: Performed by: PHYSICAL MEDICINE & REHABILITATION

## 2019-06-10 PROCEDURE — 36600 WITHDRAWAL OF ARTERIAL BLOOD: CPT

## 2019-06-10 PROCEDURE — 36415 COLL VENOUS BLD VENIPUNCTURE: CPT

## 2019-06-10 PROCEDURE — 74011250637 HC RX REV CODE- 250/637: Performed by: SURGERY

## 2019-06-10 PROCEDURE — 77010033678 HC OXYGEN DAILY

## 2019-06-10 PROCEDURE — 74011000250 HC RX REV CODE- 250: Performed by: PHYSICAL MEDICINE & REHABILITATION

## 2019-06-10 RX ORDER — FUROSEMIDE 20 MG/1
20 TABLET ORAL DAILY
Status: DISCONTINUED | OUTPATIENT
Start: 2019-06-11 | End: 2019-06-12 | Stop reason: HOSPADM

## 2019-06-10 RX ORDER — LORAZEPAM 0.5 MG/1
0.5 TABLET ORAL
Status: DISCONTINUED | OUTPATIENT
Start: 2019-06-10 | End: 2019-06-12 | Stop reason: HOSPADM

## 2019-06-10 RX ADMIN — HYDROCODONE BITARTRATE AND ACETAMINOPHEN 1 TABLET: 5; 325 TABLET ORAL at 19:12

## 2019-06-10 RX ADMIN — ALBUTEROL SULFATE 2.5 MG: 2.5 SOLUTION RESPIRATORY (INHALATION) at 06:17

## 2019-06-10 RX ADMIN — SENNOSIDES AND DOCUSATE SODIUM 2 TABLET: 8.6; 5 TABLET ORAL at 08:35

## 2019-06-10 RX ADMIN — GABAPENTIN 300 MG: 300 CAPSULE ORAL at 17:26

## 2019-06-10 RX ADMIN — VANCOMYCIN HYDROCHLORIDE 1000 MG: 1 INJECTION, POWDER, LYOPHILIZED, FOR SOLUTION INTRAVENOUS at 05:09

## 2019-06-10 RX ADMIN — GABAPENTIN 300 MG: 300 CAPSULE ORAL at 08:36

## 2019-06-10 RX ADMIN — PANTOPRAZOLE SODIUM 40 MG: 40 TABLET, DELAYED RELEASE ORAL at 05:09

## 2019-06-10 RX ADMIN — HYDROCODONE BITARTRATE AND ACETAMINOPHEN 1 TABLET: 5; 325 TABLET ORAL at 10:30

## 2019-06-10 RX ADMIN — GABAPENTIN 300 MG: 300 CAPSULE ORAL at 19:59

## 2019-06-10 RX ADMIN — BUDESONIDE 500 MCG: 0.5 INHALANT RESPIRATORY (INHALATION) at 06:17

## 2019-06-10 RX ADMIN — LEVOTHYROXINE SODIUM 75 MCG: 75 TABLET ORAL at 08:35

## 2019-06-10 RX ADMIN — ALBUTEROL SULFATE 2.5 MG: 2.5 SOLUTION RESPIRATORY (INHALATION) at 09:21

## 2019-06-10 RX ADMIN — AMIODARONE HYDROCHLORIDE 200 MG: 200 TABLET ORAL at 08:45

## 2019-06-10 RX ADMIN — ENOXAPARIN SODIUM 40 MG: 40 INJECTION SUBCUTANEOUS at 17:26

## 2019-06-10 RX ADMIN — TIOTROPIUM BROMIDE 18 MCG: 18 CAPSULE ORAL; RESPIRATORY (INHALATION) at 09:21

## 2019-06-10 RX ADMIN — VANCOMYCIN HYDROCHLORIDE 1000 MG: 1 INJECTION, POWDER, LYOPHILIZED, FOR SOLUTION INTRAVENOUS at 22:59

## 2019-06-10 RX ADMIN — FUROSEMIDE 40 MG: 20 TABLET ORAL at 08:45

## 2019-06-10 RX ADMIN — Medication 10 ML: at 05:10

## 2019-06-10 RX ADMIN — Medication 10 ML: at 20:00

## 2019-06-10 RX ADMIN — SENNOSIDES AND DOCUSATE SODIUM 2 TABLET: 8.6; 5 TABLET ORAL at 17:26

## 2019-06-10 RX ADMIN — ACETAMINOPHEN 650 MG: 325 TABLET, FILM COATED ORAL at 19:58

## 2019-06-10 RX ADMIN — POTASSIUM CHLORIDE 40 MEQ: 20 TABLET, EXTENDED RELEASE ORAL at 08:45

## 2019-06-10 NOTE — PROGRESS NOTES
06/10/19 0941   Time Spent With Patient   Time In 0845   Time Out 0856   Patient Seen For: AM;Other (see progress notes)     Patient seen for vital sign monitoring per nurse notes that patient was requiring 10L oxygen; oxygen reading via continuous monitor running high 70s-low 80s on 10L oxygen and low 80s on portable pulse oximeter. Notified physician; physician in to discuss medical status and ability to medically participate in OT. Physician placed patient on medical HOLD for the AM due to desaturation to 70s at rest on 5-10L. (discussion occurred from 8:30-8:45). At 8:45, patient's daughter was in room; discussed with daughter why Family Training was not being held today due to patient on medical hold. Patient's daughter reporting she has very busy days and would have appreciated advance notice; discussed with daughter how this therapist did not see patient until 8:30 for scheduled OT session so was unaware of patient's inability to participate until scheduled time. It is anticipated that patient will require 24/7 supervision and physical assist at discharge; it is unclear if patient's daughter will be able to provide this based on patient's daughter's reports of very busy schedule.      Brittany Kellogg, MS, OTR/L  6/10/2019

## 2019-06-10 NOTE — PROGRESS NOTES
Dispo update:  Spoke to Ms. Lara Nava in room [id number] about discharge planning. She is known from previous admission to 2nd floor, and at that time, had her own place in [place], [place], but had moved in with her daughter in [facility] side. Ms. Lara Nava is currently set up for [place] [id number]. PT, OT, RN, and aide at discharge, and with a [facility] wound vac (supplies are in the the room). However, she states that she is not sure if she wants to go home as planned, or maybe to short-term rehab at a skilled nursing facility (STR at a SNF). Standard SNF list provided to her for her review. She closed her eyes and did not want to further discuss. Will follow and assist with discharge planning.

## 2019-06-10 NOTE — PROGRESS NOTES
6/10/2019    PLAN:  Continue current care  Wound vac to right chest maintained per wound care team.   ID following              ASSESSMENT:  Admit Date: 5/31/2019  To rehab  Surgery 5/22/19  Procedure(s):  1. RIGHT THORACOSCOPY switched to thoracotomy with extensive pneumonolysis  2. LOWER LOBECTOMY   3. Upper lobe blebectomy x 2  4. Diaphragm resection with primary repair  5. Chest wall resection  6. MEDISTINAL LYMPHADENECTOMY  7. Intercostal nerve cryoablation       DIAGNOSIS   A: #9 LYMPH NODE X2:   TWO LYMPH NODES NEGATIVE FOR METASTATIC CARCINOMA (0/2). B: #7 LYMPH NODE X5:   FIVE LYMPH NODES NEGATIVE FOR METASTATIC CARCINOMA (0/5). C: PERIHILAR LYMPH NODE:   ONE LYMPH NODE NEGATIVE FOR METASTATIC CARCINOMA (0/1). D: 4R LYMPH NODE X2:   TWO LYMPH NODES NEGATIVE FOR METASTATIC CARCINOMA (0/2). E: UPPER LOBE BLEB X2:   FINDINGS CONSISTENT WITH PULMONARY BLEBS. F: RIGHT LOWER LOBE, CHEST WALL, DIAPHRAGM:   POORLY DIFFERENTIATED NON-SMALL CELL CARCINOMA WITH SQUAMOUS FEATURES AND FOCAL NEUROENDOCRINE DIFFERENTIATION. CARCINOMA IS 6.9 CM IN GREATEST DIMENSION. MARGINS ARE NEGATIVE FOR CARCINOMA. FIVE LYMPH NODES NEGATIVE FOR METASTATIC CARCINOMA (0/5). 06/04/19: Patient denies SOB. Currently O2 sat 97% with 5LNC, Temp max 101.1  Wound Vac maintained to right chest with purulent appearing drainage in canister. Patient with complaints of pain with dressing changed. 06/05/19 Patient states she is feeling much better today and participating in exercises. VAC with purulent drainage in canister, scheduled for vac dressing change today. ID following, AF. O2 5L 95% sat. 06/06/19 Patient upset this afternoon stating she is going to a nursing home because this rehab is just too hard and it is easier at the nursing home.   States she had a very bad night related to right chest \"pulling\" about every 30 minutes and and unable to get any rest.  She continues with oxygen 4L. No SOB, AF, NAD, drainage to wound vac still thick wiggins appearing. Dr. Carlie Davidson spoke with her at length recommending she stay at rehab and continue the current care. 06/10/19 Saw patient this AM sleeping. Wound vac maintained. Tmax 100.8. Spoke with wound care nurse this afternoon and she changed wound vac and now unable to keep a seal.  Dr. Carlie Davidson and myself arrived at bedside with Zenon Conroy (wound care), removed vac dressing. Seems wound is comunicating with pleural space and having difficulty keeping a seal for wound vac. Dr. Carlie Davidson place xeroform to posterior portion of wound and placed wound vac. Will monitor. MUST REMOVE XEROFORM WITH EVERY VAC CHANGE. Tissue granulation in wound bed noted without infection noted. OBJECTIVE:  Patient Vitals for the past 24 hrs:   Temp Pulse Resp BP SpO2   06/10/19 0921 -- -- -- -- 96 %   06/10/19 0738 98.5 °F (36.9 °C) 90 18 105/56 91 %   06/10/19 0617 -- -- -- -- 95 %   06/09/19 2314 98.5 °F (36.9 °C) -- -- -- --   06/09/19 2201 (!) 100.8 °F (38.2 °C) -- -- -- --   06/09/19 1927 100 °F (37.8 °C) 92 18 112/53 90 %   06/09/19 1811 -- -- -- -- 92 %         Date 06/09/19 0700 - 06/10/19 0659 06/10/19 0700 - 06/11/19 0659   Shift 0223-0470 0677-5477 24 Hour Total 8659-2334 1695-1520 24 Hour Total   INTAKE   Shift Total         OUTPUT   Urine  1000 1000        Urine Voided  1000 1000        Urine Occurrence(s) 1 x  1 x 1 x  1 x   Stool  1 1        Stool  1 1      Shift Total  1001 1001      NET  -1001 -1001      Weight (kg)                  General:          No acute distress    Lungs:             CTA Bilaterally.  O2 NC, Wound vac to right chest  Heart:              RRR  Abdomen:        Soft, Non distended, Non tender, BS+  Extremities:     No cyanosis, clubbing or edema  Neurologic:      No focal deficits           Labs:    Recent Labs     06/10/19  0632   WBC 14.9*   HGB 9.1*         K 3.6      CO2 29   BUN 10   CREA 0. 100 [facility], NP

## 2019-06-10 NOTE — WOUND CARE
Wound VAC dressing change to Right chest  Staples on left, middle and right, middle area now fully granulating under making 2 separate open areas, right side is granular, left side has some slough and breath sounds can be heard. Wound vac applied, unable to maintain seal, seal breaks with deep breaths. Call to Cynthia Ascencio NP, notified of concerns with seal and breath sounds on left side. Dr. Librado Nunn and Franklin Biggs in to see patient, removed vac, assessed wound and applied xeroform packing to left wound to assist in seal at thoracic area, wound vac reapplied by Dr. Librado Nunn, to continue wound vac changes 3 times per week. Will monitor.

## 2019-06-10 NOTE — PROGRESS NOTES
AM Physical Therapy: discussed medical status with OT and MD. Patient placed on hold in AM. Plan to check on status in PM and resume PT if able to tolerate.   Toshia Magallanes, PT  6/10/2019

## 2019-06-10 NOTE — PROGRESS NOTES
PHYSICAL THERAPY DAILY NOTE  Time In: 1347  Time Out: 1430  Patient Seen For: PM;Other (see progress notes); Therapeutic exercise;Gait training;Transfer training    Subjective: \" I'm hurting but I will follow the rules. \"         Objective: Dr. Florencia Mcfarlane in room encouraging patient to attempt getting up. Other (comment)(fall risk)  GROSS ASSESSMENT Daily Assessment            COGNITION Daily Assessment           BED/MAT MOBILITY Daily Assessment    Supine to Sit : 3 (Moderate assistance)  Sit to Supine : 3 (Moderate assistance)       TRANSFERS Daily Assessment    Transfer Type: SPT with walker  Transfer Assistance : 3 (Moderate assistance )  Sit to Stand Assistance: Moderate assistance  Car Transfers: Not tested       GAIT Daily Assessment    Amount of Assistance: 0 (Not tested)       STEPS or STAIRS Daily Assessment    Level of Assist : 0 (Not tested)       BALANCE Daily Assessment            WHEELCHAIR MOBILITY Daily Assessment            LOWER EXTREMITY EXERCISES Daily Assessment    Extremity: Both  Exercise Type #1: Seated lower extremity strengthening  Sets Performed: 1  Reps Performed: 10  Level of Assist: Minimal assistance          Assessment: Patient making good progress. Plan of Care: Continue with plan of care.     Rani Jimenez, PTA  6/10/2019

## 2019-06-10 NOTE — PROGRESS NOTES
Infectious Disease Progress Note    Today's Date: 6/10/2019   Admit Date: 5/31/2019    Impression:   · Small cell carcinoma lung ca s/p right thoracoscopy switched to right thoracotomy with extensive pneumonolysis, right lower lobe lobectomy, right upper lobe blebectomy x2, diaphragm resection with primary repair, chest wall resection, mediastinal lymphadenectomy and intercostal nerve cryoablation per Milli Collins MD on 5/22/2019. · Fever: wound culture from chest tube site with alpha strep, CoNS, GPRs  · Enterobacter cloacae UTI (5/28) treated with Rocephin (5/31-6/2)  · PCN allergy    Plan:   · Would continue vancomycin one more day given fevers last night. If she remains afebrile today, would discontinue vancomycin and observe. She has received seven days treatment. Drainage has improved per nursing. · Plan for [facility] vs home later this week. No ID follow up will be necessary. Anti-infectives:   · Vancomycin (6/3-  · Cefepime (6/3-06/5)  · Cipro 500mg po BID (06/04-6/6)  · Zosyn (6/3)  · Rocephin (5/31-6/2)    Subjective: Interval History: Fever spike last night 100.8. Blood cultures from 6/3/19 negative. WBCs stable, 14.9K. Increased O2 requirements yesterday. Desaturating this morning with movement in the bed. Some cough. Denies nausea, reports vomiting - per nursing this is strong productive cough - denies diarrhea, chills, sweats, abdominal pain. Reports significant pain with wound vac changes. Endorses difficulty breathing. HOB 90 degress. Allergies   Allergen Reactions    Penicillins Unknown (comments)     Hives. Tolerated ceftriaxone May 2019    Percocet [Oxycodone-Acetaminophen] Unknown (comments)    Prednisone Other (comments)     Tachycardia        Review of Systems:  A comprehensive review of systems was negative except for that written in the History of Present Illness.     Objective:     Visit Vitals  /56 (BP Patient Position: At rest)   Pulse 90   Temp 98.5 °F (36.9 °C)   Resp 18   SpO2 91%     Temp (24hrs), Av.5 °F (37.5 °C), Min:98.5 °F (36.9 °C), Max:100.8 °F (38.2 °C)     Lines:  Peripheral IV:       Physical Exam:    General:  Alert, cooperative, thin, well developed, appears stated age   Eyes:  Sclera anicteric, EOMI, wears glasses   Mouth/Throat: Mucous membranes normal, oral pharynx clear   Neck: Supple, midline trachea   Lungs:   Diminished air flow R lung, some wet sounds L, no audible wheezes   CV:  Regular rate and rhythm, without audible murmur   Abdomen:   Soft, non-tender. bowel sounds active, no distention   Extremities: No cyanosis or edema   Skin: Color, texture, turgor normal; R side wound vac in place with gray/cloudy sanguinous fluid in the canister   Lymph nodes: Cervical and supraclavicular normal   Musculoskeletal: No swelling or deformity   Lines/Devices:  Intact, no erythema, drainage or tenderness   Psych: Alert and oriented, appropriate mood and affect given the setting     Data Review:     CBC:  Recent Labs     06/10/19  0632   WBC 14.9*   GRANS 79*   MONOS 7   EOS 1   ANEU 11.7*   ABL 1.6   HGB 9.1*   HCT 31.4*          BMP:  Recent Labs     06/10/19  0632   CREA 0.79   BUN 10      K 3.6      CO2 29   AGAP 8          LFTS:  No results for input(s): TBILI, ALT, SGOT, AP, TP, ALB in the last 72 hours.     Microbiology:     All Micro Results     Procedure Component Value Units Date/Time    CULTURE, BLOOD [[id number]] Collected:  19    Order Status:  Completed Specimen:  Blood Updated:  19     Special Requests: --        RIGHT  HAND       Culture result: NO GROWTH 5 DAYS       CULTURE, BLOOD [[id number]] Collected:  19    Order Status:  Completed Specimen:  Blood Updated:  19     Special Requests: --        LEFT  HAND       Culture result: NO GROWTH 5 DAYS       CULTURE, Magy Clamp STAIN [[id number]]  (Abnormal) Collected:  19 1650    Order Status:  Completed Specimen:  Wound Drainage Updated:  19     Special Requests: NO SPECIAL REQUESTS        GRAM STAIN 4 TO 45 WBC'S/OIF      NO EPITHELIAL CELLS SEEN               MODERATE GRAM POS COCCI IN CLUSTERS                  MODERATE GRAM POS COCCI IN CHAINS                  MODERATE GRAM POSITIVE RODS           Culture result: LIGHT ALPHA STREPTOCOCCUS               SCANT STAPHYLOCOCCUS SPECIES, COAGULASE NEGATIVE                  THIS ORGANISM WILL BE HELD FOR 7 DAYS. IF FURTHER TESTING IS REQUIRED PLEASE NOTIFY MICROBIOLOGY          CULTURE, URINE [[id number]] Collected:  1952    Order Status:  Completed Specimen:  Urine from Clean catch Updated:  19     Special Requests: NO SPECIAL REQUESTS        Culture result:       <10,000 COLONIES/mL MIXED SKIN ZHANNA ISOLATED          CULTURE, WOUND [place] STAIN [[id number]]     Order Status:  Canceled Specimen:  Wound Drainage           Imagin/9/19 CXR  IMPRESSION: No significant interval change or obvious acute cardiopulmonary  abnormality.  CXR  Findings: Stable postsurgical change noted. There is persistent abnormal opacity within the peripheral aspect of the right midlung. Small bilateral pleural  effusions present. Coarsening of the interstitial lung markings noted once  again. No pneumothorax.     Signed By: Corby Pierre NP     April 10, 2019

## 2019-06-10 NOTE — PROGRESS NOTES
Problem: Falls - Risk of  Goal: *Absence of Falls  Description  Document Clifford Chacko Fall Risk and appropriate interventions in the flowsheet. Outcome: Progressing Towards Goal     Problem: Patient Education: Go to Patient Education Activity  Goal: Patient/Family Education  Outcome: Progressing Towards Goal     Problem: Pressure Injury - Risk of  Goal: *Prevention of pressure injury  Description  Document Nir Scale and appropriate interventions in the flowsheet.   Outcome: Progressing Towards Goal     Problem: Patient Education: Go to Patient Education Activity  Goal: Patient/Family Education  Outcome: Progressing Towards Goal     Problem: Gas Exchange - Impaired  Goal: *Absence of hypoxia  Outcome: Progressing Towards Goal     Problem: Patient Education: Go to Patient Education Activity  Goal: Patient/Family Education  Outcome: Progressing Towards Goal

## 2019-06-10 NOTE — PROGRESS NOTES
Hourly rounds completed this shift. Patient resting in bed. Wound vac dressing changed today by wound care nurse. Patient on 6 liters O2 high flow nasal cannuula. No signs/symptoms of distress. No needs stated at this time. Will continue to monitor and give report to oncoming RN.

## 2019-06-11 PROCEDURE — 74011000250 HC RX REV CODE- 250: Performed by: PHYSICAL MEDICINE & REHABILITATION

## 2019-06-11 PROCEDURE — 74011250637 HC RX REV CODE- 250/637: Performed by: NURSE PRACTITIONER

## 2019-06-11 PROCEDURE — 65310000000 HC RM PRIVATE REHAB

## 2019-06-11 PROCEDURE — 94640 AIRWAY INHALATION TREATMENT: CPT

## 2019-06-11 PROCEDURE — 97110 THERAPEUTIC EXERCISES: CPT

## 2019-06-11 PROCEDURE — 97530 THERAPEUTIC ACTIVITIES: CPT

## 2019-06-11 PROCEDURE — 74750000023 HC WOUND THERAPY

## 2019-06-11 PROCEDURE — 74011250636 HC RX REV CODE- 250/636: Performed by: PHYSICAL MEDICINE & REHABILITATION

## 2019-06-11 PROCEDURE — 97116 GAIT TRAINING THERAPY: CPT

## 2019-06-11 PROCEDURE — 97150 GROUP THERAPEUTIC PROCEDURES: CPT

## 2019-06-11 PROCEDURE — 97535 SELF CARE MNGMENT TRAINING: CPT

## 2019-06-11 PROCEDURE — 77010033678 HC OXYGEN DAILY

## 2019-06-11 PROCEDURE — 94760 N-INVAS EAR/PLS OXIMETRY 1: CPT

## 2019-06-11 PROCEDURE — 74011250637 HC RX REV CODE- 250/637: Performed by: PHYSICAL MEDICINE & REHABILITATION

## 2019-06-11 PROCEDURE — 99232 SBSQ HOSP IP/OBS MODERATE 35: CPT | Performed by: PHYSICAL MEDICINE & REHABILITATION

## 2019-06-11 PROCEDURE — 74011250637 HC RX REV CODE- 250/637: Performed by: SURGERY

## 2019-06-11 RX ORDER — GUAIFENESIN 100 MG/5ML
400 SOLUTION ORAL 3 TIMES DAILY
Status: DISCONTINUED | OUTPATIENT
Start: 2019-06-11 | End: 2019-06-12 | Stop reason: HOSPADM

## 2019-06-11 RX ORDER — MIRTAZAPINE 15 MG/1
15 TABLET, FILM COATED ORAL
Status: DISCONTINUED | OUTPATIENT
Start: 2019-06-11 | End: 2019-06-12 | Stop reason: HOSPADM

## 2019-06-11 RX ADMIN — LEVOTHYROXINE SODIUM 75 MCG: 75 TABLET ORAL at 08:29

## 2019-06-11 RX ADMIN — ALBUTEROL SULFATE 2.5 MG: 2.5 SOLUTION RESPIRATORY (INHALATION) at 10:50

## 2019-06-11 RX ADMIN — ALBUTEROL SULFATE 2.5 MG: 2.5 SOLUTION RESPIRATORY (INHALATION) at 14:43

## 2019-06-11 RX ADMIN — HYDROCODONE BITARTRATE AND ACETAMINOPHEN 1 TABLET: 5; 325 TABLET ORAL at 01:25

## 2019-06-11 RX ADMIN — HYDROCODONE BITARTRATE AND ACETAMINOPHEN 1 TABLET: 5; 325 TABLET ORAL at 18:36

## 2019-06-11 RX ADMIN — MIRTAZAPINE 15 MG: 15 TABLET, FILM COATED ORAL at 21:02

## 2019-06-11 RX ADMIN — GUAIFENESIN 400 MG: 100 SOLUTION ORAL at 15:48

## 2019-06-11 RX ADMIN — BUDESONIDE 500 MCG: 0.5 INHALANT RESPIRATORY (INHALATION) at 06:00

## 2019-06-11 RX ADMIN — HYDROCODONE BITARTRATE AND ACETAMINOPHEN 1 TABLET: 5; 325 TABLET ORAL at 08:30

## 2019-06-11 RX ADMIN — ALBUTEROL SULFATE 2.5 MG: 2.5 SOLUTION RESPIRATORY (INHALATION) at 06:00

## 2019-06-11 RX ADMIN — PANTOPRAZOLE SODIUM 40 MG: 40 TABLET, DELAYED RELEASE ORAL at 05:14

## 2019-06-11 RX ADMIN — GABAPENTIN 300 MG: 300 CAPSULE ORAL at 21:02

## 2019-06-11 RX ADMIN — TIOTROPIUM BROMIDE 18 MCG: 18 CAPSULE ORAL; RESPIRATORY (INHALATION) at 08:38

## 2019-06-11 RX ADMIN — GABAPENTIN 300 MG: 300 CAPSULE ORAL at 08:31

## 2019-06-11 RX ADMIN — Medication 10 ML: at 05:15

## 2019-06-11 RX ADMIN — ENOXAPARIN SODIUM 40 MG: 40 INJECTION SUBCUTANEOUS at 15:46

## 2019-06-11 RX ADMIN — FUROSEMIDE 20 MG: 20 TABLET ORAL at 08:31

## 2019-06-11 RX ADMIN — Medication 10 ML: at 14:06

## 2019-06-11 RX ADMIN — Medication 10 ML: at 21:05

## 2019-06-11 RX ADMIN — POTASSIUM CHLORIDE 40 MEQ: 20 TABLET, EXTENDED RELEASE ORAL at 08:29

## 2019-06-11 RX ADMIN — GUAIFENESIN 400 MG: 100 SOLUTION ORAL at 21:02

## 2019-06-11 RX ADMIN — GABAPENTIN 300 MG: 300 CAPSULE ORAL at 15:47

## 2019-06-11 RX ADMIN — HYDROCODONE BITARTRATE AND ACETAMINOPHEN 1 TABLET: 5; 325 TABLET ORAL at 22:15

## 2019-06-11 RX ADMIN — AMIODARONE HYDROCHLORIDE 200 MG: 200 TABLET ORAL at 08:30

## 2019-06-11 NOTE — PROGRESS NOTES
06/11/19 1037   Time Spent With Patient   Time In 0829   Time Out 0915   Patient Seen For: AM;ADLs   Grooming   Grooming Assistance  Mod I   Comments seated in WC at sink    Upper Body Bathing   Bathing Assistance, Upper SBA   Position Performed Seated edge of bed   Comments verbal cueing for thoroughness    Lower Body Bathing   Bathing Assistance, Lower  Mod A   Adaptive Equipment Wipes(personal cleansing cloth)   Position Performed Seated edge of bed;Standing   Comments CGA in standing; mod A for sit to stand for erica care    Upper Body Dressing    Dressing Assistance  S   Comments setup assist    Lower Body Dressing    Dressing Assistance  Mod A   Leg Crossed Method Used Yes   Position Performed Seated edge of bed;Standing;Bending forward method   Comments mod A for sit to stand to manage pants up    S: \"I can't go out to therapy. I'm exhausted. \" [re-educated patient on the importance of participating as much as possible, on benefits of rehab, and [facility] expectations]  O: Patient presented long sitting in bed. Agreeable to treatment with encouragement. Required encouragement to sit up at edge of bed to complete ADL. Required mod A for transfer from long sitting to edge of bed. Patient completed sponge bath seated edge of bed; see above for details. Patient required mod to max A for sit to stand transfers for erica care and clothing management. Patient did agree to come out and participate in PT. Oxygen saturation on 5L ranged from 69-88% on continuous oxygen monitoring throughout ADL.    Shoes: 9: Not applicable  Socks: 5: Setup or clean-up assist  Orientation: alert and oriented x 4  Expression: able to express needs verbally  Comprehension: understands basic instructions, moderate assist with complex instructions  Social Interaction: cooperating, appropriate with OT, participating, motivated to improve  Problem Solving: solves routine problems 75% of the time, moderate to maximum assist with complex problems Memory: recalls people frequently seen, including names; able to complete 2/3 step commands. Occasionally inconsistent carryover. A: Continue to anticipate patient will require physical assistance at discharge and 24/7 supervision at discharge. Patient's daughter was scheduled for family training this morning at 0830 but cancelled this morning due to having to take a grandchild to the doctor. Question whether patient's daughter would be able to provide recommended level of assistance at discharge. Patient tolerated session fairly with no complaint of pain. P: Continue OT POC with focus on ADL/IADL skills, functional transfers, functional mobility, coordination, strength, static and dynamic balance, and activity tolerance to maximize safety and independence with ADLs and functional transfers. Patient was handed off to PT. Interdisciplinary communication: Collaborated with PT regarding patient's functional status.      Butch Perez MS, OTR/L  6/11/2019          Note may contain the following abbreviations:   Abbreviation Explanation   AROM Active range of motion   PROM Passive range of motion   SPT Stand pivot transfer   LPT Lateral pivot transfer   WC wheelchair   RW Rolling walker    BUE Bilateral upper extremities   BLE Bilateral lower extremities    WBAT Weight bearing as tolerated    TTWB Toe-touch weight bearing    AD Adaptive device   AE Adaptive equipment    NMES Neuro muscular electrical stimulation   UBE Upper body ergometer

## 2019-06-11 NOTE — PROGRESS NOTES
Infectious Disease Progress Note    Today's Date: 2019   Admit Date: 2019    Impression:   · Small cell carcinoma lung ca s/p right thoracoscopy switched to right thoracotomy with extensive pneumonolysis, right lower lobe lobectomy, right upper lobe blebectomy x2, diaphragm resection with primary repair, chest wall resection, mediastinal lymphadenectomy and intercostal nerve cryoablation per Joby Romero MD on 2019. ? Has wound vac on R chest/side. · Fever: wound culture from chest tube site with alpha strep, CoNS, GPRs: no new results. · Enterobacter cloacae UTI () treated with Rocephin (-)  · PCN allergy    Plan:   · Has been afebrile throughout today. Temp 100.4 last night is T max. WBC down slightly but about same. Blood cx 6/3 negative. · Will stop Vanc today and observe overnight. · Plan for [facility] vs home later this week. Anti-infectives:   · Vancomycin (6/3-)  · Cefepime (6/3-)  · Cipro 500mg po BID (-)  · Zosyn (6/3)  · Rocephin (-)    Subjective: Interval History: asleep but woke easily. Denies any fever. Was afebrile this AM and 98/6 103/63 75 now per RN. Has wound vac in place. Feels like breathing ok. No diarrhea. Allergies   Allergen Reactions    Penicillins Unknown (comments)     Hives. Tolerated ceftriaxone May 2019    Percocet [Oxycodone-Acetaminophen] Unknown (comments)    Prednisone Other (comments)     Tachycardia        Review of Systems:  A comprehensive review of systems was negative except for that written in the History of Present Illness.     Objective:     Visit Vitals  /72   Pulse 83   Temp 100.4 °F (38 °C)   Resp 18   SpO2 95%     Temp (24hrs), Av.6 °F (37.6 °C), Min:98.7 °F (37.1 °C), Max:100.4 °F (38 °C)     Lines:  Peripheral IV only    Physical Exam:    General:  Alert, cooperative, thin, well developed, appears stated age   Eyes:  Sclera anicteric, EOMI, wears glasses   Mouth/Throat: Mucous membranes normal, oral pharynx clear   Neck: Supple, midline trachea   Lungs:   Diminished air flow R lung, some wet sounds at base right and left  no wheezes   CV:  Regular rate and rhythm, without audible murmur   Abdomen:   Soft, non-tender. bowel sounds active, no distention   Extremities: No cyanosis or edema   Skin: Color, texture, turgor normal; R side wound vac in place with gray/cloudy sanguinous fluid in the canister   Lymph nodes: Cervical and supraclavicular normal   Musculoskeletal: No swelling or deformity   Lines/Devices:  Intact, no erythema, drainage or tenderness   Psych: Alert and oriented, appropriate mood and affect given the setting     Data Review:     CBC:  Recent Labs     06/10/19  0632   WBC 14.9*   GRANS 79*   MONOS 7   EOS 1   ANEU 11.7*   ABL 1.6   HGB 9.1*   HCT 31.4*          BMP:  Recent Labs     06/10/19  0632   CREA 0.79   BUN 10      K 3.6      CO2 29   AGAP 8          LFTS:  No results for input(s): TBILI, ALT, SGOT, AP, TP, ALB in the last 72 hours.     Microbiology:     All Micro Results     Procedure Component Value Units Date/Time    CULTURE, BLOOD [[id number]] Collected:  06/03/19 2202    Order Status:  Completed Specimen:  Blood Updated:  06/08/19 0628     Special Requests: --        RIGHT  HAND       Culture result: NO GROWTH 5 DAYS       CULTURE, BLOOD [[id number]] Collected:  06/03/19 2202    Order Status:  Completed Specimen:  Blood Updated:  06/08/19 0628     Special Requests: --        LEFT  HAND       Culture result: NO GROWTH 5 DAYS       CULTURE, [place] STAIN [[id number]]  (Abnormal) Collected:  06/02/19 1650    Order Status:  Completed Specimen:  Wound Drainage Updated:  06/05/19 0812     Special Requests: NO SPECIAL REQUESTS        GRAM STAIN 4 TO 45 WBC'S/OIF      NO EPITHELIAL CELLS SEEN               MODERATE GRAM POS COCCI IN CLUSTERS                  MODERATE GRAM POS COCCI IN CHAINS                  MODERATE GRAM POSITIVE RODS Culture result: LIGHT ALPHA STREPTOCOCCUS               SCANT STAPHYLOCOCCUS SPECIES, COAGULASE NEGATIVE                  THIS ORGANISM WILL BE HELD FOR 7 DAYS. IF FURTHER TESTING IS REQUIRED PLEASE NOTIFY MICROBIOLOGY          CULTURE, URINE [[id number]] Collected:  19 0952    Order Status:  Completed Specimen:  Urine from Clean catch Updated:  19     Special Requests: NO SPECIAL REQUESTS        Culture result:       <10,000 COLONIES/mL MIXED SKIN ZHANNA ISOLATED          CULTURE, WOUND [place]anna Shall STAIN [[id number]]     Order Status:  Canceled Specimen:  Wound Drainage           Imagin/9/19 CXR  IMPRESSION: No significant interval change or obvious acute cardiopulmonary  abnormality.  CXR  Findings: Stable postsurgical change noted. There is persistent abnormal opacity within the peripheral aspect of the right midlung. Small bilateral pleural  effusions present. Coarsening of the interstitial lung markings noted once  again. No pneumothorax.     Signed By: Aye Childs MD     2019

## 2019-06-11 NOTE — PROGRESS NOTES
Problem: Falls - Risk of  Goal: *Absence of Falls  Description  Document yCndi Stewart Fall Risk and appropriate interventions in the flowsheet. Outcome: Progressing Towards Goal     Problem: Patient Education: Go to Patient Education Activity  Goal: Patient/Family Education  Outcome: Progressing Towards Goal     Problem: Pressure Injury - Risk of  Goal: *Prevention of pressure injury  Description  Document Nir Scale and appropriate interventions in the flowsheet.   Outcome: Progressing Towards Goal     Problem: Patient Education: Go to Patient Education Activity  Goal: Patient/Family Education  Outcome: Progressing Towards Goal

## 2019-06-11 NOTE — PROGRESS NOTES
Called into PT treatment session this morning by Florentino Dean (PT) to assist with communication about discharge. Patient was on the phone at the time, speaking to her daughter, the patient with a raised voice telling her daughter that she wanted to continue with her therapy at [facility], and that is was not her daughter's decision but her own to make. After the phone call, patient reiterated to me, Dr Noel Stiles and Florentino Dean that she wanted to go to [facility] for more therapy and enunciated several appropriated reasons why. Met with daughter and patient after team conference today after reports of daughter's concerns for discharge to subacute. Met with both and answered all questions - mostly the daughter is worried about wound care. Notified her that the facility will be notified about the need for a wound vac, and that the doctors here would write orders for wound care at the facility. Listened to daughter's concerns, answered questions, counseled and supported her. Recommended that she visit the facilities they are requesting and ask them directly about her wound care concerns (their training etc). As I could only give generalizations that may not assuage her worries. By end of session, daughter appeared much calmer, stated she felt better and thanked me for my time. She was told how to reach me should she have additional concerns that she needed to voice, or if she simply needed to talk over her worries. Patient slept on and off through most of interaction and tried to convince her daughter that she was not worried and her daughter shouldn't be either. Pt left in bed as found. Discussed above with treatment team (Dr Toshia Gudino (PT), Corbett Favre (OT), Caio Marquez (RN).      Kaelyn Carmona OTR/L  Inpatient Rehab Therapy Supervisor

## 2019-06-11 NOTE — PROGRESS NOTES
6/11/2019    PLAN:  Continue current care  Wound vac to right chest maintained per wound care team.   Sleep aid                ASSESSMENT:  Admit Date: 5/31/2019  To rehab  Surgery 5/22/19  Procedure(s):  1. RIGHT THORACOSCOPY switched to thoracotomy with extensive pneumonolysis  2. LOWER LOBECTOMY   3. Upper lobe blebectomy x 2  4. Diaphragm resection with primary repair  5. Chest wall resection  6. MEDISTINAL LYMPHADENECTOMY  7. Intercostal nerve cryoablation       DIAGNOSIS   A: #9 LYMPH NODE X2:   TWO LYMPH NODES NEGATIVE FOR METASTATIC CARCINOMA (0/2). B: #7 LYMPH NODE X5:   FIVE LYMPH NODES NEGATIVE FOR METASTATIC CARCINOMA (0/5). C: PERIHILAR LYMPH NODE:   ONE LYMPH NODE NEGATIVE FOR METASTATIC CARCINOMA (0/1). D: 4R LYMPH NODE X2:   TWO LYMPH NODES NEGATIVE FOR METASTATIC CARCINOMA (0/2). E: UPPER LOBE BLEB X2:   FINDINGS CONSISTENT WITH PULMONARY BLEBS. F: RIGHT LOWER LOBE, CHEST WALL, DIAPHRAGM:   POORLY DIFFERENTIATED NON-SMALL CELL CARCINOMA WITH SQUAMOUS FEATURES AND FOCAL NEUROENDOCRINE DIFFERENTIATION. CARCINOMA IS 6.9 CM IN GREATEST DIMENSION. MARGINS ARE NEGATIVE FOR CARCINOMA. FIVE LYMPH NODES NEGATIVE FOR METASTATIC CARCINOMA (0/5). 06/04/19: Patient denies SOB. Currently O2 sat 97% with 5LNC, Temp max 101.1  Wound Vac maintained to right chest with purulent appearing drainage in canister. Patient with complaints of pain with dressing changed. 06/05/19 Patient states she is feeling much better today and participating in exercises. VAC with purulent drainage in canister, scheduled for vac dressing change today. ID following, AF. O2 5L 95% sat. 06/06/19 Patient upset this afternoon stating she is going to a nursing home because this rehab is just too hard and it is easier at the nursing home.   States she had a very bad night related to right chest \"pulling\" about every 30 minutes and and unable to get any rest.  She continues with oxygen 4L. No SOB, AF, NAD, drainage to wound vac still thick wiggins appearing. Dr. Theresa Gibson spoke with her at length recommending she stay at rehab and continue the current care. 06/10/19 Saw patient this AM sleeping. Wound vac maintained. Tmax 100.8. Spoke with wound care nurse this afternoon and she changed wound vac and now unable to keep a seal.  Dr. Theresa Gibson and myself arrived at bedside with Genesis Fletcher (wound care), removed vac dressing. Seems wound is comunicating with pleural space and having difficulty keeping a seal for wound vac. Dr. Theresa Gibson place xeroform to posterior portion of wound and placed wound vac. Will monitor. MUST REMOVE XEROFORM WITH EVERY VAC CHANGE. Tissue granulation in wound bed noted without infection noted. 06/11/19 Wound vac maintained to suction. Patient complains of lack of sleep and does not want ativan. C/o congestion with inability to expectorate secreations. OBJECTIVE:  Patient Vitals for the past 24 hrs:   Temp Pulse Resp BP SpO2   06/11/19 0500 -- -- -- -- 94 %   06/10/19 2009 100.4 °F (38 °C) 83 18 123/72 96 %   06/10/19 1729 -- -- -- -- 92 %   06/10/19 1700 98.7 °F (37.1 °C) 75 18 108/55 90 %         Date 06/10/19 0700 - 06/11/19 0659 06/11/19 0700 - 06/12/19 0659   Shift 4098-0365 8101-0238 24 Hour Total 7311-3212 4337-8691 24 Hour Total   INTAKE   P.O. 520  520        P. O. 520  520      Shift Total 520  520      OUTPUT   Urine           Urine Occurrence(s) 2 x 1 x 3 x      Shift Total           520      Weight (kg)                  General:          No acute distress    Lungs:             CTA Bilaterally.  O2 NC, Wound vac to right chest maintained without airleak  Heart:              RRR  Abdomen:        Soft, Non distended, Non tender, BS+  Extremities:     No cyanosis, clubbing or edema  Neurologic:      No focal deficits           Labs:    Recent Labs     06/10/19  0632   WBC 14.9*   HGB 9.1*         K 3.6      CO2 29 BUN 10   CREA 0.79            Lam Welsh, NP

## 2019-06-11 NOTE — PROGRESS NOTES
PHYSICAL THERAPY DAILY NOTE  Time In: 1300  Time Out: 1350  Patient Seen For: PM;Transfer training;Gait training; Therapeutic exercise; Other (see progress notes)    Subjective: Patient had no complaints. Objective: O2 at 6 liters. Daughter present and very pleased on her mother walking. Other (comment)(fall risk)  GROSS ASSESSMENT Daily Assessment            COGNITION Daily Assessment    intact       BED/MAT MOBILITY Daily Assessment    Supine to Sit : 0 (Not tested)  Sit to Supine : 4 (Minimal assistance)       TRANSFERS Daily Assessment    Transfer Type: SPT with walker  Transfer Assistance : 3 (Moderate assistance )  Sit to Stand Assistance: Moderate assistance  Car Transfers: Not tested       GAIT Daily Assessment    Amount of Assistance: 3 (Moderate assistance)  Distance (ft): 20 Feet (ft)  Assistive Device: Walker, rolling       STEPS or STAIRS Daily Assessment    Level of Assist : 0 (Not tested)       BALANCE Daily Assessment    Sitting - Static: Good (unsupported)  Sitting - Dynamic: Good (unsupported)  Standing - Static: Poor;Constant support  Standing - Dynamic : Impaired       WHEELCHAIR MOBILITY Daily Assessment            LOWER EXTREMITY EXERCISES Daily Assessment    Extremity: Both  Exercise Type #1: Seated lower extremity strengthening  Sets Performed: 3  Reps Performed: 10  Level of Assist: Minimal assistance          Assessment: Patient making good progress. Plan of Care: Continue with plan of care.     Nahomy Michael, PTA  6/11/2019

## 2019-06-11 NOTE — PROGRESS NOTES
OT Daily Note  Time In 1115   Time Out 1200     Subjective: \"Did you see? I did that with my right hand. \"   Pain: none reported  Education: transfer training, benefits of rehab  Interdisciplinary Communication: with rehab tech regarding transfer  Precautions: Other (comment)(fall risk)  Location on arrival: up at edge of bed with rehab tech     Transfers Daily Assessment   Patient transferred edge of bed to [facility] to  using SPT without RW with CGA. Progressing. Improved performance since AM ADL session. Activity Tolerance Daily Assessment   Patient completed BUE activity tolerance and visual perceptual task replicating one moderately complex pattern using rubberbands with extra time and one error that patient required assist to identify and correct. Required increased time for task. Patient completed cognitive task organizing numbers from 1-60 with no errors and no assist, requiring increased time to complete task, and using BUE. Patient performed reaching activity using different vertical heights and small rings with RUE only, 5 x 5 sets, working on shoulder stabilization for overhead reaching and fine motor coordination. Remains well below baseline for activity tolerance. Patient was left seated up in [facility] in room with call light/all needs in reach and in no distress. Oxygen saturation ranged from 78-94% on 5L per continuous oxygen monitor. Assessment: Improved performance and participation this session. Plan: Continue OT POC with focus on ADL/IADL skills, functional transfers, functional mobility, coordination, strength, static and dynamic balance, and activity tolerance to maximize safety and independence with ADLs and functional transfers.      Fidelia Machuca MS, OTR/L  6/11/2019        Note may contain the following abbreviations:   Abbreviation Explanation   AROM Active range of motion   PROM Passive range of motion   SPT Stand pivot transfer   LPT Lateral pivot transfer   WC wheelchair RW Rolling walker    BUE Bilateral upper extremities   BLE Bilateral lower extremities    WBAT Weight bearing as tolerated    TTWB Toe-touch weight bearing    AD Adaptive device   AE Adaptive equipment    NMES Neuro muscular electrical stimulation   UBE Upper body ergometer

## 2019-06-11 NOTE — PROGRESS NOTES
SW covering for RN KJ Garcia. Patient and family are agreeable to [address] referrals for [facility] and [facility]. Referrals placed in [address] on this date. Awaiting responses. Hopeful for bed availability for patient to DC tomorrow as planned.

## 2019-06-11 NOTE — PROGRESS NOTES
PHYSICAL THERAPY DAILY NOTE  Time In: 0916  Time Out: 1000  Patient Seen For: AM;Patient education; Therapeutic exercise;Transfer training    Subjective: \"I need my daughter to listen to me. \" Patient agreeable to therapy. Objective: Vital Signs:   Patient Vitals for the past 8 hrs:   SpO2   06/11/19 1050 96 %   06/11/19 0500 94 %         Pain level: 6/10  Pain location: R posterior flank  Pain interventions: Positioned for comfort, provided rest breaks, pain medication per nursing    Patient education: Educated patient on HEP with therapeutic exercise. Interdisciplinary Communication: Communicated with Ama Collins regarding patient's POC. Other (comment)(fall risk)  GROSS ASSESSMENT Daily Assessment            COGNITION Daily Assessment    Verbal cues for safety with mobility and managing lines. BED/MAT MOBILITY Daily Assessment   Required for safety, increased time and effort. Supine to Sit : 0 (Not tested)  Sit to Supine : 3 (Moderate assistance)       TRANSFERS Daily Assessment   Required for safety, increased time and effort. Transfer Type: SPT with walker  Transfer Assistance : 3 (Moderate assistance )  Sit to Stand Assistance:  Moderate assistance  Car Transfers: Not tested       GAIT Daily Assessment   Patient declined ambulation attempt this AM. Amount of Assistance: 0 (Not tested)       STEPS or STAIRS Daily Assessment    Level of Assist : 0 (Not tested)       BALANCE Daily Assessment    Sitting - Static: Good (unsupported)  Sitting - Dynamic: Good (unsupported)  Standing - Static: Poor;Constant support  Standing - Dynamic : Impaired       WHEELCHAIR MOBILITY Daily Assessment            LOWER EXTREMITY EXERCISES Daily Assessment    Extremity: Both  Exercise Type #1: Seated lower extremity strengthening  Sets Performed: 3  Reps Performed: 10  Level of Assist: Minimal assistance     Patient performed the following B LE exercises:  SEATED EXERCISES Sets Reps Comments   Ankle Pumps 3 10    Hip Flexion 3 10    Long Arc Quads 3 10    Hip Adduction/Ball Squeeze 3 10    Hip Abduction with red Theraband 3 10    Hamstring Curls with red Theraband 3 10      Patient returned to room lying supine in bed with head of bed elevated and all needs in reach. Assessment: Patient making slow progress towards goals. Patient putting forth good effort with therapeutic exercise. Patient declined ambulation this AM secondary to reports of her not sleeping well last night. Plan of Care: Continue with POC and progress as tolerated.        Evelyn Woodard  6/11/2019

## 2019-06-11 NOTE — PROGRESS NOTES
Problem: Falls - Risk of  Goal: *Absence of Falls  Description  Document Anil Bunting Fall Risk and appropriate interventions in the flowsheet. Outcome: Progressing Towards Goal     Problem: Patient Education: Go to Patient Education Activity  Goal: Patient/Family Education  Outcome: Progressing Towards Goal     Problem: Patient Education: Go to Patient Education Activity  Goal: Patient/Family Education  Description  Patient / Patient?s family will verbalize understanding of PT safety recommendations, demonstrate appropriate assist for current functional mobility status, safety, and home exercise program by time of discharge. Outcome: Progressing Towards Goal     Problem: Patient Education: Go to Patient Education Activity  Goal: Patient/Family Education  Outcome: Progressing Towards Goal     Problem: Patient Education: Go to Patient Education Activity  Goal: Patient/Family Education  Outcome: Progressing Towards Goal     Problem: Patient Education: Go to Patient Education Activity  Goal: Patient/Family Education  Outcome: Progressing Towards Goal     Problem: Pressure Injury - Risk of  Goal: *Prevention of pressure injury  Description  Document Nir Scale and appropriate interventions in the flowsheet.   Outcome: Progressing Towards Goal     Problem: Patient Education: Go to Patient Education Activity  Goal: Patient/Family Education  Outcome: Progressing Towards Goal     Problem: Gas Exchange - Impaired  Goal: *Absence of hypoxia  Outcome: Progressing Towards Goal     Problem: Patient Education: Go to Patient Education Activity  Goal: Patient/Family Education  Outcome: Progressing Towards Goal

## 2019-06-11 NOTE — PROGRESS NOTES
[facility] PROGRESS NOTE    Anthony Islas  Admit Date: 5/31/2019  Admit Diagnosis: Lung cancer ([facility] [C34.90]; History of thoracotomy [Z98.890]; Hypoxia [R09.02]; Pain [R52]; Debility [R53.81]; Small cell carcinoma (HCC) [C80.1]; Atrial fibrillation with RVR (HCC) [I48.91];COPD (chronic obstructive pulmonary disease) ([facility] [J44.9]; Gait difficulty [R26.9]    Subjective     Afebrile, vss. surgery following wound vac care per wound care nurse. On 5L O2 via NC, SaO2 >94%. Patient this morning expressed desire to be discharge to SNF, does not want to be a burden on her daughter. It is very reasonable, and necessary to continue medical and rehab care at SNF to facilitate safe home discharge.      Objective:     Current Facility-Administered Medications   Medication Dose Route Frequency    LORazepam (ATIVAN) tablet 0.5 mg  0.5 mg Oral Q8H PRN    furosemide (LASIX) tablet 20 mg  20 mg Oral DAILY    zinc oxide-cod liver oil (DESITIN) 40 % paste   Topical PRN    vancomycin (VANCOCIN) 1,000 mg in 0.9% sodium chloride (MBP/ADV) 250 mL  1 g IntraVENous Q18H    acetaminophen (TYLENOL) tablet 650 mg  650 mg Oral Q6H PRN    phenol throat spray (CHLORASEPTIC) 1 Spray  1 Spray Oral PRN    HYDROmorphone (DILAUDID) tablet 2 mg  2 mg Oral Q6H PRN    potassium chloride (K-DUR, KLOR-CON) SR tablet 40 mEq  40 mEq Oral DAILY    naloxone (NARCAN) injection 0.4 mg  0.4 mg IntraVENous PRN    ondansetron (ZOFRAN) injection 4 mg  4 mg IntraVENous Q4H PRN    sodium chloride (NS) flush 5-40 mL  5-40 mL IntraVENous Q8H    sodium chloride (NS) flush 5-40 mL  5-40 mL IntraVENous PRN    albuterol (PROVENTIL VENTOLIN) nebulizer solution 2.5 mg  2.5 mg Nebulization QID RT    amiodarone (CORDARONE) tablet 200 mg  200 mg Oral DAILY    budesonide (PULMICORT) 500 mcg/2 ml nebulizer suspension  500 mcg Nebulization BID RT    enoxaparin (LOVENOX) injection 40 mg  40 mg SubCUTAneous Q24H    gabapentin (NEURONTIN) capsule 300 mg  300 mg Oral TID  HYDROcodone-acetaminophen (NORCO) 5-325 mg per tablet 1 Tab  1 Tab Oral Q4H PRN    levothyroxine (SYNTHROID) tablet 75 mcg  75 mcg Oral DAILY    magnesium hydroxide (MILK OF MAGNESIA) 400 mg/5 mL oral suspension 30 mL  30 mL Oral DAILY PRN    magnesium hydroxide (MILK OF MAGNESIA) 400 mg/5 mL oral suspension 30 mL  30 mL Oral DAILY    pantoprazole (PROTONIX) tablet 40 mg  40 mg Oral ACB    senna-docusate (PERICOLACE) 8.6-50 mg per tablet 2 Tab  2 Tab Oral BID    tiotropium (SPIRIVA) inhalation capsule 18 mcg  1 Cap Inhalation DAILY     Review of Systems: + chest wall pain. Denies chest pain, shortness of breath, cough, headache, visual problems, abdominal pain, dysurea, calf pain. Pertinent positives are as noted in the medical records and unremarkable otherwise. Visit Vitals  /72   Pulse 83   Temp 100.4 °F (38 °C)   Resp 18   SpO2 94%      Physical Exam:   Psych: Patient was oriented to person, place, and time. On 5L O2 via NC. General:   Alert, appears stated age, No acute distress. HEENT:  Normocephalic. No JVD   Lungs:  + crackles thoughout R base. Respiration even and unlabored   No apparent use of accessory muscles for respiration. Heart:  Regular rate and rhythm, S1, S2, No obvious murmur    Genitourinary: defered   Abdomen:  Soft, non-tender to palpation in all four quadrants. Neuromuscular:  PERRL, EOMI  Follows simple commands consistently.    + generalized weakness   Sensory - intact   Skin:  edema: none   Incision:  Calf non tender BLE. Right chest wall with wound vac, good seal.                                                                                  Functional Assessment:  Gross Assessment  AROM: Generally decreased, functional (06/03/19 1000)  Strength: Generally decreased, functional (06/03/19 1000)       Balance  Sitting - Static: Good (unsupported) (06/08/19 1700)  Sitting - Dynamic: Good (unsupported) (06/08/19 1700)  Standing - Static: Poor;Constant support(with RW) (06/08/19 1700)  Standing - Dynamic : Impaired (06/08/19 1700)           Toileting  Cues: Physical assistance for pants up;Physical assistance for pants down (06/07/19 1358)         Sintia Fernando Fall Risk Assessment:  Sintia Fernando Fall Risk  Mobility: Ambulates or transfers with assist devices or assistance (06/10/19 1924)  Mobility Interventions: Communicate number of staff needed for ambulation/transfer (06/10/19 1924)  Mentation: Alert, oriented x 3 (06/10/19 1924)  Medication: Patient receiving anticonvulsants, sedatives(tranquilizers), psychotropics or hypnotics, hypoglycemics, narcotics, sleep aids, antihypertensives, laxatives, or diuretics (06/10/19 1924)  Medication Interventions: Evaluate medications/consider consulting pharmacy (06/10/19 1924)  Elimination: Needs assistance with toileting (06/10/19 1924)  Elimination Interventions: Call light in reach (06/10/19 1924)  Prior Fall History: Before admission in past 12 months _home or previous inpatient care) (06/10/19 1924)  History of Falls Interventions: Door open when patient unattended (06/10/19 1924)  Total Score: 4 (06/10/19 1924)  Standard Fall Precautions: Yes (06/08/19 0111)  High Fall Risk: Yes (06/10/19 1924)     Speech Assessment:  Aspiration Signs/Symptoms: None (06/05/19 1516)      Ambulation:  Gait  Speed/Aracelis: Slow;Shuffled (06/07/19 1000)  Step Length: Left shortened;Right shortened (06/07/19 1000)  Distance (ft): 20 Feet (ft) (06/08/19 1700)  Assistive Device: Walker, rolling (06/08/19 1700)     Labs/Studies:  Recent Results (from the past 72 hour(s))   PLEASE READ & DOCUMENT PPD TEST IN 24 HRS    Collection Time: 06/09/19  9:15 AM   Result Value Ref Range    PPD  Negative    mm Induration 0 0 - 5 mm   POC G3    Collection Time: 06/10/19  2:44 AM   Result Value Ref Range    Device: High Flow Nasal Cannula      pH (POC) 7.445 7.35 - 7.45      pCO2 (POC) 44.3 35 - 45 MMHG    pO2 (POC) 72 (L) 75 - 100 MMHG    HCO3 (POC) 30.5 (H) 22 - 26 MMOL/L    sO2 (POC) 95 95 - 98 %    Base excess (POC) 6 mmol/L    Allens test (POC) YES      Site RIGHT RADIAL      Patient temp. 98.6      Specimen type (POC) ARTERIAL      Performed by Annie     CO2, POC 32 MMOL/L    Flow rate (POC) 10.000 L/min    COLLECT TIME 238     CBC WITH AUTOMATED DIFF    Collection Time: 06/10/19  6:32 AM   Result Value Ref Range    WBC 14.9 (H) 4.3 - 11.1 K/uL    RBC 3.33 (L) 4.05 - 5.2 M/uL    HGB 9.1 (L) 11.7 - 15.4 g/dL    HCT 31.4 (L) 35.8 - 46.3 %    MCV 94.3 79.6 - 97.8 FL    MCH 27.3 26.1 - 32.9 PG    MCHC 29.0 (L) 31.4 - 35.0 g/dL    RDW 15.9 (H) 11.9 - 14.6 %    PLATELET 308 708 - 568 K/uL    MPV 9.1 (L) 9.4 - 12.3 FL    ABSOLUTE NRBC 0.00 0.0 - 0.2 K/uL    DF AUTOMATED      NEUTROPHILS 79 (H) 43 - 78 %    LYMPHOCYTES 11 (L) 13 - 44 %    MONOCYTES 7 4.0 - 12.0 %    EOSINOPHILS 1 0.5 - 7.8 %    BASOPHILS 1 0.0 - 2.0 %    IMMATURE GRANULOCYTES 1 0.0 - 5.0 %    ABS. NEUTROPHILS 11.7 (H) 1.7 - 8.2 K/UL    ABS. LYMPHOCYTES 1.6 0.5 - 4.6 K/UL    ABS. MONOCYTES 1.1 0.1 - 1.3 K/UL    ABS. EOSINOPHILS 0.2 0.0 - 0.8 K/UL    ABS. BASOPHILS 0.1 0.0 - 0.2 K/UL    ABS. IMM.  GRANS. 0.2 0.0 - 0.5 K/UL   METABOLIC PANEL, BASIC    Collection Time: 06/10/19  6:32 AM   Result Value Ref Range    Sodium 139 136 - 145 mmol/L    Potassium 3.6 3.5 - 5.1 mmol/L    Chloride 102 98 - 107 mmol/L    CO2 29 21 - 32 mmol/L    Anion gap 8 7 - 16 mmol/L    Glucose 100 65 - 100 mg/dL    BUN 10 8 - 23 MG/DL    Creatinine 0.79 0.6 - 1.0 MG/DL    GFR est AA >60 >60 ml/min/1.73m2    GFR est non-AA >60 >60 ml/min/1.73m2    Calcium 8.0 (L) 8.3 - 10.4 MG/DL   PLEASE READ & DOCUMENT PPD TEST IN 48 HRS    Collection Time: 06/10/19  9:35 AM   Result Value Ref Range    PPD  Negative    mm Induration 0 0 - 5 mm       Assessment:     Problem List as of 6/11/2019 Date Reviewed: 5/31/2019          Codes Class Noted - Resolved    Debility ICD-10-CM: R53.81  ICD-9-CM: 799.3  5/31/2019 - Present        Lung cancer [facility]) ICD-10-CM: C34.90  ICD-9-CM: 162.9  5/31/2019 - Present        Gait difficulty ICD-10-CM: R26.9  ICD-9-CM: 781.2  5/31/2019 - Present        Pain ICD-10-CM: R52  ICD-9-CM: 780.96  5/31/2019 - Present        Small cell carcinoma (HCC) ICD-10-CM: C80.1  ICD-9-CM: 199.1  5/31/2019 - Present        Atrial fibrillation with RVR (HCC) ICD-10-CM: I48.91  ICD-9-CM: 427.31  5/31/2019 - Present        History of thoracotomy ICD-10-CM: Z98.890  ICD-9-CM: V45.89  5/31/2019 - Present        Non-small cell lung cancer (NSCLC) ([facility] ICD-10-CM: C34.90  ICD-9-CM: 162.9  5/30/2019 - Present        UTI (urinary tract infection) ICD-10-CM: N39.0  ICD-9-CM: 599.0  5/29/2019 - Present        Atrial fibrillation with rapid ventricular response (HCC) ICD-10-CM: I48.91  ICD-9-CM: 427.31  5/26/2019 - Present        Normochromic anemia (Chronic) ICD-10-CM: D64.9  ICD-9-CM: 285.9  5/26/2019 - Present        Hypoxia ICD-10-CM: R09.02  ICD-9-CM: 799.02  5/24/2019 - Present        COPD (chronic obstructive pulmonary disease) (HCC) (Chronic) ICD-10-CM: J44.9  ICD-9-CM: 496  5/24/2019 - Present        Aftercare following surgery ICD-10-CM: Z48.89  ICD-9-CM: V58.89  5/22/2019 - Present        Right lower lobe lung mass ICD-10-CM: R91.8  ICD-9-CM: 786.6  5/22/2019 - Present        HTN (hypertension) (Chronic) ICD-10-CM: I10  ICD-9-CM: 401.9  4/12/2019 - Present        Acquired hypothyroidism (Chronic) ICD-10-CM: E03.9  ICD-9-CM: 244.9  4/12/2019 - Present        Personal history of tobacco use, presenting hazards to health (Chronic) ICD-10-CM: C23.439  ICD-9-CM: V15.82  3/18/2019 - Present        Liver lesion ICD-10-CM: K76.9  ICD-9-CM: 573.8  3/18/2019 - Present    Overview Signed 5/30/2019 11:14 AM by WILLIAN Johnson.  No uptake per PET             Centrilobular emphysema (HCC) (Chronic) ICD-10-CM: J43.2  ICD-9-CM: 492.8  3/18/2019 - Present        RESOLVED: Atrial fibrillation with RVR (HCC) ICD-10-CM: I48.91  ICD-9-CM: 427.31  5/26/2019 - 5/29/2019        RESOLVED: Acute respiratory failure with hypoxia [facility]) ICD-10-CM: J96.01  ICD-9-CM: 518.81  4/12/2019 - 5/30/2019              Plan:   intensive Physical Therapy for a minimum of 1.5 hours a day, at least 5 out of 7 days per week to address bed mobility, transfers, ambulation, strengthening, balance, and endurance. intensive Occupational Therapy for a minimum of 1.5 hours a day, at least 5 out of 7 days per week to address ADL ( bathing, LE dressing, toileting) and adaptive equipment as needed. - patient will be limited due to pain, hypoxia, fatigue.   - focus on endurance, strength, energy conservation techniques.     The patient will also require 24-hour skilled rehabilitation nursing for bowel and bladder management, skin care for decubitus ulcer prevention , pain management and ongoing medication administration      The patient may benefit from a psychology consult for depression, anxiety and adjustment disorder.     Continue daily physician medical management:  POSTOPERATIVE DIAGNOSIS:  Right lung cancer.     PROCEDURE PERFORMED:  1.  Right thoracoscopy switched to right thoracotomy with extensive pneumonolysis. 2.  Right lower lobe lobectomy. 3.  Right upper lobe blebectomy x2.  4.  Diaphragm resection with primary repair. 5.  Chest wall resection. 6.  Mediastinal lymphadenectomy. 7.  Intercostal nerve cryoablation.     - Non-small cell lung cancer (NSCLC) ([facility] (5/30/2019)  - Aftercare following surgery -Wound Care: Monitor wound status daily per staff and physician. At risk for failure. Will require 24/7 rehab nursing. Surgery to direct, follow. Keep incisions clean and dry, may remain uncovered. - Wet to dry dressing changes to chest wound daily. - 6/1 stable wound. 6/2  Continued malodorous drainage. Will send for cx. 6/3 - follow cx, stain. Wound vac to be placed. Wound care to follow. 6/4 - wound vac placed.  + fever, started on vanc, cefepime( pen allergy) . ID consult. 6/5 -fever down. Continues to drain from wound vac. Patient more comfortable with wound vac on.   - ID following. Per ID -  Continue Vancomycin x 7 days (est EOT 06/10/19). Continue Cipro for now; if 06/02 UC remains negative would discontinue  6/6 - no fever. Continue wound vac. Pharmacy dose vanco.   6/7 - continue wound care/ wound vac. Continue vanco. Responding to abx.   6/10 - surgery following. Continue wound vac. WBC, mildly elevated, unchanged. 6/11 - surgery following, directing specifics of wound care. Noted, there may be wound bed comunication with pleural space and having difficulty keeping a seal for wound vac. Dr. Robin Castrejon place xeroform to posterior portion of wound and placed wound vac. Per sx, MUST REMOVE XEROFORM WITH EVERY VAC CHANGE. contineud on Vancomycin iv. ID following. Hypoxia (5/24/2019)/COPD (chronic obstructive pulmonary disease) ([facility] (5/24/2019)  - proventil scheduled 4x/day  - spiriva  - monitor SaO2. O2 a via NC to keep SaO2 >90%. - continue O2 as needed. 6/7 - on 4-5L/min. desats with activity and talking. 6/10 - continue O2 as needed. Requires 4-6L/min via NC. Likely at least temporarily she will continue O2 needs, following d/c.   6/11 - continued on 5L O2 via NC. SaO2>95%.    Atrial fibrillation with rapid ventricular response ([facility] (5/26/2019)  - amiodarone. Will reduce dose as scheduled. - lasix 40 mg daily. Monitor renal function. 6/1 -HR in control. Continue amiodarone 400 bid, wean to 200 daily in 2 days. 6/3 - BP borderline low. No antihypertensives. 6/4 - now on amiodarone 200 daily. Appears on NSR.   6/5 -NSR. 6/7 - continue amiodarone. 6/10 - HR 90s. -110s. Will reduce lasix 20mg daily. Pt clinically euvolemic. 6/11 - lasix decreased to 20mg daily. No acute decompensation. No new edema. Monitor. Will discontinue if tolerated.         Normochromic anemia (5/26/2019)- s/p transfusions. - monitor.  Transfuse as needed. 6/1- hgb 8.3  6/3 - hgb 8.3  6/4 - hgb 8.7  6/7 - hgb 8.5  6/10 - hgb - 9.3     Leukocytosis/UTI (urinary tract infection) (5/29/2019) - Complete a 5 day course of Rocephin for UTI.  - 6/1 - still mild leukocytosis. Rocephin until 6/3  6/2 - continued on Rocephin for UTI, however still R thoracotomy concern for conurrent infection. cx sent. May need reconsideration for antibiotic therapy for thoracotomy, surgical bed.6/7 still mild leukocytosis on cipro.      Pneumonia prophylaxis- Insentive spirometer every hour while awake     DVT risk / DVT Prophylaxis-    - Lovenox subq daily.      Pain Control: no current joint or muscle symptoms, essentially pain-free. Will require regular pain assessment and comprenhensive pain management. - pain due to chest wound, and during dressing changes, packing.   - norco prn. Dilaudid iv if needed. - gabapentin 300mg tid  6/4 - ativan added for anxiety. Pt tolerated well. 6/5 responded well to ativan prn, especially at bedtime. 6/10 - reduce prn ativan 0.5. concern for confusion. bowel program - erica-colace. MOM prn     GERD - resume PPI. At times may need additional antacids, Maalox prn. Time spent was 25 minutes with over 1/2 in direct patient care/examination, consultation and coordination of care.      Signed By: Eunice Gardner MD     June 11, 2019

## 2019-06-12 VITALS
HEART RATE: 81 BPM | OXYGEN SATURATION: 96 % | DIASTOLIC BLOOD PRESSURE: 52 MMHG | SYSTOLIC BLOOD PRESSURE: 102 MMHG | RESPIRATION RATE: 20 BRPM | TEMPERATURE: 97.9 F

## 2019-06-12 PROCEDURE — 74011250637 HC RX REV CODE- 250/637: Performed by: SURGERY

## 2019-06-12 PROCEDURE — 97116 GAIT TRAINING THERAPY: CPT

## 2019-06-12 PROCEDURE — 74011250637 HC RX REV CODE- 250/637: Performed by: PHYSICAL MEDICINE & REHABILITATION

## 2019-06-12 PROCEDURE — 94760 N-INVAS EAR/PLS OXIMETRY 1: CPT

## 2019-06-12 PROCEDURE — 77010033678 HC OXYGEN DAILY

## 2019-06-12 PROCEDURE — 97535 SELF CARE MNGMENT TRAINING: CPT

## 2019-06-12 PROCEDURE — 94762 N-INVAS EAR/PLS OXIMTRY CONT: CPT

## 2019-06-12 PROCEDURE — 74011250637 HC RX REV CODE- 250/637: Performed by: NURSE PRACTITIONER

## 2019-06-12 PROCEDURE — 74750000023 HC WOUND THERAPY

## 2019-06-12 PROCEDURE — 74011000250 HC RX REV CODE- 250: Performed by: PHYSICAL MEDICINE & REHABILITATION

## 2019-06-12 PROCEDURE — 77030019934 HC DRSG VAC ASST KCON -B

## 2019-06-12 PROCEDURE — 99239 HOSP IP/OBS DSCHRG MGMT >30: CPT | Performed by: PHYSICAL MEDICINE & REHABILITATION

## 2019-06-12 PROCEDURE — 97605 NEG PRS WND THER DME<=50SQCM: CPT

## 2019-06-12 PROCEDURE — 94640 AIRWAY INHALATION TREATMENT: CPT

## 2019-06-12 PROCEDURE — 97530 THERAPEUTIC ACTIVITIES: CPT

## 2019-06-12 RX ORDER — HYDROCODONE BITARTRATE AND ACETAMINOPHEN 5; 325 MG/1; MG/1
1 TABLET ORAL
Qty: 30 TAB | Refills: 0 | Status: ON HOLD | OUTPATIENT
Start: 2019-06-12 | End: 2019-07-06 | Stop reason: SDUPTHER

## 2019-06-12 RX ORDER — BUDESONIDE 0.5 MG/2ML
500 INHALANT ORAL 2 TIMES DAILY
Qty: 1 EACH | Refills: 2 | Status: SHIPPED | OUTPATIENT
Start: 2019-06-12 | End: 2019-06-13

## 2019-06-12 RX ORDER — MIRTAZAPINE 15 MG/1
15 TABLET, FILM COATED ORAL
Qty: 20 TAB | Refills: 0 | Status: SHIPPED | OUTPATIENT
Start: 2019-06-12 | End: 2019-07-06

## 2019-06-12 RX ORDER — GABAPENTIN 300 MG/1
300 CAPSULE ORAL 3 TIMES DAILY
Qty: 90 CAP | Refills: 2 | Status: SHIPPED | OUTPATIENT
Start: 2019-06-12 | End: 2019-06-13

## 2019-06-12 RX ORDER — HYDROCODONE BITARTRATE AND ACETAMINOPHEN 5; 325 MG/1; MG/1
1 TABLET ORAL
Qty: 25 TAB | Refills: 0 | Status: SHIPPED | OUTPATIENT
Start: 2019-06-12 | End: 2019-06-15

## 2019-06-12 RX ADMIN — SENNOSIDES AND DOCUSATE SODIUM 2 TABLET: 8.6; 5 TABLET ORAL at 08:38

## 2019-06-12 RX ADMIN — PANTOPRAZOLE SODIUM 40 MG: 40 TABLET, DELAYED RELEASE ORAL at 04:52

## 2019-06-12 RX ADMIN — TIOTROPIUM BROMIDE 18 MCG: 18 CAPSULE ORAL; RESPIRATORY (INHALATION) at 05:33

## 2019-06-12 RX ADMIN — LEVOTHYROXINE SODIUM 75 MCG: 75 TABLET ORAL at 08:38

## 2019-06-12 RX ADMIN — GABAPENTIN 300 MG: 300 CAPSULE ORAL at 08:38

## 2019-06-12 RX ADMIN — GUAIFENESIN 400 MG: 100 SOLUTION ORAL at 08:38

## 2019-06-12 RX ADMIN — BUDESONIDE 500 MCG: 0.5 INHALANT RESPIRATORY (INHALATION) at 05:43

## 2019-06-12 RX ADMIN — FUROSEMIDE 20 MG: 20 TABLET ORAL at 08:38

## 2019-06-12 RX ADMIN — ALBUTEROL SULFATE 2.5 MG: 2.5 SOLUTION RESPIRATORY (INHALATION) at 05:43

## 2019-06-12 RX ADMIN — POTASSIUM CHLORIDE 40 MEQ: 20 TABLET, EXTENDED RELEASE ORAL at 08:38

## 2019-06-12 RX ADMIN — Medication 10 ML: at 05:01

## 2019-06-12 RX ADMIN — HYDROCODONE BITARTRATE AND ACETAMINOPHEN 1 TABLET: 5; 325 TABLET ORAL at 04:52

## 2019-06-12 RX ADMIN — HYDROCODONE BITARTRATE AND ACETAMINOPHEN 1 TABLET: 5; 325 TABLET ORAL at 11:53

## 2019-06-12 RX ADMIN — ALBUTEROL SULFATE 2.5 MG: 2.5 SOLUTION RESPIRATORY (INHALATION) at 09:19

## 2019-06-12 NOTE — PROGRESS NOTES
OT DISCHARGE REPORT    Time In: 0701  Time Out: 2829    COMPREHENSION MODE Initial Assessment Discharge Assessment 6/12/2019   Score 7(Pt comprehends complex ideas) 6     EXPRESSION Initial Assessment Discharge Assessment 6/12/2019   Primary Mode of Expression Verbal     Score 7 7   Comments (Pt able to express complex ideas)       SOCIAL INTERACTION/ PRAGMATICS Initial Assessment Discharge Assessment 6/12/2019   Score 6 6   Comments (Pt needs to be redirected at times) extra time to adjust to new situations and people; pleasant and cooperative this session but fluctuates      PROBLEM SOLVING Initial Assessment Discharge Assessment 6/12/2019   Score 6 5   Comments (Pt able to solve basic routine problems consistently) solves routine problems 91-99% of the time      MEMORY Initial Assessment Discharge Assessment 6/12/2019   Score 6 6   Comments (Recognizes familiar faces; knows daily routines) recognizes people frequently seen, extra time      EATING Initial Assessment Discharge Assessment 6/12/2019   Functional Level 6 7   Comments (Extra time required) seated up at edge of bed      GROOMING Initial Assessment Discharge Assessment 6/12/2019   Functional Level 5 5   Tasks completed by patient Washed face, Washed hands, Brushed teeth Washed face; Washed hands   Comments (set up assist sitting w extra time due to fatigue) setup assist      BATHING Initial Assessment Discharge Assessment 6/12/2019   Functional Level 4 4   Body parts patient bathed Abdomen, Arm, left, Arm, right, Chest, Nicol area, Thigh, left, Thigh, right Abdomen;Arm, left;Arm, right;Buttocks; Chest;Nicol area; Thigh, left; Thigh, right; Lower leg and foot, left; Lower leg and foot, right   Comments (Pt bathed 8 of 11 parts.  sponge bath required )       TUB/SHOWER TRANSFER INDEPENDENCE Initial Assessment Discharge Assessment 6/12/2019   Score 0    Type of Shower:Bed bath  Adaptive  Equipment:bath pack   Comments (Unsafe to perform due to sx & poor activity tolerance) remains unable due to wound vac      UPPER BODY DRESSING/UNDRESSING Initial Assessment Discharge Assessment 6/12/2019   Functional Level 4 5   Items applied/Steps completed Pullover (4 steps) Pullover (4 steps)   Comments (Assist w 1 of 4 parts) setup assist      LOWER BODY DRESSING/UNDRESSING Initial Assessment Discharge Assessment 6/12/2019   Functional Level 2 5   Items applied/Steps completed Elastic waist pants (3 steps), Sock, left (1 step), Sock, right (1 step), Underpants (3 steps) Elastic waist pants (3 steps); Underpants (3 steps)   Comments (Assist w >50% this date)       TOILETING Initial Assessment Discharge Assessment 6/12/2019   Functional Level 2 4   Comments (Pt performed 1 of 3 parts) steadying assist during clothing management      TOILET TRANSFER INDEPENDENCE Initial Assessment Discharge Assessment 6/12/2019   Transfer score 2 5   Comments (Patient required 70% assist for sit to stand) BSC due to multiple attached lines (oxygen, continues pulse oximeter)     Plan of Care: Please see Care Plan for progress towards goals during stay  Precautions at Discharge: Falls, Oxygen 5-6L and wound vac   Discharge Location: SNF at [facility]  Safety/Supervision Recommendations/Functional Level:    recommend 24/7 supervision, assist with ADL tasks, assist with functional transfers   Family Training: was scheduled but patient's daughter did not come due to family conflict having to take grandchild to the doctor; not rescheduled due to patient discharging to SNF     Recommended Continuing Therapy: 24 Hour Supervision; Other (comment)(follow-up OT at SNF)  Residual Deficits: impaired activity tolerance, pain, impaired standing balance, impaired strength, impaired coordination, required supplemental oxygen, fatigue, impaired bed mobility  Progress over LOS: Patient made some progress with ADL skills as reflected in above chart. Impaired activity tolerance was patient's primary barrier.      Summary of Session: Patient supine in bed and agreeable to OT. Completed sponge bath seated up at edge of bed; see above for details. Patient has met all goals that were modified from initial evaluation. Patient to DC to SNF today for follow-up therapy. Patient tolerated session well with no complaint of pain and was left seated up at edge of bed. Educated patient on sitting upright to eat vs laying down to decrease risk of aspiration with verbalized and demonstrated understanding.      Veena Tidwell MS, OTR/L  6/12/2019

## 2019-06-12 NOTE — PROGRESS NOTES
Problem: Mobility Impaired (Adult and Pediatric)  Goal: *Therapy Goal (Edit Goal, Insert Text)  Description  LTG 3. Patient ambulate 150 feet with LRAD and Supervision in 10 days. (6/7/`9: Goal not met, goal to be d/c secondary to slow progress. New goal to read: Patient ambulate 50 ft with LRAD and MIN A in 1 week. (6/12/19: Goal not met secondary to decreased functional strength and fatigue.))       6/12/2019 1238 by Janet Giraldo  Outcome: Not Met  6/12/2019 1237 by Janet Giraldo  Outcome: Not Met  Goal: *Therapy Goal (Edit Goal, Insert Text)  Description  LTG 4. Patient ambulate up/down 4 steps with handrails and supervision in 10 days. (6/7/19: Goal not met, goal to be d/c secondary to slow progress. New goal to read: Patient ambulate up/down 4 step with MIN A in 1 week. (6/12/19: Goal not met.))       6/12/2019 1238 by Janet Giraldo  Outcome: Not Met  6/12/2019 1237 by Julia GUSMAN  Outcome: Not Met  Goal: *Therapy Goal (Edit Goal, Insert Text)  Description  LTG 5. Patient perform HEP with supervision in 10 days.(6/7/19: Goal not met, patient making progress towards goal. (6/12/19: Goal not met.))   6/12/2019 1238 by Janet Giraldo  Outcome: Not Met  6/12/2019 1237 by Janet Giraldo  Outcome: Not Met     Problem: Patient Education: Go to Patient Education Activity  Goal: Patient/Family Education  Description  Patient / Patient's family will verbalize understanding of PT safety recommendations, demonstrate appropriate assist for current functional mobility status, safety, and home exercise program by time of discharge.  (6/12/19: Goal not met.))      6/12/2019 1238 by Janet Giraldo  Outcome: Not Met  6/12/2019 1237 by Julia GUSMAN  Outcome: Not Met

## 2019-06-12 NOTE — PROGRESS NOTES
Problem: Falls - Risk of  Goal: *Absence of Falls  Description  Document Miracle Bruno Fall Risk and appropriate interventions in the flowsheet. Outcome: Progressing Towards Goal     Problem: Patient Education: Go to Patient Education Activity  Goal: Patient/Family Education  Outcome: Progressing Towards Goal     Problem: Pressure Injury - Risk of  Goal: *Prevention of pressure injury  Description  Document Nir Scale and appropriate interventions in the flowsheet.   Outcome: Progressing Towards Goal     Problem: Patient Education: Go to Patient Education Activity  Goal: Patient/Family Education  Outcome: Progressing Towards Goal     Problem: Gas Exchange - Impaired  Goal: *Absence of hypoxia  Outcome: Progressing Towards Goal     Problem: Patient Education: Go to Patient Education Activity  Goal: Patient/Family Education  Outcome: Progressing Towards Goal

## 2019-06-12 NOTE — WOUND CARE
Spoke to [employer], RN called at [address] [place] [address] nurse who will be changing dressings at rehab, notified Armando Perches of the xeroform over air leak and importance of removing/ replacing every dressing change, Armando Perches verbalized understanding. Will plan dressing change today as patient will be transferred in the afternoon after Armando Perches is to be off and 1st dressing change at [facility] will be on Friday.

## 2019-06-12 NOTE — PROGRESS NOTES
Spoke with pt and daughter Shruthi Cherry and both are agreeable to bed offer from [facility] and aware pt will be transported today by [facility] at 230 pm. Dr Walter Vera aware and working on DC orders at this time. Pt will be going to room 215 A, report to be called to [phone]. Information proved to primary BRET Barrios.

## 2019-06-12 NOTE — PROGRESS NOTES
Problem: Mobility Impaired (Adult and Pediatric)  Goal: *Therapy Goal (Edit Goal, Insert Text)  Description  LTG 3. Patient ambulate 150 feet with LRAD and Supervision in 10 days. (6/7/`9: Goal not met, goal to be d/c secondary to slow progress. New goal to read: Patient ambulate 50 ft with LRAD and MIN A in 1 week. (6/12/19: Goal not met secondary to decreased functional strength and fatigue.))       6/12/2019 1238 by Roberta Carroll  Outcome: Resolved/Not Met  6/12/2019 1238 by Roberta Carroll  Outcome: Not Met  6/12/2019 1237 by Ervin GUSMAN  Outcome: Not Met  Goal: *Therapy Goal (Edit Goal, Insert Text)  Description  LTG 4. Patient ambulate up/down 4 steps with handrails and supervision in 10 days. (6/7/19: Goal not met, goal to be d/c secondary to slow progress. New goal to read: Patient ambulate up/down 4 step with MIN A in 1 week. (6/12/19: Goal not met.))       6/12/2019 1238 by Roberta Carroll  Outcome: Resolved/Not Met  6/12/2019 1238 by Roberta Carroll  Outcome: Not Met  6/12/2019 1237 by Ervin GUSMAN  Outcome: Not Met  Goal: *Therapy Goal (Edit Goal, Insert Text)  Description  LTG 5. Patient perform HEP with supervision in 10 days.(6/7/19: Goal not met, patient making progress towards goal. (6/12/19: Goal not met.))   6/12/2019 1238 by Roberta Carroll  Outcome: Resolved/Not Met  6/12/2019 1238 by Roberta Carroll  Outcome: Not Met  6/12/2019 1237 by Roberta Carroll  Outcome: Not Met     Problem: Patient Education: Go to Patient Education Activity  Goal: Patient/Family Education  Description  Patient / Patient's family will verbalize understanding of PT safety recommendations, demonstrate appropriate assist for current functional mobility status, safety, and home exercise program by time of discharge.  (6/12/19: Goal not met.))      6/12/2019 1238 by Roberta Carroll  Outcome: Resolved/Not Met  6/12/2019 1238 by Ervin GUSMAN  Outcome: Not Met  6/12/2019 1237 by Elzbieta GUSMAN  Outcome: Not Met

## 2019-06-12 NOTE — WOUND CARE
Right chest wound vac changed, xeroform in space on left side of wound at 9 o'clock removed and replaced, seal achieved, connected to home machine for transport to SNF, SNF to begin changes on Friday. Will monitor.

## 2019-06-12 NOTE — PROGRESS NOTES
Patient discharged per MD order. Follow up appointment made with Dr Paige Santillan per patient family request. Family is taking patient belongings with her staff helped with loading. No complaints voiced.

## 2019-06-12 NOTE — DISCHARGE SUMMARY
REHABILITATION DISCHARGE SUMMARY     Patient: Marcia Aguila MRN: [id number]  SSN: [id number]    YOB: 1940  Age: 78 y.o. Sex: female      Date: 6/12/2019  Admit Date: 5/31/2019  Discharge Date: 6/12/2019    Admitting Physician: Mauri Carrizales MD   Primary Care Physician: Anil Vizcarra MD     Admission Condition: good    Chief Complaint : Gait dysfunction secondary to below. Admit Diagnosis: Lung mass [R91.8]; Lung mass [R91.8]; Aftercare following surgery [Z48.89]; Right lower lobe lung mass [R91.8]; Right lower lobe lung mass [R91.8]  PREOPERATIVE DIAGNOSIS:  Right lung cancer.     POSTOPERATIVE DIAGNOSIS:  Right lung cancer.     PROCEDURE PERFORMED:  1.  Right thoracoscopy switched to right thoracotomy with extensive pneumonolysis. 2.  Right lower lobe lobectomy. 3.  Right upper lobe blebectomy x2.  4.  Diaphragm resection with primary repair. 5.  Chest wall resection. 6.  Mediastinal lymphadenectomy. 7.  Intercostal nerve cryoablation.     weakness  Pain  DVT risk  Non-small cell lung cancer (NSCLC) ([address] (5/30/2019)  Aftercare following surgery (5/22/2019)  Right lower lobe lung mass (5/22/2019)  Hypoxia (5/24/2019)  COPD (chronic obstructive pulmonary disease) ([address] (5/24/2019)  Atrial fibrillation with rapid ventricular response ([address] (5/26/2019)  Normochromic anemia (5/26/2019)  leukocytosis  UTI (urinary tract infection) (5/29/2019)  Acute Rehab Dx:  weakness  Debility    deconditioning  Mobility and ambulation deficits  Self Care/ADL deficits           HPI: Bárbaraafshin Watkinspolo Stewart a 78 y.o. female patient at [address] [place] [address] was admitted on 5/22/2019  by Soraya Davidson below mentioned medical history ,is being seen for Physical Medicine and Rehabilitation.     Patricia Ojeda has history of emphysema, hypothyroidism, subarachnoid hemorrhage, anterior cerebral aneurysm s/p brain coil, HTN, HLD was recently diagnosed with lung cancer.  Tumor noted to be in right lower lobe mass measuring 5.4 cm x 4.4 cm and was appearing to extend to the right inferior hilum. patient underwent a right thoracoscopy switched to right thoracotomy with extensive pneumonolysis, right lower lobe lobectomy, right upper lobe blebectomy x2, diaphragm resection with primary repair, chest wall resection, mediastinal lymphadenectomy and intercostal nerve cryoablation per Ana Luisa Danielson MD on 5/22/2019. Post op patient continued to be hypoxic, requiring high flow O2 to maintain sat of 90%. Patient developed atrial fibrillation with RVR, 5/26/2019. HR, BP control treated per cardiology. Patient continued on amiodarone, continued on telemetry. She is not on OAC due to recent lung surgery. CxR 5/30 demonstrates still bibasilar opacities, likely  combination of small effusion and atelectasis, and vascular congestion. Patient continued on pulmicort and duoneb for COPD. Patient is continued on Rocephin for UTI. WBC trending down, 17.5 on 5/30. Patient has started to participate in therapies with acute PT, OT and ST. Patient shows mild generalized weakness, deconditioned state, poor activity tolerance due to hypoxia,limiting her mobility, ambulation and ADLs. Patient is managing her gait with minimal assist.    Aliza Dk seen and examined. Medical Records reviewed. Patient lives alone, has been independent prior to this admission. She has a daughter she she has been staying with recently.   Our service was consulted for rehab needs and we recommended inpatient hospital rehabilitation is reasonable and necessary due to ongoing acute medical issues which have not changed since initial pre-admission evaluation. I reviewed the preadmission screening and have approved for admission to the [facility]. The patient was cleared for transfer to rehab today.  Patient continues to have ongoing  medical issues outlined above requiring physician medical supervision and functional deficits which would benefit from continued intensive therapies. Rehabiitation Course:     Patient was admitted to acute medical rehab unit following right thoracotomy status post to lower lobe resection, chest wall resection. Patient was continued on daily wound care. Initially she has had increased drainage Which eventually decreased with antibiotic treatment and patient was placed on wound vac since 6/3/2019. Patient has continued On the wound vac per Dr. Lennox Cooks direction. Patient continues to have hypoxia which required continued oxygen supplement at 5 L per minute via nasal cannula. Patient continues to have mild anemia. Patient continues to have mild leukocytosis likely due to her surgical cause. infections as treated the patient with vancomycin discontinued on June 11. Patient should be monitored for a fever or signs of re-infection. Patient is continued on Lovenox for DVT prophylaxis. Patient is continued on Norco as needed for right chest wall pain. He has had poor sleep patterns. Remeron has been added as needed  Functionally patient has had multiple barriers during her admission due to pain, infection, hypoxia, weakness. Nevertheless patient has participated in therapies as best she could. She has made really good functional gains however he is not quite ready for home discharge with partial supervision or care from her daughter. Continued rehab at sub acute level At the SNF is reasonable and necessary due to ongoing medical issues which have improved and still functional level below her baseline. Patient will follow up with Dr. Lennox Cooks as directed and address her surgical condition as well has her cancer management.       Home Architecture: Home Situation  Home Environment: Private residence (05/31/19 1500)  # Steps to Enter: 2 (05/31/19 1500)  Rails to Enter: Yes (05/31/19 1500)  Hand Rails : Right (05/31/19 1500)  One/Two Story Residence: One story (05/31/19 1500)  Living Alone: No (05/31/19 1500)  Support Systems: Family member(s)(daughter) (05/31/19 1500)  Patient Expects to be Discharged to[de-identified] Private residence(going home with daughter) (05/31/19 1500)  Current DME Used/Available at Home: Walker, rolling (05/31/19 1500)  Tub or Shower Type: Shower (05/31/19 1500)     Past Medical History:   Diagnosis Date    Aspiration pneumonia ([facility] 4/12/2019    Cancer ([facility]     Lung cancer    Chronic obstructive pulmonary disease ([facility]     Hypertension     Severe sepsis with acute organ dysfunction ([address] 4/12/2019    Subarachnoid hemorrhage ([facility]     Thyroid disease       Past Surgical History:   Procedure Laterality Date    HX OTHER SURGICAL      coil in brain    HX WRIST FRACTURE TX      bilat wrist      No family history on file. Social History     Tobacco Use    Smoking status: Former Smoker     Packs/day: 1.50     Years: 45.00     Pack years: 67.50     Types: Cigarettes     Last attempt to quit: 2004     Years since quitting: 15.4    Smokeless tobacco: Never Used   Substance Use Topics    Alcohol use: Not Currently       Allergies   Allergen Reactions    Penicillins Unknown (comments)     Hives. Tolerated ceftriaxone May 2019    Percocet [Oxycodone-Acetaminophen] Unknown (comments)    Prednisone Other (comments)     Tachycardia       Prior to Admission medications    Medication Sig Start Date End Date Taking? Authorizing Provider   amiodarone (CORDARONE) 200 mg tablet Take 1 Tab by mouth daily. 6/4/19   Rosa Anne MD   amiodarone (PACERONE) 400 mg tablet Take 1 Tab by mouth two (2) times a day. 5/31/19   Rosa Anne MD   budesonide (PULMICORT) 0.5 mg/2 mL nbsp 2 mL by Nebulization route two (2) times a day for 30 days. 5/31/19 6/30/19  Rosa Anne MD   celecoxib (CELEBREX) 100 mg capsule Take 1 Cap by mouth two (2) times a day for 90 days. 5/31/19 8/29/19  Rosa Anne MD   senna-docusate (PERICOLACE) 8.6-50 mg per tablet Take 2 Tabs by mouth two (2) times a day.  5/31/19   Rosa Anne MD   tiotropium [facility]) 18 mcg inhalation capsule Take 1 Cap by inhalation daily. 5/31/19   Nikita Romero MD   pantoprazole (PROTONIX) 40 mg tablet Take 1 Tab by mouth Daily (before breakfast) for 30 days. 6/1/19 7/1/19  Nikita Romero MD   magnesium hydroxide (MILK OF MAGNESIA) 400 mg/5 mL suspension Take 30 mL by mouth daily. 6/1/19   Nikita Romero MD   enoxaparin (LOVENOX) 40 mg/0.4 mL 0.4 mL by SubCUTAneous route every twenty-four (24) hours for 21 days. 5/31/19 6/21/19  Nikita Romero MD   furosemide (LASIX) 40 mg tablet Take 1 Tab by mouth daily. 5/31/19   Nikita Romero MD   gabapentin (NEURONTIN) 300 mg capsule Take 1 Cap by mouth three (3) times daily. 5/31/19   Nikita Romero MD   benzonatate (TESSALON) 100 mg capsule Take 1 Cap by mouth three (3) times daily as needed for Cough. 5/6/19   Tomasa Kee MD   albuterol (PROVENTIL HFA, VENTOLIN HFA, PROAIR HFA) 90 mcg/actuation inhaler Take 1 Puff by inhalation every six (6) hours as needed for Wheezing or Shortness of Breath. 4/16/19   JONNY Mayfield   ascorbic acid, vitamin C, (VITAMIN C) 500 mg tablet Take  by mouth. Provider, Historical   calc-D3-magnes-P5-Ji-Zp-jake 250 mg-400 unit -40 mg-5 mg tab Take 1 Tab by mouth daily. Provider, Historical   omega 3-dha-epa-fish oil (FISH OIL) 100-160-1,000 mg cap Take 1 Cap by mouth daily. Provider, Historical   MAGNESIUM PO Take 100 mg by mouth daily. Provider, Historical   VITAMIN B COMPLEX PO Take 1 Tab by mouth daily. Provider, Historical   atorvastatin (LIPITOR) 10 mg tablet Take 10 mg by mouth daily. Provider, Historical   cholecalciferol (VITAMIN D3) 1,000 unit tablet Take 1,000 Units by mouth daily. Provider, Historical   levothyroxine (SYNTHROID) 75 mcg tablet Take 75 mcg by mouth daily. Provider, Historical   losartan (COZAAR) 25 mg tablet Take 25 mg by mouth daily. Provider, Historical   therapeutic multivitamin (THERAGRAN) tablet Take 1 Tab by mouth daily.     Provider, Historical       Current Medications:  Current Facility-Administered Medications   Medication Dose Route Frequency Provider Last Rate Last Dose    mirtazapine (REMERON) tablet 15 mg  15 mg Oral QHS Karol Melton L, NP   15 mg at 06/11/19 2102    guaiFENesin (ROBITUSSIN) 100 mg/5 mL oral liquid 400 mg  400 mg Oral TID Milton ULLOA, NP   400 mg at 06/12/19 0838    LORazepam (ATIVAN) tablet 0.5 mg  0.5 mg Oral Q8H PRN Candi Reyes MD        furosemide (LASIX) tablet 20 mg  20 mg Oral DAILY Candi Reyes MD   20 mg at 06/12/19 2933    zinc oxide-cod liver oil (DESITIN) 40 % paste   Topical PRN Candi Reyes MD        acetaminophen (TYLENOL) tablet 650 mg  650 mg Oral Q6H PRN Candi Reyes MD   650 mg at 06/10/19 1958    phenol throat spray (CHLORASEPTIC) 1 Spray  1 Spray Oral PRN Candi Reyes MD        HYDROmorphone (DILAUDID) tablet 2 mg  2 mg Oral Q6H PRN Candi Reyes MD   2 mg at 06/02/19 1745    potassium chloride (K-DUR, KLOR-CON) SR tablet 40 mEq  40 mEq Oral DAILY Rosa Anne MD   40 mEq at 06/12/19 0838    naloxone (NARCAN) injection 0.4 mg  0.4 mg IntraVENous PRN Candi Reyes MD        ondansetron [facility]) injection 4 mg  4 mg IntraVENous Q4H PRN Luis GUSMAN MD   4 mg at 06/09/19 1337    sodium chloride (NS) flush 5-40 mL  5-40 mL IntraVENous Q8H Luis GUSMAN MD   10 mL at 06/12/19 0501    sodium chloride (NS) flush 5-40 mL  5-40 mL IntraVENous PRN Candi Reyes MD        albuterol (PROVENTIL VENTOLIN) nebulizer solution 2.5 mg  2.5 mg Nebulization QID RT Candi Reyes MD   2.5 mg at 06/12/19 0919    amiodarone (CORDARONE) tablet 200 mg  200 mg Oral DAILY Candi Reyes MD   Stopped at 06/12/19 0838    budesonide (PULMICORT) 500 mcg/2 ml nebulizer suspension  500 mcg Nebulization BID RT Luis GUSMAN MD   500 mcg at 06/12/19 0543    enoxaparin (LOVENOX) injection 40 mg  40 mg SubCUTAneous Q24H Luis GUSMAN MD   40 mg at 06/11/19 1546    gabapentin (NEURONTIN) capsule 300 mg  300 mg Oral TID Candi Reyes MD   300 mg at 06/12/19 9095    HYDROcodone-acetaminophen (NORCO) 5-325 mg per tablet 1 Tab  1 Tab Oral Q4H PRN Zahida Deal MD   1 Tab at 06/12/19 0452    levothyroxine (SYNTHROID) tablet 75 mcg  75 mcg Oral DAILY Zahida Deal MD   75 mcg at 06/12/19 7117    magnesium hydroxide (MILK OF MAGNESIA) 400 mg/5 mL oral suspension 30 mL  30 mL Oral DAILY PRN Zahida Deal MD        magnesium hydroxide (MILK OF MAGNESIA) 400 mg/5 mL oral suspension 30 mL  30 mL Oral DAILY Zahida Deal MD   Stopped at 06/08/19 0856    pantoprazole (PROTONIX) tablet 40 mg  40 mg Oral ACB Rudi GUSMAN MD   40 mg at 06/12/19 0452    senna-docusate (PERICOLACE) 8.6-50 mg per tablet 2 Tab  2 Tab Oral BID Zahida Deal MD   2 Tab at 06/12/19 0838    tiotropium (SPIRIVA) inhalation capsule 18 mcg  1 Cap Inhalation DAILY Zahida Deal MD   18 mcg at 06/12/19 0533        Review of Systems: Denies chest pain, shortness of breath, cough, headache, visual problems, abdominal pain, dysurea, calf pain. Pertinent positives are as noted in the medical records and unremarkable otherwise. Vital Signs:   Patient Vitals for the past 8 hrs:   BP Temp Pulse Resp SpO2   06/12/19 0920 -- -- -- -- 96 %   06/12/19 0720 102/52 97.9 °F (36.6 °C) 81 20 93 %   06/12/19 0543 -- -- -- -- 97 %      Physical Exam:   Psych: Patient was oriented to person, place, and time. On 5L O2 via NC. General:   Alert, appears stated age, No acute distress. HEENT:  Normocephalic. No JVD   Lungs:  + crackles thoughout R base. Respiration even and unlabored   No apparent use of accessory muscles for respiration.     Heart:  Regular rate and rhythm, S1, S2, No obvious murmur    Genitourinary: defered   Abdomen:  Soft, non-tender to palpation in all four quadrants. Neuromuscular:  PERRL, EOMI  Follows simple commands consistently.    + generalized weakness   Sensory - intact   Skin:  edema: none   Incision:  Calf non tender BLE. Right chest wall with wound vac, good seal.          Lab Review:   Recent Results (from the past 72 hour(s))   POC G3    Collection Time: 06/10/19  2:44 AM   Result Value Ref Range    Device: High Flow Nasal Cannula      pH (POC) 7.445 7.35 - 7.45      pCO2 (POC) 44.3 35 - 45 MMHG    pO2 (POC) 72 (L) 75 - 100 MMHG    HCO3 (POC) 30.5 (H) 22 - 26 MMOL/L    sO2 (POC) 95 95 - 98 %    Base excess (POC) 6 mmol/L    Allens test (POC) YES      Site RIGHT RADIAL      Patient temp. 98.6      Specimen type (POC) ARTERIAL      Performed by [facility]     CO2, POC 32 MMOL/L    Flow rate (POC) 10.000 L/min    COLLECT TIME 238     CBC WITH AUTOMATED DIFF    Collection Time: 06/10/19  6:32 AM   Result Value Ref Range    WBC 14.9 (H) 4.3 - 11.1 K/uL    RBC 3.33 (L) 4.05 - 5.2 M/uL    HGB 9.1 (L) 11.7 - 15.4 g/dL    HCT 31.4 (L) 35.8 - 46.3 %    MCV 94.3 79.6 - 97.8 FL    MCH 27.3 26.1 - 32.9 PG    MCHC 29.0 (L) 31.4 - 35.0 g/dL    RDW 15.9 (H) 11.9 - 14.6 %    PLATELET 483 305 - 287 K/uL    MPV 9.1 (L) 9.4 - 12.3 FL    ABSOLUTE NRBC 0.00 0.0 - 0.2 K/uL    DF AUTOMATED      NEUTROPHILS 79 (H) 43 - 78 %    LYMPHOCYTES 11 (L) 13 - 44 %    MONOCYTES 7 4.0 - 12.0 %    EOSINOPHILS 1 0.5 - 7.8 %    BASOPHILS 1 0.0 - 2.0 %    IMMATURE GRANULOCYTES 1 0.0 - 5.0 %    ABS. NEUTROPHILS 11.7 (H) 1.7 - 8.2 K/UL    ABS. LYMPHOCYTES 1.6 0.5 - 4.6 K/UL    ABS. MONOCYTES 1.1 0.1 - 1.3 K/UL    ABS. EOSINOPHILS 0.2 0.0 - 0.8 K/UL    ABS. BASOPHILS 0.1 0.0 - 0.2 K/UL    ABS. IMM.  GRANS. 0.2 0.0 - 0.5 K/UL   METABOLIC PANEL, BASIC    Collection Time: 06/10/19  6:32 AM   Result Value Ref Range    Sodium 139 136 - 145 mmol/L    Potassium 3.6 3.5 - 5.1 mmol/L    Chloride 102 98 - 107 mmol/L    CO2 29 21 - 32 mmol/L    Anion gap 8 7 - 16 mmol/L    Glucose 100 65 - 100 mg/dL    BUN 10 8 - 23 MG/DL    Creatinine 0.79 0.6 - 1.0 MG/DL    GFR est AA >60 >60 ml/min/1.73m2    GFR est non-AA >60 >60 ml/min/1.73m2    Calcium 8.0 (L) 8.3 - 10.4 MG/DL   PLEASE READ & DOCUMENT PPD TEST IN 48 HRS    Collection Time: 06/10/19  9:35 AM   Result Value Ref Range    PPD  Negative    mm Induration 0 0 - 5 mm       PT Initial  PT Most Recent   AROM: Generally decreased, functional (06/03/19 1000) Generally decreased, functional (06/03/19 1000)         Strength: Generally decreased, functional (06/03/19 1000) Generally decreased, functional (06/03/19 1000)                     Amount of Assistance: 4 (Minimal assistance) (06/01/19 1400) 3 (Moderate assistance) (06/11/19 1618)   Distance (ft): 5 Feet (ft) (05/31/19 1500) 20 Feet (ft) (06/11/19 1618)   Assistive Device: Other (comment)(No device) (05/31/19 1500) Walker, rolling (06/11/19 1618)       OT Initial OT Most Recent   Feeding/Eating Assistance: 5 (Supervision/setup) (06/03/19 0701) 5 (Supervision/setup) (06/03/19 0701)   Grooming Assistance : 5 (Stand-by assistance) (06/03/19 0701) 6 (Modified independent) (06/11/19 1037)   [address], Upper: 5 (Stand-by assistance) (06/03/19 0701) 5 (Stand-by assistance) (06/11/19 1037)   Bathing Assistance, Lower : 4 (Minimal assistance) (06/03/19 0701) 3 (Moderate assistance ) (06/11/19 1037)   Toileting Assistance (FIM Score): 1 (Total assistance) (06/03/19 0701) 1 (Total assistance)(assist x 2 to manage lines and for pants management up ) (06/07/19 1358)   Dressing Assistance : 5 (Supervision) (06/11/19 1037) 5 (Supervision) (06/11/19 1037)   Dressing Assistance : 3 (Moderate assistance) (06/11/19 1037) 3 (Moderate assistance) (06/11/19 1037)     ST Initial ST Most Recent          Aspiration Signs/Symptoms: None (06/05/19 1516) None (06/05/19 1516)           Active Problems:    Hypoxia (5/24/2019)      COPD (chronic obstructive pulmonary disease) ([facility] (5/24/2019)      Debility (5/31/2019)      Lung cancer ([facility] (5/31/2019)      Gait difficulty (5/31/2019)      Pain (5/31/2019)      Small cell carcinoma ([facility] (5/31/2019)      Atrial fibrillation with RVR ([facility] (5/31/2019)      History of thoracotomy (5/31/2019)          Condition on discharge :  good  Rehabilitation  potential : Good     Goals in Rehab : Patient to reach maximal rehabilitation potential in functional mobility,ambulation and ADL/ self care skills ; to improve strength and endurance    Expected Length Of Stay : Depending on pace of progress in mobility and self care in PT and OT ,therapies    Discharge Instructions:  POSTOPERATIVE DIAGNOSIS:  Right lung cancer.     PROCEDURE PERFORMED:  1.  Right thoracoscopy switched to right thoracotomy with extensive pneumonolysis. 2.  Right lower lobe lobectomy. 3.  Right upper lobe blebectomy x2.  4.  Diaphragm resection with primary repair. 5.  Chest wall resection. 6.  Mediastinal lymphadenectomy. 7.  Intercostal nerve cryoablation.     - Non-small cell lung cancer (NSCLC) ([facility] (5/30/2019)  - Aftercare following surgery -Wound Care: Monitor wound status daily per staff and physician. At risk for failure. Will require 24/7 rehab nursing.  Surgery to direct, follow. Keep incisions clean and dry, may remain uncovered. - Wet to dry dressing changes to chest wound daily.   - 6/1 stable wound. 6/2  Continued malodorous drainage. Will send for cx. 6/3 - follow cx, stain. Wound vac to be placed. Wound care to follow. 6/4 - wound vac placed. + fever, started on vanc, cefepime( pen allergy) . ID consult. 6/5 -fever down. Continues to drain from wound vac. Patient more comfortable with wound vac on.   - ID following. Per ID -  Continue Vancomycin x 7 days (est EOT 06/10/19). Continue Cipro for now; if 06/02 UC remains negative would discontinue  6/6 - no fever. Continue wound vac. Pharmacy dose vanco.   6/7 - continue wound care/ wound vac. Continue vanco. Responding to abx.   6/10 - surgery following. Continue wound vac. WBC, mildly elevated, unchanged. 6/12-   directing specifics of wound care.  Noted, there may be wound bed comunication with pleural space and having difficulty keeping a seal for wound vac.  Dr. Mary Kate Quiroz place xeroform to posterior portion of wound and placed wound vac. Per sx, MUST REMOVE XEROFORM WITH EVERY VAC CHANGE.             Hypoxia (5/24/2019)/COPD (chronic obstructive pulmonary disease) ([facility] (5/24/2019)  - proventil scheduled 4x/day  - spiriva  - monitor SaO2. O2 a via NC to keep SaO2 >90%. - continue O2 as needed. 6/7 - on 4-5L/min. desats with activity and talking. 6/10 - continue O2 as needed. Requires 4-6L/min via NC. Likely at least temporarily she will continue O2 needs, following d/c.   6/11 - continued on 5L O2 via NC. SaO2>95%. 6/12 - continue O2 to keep SaO2 in good range.        Atrial fibrillation with rapid ventricular response ([facility] (5/26/2019)  - amiodarone. Will reduce dose as scheduled. - lasix 40 mg daily. Monitor renal function.   6/1 -HR in control. Continue amiodarone 400 bid, wean to 200 daily in 2 days. 6/3 - BP borderline low. No antihypertensives. 6/4 - now on amiodarone 200 daily. Appears on NSR.   6/5 -NSR. 6/7 - continue amiodarone. 6/10 - HR 90s. -110s. Will reduce lasix 20mg daily. Pt clinically euvolemic. 6/11 - lasix decreased to 20mg daily. No acute decompensation. No new edema. Monitor. Will discontinue if tolerated. 6/12- Lasix held. Do not feel diuretics is needed. Monitor response.         Normochromic anemia (5/26/2019)- s/p transfusions. - monitor. Transfuse as needed.   6/1- hgb 8.3  6/3 - hgb 8.3  6/4 - hgb 8.7  6/7 - hgb 8.5  6/10 - hgb - 9.3     Leukocytosis/UTI (urinary tract infection) (5/29/2019) - Complete a 5 day course of Rocephin for UTI.  - 6/1 - still mild leukocytosis. Rocephin until 6/3  6/2 - continued on Rocephin for UTI, however still R thoracotomy concern for conurrent infection. cx sent. May need reconsideration for antibiotic therapy for thoracotomy, surgical bed.6/7 still mild leukocytosis on cipro. 6/12 - ID has followed. No new fevers. Antibiotics discontinued.  Monitor closely.      Pneumonia prophylaxis- Insentive spirometer every hour while awake     DVT risk / DVT Prophylaxis-    - Lovenox subq daily.      Pain Control: no current joint or muscle symptoms, essentially pain-free. Will require regular pain assessment and comprenhensive pain management. - pain due to chest wound, and during dressing changes, packing.   - norco prn. Dilaudid iv if needed. - gabapentin 300mg tid  6/4 - ativan added for anxiety. Pt tolerated well. 6/5 responded well to ativan prn, especially at bedtime. 6/10 - reduce prn ativan 0.5. concern for confusion. 6/12 - prn norco. Takes few tabs as needed. bowel program - erica-colace. MOM prn     GERD - resume PPI. At times may need additional antacids, Maalox         Follow-up with Dr. Reji Quiros next Friday 6/21 in office     Follow up with PCP 2 weeks. Discharge Medications:      enoxaparin (LOVENOX) injection 40 mg   Ordered Dose: 40mg Route: SubCUTAneous Frequency: : EVERY 24 HOURS       guaiFENesin (ROBITUSSIN) 100 mg/5 mL oral liquid 400 mg   Ordered Dose: 400mg Route: Oral Frequency: 3 TIMES DAILY       mirtazapine (REMERON) tablet 15 mg   Tab  Ordered Dose: 15mg Route: Oral Frequency: EVERY BEDTIME as needed for sleep           HYDROcodone-acetaminophen (NORCO) 5-325 mg per tablet   Ordered Dose: 5/325 mg Route: Oral Frequency: EVERY 4 HOURS  as needed for pain          senna-docusate (PERICOLACE) 8.6-50 mg per tablet 2 Tab Ordered Dose: 2 Tab Route: Oral Frequency: DAILY       Current Discharge Medication List      CONTINUE these medications which have NOT CHANGED    Details   amiodarone (CORDARONE) 200 mg tablet Take 1 Tab by mouth daily. budesonide (PULMICORT) 0.5 mg/2 mL nbsp 2 mL by Nebulization route two (2) times a day                  senna-docusate (PERICOLACE) 8.6-50 mg per tablet Take 2 Tabs by mouth two (2) times a day. tiotropium (SPIRIVA) 18 mcg inhalation capsule Take 1 Cap by inhalation daily.          pantoprazole (PROTONIX) 40 mg tablet Take 1 Tab by mouth Daily (before breakfast) for 30 days. Qty: 30 Tab, Refills: 0      magnesium hydroxide (MILK OF MAGNESIA) 400 mg/5 mL suspension Take 30 mL by mouth daily. enoxaparin (LOVENOX) 40 mg/0.4 mL 0.4 mL by SubCUTAneous route every twenty-four (24) hours                  gabapentin (NEURONTIN) 300 mg capsule Take 1 Cap by mouth three (3) times daily. benzonatate (TESSALON) 100 mg capsule Take 1 Cap by mouth three (3) times daily as needed for Cough. Associated Diagnoses: Malignant neoplasm of lower lobe of right lung (HCC)      albuterol (PROVENTIL HFA, VENTOLIN HFA, PROAIR HFA) 90 mcg/actuation inhaler Take 1 Puff by inhalation every six (6) hours as needed for Wheezing or Shortness of Breath. ascorbic acid, vitamin C, (VITAMIN C) 500 mg tablet Take  by mouth.      calc-D3-magnes-G0-Ek-Do-jake 250 mg-400 unit -40 mg-5 mg tab Take 1 Tab by mouth daily. MAGNESIUM PO Take 100 mg by mouth daily. VITAMIN B COMPLEX PO Take 1 Tab by mouth daily. atorvastatin (LIPITOR) 10 mg tablet Take 10 mg by mouth daily. cholecalciferol (VITAMIN D3) 1,000 unit tablet Take 1,000 Units by mouth daily. levothyroxine (SYNTHROID) 75 mcg tablet Take 75 mcg by mouth daily. losartan (COZAAR) 25 mg tablet Take 25 mg by mouth daily. therapeutic multivitamin (THERAGRAN) tablet Take 1 Tab by mouth daily. !! - Potential duplicate medications found. Please discuss with provider. STOP taking these medications       cefTRIAXone 1 gram 1 g, ADDaptor 1 Device IVPB Comments:   Reason for Stopping:         HYDROcodone-acetaminophen (NORCO) 5-325 mg per tablet Comments:   Reason for Stopping:               Discharge time: 35 minutes.     Signed By: Zoraida Qureshi MD     June 12, 2019

## 2019-06-12 NOTE — PROGRESS NOTES
PHYSICAL THERAPY DISCHARGE SUMMARY    Time in: 0920  Time out: 0959     Precautions at discharge: Other (comment)(fall risk)    Problem List:    Decreased strength B LE  [x]     Decreased strength trunk/core  [x]     Decreased AROM   []     Decreased PROM  []     Decreased balance sitting  []     Decreased balance standing  [x]     Decreased endurance  [x]     Pain  [x]       Functional Limitations:   Decreased independence with bed mobility  [x]     Decreased independence with functional transfers  [x]     Decreased independence with ambulation  [x]     Decreased independence with stair negotiation  [x]            Outcome Measures: Vital Signs:   Patient Vitals for the past 8 hrs:   Temp Pulse Resp BP SpO2   06/12/19 0920 -- -- -- -- 96 %   06/12/19 0720 97.9 °F (36.6 °C) 81 20 102/52 93 %   06/12/19 0543 -- -- -- -- 97 %         Pain level: 6/10  Pain location: R shoulder  Pain interventions: Positioned for comfort, provided rest breaks, pain medication per nursing    Patient education: Educated patient on safety with functional mobility. Interdisciplinary Communication: Communicated with Yee Sher regarding patient's POC. Cognition: Verbal cues for safety with functional mobility.      MMT Initial Asssessment   Right Lower Extremity Left Lower Extremity   Hip Flexion 4- 4-   Knee Extension 4 4   Knee Flexion 4- 4-   Ankle Dorsiflexion 4 4      MMT Discharge Assessment   Right Lower Extremity Left Lower Extremity   Hip Flexion 4- 4-   Knee Extension 4 4   Knee Flexion 4- 4-   Ankle Dorsiflexion 4 4   0/5 No palpable muscle contraction  1/5 Palpable muscle contraction, no joint movement  2-/5 Less than full range of motion in gravity eliminated position  2/5 Able to complete full range of motion in gravity eliminated position  2+/5 Able to initiate movement against gravity  3-/5 More than half but not full range of motion against gravity  3/5 Able to complete full range of motion against gravity  3+/5 Completes full range of motion against gravity with minimal resistance  4-/5 Completes full range of motion against gravity with minimal-moderate resistance  4/5 Completes full range of motion against gravity with moderate resistance  4+/5 Completes full range of motion against gravity with moderate-maximum resistance  5/5 Completes full range of motion against gravity with maximum resistance     AROM: Generally decreased, functional.    FIM SCORES Initial Assessment Discharge Assessment   Bed/Chair/Wheelchair Transfers 3 4   Wheelchair Mobility 0(Secondary to patient fatigue) 0(Secondary to pain in L UE)   Walking [place] 1 1   Steps/Stairs 0(Secondary to decreased functional strength and balance) 0   PRIMARY MODE OF LOCOMOTION: Ambulation  Please see IRC Interdisciplinary Eval: Coordination/Balance Section for details regarding FIM score description.     BED/CHAIR/WHEELCHAIR TRANSFERS Initial Assessment Discharge Assessment   Rolling Right 4 (Minimal assistance)     Rolling Left 4 (Minimal assistance)     Supine to Sit 4 (Minimal assistance) 4 (Minimal assistance)   Sit to Stand Moderate assistance Minimal assistance   Sit to Supine 4 (Minimal assistance) 4 (Minimal assistance)   Transfer Assist Score 3 4   Transfer Type SPT without device SPT with walker   Comments Increased time and effort with decreased anterior weight shift and slow shuffled step from bed<>w/c Improved anterior weight shift and upright posture   Car Transfer Not tested(Secondary to decreased functional strength and balance) Not tested   Car Type           WHEELCHAIR MOBILITY/MANAGEMENT Initial Assessment Discharge Assessment   Able to Propel 0 feet     Functional Level 0(Secondary to patient fatigue) 0(Secondary to pain in L UE)   Curbs/ramps assistance required 0 (Not tested)     Wheelchair set up assistance required 0 (Not tested)     Wheelchair management           WALKING INDEPENDENCE Initial Assessment Discharge Assessment   Assistive device Other (comment)(No device) Gait belt;Walker, rolling   Ambulation assistance - level surface 1 (Dependent/total assistance)(MOD A HHA x1, w/c follow with 2nd person for safety) 1 (Dependent/total assistance)(MIN A x 1 with 2nd person for w/c follow for safety)   Distance 5 Feet (ft) 20 Feet (ft)   Functional Level 1 1   Comments Patient took slow shuffled step with increased trunk sway to ipsilateral side during single limb stance, narrowd CLAUDIO Patient ambulated with moderate forward head posture, decreased step clearance of B LE, abrupt sit from ambulation reporting she was tired   Ambulation assistance - unlevel surface 0 (Not tested)(Secondary to decreased dynamic balance) 0 (Not tested)(Secondary to patient fatigue)       STEPS/STAIRS Initial Assessment Discharge Assessment   Steps/Stairs ambulated 0     Rail Use       Functional Level 0(Secondary to decreased functional strength and balance) 0   Comments Secondary to decreased functional strength and balance Secondary to decreased functional strength and balance   Curbs/Ramps 0 (Not tested) 0 (Not tested)(Secondary to decreased functional strength and balance)       QUALITY INDICATOR ASSIST COMMENTS   Walk 10 feet DEP MIN a x 1 with 2nd person w/c follow    Walk 50 feet with 2 turns NT Secondary to patient fatigue   Walk 150 feet NT Secondary to patient fatigue   Walk 10 feet on uneven  NT Secondary to impaired dynamic standing balance   1 step/curb NT Secondary to decreased functional strength   4 steps NT Secondary to decreased functional strength   12 steps NT Secondary to decreased functional strength    object NT Secondary to impaired dynamic balance   Wheel 50' w/2 turns NT Secondary to pain in R posterior flank   Wheel 150' NT Secondary to pain in R posterior flank     Patient returned to room lying supine in bed with all needs in reach.          PHYSICAL THERAPY PLAN OF CARE    LTGs: For updated LTG's please see Care Plan.    Pt would benefit from continued skilled physical therapy in order to improve independent functional mobility within the home with use of least restrictive device. Interventions may include range of motion (AROM, PROM B LE/trunk), motor function (B LE/trunk strengthening/coordination), activity tolerance (vitals, oxygen saturation levels), bed mobility training, balance activities, gait training (progressive ambulation program), and functional transfer training. .     Therapy Recommendations upon discharge: Other (comment)(continued rehab at skilled nursing)   Equipment needs at discharge:    None. Patient transitioning to skilled nursing facility. Please see IRC; Interdisciplinary Eval, Care Plan, and Patient Education for further information regarding physical therapy discharge summary and plan of care.      Briana Blake  6/12/2019

## 2019-06-12 NOTE — PROGRESS NOTES
6/12/2019    PLAN:  Continue current care  Wound vac to right chest maintained per wound care team.   Sleep aid  If discharged to rehab today then follow up with Dr. Primitivo Diaz next Friday in office                ASSESSMENT:  Admit Date: 5/31/2019  To rehab  Surgery 5/22/19  Procedure(s):  1. RIGHT THORACOSCOPY switched to thoracotomy with extensive pneumonolysis  2. LOWER LOBECTOMY   3. Upper lobe blebectomy x 2  4. Diaphragm resection with primary repair  5. Chest wall resection  6. MEDISTINAL LYMPHADENECTOMY  7. Intercostal nerve cryoablation       DIAGNOSIS   A: #9 LYMPH NODE X2:   TWO LYMPH NODES NEGATIVE FOR METASTATIC CARCINOMA (0/2). B: #7 LYMPH NODE X5:   FIVE LYMPH NODES NEGATIVE FOR METASTATIC CARCINOMA (0/5). C: PERIHILAR LYMPH NODE:   ONE LYMPH NODE NEGATIVE FOR METASTATIC CARCINOMA (0/1). D: 4R LYMPH NODE X2:   TWO LYMPH NODES NEGATIVE FOR METASTATIC CARCINOMA (0/2). E: UPPER LOBE BLEB X2:   FINDINGS CONSISTENT WITH PULMONARY BLEBS. F: RIGHT LOWER LOBE, CHEST WALL, DIAPHRAGM:   POORLY DIFFERENTIATED NON-SMALL CELL CARCINOMA WITH SQUAMOUS FEATURES AND FOCAL NEUROENDOCRINE DIFFERENTIATION. CARCINOMA IS 6.9 CM IN GREATEST DIMENSION. MARGINS ARE NEGATIVE FOR CARCINOMA. FIVE LYMPH NODES NEGATIVE FOR METASTATIC CARCINOMA (0/5). 06/04/19: Patient denies SOB. Currently O2 sat 97% with 5LNC, Temp max 101.1  Wound Vac maintained to right chest with purulent appearing drainage in canister. Patient with complaints of pain with dressing changed. 06/05/19 Patient states she is feeling much better today and participating in exercises. VAC with purulent drainage in canister, scheduled for vac dressing change today. ID following, AF. O2 5L 95% sat. 06/06/19 Patient upset this afternoon stating she is going to a nursing home because this rehab is just too hard and it is easier at the nursing home.   States she had a very bad night related to right chest \"pulling\" about every 30 minutes and and unable to get any rest.  She continues with oxygen 4L. No SOB, AF, NAD, drainage to wound vac still thick wiggins appearing. Dr. Emerald Guan spoke with her at length recommending she stay at rehab and continue the current care. 06/10/19 Saw patient this AM sleeping. Wound vac maintained. Tmax 100.8. Spoke with wound care nurse this afternoon and she changed wound vac and now unable to keep a seal.  Dr. Emerald Guan and myself arrived at bedside with [facility] ([facility]ound care), removed vac dressing. Seems wound is comunicating with pleural space and having difficulty keeping a seal for wound vac. Dr. Emerald Guan place xeroform to posterior portion of wound and placed wound vac. Will monitor. MUST REMOVE XEROFORM WITH EVERY VAC CHANGE. Tissue granulation in wound bed noted without infection noted. 06/11/19 Wound vac maintained to suction. Patient complains of lack of sleep and does not want ativan. C/o congestion with inability to expectorate secreations. 06/12/19 No complaints of SOB. on 5LNC  Vac maintained and functioning, AF    OBJECTIVE:  Patient Vitals for the past 24 hrs:   Temp Pulse Resp BP SpO2   06/12/19 0920 -- -- -- -- 96 %   06/12/19 0720 97.9 °F (36.6 °C) 81 20 102/52 93 %   06/12/19 0543 -- -- -- -- 97 %   06/11/19 2057 98.4 °F (36.9 °C) 96 18 98/57 --   06/11/19 1713 -- 84 18 90/49 91 %   06/11/19 1443 -- -- -- -- 95 %   06/11/19 1050 -- -- -- -- 96 %                  General:          No acute distress    Lungs:             CTA Bilaterally.  O2 NC, Wound vac to right chest maintained without airleak  Heart:              RRR  Abdomen:        Soft, Non distended, Non tender, BS+  Extremities:     No cyanosis, clubbing or edema  Neurologic:      No focal deficits           Labs:    Recent Labs     06/10/19  0632   WBC 14.9*   HGB 9.1*         K 3.6      CO2 29   BUN 10   CREA 0.79            Crystal Cam Ramires NP

## 2019-06-13 ENCOUNTER — HOME HEALTH ADMISSION (OUTPATIENT)
Dept: HOME HEALTH SERVICES | Facility: HOME HEALTH | Age: 79
End: 2019-06-13

## 2019-06-13 RX ORDER — AMIODARONE HYDROCHLORIDE 200 MG/1
200 TABLET ORAL DAILY
Qty: 30 TAB | Refills: 1 | Status: SHIPPED | OUTPATIENT
Start: 2019-06-13 | End: 2019-08-23 | Stop reason: ALTCHOICE

## 2019-06-13 RX ORDER — LEVOTHYROXINE SODIUM 75 UG/1
75 TABLET ORAL DAILY
Qty: 30 TAB | Refills: 1 | Status: SHIPPED | OUTPATIENT
Start: 2019-06-13

## 2019-06-13 RX ORDER — PANTOPRAZOLE SODIUM 40 MG/1
40 TABLET, DELAYED RELEASE ORAL
Qty: 30 TAB | Refills: 1 | Status: SHIPPED | OUTPATIENT
Start: 2019-06-13 | End: 2019-07-06

## 2019-06-13 RX ORDER — GABAPENTIN 300 MG/1
300 CAPSULE ORAL 3 TIMES DAILY
Qty: 90 CAP | Refills: 2 | Status: SHIPPED | OUTPATIENT
Start: 2019-06-13 | End: 2019-07-06

## 2019-06-13 RX ORDER — ATORVASTATIN CALCIUM 10 MG/1
10 TABLET, FILM COATED ORAL DAILY
Qty: 30 TAB | Refills: 1 | Status: SHIPPED | OUTPATIENT
Start: 2019-06-13

## 2019-06-26 ENCOUNTER — APPOINTMENT (OUTPATIENT)
Dept: GENERAL RADIOLOGY | Age: 79
DRG: 194 | End: 2019-06-26
Attending: EMERGENCY MEDICINE
Payer: MEDICARE

## 2019-06-26 ENCOUNTER — HOSPITAL ENCOUNTER (INPATIENT)
Age: 79
LOS: 9 days | Discharge: NURSING, REHAB OR LONG-TERM CARE | DRG: 194 | End: 2019-07-06
Attending: EMERGENCY MEDICINE | Admitting: FAMILY MEDICINE
Payer: MEDICARE

## 2019-06-26 DIAGNOSIS — R77.8 ELEVATED TROPONIN: ICD-10-CM

## 2019-06-26 DIAGNOSIS — J44.9 CHRONIC OBSTRUCTIVE PULMONARY DISEASE, UNSPECIFIED COPD TYPE (HCC): Chronic | ICD-10-CM

## 2019-06-26 DIAGNOSIS — R53.83 FATIGUE, UNSPECIFIED TYPE: ICD-10-CM

## 2019-06-26 DIAGNOSIS — R06.02 SOB (SHORTNESS OF BREATH): Primary | ICD-10-CM

## 2019-06-26 DIAGNOSIS — R09.02 HYPOXIA: ICD-10-CM

## 2019-06-26 DIAGNOSIS — Z51.5 ENCOUNTER FOR PALLIATIVE CARE: ICD-10-CM

## 2019-06-26 DIAGNOSIS — Z71.89 COUNSELING AND COORDINATION OF CARE: ICD-10-CM

## 2019-06-26 DIAGNOSIS — R79.89 ELEVATED BRAIN NATRIURETIC PEPTIDE (BNP) LEVEL: ICD-10-CM

## 2019-06-26 DIAGNOSIS — J96.11 CHRONIC RESPIRATORY FAILURE WITH HYPOXIA (HCC): Chronic | ICD-10-CM

## 2019-06-26 DIAGNOSIS — R53.81 DEBILITY: Chronic | ICD-10-CM

## 2019-06-26 DIAGNOSIS — J43.2 CENTRILOBULAR EMPHYSEMA (HCC): Chronic | ICD-10-CM

## 2019-06-26 DIAGNOSIS — J90 PLEURAL EFFUSION, RIGHT: ICD-10-CM

## 2019-06-26 DIAGNOSIS — M54.9 BACK PAIN, UNSPECIFIED BACK LOCATION, UNSPECIFIED BACK PAIN LATERALITY, UNSPECIFIED CHRONICITY: ICD-10-CM

## 2019-06-26 DIAGNOSIS — Z98.890 HISTORY OF THORACOTOMY: ICD-10-CM

## 2019-06-26 DIAGNOSIS — E87.6 HYPOKALEMIA: ICD-10-CM

## 2019-06-26 DIAGNOSIS — I50.9 CONGESTIVE HEART FAILURE, UNSPECIFIED HF CHRONICITY, UNSPECIFIED HEART FAILURE TYPE (HCC): ICD-10-CM

## 2019-06-26 DIAGNOSIS — J18.9 HCAP (HEALTHCARE-ASSOCIATED PNEUMONIA): ICD-10-CM

## 2019-06-26 DIAGNOSIS — C34.91 NON-SMALL CELL CANCER OF RIGHT LUNG (HCC): ICD-10-CM

## 2019-06-26 LAB
ARTERIAL PATENCY WRIST A: ABNORMAL
BASE EXCESS BLD CALC-SCNC: 9 MMOL/L
BASOPHILS # BLD: 0.1 K/UL (ref 0–0.2)
BASOPHILS NFR BLD: 1 % (ref 0–2)
BDY SITE: ABNORMAL
BODY TEMPERATURE: 98.6
CO2 BLD-SCNC: 33 MMOL/L
COLLECT TIME,HTIME: 2337
DIFFERENTIAL METHOD BLD: ABNORMAL
EOSINOPHIL # BLD: 0.2 K/UL (ref 0–0.8)
EOSINOPHIL NFR BLD: 1 % (ref 0.5–7.8)
ERYTHROCYTE [DISTWIDTH] IN BLOOD BY AUTOMATED COUNT: 16 % (ref 11.9–14.6)
FLOW RATE ISTAT,IFRATE: 6 L/MIN
GAS FLOW.O2 O2 DELIVERY SYS: ABNORMAL L/MIN
HCO3 BLD-SCNC: 32.2 MMOL/L (ref 22–26)
HCT VFR BLD AUTO: 30.4 % (ref 35.8–46.3)
HGB BLD-MCNC: 9.4 G/DL (ref 11.7–15.4)
IMM GRANULOCYTES # BLD AUTO: 0.1 K/UL (ref 0–0.5)
IMM GRANULOCYTES NFR BLD AUTO: 1 % (ref 0–5)
LACTATE BLD-SCNC: 1.06 MMOL/L (ref 0.5–1.9)
LYMPHOCYTES # BLD: 0.9 K/UL (ref 0.5–4.6)
LYMPHOCYTES NFR BLD: 8 % (ref 13–44)
MCH RBC QN AUTO: 27.3 PG (ref 26.1–32.9)
MCHC RBC AUTO-ENTMCNC: 30.9 G/DL (ref 31.4–35)
MCV RBC AUTO: 88.4 FL (ref 79.6–97.8)
MONOCYTES # BLD: 1 K/UL (ref 0.1–1.3)
MONOCYTES NFR BLD: 8 % (ref 4–12)
NEUTS SEG # BLD: 10.1 K/UL (ref 1.7–8.2)
NEUTS SEG NFR BLD: 82 % (ref 43–78)
NRBC # BLD: 0 K/UL (ref 0–0.2)
PCO2 BLD: 37.1 MMHG (ref 35–45)
PH BLD: 7.55 [PH] (ref 7.35–7.45)
PLATELET # BLD AUTO: 318 K/UL (ref 150–450)
PMV BLD AUTO: 9.1 FL (ref 9.4–12.3)
PO2 BLD: 70 MMHG (ref 75–100)
RBC # BLD AUTO: 3.44 M/UL (ref 4.05–5.2)
SAO2 % BLD: 96 % (ref 95–98)
SERVICE CMNT-IMP: ABNORMAL
SPECIMEN TYPE: ABNORMAL
WBC # BLD AUTO: 12.3 K/UL (ref 4.3–11.1)

## 2019-06-26 PROCEDURE — 85025 COMPLETE CBC W/AUTO DIFF WBC: CPT

## 2019-06-26 PROCEDURE — 36600 WITHDRAWAL OF ARTERIAL BLOOD: CPT

## 2019-06-26 PROCEDURE — 77030011943

## 2019-06-26 PROCEDURE — 84484 ASSAY OF TROPONIN QUANT: CPT

## 2019-06-26 PROCEDURE — 83605 ASSAY OF LACTIC ACID: CPT

## 2019-06-26 PROCEDURE — 51701 INSERT BLADDER CATHETER: CPT | Performed by: EMERGENCY MEDICINE

## 2019-06-26 PROCEDURE — 93005 ELECTROCARDIOGRAM TRACING: CPT | Performed by: EMERGENCY MEDICINE

## 2019-06-26 PROCEDURE — 71045 X-RAY EXAM CHEST 1 VIEW: CPT

## 2019-06-26 PROCEDURE — 84145 PROCALCITONIN (PCT): CPT

## 2019-06-26 PROCEDURE — 99285 EMERGENCY DEPT VISIT HI MDM: CPT | Performed by: EMERGENCY MEDICINE

## 2019-06-26 PROCEDURE — 82803 BLOOD GASES ANY COMBINATION: CPT

## 2019-06-26 PROCEDURE — 81003 URINALYSIS AUTO W/O SCOPE: CPT | Performed by: EMERGENCY MEDICINE

## 2019-06-26 PROCEDURE — 83880 ASSAY OF NATRIURETIC PEPTIDE: CPT

## 2019-06-26 PROCEDURE — 81015 MICROSCOPIC EXAM OF URINE: CPT

## 2019-06-26 PROCEDURE — 83735 ASSAY OF MAGNESIUM: CPT

## 2019-06-26 PROCEDURE — 80053 COMPREHEN METABOLIC PANEL: CPT

## 2019-06-27 ENCOUNTER — APPOINTMENT (OUTPATIENT)
Dept: CT IMAGING | Age: 79
DRG: 194 | End: 2019-06-27
Attending: INTERNAL MEDICINE
Payer: MEDICARE

## 2019-06-27 PROBLEM — R53.81 DEBILITY: Chronic | Status: ACTIVE | Noted: 2019-05-31

## 2019-06-27 PROBLEM — J90 PLEURAL EFFUSION, RIGHT: Status: ACTIVE | Noted: 2019-06-27

## 2019-06-27 PROBLEM — Z98.890 HISTORY OF THORACOTOMY: Chronic | Status: ACTIVE | Noted: 2019-05-31

## 2019-06-27 PROBLEM — J96.10 CHRONIC RESPIRATORY FAILURE (HCC): Status: ACTIVE | Noted: 2019-06-27

## 2019-06-27 PROBLEM — Z48.89 AFTERCARE FOLLOWING SURGERY: Status: RESOLVED | Noted: 2019-05-22 | Resolved: 2019-06-27

## 2019-06-27 PROBLEM — R91.8 RIGHT LOWER LOBE LUNG MASS: Status: RESOLVED | Noted: 2019-05-22 | Resolved: 2019-06-27

## 2019-06-27 PROBLEM — R77.8 ELEVATED TROPONIN: Status: ACTIVE | Noted: 2019-06-27

## 2019-06-27 PROBLEM — R52 PAIN: Status: RESOLVED | Noted: 2019-05-31 | Resolved: 2019-06-27

## 2019-06-27 PROBLEM — R79.89 ELEVATED BRAIN NATRIURETIC PEPTIDE (BNP) LEVEL: Status: ACTIVE | Noted: 2019-06-27

## 2019-06-27 PROBLEM — J96.10 CHRONIC RESPIRATORY FAILURE (HCC): Chronic | Status: ACTIVE | Noted: 2019-06-27

## 2019-06-27 PROBLEM — E87.70 VOLUME OVERLOAD: Status: ACTIVE | Noted: 2019-06-27

## 2019-06-27 PROBLEM — E87.6 HYPOKALEMIA: Status: ACTIVE | Noted: 2019-06-27

## 2019-06-27 LAB
ALBUMIN SERPL-MCNC: 1.8 G/DL (ref 3.2–4.6)
ALBUMIN/GLOB SERPL: 0.3 {RATIO} (ref 1.2–3.5)
ALP SERPL-CCNC: 86 U/L (ref 50–136)
ALT SERPL-CCNC: 8 U/L (ref 12–65)
AMMONIA PLAS-SCNC: 41 UMOL/L (ref 11–32)
ANION GAP SERPL CALC-SCNC: 8 MMOL/L (ref 7–16)
AST SERPL-CCNC: 14 U/L (ref 15–37)
ATRIAL RATE: 147 BPM
BACTERIA URNS QL MICRO: 0 /HPF
BILIRUB SERPL-MCNC: 1 MG/DL (ref 0.2–1.1)
BNP SERPL-MCNC: 421 PG/ML
BUN SERPL-MCNC: 12 MG/DL (ref 8–23)
CALCIUM SERPL-MCNC: 8 MG/DL (ref 8.3–10.4)
CALCULATED P AXIS, ECG09: 82 DEGREES
CALCULATED R AXIS, ECG10: 83 DEGREES
CALCULATED T AXIS, ECG11: 68 DEGREES
CASTS URNS QL MICRO: 0 /LPF
CHLORIDE SERPL-SCNC: 99 MMOL/L (ref 98–107)
CO2 SERPL-SCNC: 33 MMOL/L (ref 21–32)
CREAT SERPL-MCNC: 0.87 MG/DL (ref 0.6–1)
CRYSTALS URNS QL MICRO: 0 /LPF
DIAGNOSIS, 93000: NORMAL
EPI CELLS #/AREA URNS HPF: NORMAL /HPF
GLOBULIN SER CALC-MCNC: 5.2 G/DL (ref 2.3–3.5)
GLUCOSE SERPL-MCNC: 113 MG/DL (ref 65–100)
MAGNESIUM SERPL-MCNC: 1.8 MG/DL (ref 1.8–2.4)
MUCOUS THREADS URNS QL MICRO: 0 /LPF
OTHER OBSERVATIONS,UCOM: NORMAL
POTASSIUM SERPL-SCNC: 2.9 MMOL/L (ref 3.5–5.1)
PROCALCITONIN SERPL-MCNC: 0.1 NG/ML
PROT SERPL-MCNC: 7 G/DL (ref 6.3–8.2)
Q-T INTERVAL, ECG07: 348 MS
QRS DURATION, ECG06: 92 MS
QTC CALCULATION (BEZET), ECG08: 401 MS
RBC #/AREA URNS HPF: NORMAL /HPF
SODIUM SERPL-SCNC: 140 MMOL/L (ref 136–145)
TROPONIN I SERPL-MCNC: 0.22 NG/ML (ref 0.02–0.05)
TROPONIN I SERPL-MCNC: 0.34 NG/ML (ref 0.02–0.05)
VENTRICULAR RATE, ECG03: 80 BPM
WBC URNS QL MICRO: NORMAL /HPF

## 2019-06-27 PROCEDURE — 77030021668 HC NEB PREFIL KT VYRM -A

## 2019-06-27 PROCEDURE — 74011250636 HC RX REV CODE- 250/636: Performed by: FAMILY MEDICINE

## 2019-06-27 PROCEDURE — 97535 SELF CARE MNGMENT TRAINING: CPT

## 2019-06-27 PROCEDURE — 97162 PT EVAL MOD COMPLEX 30 MIN: CPT

## 2019-06-27 PROCEDURE — 96374 THER/PROPH/DIAG INJ IV PUSH: CPT | Performed by: EMERGENCY MEDICINE

## 2019-06-27 PROCEDURE — 51798 US URINE CAPACITY MEASURE: CPT

## 2019-06-27 PROCEDURE — 74011250637 HC RX REV CODE- 250/637: Performed by: HOSPITALIST

## 2019-06-27 PROCEDURE — 74011250636 HC RX REV CODE- 250/636: Performed by: HOSPITALIST

## 2019-06-27 PROCEDURE — 77030019952 HC CANSTR VAC ASST KCON -B

## 2019-06-27 PROCEDURE — 36415 COLL VENOUS BLD VENIPUNCTURE: CPT

## 2019-06-27 PROCEDURE — 86580 TB INTRADERMAL TEST: CPT | Performed by: FAMILY MEDICINE

## 2019-06-27 PROCEDURE — 77030034849

## 2019-06-27 PROCEDURE — 97166 OT EVAL MOD COMPLEX 45 MIN: CPT

## 2019-06-27 PROCEDURE — 84484 ASSAY OF TROPONIN QUANT: CPT

## 2019-06-27 PROCEDURE — 74011250637 HC RX REV CODE- 250/637: Performed by: FAMILY MEDICINE

## 2019-06-27 PROCEDURE — 0T9B70Z DRAINAGE OF BLADDER WITH DRAINAGE DEVICE, VIA NATURAL OR ARTIFICIAL OPENING: ICD-10-PCS | Performed by: HOSPITALIST

## 2019-06-27 PROCEDURE — 74750000023 HC WOUND THERAPY

## 2019-06-27 PROCEDURE — BT40ZZZ ULTRASONOGRAPHY OF BLADDER: ICD-10-PCS | Performed by: FAMILY MEDICINE

## 2019-06-27 PROCEDURE — 74011000302 HC RX REV CODE- 302: Performed by: FAMILY MEDICINE

## 2019-06-27 PROCEDURE — 82140 ASSAY OF AMMONIA: CPT

## 2019-06-27 PROCEDURE — 65660000000 HC RM CCU STEPDOWN

## 2019-06-27 PROCEDURE — 74011000258 HC RX REV CODE- 258: Performed by: EMERGENCY MEDICINE

## 2019-06-27 PROCEDURE — 71250 CT THORAX DX C-: CPT

## 2019-06-27 PROCEDURE — 99223 1ST HOSP IP/OBS HIGH 75: CPT | Performed by: INTERNAL MEDICINE

## 2019-06-27 PROCEDURE — 77030019605

## 2019-06-27 PROCEDURE — 74011250636 HC RX REV CODE- 250/636: Performed by: EMERGENCY MEDICINE

## 2019-06-27 PROCEDURE — 97530 THERAPEUTIC ACTIVITIES: CPT

## 2019-06-27 RX ORDER — POTASSIUM CHLORIDE 14.9 MG/ML
20 INJECTION INTRAVENOUS
Status: COMPLETED | OUTPATIENT
Start: 2019-06-27 | End: 2019-06-27

## 2019-06-27 RX ORDER — AMOXICILLIN 250 MG
2 CAPSULE ORAL 2 TIMES DAILY
Status: DISCONTINUED | OUTPATIENT
Start: 2019-06-27 | End: 2019-06-27

## 2019-06-27 RX ORDER — POTASSIUM CHLORIDE 20 MEQ/1
40 TABLET, EXTENDED RELEASE ORAL DAILY
Status: DISCONTINUED | OUTPATIENT
Start: 2019-06-27 | End: 2019-06-27

## 2019-06-27 RX ORDER — FUROSEMIDE 10 MG/ML
40 INJECTION INTRAMUSCULAR; INTRAVENOUS
Status: COMPLETED | OUTPATIENT
Start: 2019-06-27 | End: 2019-06-27

## 2019-06-27 RX ORDER — ASPIRIN 325 MG
325 TABLET ORAL DAILY
Status: DISCONTINUED | OUTPATIENT
Start: 2019-06-27 | End: 2019-07-06 | Stop reason: HOSPADM

## 2019-06-27 RX ORDER — HEPARIN SODIUM 5000 [USP'U]/ML
5000 INJECTION, SOLUTION INTRAVENOUS; SUBCUTANEOUS EVERY 8 HOURS
Status: DISCONTINUED | OUTPATIENT
Start: 2019-06-27 | End: 2019-06-27

## 2019-06-27 RX ORDER — FUROSEMIDE 10 MG/ML
40 INJECTION INTRAMUSCULAR; INTRAVENOUS DAILY
Status: DISCONTINUED | OUTPATIENT
Start: 2019-06-27 | End: 2019-06-28

## 2019-06-27 RX ORDER — GABAPENTIN 300 MG/1
300 CAPSULE ORAL 3 TIMES DAILY
Status: DISCONTINUED | OUTPATIENT
Start: 2019-06-27 | End: 2019-06-27

## 2019-06-27 RX ORDER — BISACODYL 5 MG
5 TABLET, DELAYED RELEASE (ENTERIC COATED) ORAL DAILY PRN
Status: DISCONTINUED | OUTPATIENT
Start: 2019-06-27 | End: 2019-07-06 | Stop reason: HOSPADM

## 2019-06-27 RX ORDER — HYDROCODONE BITARTRATE AND ACETAMINOPHEN 5; 325 MG/1; MG/1
1 TABLET ORAL
Status: DISCONTINUED | OUTPATIENT
Start: 2019-06-27 | End: 2019-07-03

## 2019-06-27 RX ORDER — NITROGLYCERIN 0.4 MG/1
0.4 TABLET SUBLINGUAL AS NEEDED
Status: DISCONTINUED | OUTPATIENT
Start: 2019-06-27 | End: 2019-07-06 | Stop reason: HOSPADM

## 2019-06-27 RX ORDER — LEVOTHYROXINE SODIUM 75 UG/1
75 TABLET ORAL DAILY
Status: DISCONTINUED | OUTPATIENT
Start: 2019-06-27 | End: 2019-07-06 | Stop reason: HOSPADM

## 2019-06-27 RX ORDER — BENZONATATE 100 MG/1
100 CAPSULE ORAL
Status: DISCONTINUED | OUTPATIENT
Start: 2019-06-27 | End: 2019-07-06 | Stop reason: HOSPADM

## 2019-06-27 RX ORDER — ENOXAPARIN SODIUM 100 MG/ML
40 INJECTION SUBCUTANEOUS EVERY 24 HOURS
Status: DISCONTINUED | OUTPATIENT
Start: 2019-06-27 | End: 2019-07-06 | Stop reason: HOSPADM

## 2019-06-27 RX ORDER — NITROGLYCERIN 400 UG/1
1 SPRAY ORAL AS NEEDED
Status: DISCONTINUED | OUTPATIENT
Start: 2019-06-27 | End: 2019-06-27 | Stop reason: CLARIF

## 2019-06-27 RX ORDER — ATORVASTATIN CALCIUM 10 MG/1
10 TABLET, FILM COATED ORAL DAILY
Status: DISCONTINUED | OUTPATIENT
Start: 2019-06-27 | End: 2019-06-27

## 2019-06-27 RX ORDER — SODIUM CHLORIDE 0.9 % (FLUSH) 0.9 %
5-40 SYRINGE (ML) INJECTION EVERY 8 HOURS
Status: DISCONTINUED | OUTPATIENT
Start: 2019-06-27 | End: 2019-07-06 | Stop reason: HOSPADM

## 2019-06-27 RX ORDER — TAMSULOSIN HYDROCHLORIDE 0.4 MG/1
0.4 CAPSULE ORAL DAILY
Status: DISCONTINUED | OUTPATIENT
Start: 2019-06-27 | End: 2019-07-06 | Stop reason: HOSPADM

## 2019-06-27 RX ORDER — FUROSEMIDE 10 MG/ML
20 INJECTION INTRAMUSCULAR; INTRAVENOUS 2 TIMES DAILY
Status: DISCONTINUED | OUTPATIENT
Start: 2019-06-27 | End: 2019-06-27

## 2019-06-27 RX ORDER — POTASSIUM CHLORIDE 20 MEQ/1
40 TABLET, EXTENDED RELEASE ORAL DAILY
Status: DISCONTINUED | OUTPATIENT
Start: 2019-06-28 | End: 2019-06-28

## 2019-06-27 RX ORDER — MIRTAZAPINE 15 MG/1
15 TABLET, FILM COATED ORAL
Status: DISCONTINUED | OUTPATIENT
Start: 2019-06-27 | End: 2019-07-06 | Stop reason: HOSPADM

## 2019-06-27 RX ORDER — ONDANSETRON 8 MG/1
4 TABLET, ORALLY DISINTEGRATING ORAL
Status: DISCONTINUED | OUTPATIENT
Start: 2019-06-27 | End: 2019-07-06 | Stop reason: HOSPADM

## 2019-06-27 RX ORDER — SODIUM CHLORIDE 0.9 % (FLUSH) 0.9 %
5-40 SYRINGE (ML) INJECTION AS NEEDED
Status: DISCONTINUED | OUTPATIENT
Start: 2019-06-27 | End: 2019-07-06 | Stop reason: HOSPADM

## 2019-06-27 RX ORDER — ALBUTEROL SULFATE 0.83 MG/ML
2.5 SOLUTION RESPIRATORY (INHALATION)
Status: DISCONTINUED | OUTPATIENT
Start: 2019-06-27 | End: 2019-07-06 | Stop reason: HOSPADM

## 2019-06-27 RX ORDER — AMIODARONE HYDROCHLORIDE 200 MG/1
200 TABLET ORAL DAILY
Status: DISCONTINUED | OUTPATIENT
Start: 2019-06-27 | End: 2019-07-06 | Stop reason: HOSPADM

## 2019-06-27 RX ORDER — PANTOPRAZOLE SODIUM 40 MG/1
40 TABLET, DELAYED RELEASE ORAL
Status: DISCONTINUED | OUTPATIENT
Start: 2019-06-27 | End: 2019-06-29

## 2019-06-27 RX ADMIN — MIRTAZAPINE 15 MG: 15 TABLET, FILM COATED ORAL at 22:01

## 2019-06-27 RX ADMIN — Medication 10 ML: at 22:01

## 2019-06-27 RX ADMIN — ASPIRIN 325 MG ORAL TABLET 325 MG: 325 PILL ORAL at 08:51

## 2019-06-27 RX ADMIN — TAMSULOSIN HYDROCHLORIDE 0.4 MG: 0.4 CAPSULE ORAL at 20:17

## 2019-06-27 RX ADMIN — SODIUM CHLORIDE 150 ML: 900 INJECTION, SOLUTION INTRAVENOUS at 00:00

## 2019-06-27 RX ADMIN — Medication 5 ML: at 14:00

## 2019-06-27 RX ADMIN — FUROSEMIDE 40 MG: 10 INJECTION, SOLUTION INTRAMUSCULAR; INTRAVENOUS at 00:29

## 2019-06-27 RX ADMIN — POTASSIUM CHLORIDE 20 MEQ: 200 INJECTION, SOLUTION INTRAVENOUS at 05:22

## 2019-06-27 RX ADMIN — ENOXAPARIN SODIUM 40 MG: 40 INJECTION SUBCUTANEOUS at 16:14

## 2019-06-27 RX ADMIN — HEPARIN SODIUM 5000 UNITS: 5000 INJECTION INTRAVENOUS; SUBCUTANEOUS at 05:20

## 2019-06-27 RX ADMIN — POTASSIUM CHLORIDE 20 MEQ: 200 INJECTION, SOLUTION INTRAVENOUS at 00:54

## 2019-06-27 RX ADMIN — LEVOTHYROXINE SODIUM 75 MCG: 75 TABLET ORAL at 08:51

## 2019-06-27 RX ADMIN — AMIODARONE HYDROCHLORIDE 200 MG: 200 TABLET ORAL at 08:51

## 2019-06-27 RX ADMIN — Medication 5 ML: at 05:20

## 2019-06-27 RX ADMIN — LACTULOSE 30 ML: 20 SOLUTION ORAL at 17:45

## 2019-06-27 RX ADMIN — MIRTAZAPINE 15 MG: 15 TABLET, FILM COATED ORAL at 03:35

## 2019-06-27 RX ADMIN — POTASSIUM CHLORIDE 20 MEQ: 200 INJECTION, SOLUTION INTRAVENOUS at 03:29

## 2019-06-27 RX ADMIN — GABAPENTIN 300 MG: 300 CAPSULE ORAL at 08:51

## 2019-06-27 RX ADMIN — TUBERCULIN PURIFIED PROTEIN DERIVATIVE 5 UNITS: 5 INJECTION, SOLUTION INTRADERMAL at 03:36

## 2019-06-27 RX ADMIN — PANTOPRAZOLE SODIUM 40 MG: 40 TABLET, DELAYED RELEASE ORAL at 05:19

## 2019-06-27 RX ADMIN — FUROSEMIDE 40 MG: 10 INJECTION, SOLUTION INTRAMUSCULAR; INTRAVENOUS at 08:52

## 2019-06-27 NOTE — ED TRIAGE NOTES
Pt arrives via EMS; alert and oriented and speaking in several word sentences. EMS reports being called to residence by family with c/c being generalized weakness. EMS reports an uneventful transport until arrival to ED when patient's oxygenation reportedly began to decline.

## 2019-06-27 NOTE — PROGRESS NOTES
TRANSFER - IN REPORT:    Verbal report received from Champ(name) on Laura Padilla  being received from ED(unit) for routine progression of care      Report consisted of patients Situation, Background, Assessment and   Recommendations(SBAR). Information from the following report(s) SBAR, Kardex and MAR was reviewed with the receiving nurse. Opportunity for questions and clarification was provided. Assessment completed upon patients arrival to unit and care assumed.

## 2019-06-27 NOTE — H&P
HOSPITALIST H&P/CONSULT  NAME:  Domenic Thakkar   Age:  78 y.o.  :   1940   MRN:   [id number]  PCP: Cordelia Goldberg, MD  Consulting MD:  Treatment Team: Attending Provider: Praveena Donahue MD; Primary Nurse: Trenton Jacobson RN  HPI:   Patient is a 71yo F with hx lung cancer recent RLL lobectomy on . She had a protracted hospitalization with hypoxia, overload, atelectasis, congestion. She completed inpatient rehab at [facility] on  and was discharged with 5L oxygen requirement to SNF. The patient left SNF AMA after one day. Today, she presents with generalized weakness, not feeling well, and shortness of breath. Has been having more difficulty getting around at home. Very sob with any movement. Had been getting up to bathroom with assistance but no longer able. CXR with effusion and atelectasis v pna, troponin elevated to 0.34, K 2.9, and     Complete ROS done and is as stated in HPI or otherwise negative  Past Medical History:   Diagnosis Date    Aspiration pneumonia ([address] 2019    Cancer ([address]     Lung cancer    Chronic obstructive pulmonary disease ([address]     Hypertension     Severe sepsis with acute organ dysfunction ([address] 2019    [facility] [facility] [facility]     Thyroid disease       Past Surgical History:   Procedure Laterality Date    HX OTHER SURGICAL      coil in brain    HX WRIST FRACTURE TX      bilat wrist    reviewed  Prior to Admission Medications   Prescriptions Last Dose Informant Patient Reported? Taking? HYDROcodone-acetaminophen (NORCO) 5-325 mg per tablet   No No   Sig: Take 1 Tab by mouth every four (4) hours as needed (for pain) for up to 30 days. Max Daily Amount: 6 Tabs. VITAMIN B COMPLEX PO   Yes No   Sig: Take 1 Tab by mouth daily. albuterol (PROVENTIL HFA, VENTOLIN HFA, PROAIR HFA) 90 mcg/actuation inhaler   No No   Sig: Take 1 Puff by inhalation every six (6) hours as needed for Wheezing or Shortness of Breath.    amiodarone (CORDARONE) 200 mg tablet   No No   Sig: Take 1 Tab by mouth daily. Indications: Prevention of Recurrent Atrial Fibrillation   ascorbic acid, vitamin C, (VITAMIN C) 500 mg tablet   Yes No   Sig: Take  by mouth. atorvastatin (LIPITOR) 10 mg tablet   No No   Sig: Take 1 Tab by mouth daily. benzonatate (TESSALON) 100 mg capsule   No No   Sig: Take 1 Cap by mouth three (3) times daily as needed for Cough. calc-D3-magnes-Z1-Ry-Fk-jake 250 mg-400 unit -40 mg-5 mg tab   Yes No   Sig: Take 1 Tab by mouth daily. cholecalciferol (VITAMIN D3) 1,000 unit tablet   Yes No   Sig: Take 1,000 Units by mouth daily. gabapentin (NEURONTIN) 300 mg capsule   No No   Sig: Take 1 Cap by mouth three (3) times daily. gabapentin (NEURONTIN) 300 mg capsule   No No   Sig: Take 1 Cap by mouth three (3) times daily. Indications: Neuropathic Pain   levothyroxine (SYNTHROID) 75 mcg tablet   No No   Sig: Take 1 Tab by mouth daily. magnesium hydroxide (MILK OF MAGNESIA) 400 mg/5 mL suspension   Yes No   Sig: Take 30 mL by mouth daily. mirtazapine (REMERON) 15 mg tablet   No No   Sig: Take 1 Tab by mouth nightly. Indications: sleep   pantoprazole (PROTONIX) 40 mg tablet   No No   Sig: Take 1 Tab by mouth Daily (before breakfast) for 30 days. senna-docusate (PERICOLACE) 8.6-50 mg per tablet   Yes No   Sig: Take 2 Tabs by mouth two (2) times a day. therapeutic multivitamin [facility]) tablet   Yes No   Sig: Take 1 Tab by mouth daily. tiotropium (SPIRIVA) 18 mcg inhalation capsule   No No   Sig: Take 1 Cap by inhalation daily. Facility-Administered Medications: None     Allergies   Allergen Reactions    Penicillins Unknown (comments)     Hives.     Tolerated ceftriaxone May 2019    Percocet [Oxycodone-Acetaminophen] Hives    Prednisone Other (comments)     Tachycardia      Social History     Tobacco Use    Smoking status: Former Smoker     Packs/day: 1.50     Years: 45.00     Pack years: 67.50     Types: Cigarettes Last attempt to quit: 2004     Years since quitting: 15.4    Smokeless tobacco: Never Used   Substance Use Topics    Alcohol use: Not Currently      History reviewed. No pertinent family history. reviewed  Objective:     Visit Vitals  /66   Pulse 73   Temp 98.3 °F (36.8 °C)   Resp 20   Ht 5' 10\" (1.778 m)   Wt 59 kg (130 lb)   SpO2 98%   BMI 18.65 kg/m²      Temp (24hrs), Av.3 °F (36.8 °C), Min:98.3 °F (36.8 °C), Max:98.3 °F (36.8 °C)    Oxygen Therapy  O2 Sat (%): 98 % (19)  Pulse via Oximetry: 73 beats per minute (19)  O2 Device: Nasal cannula (19)  O2 Flow Rate (L/min): 5 l/min (19)  Physical Exam:  General:    Elderly, thin, NAD  Head:   Normocephalic, without obvious abnormality, atraumatic. Nose:  Nares normal. No drainage or sinus tenderness. Lungs:   Coarse throughout, wound vac R chest wall. Heart:   Regular rate and rhythm,  no murmur, rub or gallop. Abdomen:   Soft, non-tender. Not distended. Bowel sounds normal.   Extremities: No cyanosis. No edema. No clubbing  Skin:     Texture, turgor normal. No rashes or lesions. Not Jaundiced  Neurologic: Alert and oriented x 3, no focal deficits   Data Review:   Recent Results (from the past 24 hour(s))   CBC WITH AUTOMATED DIFF    Collection Time: 19 11:14 PM   Result Value Ref Range    WBC 12.3 (H) 4.3 - 11.1 K/uL    RBC 3.44 (L) 4.05 - 5.2 M/uL    HGB 9.4 (L) 11.7 - 15.4 g/dL    HCT 30.4 (L) 35.8 - 46.3 %    MCV 88.4 79.6 - 97.8 FL    MCH 27.3 26.1 - 32.9 PG    MCHC 30.9 (L) 31.4 - 35.0 g/dL    RDW 16.0 (H) 11.9 - 14.6 %    PLATELET 455 853 - 097 K/uL    MPV 9.1 (L) 9.4 - 12.3 FL    ABSOLUTE NRBC 0.00 0.0 - 0.2 K/uL    DF AUTOMATED      NEUTROPHILS 82 (H) 43 - 78 %    LYMPHOCYTES 8 (L) 13 - 44 %    MONOCYTES 8 4.0 - 12.0 %    EOSINOPHILS 1 0.5 - 7.8 %    BASOPHILS 1 0.0 - 2.0 %    IMMATURE GRANULOCYTES 1 0.0 - 5.0 %    ABS. NEUTROPHILS 10.1 (H) 1.7 - 8.2 K/UL    ABS.  LYMPHOCYTES 0.9 0.5 - 4.6 K/UL    ABS. MONOCYTES 1.0 0.1 - 1.3 K/UL    ABS. EOSINOPHILS 0.2 0.0 - 0.8 K/UL    ABS. BASOPHILS 0.1 0.0 - 0.2 K/UL    ABS. IMM. GRANS. 0.1 0.0 - 0.5 K/UL   METABOLIC PANEL, COMPREHENSIVE    Collection Time: 06/26/19 11:14 PM   Result Value Ref Range    Sodium 140 136 - 145 mmol/L    Potassium 2.9 (LL) 3.5 - 5.1 mmol/L    Chloride 99 98 - 107 mmol/L    CO2 33 (H) 21 - 32 mmol/L    Anion gap 8 7 - 16 mmol/L    Glucose 113 (H) 65 - 100 mg/dL    BUN 12 8 - 23 MG/DL    Creatinine 0.87 0.6 - 1.0 MG/DL    GFR est AA >60 >60 ml/min/1.73m2    GFR est non-AA >60 >60 ml/min/1.73m2    Calcium 8.0 (L) 8.3 - 10.4 MG/DL    Bilirubin, total 1.0 0.2 - 1.1 MG/DL    ALT (SGPT) 8 (L) 12 - 65 U/L    AST (SGOT) 14 (L) 15 - 37 U/L    Alk.  phosphatase 86 50 - 136 U/L    Protein, total 7.0 6.3 - 8.2 g/dL    Albumin 1.8 (L) 3.2 - 4.6 g/dL    Globulin 5.2 (H) 2.3 - 3.5 g/dL    A-G Ratio 0.3 (L) 1.2 - 3.5     BNP    Collection Time: 06/26/19 11:14 PM   Result Value Ref Range     (H) 0 pg/mL   PROCALCITONIN    Collection Time: 06/26/19 11:14 PM   Result Value Ref Range    Procalcitonin 0.1 ng/mL   TROPONIN I    Collection Time: 06/26/19 11:14 PM   Result Value Ref Range    Troponin-I, Qt. 0.34 (HH) 0.02 - 0.05 NG/ML   MAGNESIUM    Collection Time: 06/26/19 11:14 PM   Result Value Ref Range    Magnesium 1.8 1.8 - 2.4 mg/dL   POC LACTIC ACID    Collection Time: 06/26/19 11:20 PM   Result Value Ref Range    Lactic Acid (POC) 1.06 0.5 - 1.9 mmol/L   URINE MICROSCOPIC    Collection Time: 06/26/19 11:42 PM   Result Value Ref Range    WBC 0-3 0 /hpf    RBC 0-3 0 /hpf    Epithelial cells 0-3 0 /hpf    Bacteria 0 0 /hpf    Casts 0 0 /lpf    Crystals, urine 0 0 /LPF    Mucus 0 0 /lpf    Other observations RESULTS VERIFIED MANUALLY     POC G3    Collection Time: 06/26/19 11:47 PM   Result Value Ref Range    Device: NASAL CANNULA      pH (POC) 7.547 (H) 7.35 - 7.45      pCO2 (POC) 37.1 35 - 45 MMHG    pO2 (POC) 70 (L) 75 - 100 MMHG    HCO3 (POC) 32. 2 (H) 22 - 26 MMOL/L    sO2 (POC) 96 95 - 98 %    Base excess (POC) 9 mmol/L    Allens test (POC) NOT APPLICABLE      Site RIGHT BRACHIAL      Patient temp. 98.6      Specimen type (POC) ARTERIAL      Performed by Colby     CO2, POC 33 MMOL/L    Flow rate (POC) 6.000 L/min    Critical value read back 23:48     Respiratory comment: PhysicianNotified     COLLECT TIME 2,337       Imaging /Procedures /Studies   XR CHEST PORT   Final Result   IMPRESSION: Right pleural effusion and right lung atelectasis or pneumonia   unchanged. Assessment and Plan: Active Hospital Problems    Diagnosis Date Noted    Volume overload 06/27/2019    Elevated troponin 06/27/2019    Elevated brain natriuretic peptide (BNP) level 06/27/2019    Chronic respiratory failure (HCC) 06/27/2019    Hypokalemia 06/27/2019    COPD (chronic obstructive pulmonary disease) ([facility] 05/24/2019       PLAN:  Effusions more likely volume related than infectious. Trend troponin, continue lasix. Check echo. Replace potassium while giving diuretics as above  PT/OT/PPD    DVT PPx: hsq  Code Status: full    Anticipated discharge: 3-4d. PPD, pt/ot evals.     Signed By: Aiden Burton MD     June 27, 2019

## 2019-06-27 NOTE — PROGRESS NOTES
Patient arrived room 806 via stretcher accompanied by transport. Patient is oriented to room. Dual skin assessment completed with Jitendra QUEEN. Wound vac connected to right upper back ( Lobectomy) site is dry and intact. Wound noted on sacrum. Small hematoma noted on right inner forearm. Respirations are even and unlabored. No distress at this time. Safety measures in place.

## 2019-06-27 NOTE — PROGRESS NOTES
MD ordered goodman placement for overnight. Goodman inserted with no difficulty. Patient tolerated tx well.

## 2019-06-27 NOTE — CONSULTS
CONSULT NOTE    Laquita Castro    6/27/2019    Date of Admission:  6/26/2019    The patient's chart is reviewed and the patient is discussed with the staff. Subjective:         Patient is a 78 y.o.  female seen and evaluated at the request of Dr. Ying English. She was diagnosed with NSCLC in May and she underwent an elective right thoracoscopy which was switched to right thoracotomy with extensive pneumonolysis,  right lower lobe lobectomy,  right upper lobe blebectomy x2,  diaphragm resection with primary repair,  chest wall resection,  mediastinal lymphadenectomy, intercostal nerve cryoablation. Post op, her wound became infected with strept for which she was treated with Vancomycin per ID. A wound vac remains. She also had problems with a fib, hypoxia, volume overload and debility. She was eventually discharged to rehab but has since been taken home by her daughter. She was getting PT at home. The patient is not responsive enough to provide a history so the chart was reviewed and her nurse interviewed. She was reportedly admitted with a 2 day history of weakness. Her echo shows an EF of 65%. She has no edema but her BNP is 421. She was not on home lasix but has been started on it here. She was sent home on oxygen. Her PFTs show mild obstruction. Her WBC is 12.3 with a PCT of 0.1. Review of Systems  Review of systems not obtained due to patient factors.     Patient Active Problem List   Diagnosis Code    Personal history of tobacco use, presenting hazards to health Z87.891    Liver lesion K76.9    Centrilobular emphysema ([address] J43.2    HTN (hypertension) I10    Acquired hypothyroidism E03.9    Hypoxia R09.02    COPD (chronic obstructive pulmonary disease) (HCC) J44.9    Normochromic anemia D64.9    Non-small cell lung cancer (NSCLC) (HCC) C34.90    Debility R53.81    Gait difficulty R26.9    Small cell carcinoma (HCC) C80.1    History of thoracotomy X47.235  Volume overload E87.70    Elevated troponin R74.8    Elevated brain natriuretic peptide (BNP) level R79.89    Chronic respiratory failure (HCC) J96.10    Hypokalemia E87.6    Pleural effusion, right J90         Prior to Admission Medications   Prescriptions Last Dose Informant Patient Reported? Taking? HYDROcodone-acetaminophen (NORCO) 5-325 mg per tablet 6/26/2019 at Unknown time  No Yes   Sig: Take 1 Tab by mouth every four (4) hours as needed (for pain) for up to 30 days. Max Daily Amount: 6 Tabs. VITAMIN B COMPLEX PO 6/20/2019 at Unknown time  Yes Yes   Sig: Take 1 Tab by mouth daily. albuterol (PROVENTIL HFA, VENTOLIN HFA, PROAIR HFA) 90 mcg/actuation inhaler 6/27/2019 at Unknown time  No Yes   Sig: Take 1 Puff by inhalation every six (6) hours as needed for Wheezing or Shortness of Breath. amiodarone (CORDARONE) 200 mg tablet 6/26/2019 at Unknown time  No Yes   Sig: Take 1 Tab by mouth daily. Indications: Prevention of Recurrent Atrial Fibrillation   ascorbic acid, vitamin C, (VITAMIN C) 500 mg tablet 6/26/2019 at Unknown time  Yes Yes   Sig: Take  by mouth. atorvastatin (LIPITOR) 10 mg tablet   No No   Sig: Take 1 Tab by mouth daily. benzonatate (TESSALON) 100 mg capsule   No No   Sig: Take 1 Cap by mouth three (3) times daily as needed for Cough. calc-D3-magnes-H3-Er-Fm-jake 250 mg-400 unit -40 mg-5 mg tab 6/20/2019 at Unknown time  Yes Yes   Sig: Take 1 Tab by mouth daily. cholecalciferol (VITAMIN D3) 1,000 unit tablet 6/26/2019 at Unknown time  Yes Yes   Sig: Take 1,000 Units by mouth daily. gabapentin (NEURONTIN) 300 mg capsule 6/26/2019 at Unknown time  No Yes   Sig: Take 1 Cap by mouth three (3) times daily. gabapentin (NEURONTIN) 300 mg capsule 6/26/2019 at Unknown time  No Yes   Sig: Take 1 Cap by mouth three (3) times daily. Indications: Neuropathic Pain   levothyroxine (SYNTHROID) 75 mcg tablet 6/26/2019 at Unknown time  No Yes   Sig: Take 1 Tab by mouth daily. magnesium hydroxide (MILK OF MAGNESIA) 400 mg/5 mL suspension 6/20/2019 at Unknown time  Yes Yes   Sig: Take 30 mL by mouth daily. mirtazapine (REMERON) 15 mg tablet 6/26/2019 at Unknown time  No Yes   Sig: Take 1 Tab by mouth nightly. Indications: sleep   pantoprazole (PROTONIX) 40 mg tablet 6/26/2019 at Unknown time  No Yes   Sig: Take 1 Tab by mouth Daily (before breakfast) for 30 days. senna-docusate (PERICOLACE) 8.6-50 mg per tablet 6/26/2019 at Unknown time  Yes Yes   Sig: Take 2 Tabs by mouth two (2) times a day. therapeutic multivitamin (THERAGRAN) tablet 6/20/2019 at Unknown time  Yes Yes   Sig: Take 1 Tab by mouth daily. tiotropium (SPIRIVA) 18 mcg inhalation capsule 6/26/2019 at Unknown time  No Yes   Sig: Take 1 Cap by inhalation daily.       Facility-Administered Medications: None       Past Medical History:   Diagnosis Date    Aspiration pneumonia ([address] 4/12/2019    Atrial fibrillation with rapid ventricular response ([address] 5/26/2019    Atrial fibrillation with RVR ([address] 5/31/2019    Cancer ([address]     Lung cancer    Chronic obstructive pulmonary disease ([address]     Hypertension     Severe sepsis with acute organ dysfunction ([address] 4/12/2019    Subarachnoid hemorrhage (HCC)     Thyroid disease     UTI (urinary tract infection) 5/29/2019     Active Ambulatory Problems     Diagnosis Date Noted    Personal history of tobacco use, presenting hazards to health 03/18/2019    Liver lesion 03/18/2019    Centrilobular emphysema ([address] 03/18/2019    HTN (hypertension) 04/12/2019    Acquired hypothyroidism 04/12/2019    Hypoxia 05/24/2019    COPD (chronic obstructive pulmonary disease) ([address] 05/24/2019    Normochromic anemia 05/26/2019    Non-small cell lung cancer (NSCLC) ([address] 05/30/2019    Debility 05/31/2019    Gait difficulty 05/31/2019    Small cell carcinoma ([address] 05/31/2019    History of thoracotomy 05/31/2019     Resolved Ambulatory Problems     Diagnosis Date Noted    Acute respiratory failure with hypoxia ([facility] 04/12/2019    Aftercare following surgery 05/22/2019    Right lower lobe lung mass 05/22/2019    Atrial fibrillation with RVR ([address] 05/26/2019    Pain 05/31/2019     Past Medical History:   Diagnosis Date    Aspiration pneumonia ([facility] 4/12/2019    Atrial fibrillation with rapid ventricular response ([address] 5/26/2019    Atrial fibrillation with RVR ([facility] 5/31/2019    Cancer (HCC)     Chronic obstructive pulmonary disease (HCC)     Hypertension     Severe sepsis with acute organ dysfunction ([facility] 4/12/2019    Subarachnoid hemorrhage ([facility]     Thyroid disease     UTI (urinary tract infection) 5/29/2019     Past Surgical History:   Procedure Laterality Date    HX OTHER SURGICAL      coil in brain    HX WRIST FRACTURE [place]      bilat wrist     Social History     Socioeconomic History    Marital status: SINGLE     Spouse name: Not on file    Number of children: Not on file    Years of education: Not on file    Highest education level: Not on file   Occupational History    Not on file   Social Needs    Financial resource strain: Not on file    Food insecurity:     Worry: Not on file     Inability: Not on file    Transportation needs:     Medical: Not on file     Non-medical: Not on file   Tobacco Use    Smoking status: Former Smoker     Packs/day: 1.50     Years: 45.00     Pack years: 67.50     Types: Cigarettes     Last attempt to quit: 2004     Years since quitting: 15.4    Smokeless tobacco: Never Used   Substance and Sexual Activity    Alcohol use: Not Currently    Drug use: Never    Sexual activity: Not on file   Lifestyle    Physical activity:     Days per week: Not on file     Minutes per session: Not on file    Stress: Not on file   Relationships    Social connections:     Talks on phone: Not on file     Gets together: Not on file     Attends [employer] service: Not on file     Active member of club or organization: Not on file     Attends meetings of clubs or organizations: Not on file     Relationship status: Not on file    Intimate partner violence:     Fear of current or ex partner: Not on file     Emotionally abused: Not on file     Physically abused: Not on file     Forced sexual activity: Not on file   Other Topics Concern    Not on file   Social History Narrative    Not on file     History reviewed. No pertinent family history. Allergies   Allergen Reactions    Penicillins Unknown (comments)     Hives.     Tolerated ceftriaxone May 2019    Percocet [Oxycodone-Acetaminophen] Hives    Prednisone Other (comments)     Tachycardia       Current Facility-Administered Medications   Medication Dose Route Frequency    albuterol (PROVENTIL VENTOLIN) nebulizer solution 2.5 mg  2.5 mg Nebulization Q4H PRN    amiodarone (CORDARONE) tablet 200 mg  200 mg Oral DAILY    benzonatate (TESSALON) capsule 100 mg  100 mg Oral TID PRN    HYDROcodone-acetaminophen (NORCO) 5-325 mg per tablet 1 Tab  1 Tab Oral Q4H PRN    levothyroxine (SYNTHROID) tablet 75 mcg  75 mcg Oral DAILY    mirtazapine (REMERON) tablet 15 mg  15 mg Oral QHS    pantoprazole (PROTONIX) tablet 40 mg  40 mg Oral ACB    sodium chloride (NS) flush 5-40 mL  5-40 mL IntraVENous Q8H    sodium chloride (NS) flush 5-40 mL  5-40 mL IntraVENous PRN    tuberculin injection 5 Units  5 Units IntraDERMal ONCE    ondansetron (ZOFRAN ODT) tablet 4 mg  4 mg Oral Q4H PRN    bisacodyl (DULCOLAX) tablet 5 mg  5 mg Oral DAILY PRN    enoxaparin (LOVENOX) injection 40 mg  40 mg SubCUTAneous Q24H    aspirin tablet 325 mg  325 mg Oral DAILY    furosemide (LASIX) injection 40 mg  40 mg IntraVENous DAILY    [START ON 6/28/2019] potassium chloride (K-DUR, KLOR-CON) SR tablet 40 mEq  40 mEq Oral DAILY    nitroglycerin (NITROSTAT) tablet 0.4 mg  0.4 mg SubLINGual PRN         Objective:     Vitals:    06/27/19 0324 06/27/19 0801 06/27/19 1158 06/27/19 1518   BP:  124/65 103/64 97/62   Pulse:  85 83 78   Resp:  18 19 20   Temp:  98.5 °F (36.9 °C) 97.6 °F (36.4 °C) 99.2 °F (37.3 °C)   SpO2:  93% 92% 94%   Weight: 139 lb 1.6 oz (63.1 kg)      Height:           PHYSICAL EXAM     Constitutional:  the patient is thin, well developed and in no acute distress  HEENT:  Sclera clear, pupils equal, oral mucosa moist  Lungs: Clear bilaterally but with distant breath sounds on the right. Respirations even and not labored. Oxygen at 4 liters  Cardiovascular:  RRR without M,G,R  Abd/GI: soft and non-tender; with positive bowel sounds. Ext: warm without cyanosis. There is no lower leg edema. Skin:  no jaundice or rashes, right chest wound with wound vac in place  Neuro: barely arousable. Mumbling but not able to actually speak. Calls out when repositioned  Musculoskeletal: moves all four extremities. No deformities. Psychiatric: cannot assess due to lethargy  Chest X-ray:        Recent Labs     06/26/19  2314   WBC 12.3*   HGB 9.4*   HCT 30.4*        Recent Labs     06/26/19  2314      K 2.9*   CL 99   *   CO2 33*   BUN 12   CREA 0.87   MG 1.8   CA 8.0*   ALB 1.8*   SGOT 14*       Assessment:  (Medical Decision Making)     Hospital Problems  Date Reviewed: 6/21/2019          Codes Class Noted POA    * (Principal) Volume overload ICD-10-CM: E87.70  ICD-9-CM: 276.69  6/27/2019 Yes        Elevated troponin ICD-10-CM: R74.8  ICD-9-CM: 790.6  6/27/2019 Yes        Elevated brain natriuretic peptide (BNP) level ICD-10-CM: R79.89  ICD-9-CM: 790.99  6/27/2019 Yes        Chronic respiratory failure (HCC) (Chronic) ICD-10-CM: J96.10  ICD-9-CM: 518.83  6/27/2019 Yes        Hypokalemia ICD-10-CM: E87.6  ICD-9-CM: 276.8  6/27/2019 Yes        Pleural effusion, right ICD-10-CM: J90  ICD-9-CM: 511.9  6/27/2019 Yes        COPD (chronic obstructive pulmonary disease) (HCC) (Chronic) ICD-10-CM: J44.9  ICD-9-CM: 496  5/24/2019 Yes              Plan:  (Medical Decision Making)   1.  Will plan to US right chest and perform thoracentesis if sufficient fluid. She has a history of a fib and her BNP is 421 raising possibility of effusion being a result of volume overload (albumin only 1.8). She is currently on lasix. Monitor response. Otherwise, she has recently undergone extensive right chest surgery with postop wound infection secondary to strept for which she was treated with Vanc. She still has a wound vac. This raises the possibility of empyema. Fluid, if obtained, will be sent for study. 2. Medications reviewed - she is currently lethargic but has not received any sedatives. Continue to monitor. ABG yesterday with no hypercapnea  3. Reassess oxygen needs at discharge    Tori Russo NP      More than 50% of time documented was spent face-to-face contact with the patient and in the care of the patient on the floor/unit where the patient is located      Lungs:  CTA B in anterior lung fields. Pt is unresponsive and unable to sit up to allow me to examine posterior lung fields. Wound vac to upper right back. Heart:  RRR with no Murmur/Rubs/Gallops    Additional Comments:    Patient's CXR is actually unchanged when compared to that from 6/9/19. Pt will not be able to sit up or participate enough for thoracentesis at present either. Will obtain CT now to more clearly evaluate post surgical changes in the right chest. AMS not due to hypercarbia, will defer workup of this to primary team.   F/u echo when available. I have spoken with and examined the patient. I agree with the above assessment and plan as documented.     Samia Hinlke MD

## 2019-06-27 NOTE — PROGRESS NOTES
Problem: Mobility Impaired (Adult and Pediatric)  Goal: *Acute Goals and Plan of Care (Insert Text)  Description  STG:  (1.)Ms. Frederick Wolff will move from supine to sit and sit to supine  with MODERATE ASSIST within 3 treatment day(s). (2.)Ms. Frederick Wolff will transfer from bed to chair and chair to bed with MAXIMAL ASSIST using the least restrictive device within 3 treatment day(s). (3.)Ms. Frederick Wolff will demonstrate fair static sitting balance for 10 minutes with CONTACT GUARD within 3 treatment day(s). LTG:  (1.)Ms. Frederick Wolff will move from supine to sit and sit to supine  in bed with MINIMAL ASSIST within 7 treatment day(s). (2.)Ms. Frederick Wolff will transfer from bed to chair and chair to bed with MINIMAL ASSIST using the least restrictive device within 7 treatment day(s). (3.)Ms. Frederick Wolff will demonstrate good static/dynamic sitting balance for 15 minutes with STAND BY ASSIST within 7 treatment day(s). (4.)Ms. Frederick Wolff will ambulate with MODERATE ASSIST for 15 feet X 2 with the least restrictive device within 7 treatment day(s). ________________________________________________________________________________________________     Outcome: Progressing Towards Goal      PHYSICAL THERAPY: Initial Assessment and AM 6/27/2019  INPATIENT: PT Visit Days : 1  [place] / [place] / Product Type: Medicare /       NAME/AGE/GENDER: Laura Padilla is a 78 y.o. female   PRIMARY DIAGNOSIS: Volume overload [E87.70] <principal problem not specified>   <principal problem not specified>          ICD-10: Treatment Diagnosis:    Generalized Muscle Weakness (M62.81)  Difficulty in walking, Not elsewhere classified (R26.2)   Precaution/Allergies:  Penicillins; Percocet [oxycodone-acetaminophen]; and Prednisone      ASSESSMENT:     Ms. Frederick Wolff is supine in bed upon contact and agreeable to PT evaluation and treatment this morning. Pt is A&O X 3 with reports of 0/10 pain prior to mobility.  Pt lives with her daughter in 1 story home with 0 steps to enter. Pt states she was ambulating with use of walker approximately 2 weeks ago. Pt requires assist with ADLs but able to participate. Pt reports 0 falls and currently on 5L O2 NC which is her baseline. Pt with wound vac to R mid back s/p lobectomy. Pt transitioned supine to sit EOB with max-total A X 2 requiring max cues for technique. Pt demonstrates poor static sitting balance EOB requiring constant support with increased L trunk lean noted. Provided VC and TC as well as positioning for prop sitting however pt continued to require constant support this session. Pt with O2 sat at 88-90% while sitting EOB. Pt fatigues quickly. Pt requesting to return to supine. Pt assisted into supine with total A X 2 and total A for scooting up in bed. Pt left supine with all needs met and within reach. Radhika Villeda will benefit from skilled PT (medically necessary) to address decreased strength, decreased balance, decreased functional tolerance, decreased cardiopulmonary endurance affecting participation in basic ADLs and functional tasks. Pt is appropriate for co-treatment at this time.      This section established at most recent assessment   PROBLEM LIST (Impairments causing functional limitations):  Decreased Strength  Decreased ADL/Functional Activities  Decreased Transfer Abilities  Decreased Ambulation Ability/Technique  Decreased Balance  Increased Pain  Decreased Activity Tolerance  Decreased Pacing Skills  Increased Fatigue  Increased Shortness of Breath  Decreased [place] with Home Exercise Program   INTERVENTIONS PLANNED: (Benefits and precautions of physical therapy have been discussed with the patient.)  Balance Exercise  Bed Mobility  Family Education  Gait Training  Home Exercise Program (HEP)  Neuromuscular Re-education/Strengthening  Range of Motion (ROM)  Therapeutic Activites  Therapeutic Exercise/Strengthening  Transfer Training TREATMENT PLAN: Frequency/Duration: 3 times a week for duration of hospital stay  Rehabilitation Potential For Stated Goals: [facility] (at time of discharge pending progress):    Placement: It is my opinion, based on this patient's performance to date, that Ms. Deidre Avendaño may benefit from intensive therapy at a [address] [place] [address] after discharge due to the functional deficits listed above that are likely to improve with skilled rehabilitation and concerns that he/she may be unsafe to be unsupervised at home due to decreased strength and balance putting pt at increased risk for falls. .  Equipment:   None at this time              HISTORY:   History of Present Injury/Illness (Reason for Referral):  See H&P below  Patient is a 69yo F with hx lung cancer recent RLL lobectomy on 5/22. She had a protracted hospitalization with hypoxia, overload, atelectasis, congestion. She completed inpatient rehab [facility] and was discharged with 5L oxygen requirement to SNF. The patient left SNF AMA after one day. Today, she presents with generalized weakness, not feeling well, and shortness of breath. Has been having more difficulty getting around at home. Very sob with any movement. Had been getting up to bathroom with assistance but no longer able. CXR with effusion and atelectasis v pna, troponin elevated to 0.34, K 2.9, and     Past Medical History/Comorbidities:   Ms. Deidre Avendaño  has a past medical history of Aspiration pneumonia ([facility] (4/12/2019), Cancer [facility]), Chronic obstructive pulmonary disease ([facility] [address] [facility] Hypertension, Severe sepsis with acute organ dysfunction ([facility] [facility] (4/12/2019), Subarachnoid hemorrhage ([address] and Thyroid disease. She also has no past medical history of Difficult intubation, Malignant hyperthermia due to anesthesia, Nausea & vomiting, or Pseudocholinesterase deficiency.   Ms. Deidre Avendaño  has a past surgical history that includes hx wrist fracture tx and hx other surgical.  Social History/Living Environment:   Home Environment: Private residence  # Steps to Enter: 0  One/Two Story Residence: One story  Living Alone: No  Support Systems: Child(carter)  Patient Expects to be Discharged to[de-identified] Rehabilitation facility  Current DME Used/Available at Home: [employer] [address], [address] [facility] chair  Prior Level of Function/Work/Activity:  Amb household distances with walker 2 weeks ago   Number of Personal Factors/Comorbidities that affect the Plan of Care: 3+: HIGH COMPLEXITY   EXAMINATION:   Most Recent Physical Functioning:   Gross Assessment:  Strength: Grossly decreased, non-functional  Coordination: Grossly decreased, non-functional               Posture:     Balance:  Sitting: Impaired; With support; Without support Bed Mobility:  Supine to Sit: Total assistance;Assist x2  Sit to Supine: Total assistance;Assist x2  Scooting: Total assistance  Wheelchair Mobility:     Transfers:     Gait:            Body Structures Involved:  Lungs  Bones  Joints  Muscles  Ligaments Body Functions Affected:  Sensory/Pain  Cardio  Respiratory  Neuromusculoskeletal  Movement Related Activities and Participation Affected:  General Tasks and Demands  Mobility  Self Care  Domestic Life  Interpersonal Interactions and Relationships  Community, Social and Civic Life   Number of elements that affect the Plan of Care: 4+: HIGH COMPLEXITY   CLINICAL PRESENTATION:   Presentation: Evolving clinical presentation with changing clinical characteristics: MODERATE COMPLEXITY   CLINICAL DECISION MAKIN [address]? ?6 Clicks? Basic Mobility Inpatient Short Form  How much difficulty does the patient currently have. .. Unable A Lot A Little None   1. Turning over in bed (including adjusting bedclothes, sheets and blankets)? ? 1   ? 2   ? 3   ? 4   2. Sitting down on and standing up from a chair with arms ( e.g., wheelchair, bedside commode, etc.)   ? 1   ? 2   ? 3   ? 4   3.   Moving from lying on back to sitting on the side of the bed?   ? 1   ? 2   ? 3   ? 4   How much help from another person does the patient currently need. .. Total A Lot A Little None   4. Moving to and from a bed to a chair (including a wheelchair)? ? 1   ? 2   ? 3   ? 4   5. Need to walk in hospital room? ? 1   ? 2   ? 3   ? 4   6. Climbing 3-5 steps with a railing? ? 1   ? 2   ? 3   ? 4   © 2007, Trustees of [address] [place] [address] [address] [id number], under license to [employer]. All rights reserved      Score:  Initial: 8 Most Recent: X (Date: -- )    Interpretation of Tool:  Represents activities that are increasingly more difficult (i.e. Bed mobility, Transfers, Gait). Medical Necessity:     Patient is expected to demonstrate progress in strength, balance, coordination and functional technique   to decrease assistance required with gait, transfers, and functional mobility. .  Reason for Services/Other Comments:  Patient continues to require skilled intervention due to decreased strength, decreased balance, decreased functional tolerance, decreased cardiopulmonary endurance affecting participation in basic ADLs and functional tasks   . Use of outcome tool(s) and clinical judgement create a POC that gives a: Questionable prediction of patient's progress: MODERATE COMPLEXITY            TREATMENT:   (In addition to Assessment/Re-Assessment sessions the following treatments were rendered)   Pre-treatment Symptoms/Complaints:  weakness  Pain: Initial:   Pain Intensity 1: 0  Post Session:  Increased with mobility 3/10     Therapeutic Activity: (    10 minutes): Therapeutic activities including Bed transfers and sitting balance EOB  to improve mobility, strength, balance, coordination and tolerance for functional mobility . Required maximal   to promote static balance in sitting and promote coordination of bilateral, upper extremity(s), lower extremity(s).      Today's treatment session addressed Decreased Strength, Decreased Balance, Decreased Activity Tolerance and Increased Shortness of Breath to progress towards achieving goal(s) 1 and 3. During this session, Occupational Therapy addressed ADLs to progress towards their discipline specific goal(s). Co-treatment was necessary to improve patient's ability to follow higher level commands, ability to increase activity demands and ability to return to normal functional activity. Braces/Orthotics/Lines/Etc:   purwick   O2 Device: Nasal cannula  Treatment/Session Assessment:    Response to Treatment:  max-total A X 2 for bed mobility  Interdisciplinary Collaboration:   Physical Therapist  Occupational Therapist  Registered Nurse  After treatment position/precautions:   Supine in bed  Bed/Chair-wheels locked  Bed in low position  Call light within reach   Compliance with Program/Exercises: Will assess as treatment progresses  Recommendations/Intent for next treatment session: \"Next visit will focus on advancements to more challenging activities and reduction in assistance provided\".   Total Treatment Duration:  PT Patient Time In/Time Out  Time In: 0909  Time Out: TREY/ Christopher Barber

## 2019-06-27 NOTE — PROGRESS NOTES
Patient bladder scanned r/t patient not voiding since this AM.  Bladder scan reveled 1834. MD paged.

## 2019-06-27 NOTE — PROGRESS NOTES
Patient resting in bed with no complaints at this time. Patient is alert and orientated with no distress noted. IV intact and patent with no s/s of infection noted. Respirations even and unlabored with heart rate regular. Patient unable to ambulate independently without assistance; PT to evaluate. Bed in low locked position with call light within reach. Patient instructed to call if assistance is needed. Will continue to monitor.

## 2019-06-27 NOTE — ED PROVIDER NOTES
Patient presents to the ER for generalized weakness and fatigue. Presents via EMS. Apparently, she's had some worsening weakness over the past day. Patient herself has no complaints currently. She status post recent thoracic surgery for lobectomy. Is oxygen dependent  currently. EMS does report some mild desaturations in route. The history is provided by the patient. Fatigue   This is a new problem. The current episode started more than 2 days ago. The problem has not changed since onset. Pertinent negatives include no focal weakness, no speech difficulty and no mental status change. Associated symptoms include shortness of breath. Pertinent negatives include no vomiting and no confusion. Past Medical History:   Diagnosis Date    Aspiration pneumonia ([address] 4/12/2019    Cancer ([address]     Lung cancer    Chronic obstructive pulmonary disease ([facility] [address]     Hypertension     Severe sepsis with acute organ dysfunction ([address] 4/12/2019    Subarachnoid hemorrhage ([address]     Thyroid disease        Past Surgical History:   Procedure Laterality Date    HX OTHER SURGICAL      coil in brain    HX WRIST FRACTURE TX      bilat wrist         No family history on file.     Social History     Socioeconomic History    Marital status: SINGLE     Spouse name: Not on file    Number of children: Not on file    Years of education: Not on file    Highest education level: Not on file   Occupational History    Not on file   Social Needs    Financial resource strain: Not on file    Food insecurity:     Worry: Not on file     Inability: Not on file    Transportation needs:     Medical: Not on file     Non-medical: Not on file   Tobacco Use    Smoking status: Former Smoker     Packs/day: 1.50     Years: 45.00     Pack years: 67.50     Types: Cigarettes     Last attempt to quit: 2004     Years since quitting: 15.4    Smokeless tobacco: Never Used   Substance and Sexual Activity    Alcohol use: Not Currently    Drug use: Never    Sexual activity: Not on file   Lifestyle    Physical activity:     Days per week: Not on file     Minutes per session: Not on file    Stress: Not on file   Relationships    Social connections:     Talks on phone: Not on file     Gets together: Not on file     Attends Spiritism service: Not on file     Active member of club or organization: Not on file     Attends meetings of clubs or organizations: Not on file     Relationship status: Not on file    Intimate partner violence:     Fear of current or ex partner: Not on file     Emotionally abused: Not on file     Physically abused: Not on file     Forced sexual activity: Not on file   Other Topics Concern    Not on file   Social History Narrative    Not on file         ALLERGIES: Penicillins; Percocet [oxycodone-acetaminophen]; and Prednisone    Review of Systems   Constitutional: Positive for fatigue. HENT: Negative for congestion. Eyes: Negative for photophobia, redness and visual disturbance. Respiratory: Positive for shortness of breath. Negative for chest tightness. Cardiovascular: Negative for leg swelling. Gastrointestinal: Negative for abdominal pain and vomiting. Genitourinary: Negative for flank pain, frequency and urgency. Musculoskeletal: Negative for back pain and gait problem. Neurological: Positive for weakness. Negative for focal weakness, syncope and speech difficulty. Psychiatric/Behavioral: Negative for behavioral problems and confusion. All other systems reviewed and are negative. Vitals:    06/26/19 2307   BP: 122/60   Pulse: 87   Resp: 26   Temp: 98.3 °F (36.8 °C)   SpO2: (!) 75%   Weight: 59 kg (130 lb)   Height: 5' 10\" (1.778 m)            Physical Exam   Constitutional: She appears well-developed and well-nourished. Eyes: Pupils are equal, round, and reactive to light. Conjunctivae and EOM are normal.   Cardiovascular: Normal rate and regular rhythm.    Pulmonary/Chest: Effort normal. She has decreased breath sounds. Abdominal: Soft. Bowel sounds are normal. She exhibits no distension. Neurological: She is alert. No cranial nerve deficit. Nursing note and vitals reviewed. MDM  Number of Diagnoses or Management Options  Diagnosis management comments: We'll obtain labs, ABG, as well as chest x-ray. Also obtain urinalysis    12:32 AM  ABG shows mild hypoxia. Chest x-ray shows persistent right pleural effusion an atelectasis or pneumonia. Labs show white count of 12, temperature parents and for potassium at 2.9. Troponin mildly elevated at 0.34, elevated BNP at 430    We'll discuss case with hospitalist for admission for observation and likely diuresis. Amount and/or Complexity of Data Reviewed  Clinical lab tests: ordered and reviewed  Tests in the radiology section of CPT®: ordered and reviewed  Discuss the patient with other providers: yes    Risk of Complications, Morbidity, and/or Mortality  Presenting problems: moderate  Diagnostic procedures: low  Management options: moderate    Patient Progress  Patient progress: stable         Procedures               Results Include:    Recent Results (from the past 24 hour(s))   CBC WITH AUTOMATED DIFF    Collection Time: 06/26/19 11:14 PM   Result Value Ref Range    WBC 12.3 (H) 4.3 - 11.1 K/uL    RBC 3.44 (L) 4.05 - 5.2 M/uL    HGB 9.4 (L) 11.7 - 15.4 g/dL    HCT 30.4 (L) 35.8 - 46.3 %    MCV 88.4 79.6 - 97.8 FL    MCH 27.3 26.1 - 32.9 PG    MCHC 30.9 (L) 31.4 - 35.0 g/dL    RDW 16.0 (H) 11.9 - 14.6 %    PLATELET 423 744 - 354 K/uL    MPV 9.1 (L) 9.4 - 12.3 FL    ABSOLUTE NRBC 0.00 0.0 - 0.2 K/uL    DF AUTOMATED      NEUTROPHILS 82 (H) 43 - 78 %    LYMPHOCYTES 8 (L) 13 - 44 %    MONOCYTES 8 4.0 - 12.0 %    EOSINOPHILS 1 0.5 - 7.8 %    BASOPHILS 1 0.0 - 2.0 %    IMMATURE GRANULOCYTES 1 0.0 - 5.0 %    ABS. NEUTROPHILS 10.1 (H) 1.7 - 8.2 K/UL    ABS. LYMPHOCYTES 0.9 0.5 - 4.6 K/UL    ABS. MONOCYTES 1.0 0.1 - 1.3 K/UL    ABS. EOSINOPHILS 0.2 0.0 - 0.8 K/UL    ABS. BASOPHILS 0.1 0.0 - 0.2 K/UL    ABS. IMM. GRANS. 0.1 0.0 - 0.5 K/UL   METABOLIC PANEL, COMPREHENSIVE    Collection Time: 06/26/19 11:14 PM   Result Value Ref Range    Sodium 140 136 - 145 mmol/L    Potassium 2.9 (LL) 3.5 - 5.1 mmol/L    Chloride 99 98 - 107 mmol/L    CO2 33 (H) 21 - 32 mmol/L    Anion gap 8 7 - 16 mmol/L    Glucose 113 (H) 65 - 100 mg/dL    BUN 12 8 - 23 MG/DL    Creatinine 0.87 0.6 - 1.0 MG/DL    GFR est AA >60 >60 ml/min/1.73m2    GFR est non-AA >60 >60 ml/min/1.73m2    Calcium 8.0 (L) 8.3 - 10.4 MG/DL    Bilirubin, total 1.0 0.2 - 1.1 MG/DL    ALT (SGPT) 8 (L) 12 - 65 U/L    AST (SGOT) 14 (L) 15 - 37 U/L    Alk.  phosphatase 86 50 - 136 U/L    Protein, total 7.0 6.3 - 8.2 g/dL    Albumin 1.8 (L) 3.2 - 4.6 g/dL    Globulin 5.2 (H) 2.3 - 3.5 g/dL    A-G Ratio 0.3 (L) 1.2 - 3.5     BNP    Collection Time: 06/26/19 11:14 PM   Result Value Ref Range     (H) 0 pg/mL   PROCALCITONIN    Collection Time: 06/26/19 11:14 PM   Result Value Ref Range    Procalcitonin 0.1 ng/mL   TROPONIN I    Collection Time: 06/26/19 11:14 PM   Result Value Ref Range    Troponin-I, Qt. 0.34 (HH) 0.02 - 0.05 NG/ML   MAGNESIUM    Collection Time: 06/26/19 11:14 PM   Result Value Ref Range    Magnesium 1.8 1.8 - 2.4 mg/dL   POC LACTIC ACID    Collection Time: 06/26/19 11:20 PM   Result Value Ref Range    Lactic Acid (POC) 1.06 0.5 - 1.9 mmol/L   URINE MICROSCOPIC    Collection Time: 06/26/19 11:42 PM   Result Value Ref Range    WBC 0-3 0 /hpf    RBC 0-3 0 /hpf    Epithelial cells 0-3 0 /hpf    Bacteria 0 0 /hpf    Casts 0 0 /lpf    Crystals, urine 0 0 /LPF    Mucus 0 0 /lpf    Other observations RESULTS VERIFIED MANUALLY     POC G3    Collection Time: 06/26/19 11:47 PM   Result Value Ref Range    Device: NASAL CANNULA      pH (POC) 7.547 (H) 7.35 - 7.45      pCO2 (POC) 37.1 35 - 45 MMHG    pO2 (POC) 70 (L) 75 - 100 MMHG    HCO3 (POC) 32.2 (H) 22 - 26 MMOL/L    sO2 (POC) 96 95 - 98 % Base excess (POC) 9 mmol/L    Allens test (POC) NOT APPLICABLE      Site RIGHT BRACHIAL      Patient temp. 98.6      Specimen type (POC) ARTERIAL      Performed by Colby     CO2, POC 33 MMOL/L    Flow rate (POC) 6.000 L/min    Critical value read back 23:48     Respiratory comment: PhysicianNotified     COLLECT TIME 2,337       Voice dictation software was used during the making of this note. This software is not perfect and grammatical and other typographical errors may be present. This note has been proofread, but may still contain errors.   Samaria Ramon MD; 6/27/2019 @12:33 AM   ===================================================================

## 2019-06-27 NOTE — PROGRESS NOTES
Patient voices no concerns at this time. Patient is resting in bed with daughter at bedside for support. Patients wound vac changed by wound care but patient denies any pain at this time. Call light within reach and patient instructed to call if assistance is needed. Door open for visual also. Will continue to monitor.

## 2019-06-27 NOTE — WOUND CARE
Patient admitted with vac from home,  was not brought in and the home machine is low battery. Converted to hospital machine, seal intact, bandage intact. Plan to keep 3 times per week dressing change on vac site as prior to admission, will change tomorrow, dressing is in room. Family is to charge machine at home and bring back for day of discharge. Will monitor.

## 2019-06-27 NOTE — PROGRESS NOTES
ASTHMA ACTION PLAN for Andrea Ryan     : 1986     Date: 3/18/2021  Provider:  Jade Escobar MD  Phone for doctor or clinic: [facility], [address], [address] [address] [address] [facility], [address] [place] [address]  [place] [phone]    ACT Nutrition  Reason for assessment: Referral received from nursing admission Malnutrition Screening Tool   Recently Lost Weight Without Trying: Yes  If Yes, How Much Weight Loss: 14 - 23 lbs  Eating Poorly Due to Decreased Appetite: No  Assessment:   Diet: DIET CARDIAC Regular    PMH:  Past Medical History:   Diagnosis Date    Aspiration pneumonia ([facility] 4/12/2019    Cancer ([facility]     Lung cancer    [facility]     Hypertension     Severe sepsis with acute organ dysfunction ([facility] 4/12/2019    Sub[facility]ach[facility]oid hemorrhage ([facility]     Thyroid disease      Food/Nutrition Patient History:  The patient states that her appetite has been very poor over the past 2 months. She believes that she has lost ~20 lbs. She denies any other po difficulties at this time. She is currently admitted due to fluid overload therefore admission weight may not be accurate. Weight history in the EMR cannot be verified as accurate due to unknown weight source (pt stated vs estimated vs measured). Weight Loss Metrics 6/27/2019 5/31/2019 5/22/2019 5/20/2019   Today's Wt 139 lb 1.6 oz 140 lb 4.8 oz  137 lb   BMI 19.96 kg/m2  21.97 kg/m2 20.83 kg/m2     Weight Loss Metrics 5/15/2019 5/6/2019 5/3/2019 4/30/2019   Today's Wt 137 lb 7 oz 141 lb 3.2 oz 140 lb 140 lb   BMI 20.9 kg/m2 21.47 kg/m2 21.29 kg/m2 21.29 kg/m2     Weight Loss Metrics 4/25/2019 4/18/2019 4/12/2019 4/9/2019   Today's Wt 151 lb 151 lb 12.8 oz 130 lb 148 lb   BMI 22.96 kg/m2 23.08 kg/m2 19.77 kg/m2 22.5 kg/m2     Weight Loss Metrics 4/4/2019 3/18/2019   Today's Wt 149 lb 149 lb   BMI 22.66 kg/m2 22.66 kg/m2   According to the EMR the patient has potentially lost ~10 lbs over the past ~3 months. This is a clinically significant weight loss of 6.7% over the past ~3 months. Anthropometrics:Height: 5' 10\" (177.8 cm),  Weight: 63.1 kg (139 lb 1.6 oz), Weight Source: Bed, Body mass index is 19.96 kg/m². BMI class of underweight for age >71. Macronutrient needs:  EER:  3269-2903 kcal /day (25-30 kcal/kg listed BW of 63.1 kg-pt noted to be bed bound)  EPR:  63-79 grams protein/day (1-1.25 grams/kg listed BW)  Intake/Comparative Standards: Average intake for past 1 recorded meal(s): 5%. This potentially meets ~<25% of kcal and ~<25% of protein needs. Nutrition Diagnosis: Unintended weight loss related to decreased appetite and inadequate oral intake as evidenced by patient with 10# (6.7%) weight loss over the past ~3 months. Intervention:  Meals and snacks: Continue current diet. Nutrition Supplement Therapy: Add Ensure Enlive TID (strawberry preference)   Nutrition Discharge Plan: Too soon to be determined. Shanita Gregg [address], [address] [place] [address], [phone]

## 2019-06-27 NOTE — PROGRESS NOTES
Patient stable with no complaints at this time. Patient is very sleepy and somewhat hard to arouse. Patient does respond to chest rub and will wake up and answer questions but then fall back asleep. Patient states she is sleepy. VS stable. MD aware.   Call light within reach and door open for visual.  Report to be given to oncoming RN 7p-7a

## 2019-06-27 NOTE — PROGRESS NOTES
HOSPITALIST  NAME:  Duy Owens   Age:  78 y.o.  :   1940   MRN:   [id number]  PCP: Ernst Mena MD    subj   Patient is a 69yo F with hx lung cancer recent RLL lobectomy on . She had a protracted hospitalization with hypoxia, overload, atelectasis, congestion. She completed inpatient rehab at [facility] on  and was discharged with 5L oxygen requirement to SNF. The patient left SNF AMA after one day. Today, she presents with generalized weakness, not feeling well, and shortness of breath. Has been having more difficulty getting around at home. Very sob with any movement. Had been getting up to bathroom with assistance but no longer able. CXR with effusion and atelectasis v pna, troponin elevated to 0.34, K 2.9, and     Today, drowzy and weak, too sleepy, discussed w/ daughter    Objective:     Visit Vitals  BP 97/62   Pulse 78   Temp 99.2 °F (37.3 °C)   Resp 20   Ht 5' 10\" (1.778 m)   Wt 63.1 kg (139 lb 1.6 oz)   SpO2 94%   BMI 19.96 kg/m²      Temp (24hrs), Av.3 °F (36.8 °C), Min:97.6 °F (36.4 °C), Max:99.2 °F (37.3 °C)    Oxygen Therapy  O2 Sat (%): 94 % (19 1518)  Pulse via Oximetry: 73 beats per minute (19 0127)  O2 Device: Nasal cannula (19 012)  O2 Flow Rate (L/min): 5 l/min (19 012)  Physical Exam:  General:    Elderly, thin, NAD  Lungs:   Mild crackles, wound vac R chest wall. Diminished bs bibasilar, more on R  Heart:   Regular rate and rhythm,  no murmur, rub or gallop. Abdomen:   Soft, non-tender. Not distended. Bowel sounds normal.   Extremities: No cyanosis. No edema. No clubbing  Skin:     Texture, turgor normal. No rashes or lesions.   Not Jaundiced  Neurologic: Alert and oriented x 3, no focal deficits     Data Review:   Recent Results (from the past 24 hour(s))   CBC WITH AUTOMATED DIFF    Collection Time: 19 11:14 PM   Result Value Ref Range    WBC 12.3 (H) 4.3 - 11.1 K/uL    RBC 3.44 (L) 4.05 - 5.2 M/uL    HGB 9.4 (L) 11.7 - 15.4 g/dL    HCT 30.4 (L) 35.8 - 46.3 %    MCV 88.4 79.6 - 97.8 FL    MCH 27.3 26.1 - 32.9 PG    MCHC 30.9 (L) 31.4 - 35.0 g/dL    RDW 16.0 (H) 11.9 - 14.6 %    PLATELET 578 610 - 434 K/uL    MPV 9.1 (L) 9.4 - 12.3 FL    ABSOLUTE NRBC 0.00 0.0 - 0.2 K/uL    DF AUTOMATED      NEUTROPHILS 82 (H) 43 - 78 %    LYMPHOCYTES 8 (L) 13 - 44 %    MONOCYTES 8 4.0 - 12.0 %    EOSINOPHILS 1 0.5 - 7.8 %    BASOPHILS 1 0.0 - 2.0 %    IMMATURE GRANULOCYTES 1 0.0 - 5.0 %    ABS. NEUTROPHILS 10.1 (H) 1.7 - 8.2 K/UL    ABS. LYMPHOCYTES 0.9 0.5 - 4.6 K/UL    ABS. MONOCYTES 1.0 0.1 - 1.3 K/UL    ABS. EOSINOPHILS 0.2 0.0 - 0.8 K/UL    ABS. BASOPHILS 0.1 0.0 - 0.2 K/UL    ABS. IMM. GRANS. 0.1 0.0 - 0.5 K/UL   METABOLIC PANEL, COMPREHENSIVE    Collection Time: 06/26/19 11:14 PM   Result Value Ref Range    Sodium 140 136 - 145 mmol/L    Potassium 2.9 (LL) 3.5 - 5.1 mmol/L    Chloride 99 98 - 107 mmol/L    CO2 33 (H) 21 - 32 mmol/L    Anion gap 8 7 - 16 mmol/L    Glucose 113 (H) 65 - 100 mg/dL    BUN 12 8 - 23 MG/DL    Creatinine 0.87 0.6 - 1.0 MG/DL    GFR est AA >60 >60 ml/min/1.73m2    GFR est non-AA >60 >60 ml/min/1.73m2    Calcium 8.0 (L) 8.3 - 10.4 MG/DL    Bilirubin, total 1.0 0.2 - 1.1 MG/DL    ALT (SGPT) 8 (L) 12 - 65 U/L    AST (SGOT) 14 (L) 15 - 37 U/L    Alk.  phosphatase 86 50 - 136 U/L    Protein, total 7.0 6.3 - 8.2 g/dL    Albumin 1.8 (L) 3.2 - 4.6 g/dL    Globulin 5.2 (H) 2.3 - 3.5 g/dL    A-G Ratio 0.3 (L) 1.2 - 3.5     BNP    Collection Time: 06/26/19 11:14 PM   Result Value Ref Range     (H) 0 pg/mL   PROCALCITONIN    Collection Time: 06/26/19 11:14 PM   Result Value Ref Range    Procalcitonin 0.1 ng/mL   TROPONIN I    Collection Time: 06/26/19 11:14 PM   Result Value Ref Range    Troponin-I, Qt. 0.34 (HH) 0.02 - 0.05 NG/ML   MAGNESIUM    Collection Time: 06/26/19 11:14 PM   Result Value Ref Range    Magnesium 1.8 1.8 - 2.4 mg/dL   POC LACTIC ACID    Collection Time: 06/26/19 11:20 PM   Result Value Ref Range Lactic Acid (POC) 1.06 0.5 - 1.9 mmol/L   EKG, 12 LEAD, INITIAL    Collection Time: 06/26/19 11:25 PM   Result Value Ref Range    Ventricular Rate 80 BPM    Atrial Rate 147 BPM    QRS Duration 92 ms    Q-T Interval 348 ms    QTC Calculation (Bezet) 401 ms    Calculated P Axis 82 degrees    Calculated R Axis 83 degrees    Calculated T Axis 68 degrees    Diagnosis       !! AGE AND GENDER SPECIFIC ECG ANALYSIS !!  nsr, artifact , pvc  Nonspecific ST and T wave abnormality  Abnormal ECG  When compared with ECG of 27-MAY-2019 07:44,  Nonspecific T wave abnormality now evident in Lateral leads  QT has shortened  Confirmed by [facility]  MD ()TEJINDER ([id number]) on 6/27/2019 7:34:23 AM     URINE MICROSCOPIC    Collection Time: 06/26/19 11:42 PM   Result Value Ref Range    WBC 0-3 0 /hpf    RBC 0-3 0 /hpf    Epithelial cells 0-3 0 /hpf    Bacteria 0 0 /hpf    Casts 0 0 /lpf    Crystals, urine 0 0 /LPF    Mucus 0 0 /lpf    Other observations RESULTS VERIFIED MANUALLY     POC G3    Collection Time: 06/26/19 11:47 PM   Result Value Ref Range    Device: NASAL CANNULA      pH (POC) 7.547 (H) 7.35 - 7.45      pCO2 (POC) 37.1 35 - 45 MMHG    pO2 (POC) 70 (L) 75 - 100 MMHG    HCO3 (POC) 32.2 (H) 22 - 26 MMOL/L    sO2 (POC) 96 95 - 98 %    Base excess (POC) 9 mmol/L    Allens test (POC) NOT APPLICABLE      Site RIGHT BRACHIAL      Patient temp.  98.6      Specimen type (POC) ARTERIAL      Performed by Colby     CO2, POC 33 MMOL/L    Flow rate (POC) 6.000 L/min    Critical value read back 23:48     Respiratory comment: PhysicianNotified     COLLECT TIME 2,337     AMMONIA    Collection Time: 06/27/19  1:46 PM   Result Value Ref Range    Ammonia 41 (H) 11 - 32 UMOL/L   TROPONIN I    Collection Time: 06/27/19  1:53 PM   Result Value Ref Range    Troponin-I, Qt. 0.22 (HH) 0.02 - 0.05 NG/ML     Imaging /Procedures /Studies   XR CHEST PORT   Final Result   IMPRESSION: Right pleural effusion and right lung atelectasis or pneumonia   unchanged. CT CHEST WO CONT    (Results Pending)       Assessment and Plan: Active Hospital Problems    Diagnosis Date Noted    Volume overload 06/27/2019    Elevated troponin 06/27/2019    Elevated brain natriuretic peptide (BNP) level 06/27/2019    Chronic respiratory failure (HCC) 06/27/2019    Hypokalemia 06/27/2019    Pleural effusion, right 06/27/2019    COPD (chronic obstructive pulmonary disease) ([facility] 05/24/2019       PLAN:    Follow US  pulm consulted  Avoid meds affecting mentation  PT eval  Dispo: TBD    Effusions more likely volume related than infectious. Trend troponin, continue lasix. Check echo. Replace potassium while giving diuretics as above  PT/OT/PPD    DVT PPx: hsq  Code Status: full    Anticipated discharge: 3-4d. PPD, pt/ot evals.     Signed By: Jon Tong MD     June 27, 2019

## 2019-06-27 NOTE — PROGRESS NOTES
Spiritual Care Visit, initial visit. Attempted to visit with patient at bedside. Patient was sleeping. Visit by Katarina Mills, Staff .  JESUS.Philip., Sunil.B., B.A.

## 2019-06-27 NOTE — PROGRESS NOTES
SBAR  report given to oncoming nurse. Respirations are even and unlabored. No distress at this time.

## 2019-06-27 NOTE — PROGRESS NOTES
Problem: Self Care Deficits Care Plan (Adult)  Goal: *Acute Goals and Plan of Care (Insert Text)  Description  1. Patient will complete total body bathing and dressing with moderate assistance and adaptive equipment as needed. 2. Patient will complete toileting with maximal assistance and adaptive equipment as needed. 3. Patient will tolerate 20 minutes of OT treatment with up to minimal rest breaks to increase activity tolerance for ADLs. 4. Patient will complete bed mobility with moderate assistance in preparation for functional transfers. 5. Patient will attempt to  preparation for functional transfers with adaptive equipment as needed. 6. Patient will demonstrate modified independence with therapeutic exercise AROM HEP to increase strength in BUEs for increased safety and independence with functional transfers. 7. Patient will demonstrate improved sitting balance for ADLs with up to minimal assistance and adaptive equipment as needed. Timeframe: 7 visits      Outcome: Progressing Towards Goal     OCCUPATIONAL THERAPY: Initial Assessment, Daily Note and AM 6/27/2019  INPATIENT: OT Visit Days: 1  [place] / [place] / Product Type: Medicare /      NAME/AGE/GENDER: Janelle Dukes is a 78 y.o. female   PRIMARY DIAGNOSIS:  Volume overload [E87.70] <principal problem not specified>   <principal problem not specified>          ICD-10: Treatment Diagnosis:    · Generalized Muscle Weakness (M62.81)  · Other lack of cordination (R27.8)   Precautions/Allergies:    Fall precautions  Penicillins; Percocet [oxycodone-acetaminophen]; and Prednisone      ASSESSMENT:     Ms. Livia Cervantes is a 78 y.o. female admitted with volume overload. At baseline, pt lives with daughter and reports requiring assistance with ADLs and that she was using her RW for mobility a few weeks ago. PMH including lung CA, utilizes 5L supplemental O2.  Per chart, pt with recent RLL lobectomy and IRC stay, d/c to SNF from which pt left AMA. Upon arrival pt alert and agreeable to OT evaluation and co-treatment with PT, wound vac to R mid back. BUE assessment revealed AROM and strength decreased in BUEs. Pt completed bed mobility with MaxA x2/cueing/additional time. This patient is appropriate for co-treatment at this time due to multiple deficits including decreased balance, decreased endurance, decreased strength, and need for high level assistance to complete functional transfers and functional tasks. Co-treatment initiated to include bed mobility with MaxA x2-TA and seated ADLs with Min-ModA after set up while PT addressed sitting balance/L sided trunk lean with almost constant cueing/support. Pt demonstrates decreased independence with self-drinking and grooming with poor activity tolerance and decreased BUE strength. Pt left supine in bed with call bell within reach. Pt's deficits include decreased strength, activity tolerance, balance, bed mobility and functional transfers, all of which negatively impact safety and independence with ADLs. Radha Kim is currently functioning below baseline and would benefit from continued OT to increase safety and independence with ADLs. Will follow. This section established at most recent assessment   PROBLEM LIST (Impairments causing functional limitations):  1. Decreased Strength  2. Decreased ADL/Functional Activities  3. Decreased Transfer Abilities  4. Decreased Ambulation Ability/Technique  5. Decreased Balance  6. Increased Pain  7. Decreased Activity Tolerance  8. Decreased Pacing Skills  9. Decreased Work Simplification/Energy Conservation Techniques  10. Increased Fatigue  11. Increased Shortness of Breath  12. Decreased Flexibility/Joint Mobility  13. Decreased [place] with Home Exercise Program   INTERVENTIONS PLANNED: (Benefits and precautions of occupational therapy have been discussed with the patient.)  1.  Activities of daily living training  2. Adaptive equipment training  3. Balance training  4. Clothing management  5. Donning&doffing training  6. Hygiene training  7. Neuromuscular re-eduation  8. Re-evaluation  9. Therapeutic activity  10. Therapeutic exercise  11. Wheelchair management     TREATMENT PLAN: Frequency/Duration: Follow patient 3x/week to address above goals. Rehabilitation Potential For Stated Goals: [facility] (at time of discharge pending progress):    Placement: It is my opinion, based on this patient's performance to date, that Ms. Sis Gonzales may benefit from intensive therapy at a [address] [place] [address] after discharge due to the functional deficits listed above that are likely to improve with skilled rehabilitation and concerns that he/she may be unsafe to be unsupervised at home due to poor balance, activity tolerance, strength impacting ADLs and increasing risk for falls . Equipment:    TBD               OCCUPATIONAL PROFILE AND HISTORY:   History of Present Injury/Illness (Reason for Referral):  See H&P. Past Medical History/Comorbidities:   Ms. Sis Gonzales  has a past medical history of Aspiration pneumonia ([facility] (4/12/2019), Cancer [facility]), Chronic obstructive pulmonary disease ([facility] Hypertension, Severe sepsis with acute organ dysfunction ([facility] (4/12/2019), Subarachnoid hemorrhage ([facility] and Thyroid disease. She also has no past medical history of Difficult intubation, Malignant hyperthermia due to anesthesia, Nausea & vomiting, or Pseudocholinesterase deficiency.   Ms. Sis Gonzales  has a past surgical history that includes hx wrist fracture tx and hx other surgical.  Social History/Living Environment:   Home Environment: Private residence  # Steps to Enter: 0  One/Two Story Residence: One story  Living Alone: No  Support Systems: Child(carter)  Patient Expects to be Discharged to[de-identified] Rehabilitation facility  Current DME Used/Available at Home: [employer]i [employer], rolling, Wheelchair, [address] [employer] chair  Tub or Shower Type: Shower  Prior Level of Function/Work/Activity:  At baseline, pt lives with daughter and reports requiring assistance with ADLs and that she was using her RW for mobility a few weeks ago. PMH including lung CA, utilizes 5L supplemental O2. Per chart, pt with recent RLL lobectomy and IRC stay, d/c to SNF from which pt left AMA. Personal Factors:          Sex:  female        Age:  78 y.o. Other factors that influence how disability is experienced by the patient:  Multiple co-morbidities     Number of Personal Factors/Comorbidities that affect the Plan of Care: Extensive review of physical, cognitive, and psychosocial performance (3+):  HIGH COMPLEXITY   ASSESSMENT OF OCCUPATIONAL PERFORMANCE[de-identified]   Activities of Daily Living:   Basic ADLs (From Assessment) Complex ADLs (From Assessment)   Feeding: Moderate assistance  Oral Facial Hygiene/Grooming: Maximum assistance  Bathing: Maximum assistance  Upper Body Dressing: Maximum assistance  Lower Body Dressing: Total assistance  Toileting: Total assistance Instrumental ADL  Meal Preparation: Total assistance  Homemaking: Total assistance  Medication Management: Total assistance  Financial Management: Total assistance   Grooming/Bathing/Dressing Activities of Daily Living   Grooming  Brushing Teeth: Minimum assistance; Moderate assistance Cognitive Retraining  Safety/Judgement: Awareness of environment; Fall prevention     Feeding  Drink to Mouth: Minimum assistance; Moderate assistance                 Bed/Mat Mobility  Supine to Sit: Maximum assistance;Assist x2  Sit to Supine: Total assistance;Assist x2  Scooting:  Total assistance     Most Recent Physical Functioning:   Gross Assessment:  AROM: Generally decreased, functional(BUEs)  Strength: Generally decreased, functional(BUEs)  Coordination: Generally decreased, functional(BUEs)               Posture:     Balance:  Sitting: Impaired  Sitting - Static: Poor (constant support) Bed Mobility:  Supine to Sit: Maximum assistance;Assist x2  Sit to Supine: Total assistance;Assist x2  Scooting: Total assistance  Wheelchair Mobility:     Transfers:               Patient Vitals for the past 6 hrs:   BP SpO2 Pulse   19 0801 124/65 93 % 85       Mental Status  Neurologic State: Alert  Orientation Level: Oriented to person, Oriented to situation  Cognition: Appropriate decision making, Appropriate for age attention/concentration, Follows commands  Perception: Appears intact  Perseveration: No perseveration noted  Safety/Judgement: Awareness of environment, Fall prevention                          Physical Skills Involved:  1. Range of Motion  2. Balance  3. Strength  4. Activity Tolerance  5. Fine Motor Control  6. Gross Motor Control  7. Pain (acute)  8. Pain (Chronic) Cognitive Skills Affected (resulting in the inability to perform in a timely and safe manner):  1. None  Psychosocial Skills Affected:  1. Habits/Routines  2. Environmental Adaptation  3. Social Interaction  4. Emotional Regulation  5. Self-Awareness  6. Awareness of Others  7. Social Roles   Number of elements that affect the Plan of Care: 5+:  HIGH COMPLEXITY   CLINICAL DECISION MAKIN[address] [place] [id number] AM-PAC 6 Clicks   Daily Activity Inpatient Short Form  How much help from another person does the patient currently need. .. Total A Lot A Little None   1. Putting on and taking off regular lower body clothing? ? 1   ? 2   ? 3   ? 4   2. Bathing (including washing, rinsing, drying)? ? 1   ? 2   ? 3   ? 4   3. Toileting, which includes using toilet, bedpan or urinal?   ? 1   ? 2   ? 3   ? 4   4. Putting on and taking off regular upper body clothing? ? 1   ? 2   ? 3   ? 4   5. Taking care of personal grooming such as brushing teeth? ? 1   ? 2   ? 3   ? 4   6. Eating meals? ? 1   ? 2   ? 3   ? 4   © 2007, Trustees of [address] [place] [id number], under license to [employer].  All rights reserved      Score:  Initial: 10 19 Most Recent: X (Date: -- ) Interpretation of Tool:  Represents activities that are increasingly more difficult (i.e. Bed mobility, Transfers, Gait). Medical Necessity:     · Patient demonstrates fair  ·  rehab potential due to higher previous functional level. Reason for Services/Other Comments:  · Patient continues to require skilled intervention due to inability to complete ADLs at prior level of independence   · . Use of outcome tool(s) and clinical judgement create a POC that gives a: MODERATE COMPLEXITY         TREATMENT:   (In addition to Assessment/Re-Assessment sessions the following treatments were rendered)     Pre-treatment Symptoms/Complaints:    Pain: Initial:   Pain Intensity 1: 0  Post Session:  same     Today's treatment session addressed Decreased Strength, Decreased ADL/Functional Activities, Decreased Transfer Abilities, Decreased Balance, Decreased Activity Tolerance, Increased Fatigue and Increased Shortness of Breath to progress towards achieving goal(s) 3, 4 and 7. During this session,  Physical Therapy addressed  Balance to progress towards their discipline specific goal(s). Co-treatment was necessary to improve patient's ability to increase activity demands and ability to return to normal functional activity. Self Care: (8 minutes): Procedure(s) (per grid) utilized to improve and/or restore self-care/home management as related to grooming and self feeding. Required moderate visual, verbal and manual cueing to facilitate activities of daily living skills and compensatory activities. Co-treatment initiated to include bed mobility with MaxA x2-TA and seated ADLs with Min-ModA after set up while PT addressed sitting balance with almost constant cueing/support. Pt demonstrates decreased independence with self-drinking and grooming with poor activity tolerance and decreased BUE strength.      Braces/Orthotics/Lines/Etc:   · IV  · Wound vac  · O2 Device: Nasal cannula  Treatment/Session Assessment:    · Response to Treatment:  Poor activity tolerance  · Interdisciplinary Collaboration:   o Physical Therapist  o Occupational Therapist  o Registered Nurse  · After treatment position/precautions:   o Supine in bed  o Bed alarm/tab alert on  o Bed/Chair-wheels locked  o Bed in low position  o Call light within reach   · Compliance with Program/Exercises: Compliant most of the time, Will assess as treatment progresses. · Recommendations/Intent for next treatment session: \"Next visit will focus on advancements to more challenging activities and reduction in assistance provided\".   Total Treatment Duration:  OT Patient Time In/Time Out  Time In: 0909  Time Out: 9189 Gio Edmond OTR/L

## 2019-06-27 NOTE — ED NOTES
TRANSFER - OUT REPORT:    Verbal report given to BRET Ash(name) on Margarita Skill  being transferred to 806(unit) for routine progression of care       Report consisted of patients Situation, Background, Assessment and   Recommendations(SBAR). Information from the following report(s) ED Summary, MAR and Recent Results was reviewed with the receiving nurse. Lines:   Peripheral IV 06/26/19 Left Antecubital (Active)   Site Assessment Clean, dry, & intact 6/26/2019 11:15 PM   Phlebitis Assessment 0 6/26/2019 11:15 PM   Infiltration Assessment 0 6/26/2019 11:15 PM   Dressing Status Clean, dry, & intact 6/26/2019 11:15 PM   Dressing Type Tape;Transparent 6/26/2019 11:15 PM        Opportunity for questions and clarification was provided.       Patient transported with:  TRANSPORT

## 2019-06-27 NOTE — PROGRESS NOTES
Patient readmitted after recent discharge from 9th Floor [facility] on 6/13. She went to [facility] from the 9th floor, but left AMA the next day. At baseline, patient lives at home with her . She has reportedly \"not done well\" since going home. Case Management will follow for discharge planning. Care Management Interventions  PCP Verified by CM:  Yes  Transition of Care Consult (CM Consult): Discharge Planning  Discharge Durable Medical Equipment: No  Physical Therapy Consult: Yes  Occupational Therapy Consult: Yes  Speech Therapy Consult: No  Current Support Network: Lives with Spouse, Own Home  Confirm Follow Up Transport: Family  Plan discussed with Pt/Family/Caregiver: Yes  Freedom of Choice Offered: Yes  Discharge Location  Discharge Placement: Unable to determine at this time

## 2019-06-28 PROBLEM — J18.9 HCAP (HEALTHCARE-ASSOCIATED PNEUMONIA): Status: ACTIVE | Noted: 2019-06-28

## 2019-06-28 LAB
ANION GAP SERPL CALC-SCNC: 6 MMOL/L (ref 7–16)
BUN SERPL-MCNC: 16 MG/DL (ref 8–23)
CALCIUM SERPL-MCNC: 8.1 MG/DL (ref 8.3–10.4)
CHLORIDE SERPL-SCNC: 101 MMOL/L (ref 98–107)
CO2 SERPL-SCNC: 36 MMOL/L (ref 21–32)
CREAT SERPL-MCNC: 1.18 MG/DL (ref 0.6–1)
GLUCOSE SERPL-MCNC: 104 MG/DL (ref 65–100)
MAGNESIUM SERPL-MCNC: 1.8 MG/DL (ref 1.8–2.4)
MM INDURATION POC: 0 MM (ref 0–5)
POTASSIUM SERPL-SCNC: 2.9 MMOL/L (ref 3.5–5.1)
PPD POC: NORMAL NEGATIVE
SODIUM SERPL-SCNC: 143 MMOL/L (ref 136–145)

## 2019-06-28 PROCEDURE — 97605 NEG PRS WND THER DME<=50SQCM: CPT

## 2019-06-28 PROCEDURE — 74011250637 HC RX REV CODE- 250/637: Performed by: HOSPITALIST

## 2019-06-28 PROCEDURE — 77010033678 HC OXYGEN DAILY

## 2019-06-28 PROCEDURE — 65660000000 HC RM CCU STEPDOWN

## 2019-06-28 PROCEDURE — 74750000023 HC WOUND THERAPY

## 2019-06-28 PROCEDURE — 97530 THERAPEUTIC ACTIVITIES: CPT

## 2019-06-28 PROCEDURE — 94760 N-INVAS EAR/PLS OXIMETRY 1: CPT

## 2019-06-28 PROCEDURE — 74011250636 HC RX REV CODE- 250/636: Performed by: HOSPITALIST

## 2019-06-28 PROCEDURE — 80048 BASIC METABOLIC PNL TOTAL CA: CPT

## 2019-06-28 PROCEDURE — 83735 ASSAY OF MAGNESIUM: CPT

## 2019-06-28 PROCEDURE — 97110 THERAPEUTIC EXERCISES: CPT

## 2019-06-28 PROCEDURE — 77030019934 HC DRSG VAC ASST KCON -B

## 2019-06-28 PROCEDURE — 36415 COLL VENOUS BLD VENIPUNCTURE: CPT

## 2019-06-28 PROCEDURE — 74011000258 HC RX REV CODE- 258: Performed by: HOSPITALIST

## 2019-06-28 PROCEDURE — 99232 SBSQ HOSP IP/OBS MODERATE 35: CPT | Performed by: INTERNAL MEDICINE

## 2019-06-28 PROCEDURE — 74011250636 HC RX REV CODE- 250/636: Performed by: FAMILY MEDICINE

## 2019-06-28 PROCEDURE — 74011250637 HC RX REV CODE- 250/637: Performed by: FAMILY MEDICINE

## 2019-06-28 RX ORDER — POTASSIUM CHLORIDE 20 MEQ/1
40 TABLET, EXTENDED RELEASE ORAL 2 TIMES DAILY
Status: DISCONTINUED | OUTPATIENT
Start: 2019-06-28 | End: 2019-06-28

## 2019-06-28 RX ORDER — POTASSIUM CHLORIDE AND SODIUM CHLORIDE 900; 300 MG/100ML; MG/100ML
INJECTION, SOLUTION INTRAVENOUS CONTINUOUS
Status: DISCONTINUED | OUTPATIENT
Start: 2019-06-28 | End: 2019-06-29

## 2019-06-28 RX ORDER — FUROSEMIDE 40 MG/1
40 TABLET ORAL DAILY
Status: DISCONTINUED | OUTPATIENT
Start: 2019-06-28 | End: 2019-06-28

## 2019-06-28 RX ORDER — VANCOMYCIN HYDROCHLORIDE
1250
Status: DISCONTINUED | OUTPATIENT
Start: 2019-06-29 | End: 2019-07-01 | Stop reason: SDUPTHER

## 2019-06-28 RX ORDER — POTASSIUM CHLORIDE 14.9 MG/ML
20 INJECTION INTRAVENOUS
Status: COMPLETED | OUTPATIENT
Start: 2019-06-28 | End: 2019-06-28

## 2019-06-28 RX ORDER — SODIUM CHLORIDE 9 MG/ML
50 INJECTION, SOLUTION INTRAVENOUS CONTINUOUS
Status: DISCONTINUED | OUTPATIENT
Start: 2019-06-28 | End: 2019-06-28

## 2019-06-28 RX ORDER — VANCOMYCIN 1.75 GRAM/500 ML IN 0.9 % SODIUM CHLORIDE INTRAVENOUS
1750 ONCE
Status: COMPLETED | OUTPATIENT
Start: 2019-06-28 | End: 2019-06-28

## 2019-06-28 RX ADMIN — METHYLPREDNISOLONE SODIUM SUCCINATE 60 MG: 125 INJECTION, POWDER, FOR SOLUTION INTRAMUSCULAR; INTRAVENOUS at 15:31

## 2019-06-28 RX ADMIN — POTASSIUM CHLORIDE 40 MEQ: 20 TABLET, EXTENDED RELEASE ORAL at 09:49

## 2019-06-28 RX ADMIN — POTASSIUM CHLORIDE 20 MEQ: 200 INJECTION, SOLUTION INTRAVENOUS at 09:49

## 2019-06-28 RX ADMIN — SODIUM CHLORIDE AND POTASSIUM CHLORIDE: 9; 2.98 INJECTION, SOLUTION INTRAVENOUS at 21:27

## 2019-06-28 RX ADMIN — SODIUM CHLORIDE 500 ML: 900 INJECTION, SOLUTION INTRAVENOUS at 10:53

## 2019-06-28 RX ADMIN — VANCOMYCIN HYDROCHLORIDE 1750 MG: 10 INJECTION, POWDER, LYOPHILIZED, FOR SOLUTION INTRAVENOUS at 15:32

## 2019-06-28 RX ADMIN — HYDROCODONE BITARTRATE AND ACETAMINOPHEN 1 TABLET: 5; 325 TABLET ORAL at 01:52

## 2019-06-28 RX ADMIN — Medication 5 ML: at 05:55

## 2019-06-28 RX ADMIN — POTASSIUM CHLORIDE 20 MEQ: 200 INJECTION, SOLUTION INTRAVENOUS at 06:00

## 2019-06-28 RX ADMIN — LEVOTHYROXINE SODIUM 75 MCG: 75 TABLET ORAL at 09:50

## 2019-06-28 RX ADMIN — Medication 10 ML: at 09:49

## 2019-06-28 RX ADMIN — SODIUM CHLORIDE 500 ML: 900 INJECTION, SOLUTION INTRAVENOUS at 15:33

## 2019-06-28 RX ADMIN — Medication 10 ML: at 21:31

## 2019-06-28 RX ADMIN — LACTULOSE 30 ML: 20 SOLUTION ORAL at 09:49

## 2019-06-28 RX ADMIN — SODIUM CHLORIDE AND POTASSIUM CHLORIDE: 9; 2.98 INJECTION, SOLUTION INTRAVENOUS at 13:52

## 2019-06-28 RX ADMIN — TAMSULOSIN HYDROCHLORIDE 0.4 MG: 0.4 CAPSULE ORAL at 09:49

## 2019-06-28 RX ADMIN — MIRTAZAPINE 15 MG: 15 TABLET, FILM COATED ORAL at 21:32

## 2019-06-28 RX ADMIN — ASPIRIN 325 MG ORAL TABLET 325 MG: 325 PILL ORAL at 09:50

## 2019-06-28 RX ADMIN — FUROSEMIDE 40 MG: 40 TABLET ORAL at 09:50

## 2019-06-28 RX ADMIN — PIPERACILLIN, TAZOBACTAM 4.5 G: 4; .5 INJECTION, POWDER, LYOPHILIZED, FOR SOLUTION INTRAVENOUS at 21:32

## 2019-06-28 RX ADMIN — ENOXAPARIN SODIUM 40 MG: 40 INJECTION SUBCUTANEOUS at 15:32

## 2019-06-28 RX ADMIN — METHYLPREDNISOLONE SODIUM SUCCINATE 60 MG: 125 INJECTION, POWDER, FOR SOLUTION INTRAMUSCULAR; INTRAVENOUS at 21:32

## 2019-06-28 RX ADMIN — PIPERACILLIN, TAZOBACTAM 4.5 G: 4; .5 INJECTION, POWDER, LYOPHILIZED, FOR SOLUTION INTRAVENOUS at 15:31

## 2019-06-28 RX ADMIN — PANTOPRAZOLE SODIUM 40 MG: 40 TABLET, DELAYED RELEASE ORAL at 05:55

## 2019-06-28 NOTE — PROGRESS NOTES
Problem: Falls - Risk of  Goal: *Absence of Falls  Description  Document Jessica Mcnair Fall Risk and appropriate interventions in the flowsheet.   Outcome: Progressing Towards Goal     Problem: Patient Education: Go to Patient Education Activity  Goal: Patient/Family Education  Outcome: Progressing Towards Goal     Problem: Patient Education: Go to Patient Education Activity  Goal: Patient/Family Education  Outcome: Progressing Towards Goal     Problem: Nutrition Deficit  Goal: *Optimize nutritional status  Outcome: Progressing Towards Goal

## 2019-06-28 NOTE — PROGRESS NOTES
HOSPITALIST  NAME:  Kory Stone   Age:  78 y.o.  :   1940   MRN:   [id number]  PCP: Tasia Canela MD    subj   Patient is a 69yo F with hx lung cancer recent RLL lobectomy on . She had a protracted hospitalization with hypoxia, overload, atelectasis, congestion. She completed inpatient rehab at [facility] on  and was discharged with 5L oxygen requirement to SNF. The patient left SNF AMA after one day. Today, she presents with generalized weakness, not feeling well, and shortness of breath. Has been having more difficulty getting around at home. Very sob with any movement. Had been getting up to bathroom with assistance but no longer able. CXR with effusion and atelectasis v pna, troponin elevated to 0.34, K 2.9, and     Today, drowzy and weak, less sleepy, discussed w/ daughter. BP dropping and responded to a bolus    Objective:     Visit Vitals  BP 98/58 (BP 1 Location: Right arm, BP Patient Position: At rest)   Pulse 81   Temp 97.7 °F (36.5 °C)   Resp 18   Ht 5' 10\" (1.778 m)   Wt 62.3 kg (137 lb 4.8 oz)   SpO2 96%   BMI 19.70 kg/m²      Temp (24hrs), Av.2 °F (36.8 °C), Min:97.7 °F (36.5 °C), Max:99.2 °F (37.3 °C)    Oxygen Therapy  O2 Sat (%): 96 % (19 1044)  Pulse via Oximetry: 87 beats per minute (19 1020)  O2 Device: Nasal cannula (19 1020)  O2 Flow Rate (L/min): 4.5 l/min (19 1020)  Physical Exam:  General:    Elderly, thin, NAD  Lungs:   Mild crackles, wound vac R chest wall. Diminished bs bibasilar, more on R  Heart:   Regular rate and rhythm,  no murmur, rub or gallop. Abdomen:   Soft, non-tender. Not distended. Bowel sounds normal.   Extremities: No cyanosis. No edema. No clubbing  Skin:     Texture, turgor normal. No rashes or lesions.   Not Jaundiced  Neurologic: Alert and oriented x 3, no focal deficits     Data Review:   Recent Results (from the past 24 hour(s))   PLEASE READ & DOCUMENT PPD TEST IN 24 HRS    Collection Time: 06/28/19  2:53 AM   Result Value Ref Range    PPD  Negative    mm Induration 0 0 - 5 mm   METABOLIC PANEL, BASIC    Collection Time: 06/28/19  4:10 AM   Result Value Ref Range    Sodium 143 136 - 145 mmol/L    Potassium 2.9 (LL) 3.5 - 5.1 mmol/L    Chloride 101 98 - 107 mmol/L    CO2 36 (H) 21 - 32 mmol/L    Anion gap 6 (L) 7 - 16 mmol/L    Glucose 104 (H) 65 - 100 mg/dL    BUN 16 8 - 23 MG/DL    Creatinine 1.18 (H) 0.6 - 1.0 MG/DL    GFR est AA 57 (L) >60 ml/min/1.73m2    GFR est non-AA 47 (L) >60 ml/min/1.73m2    Calcium 8.1 (L) 8.3 - 10.4 MG/DL   MAGNESIUM    Collection Time: 06/28/19  4:10 AM   Result Value Ref Range    Magnesium 1.8 1.8 - 2.4 mg/dL     Imaging /Procedures /Studies   CT CHEST WO CONT   Final Result   IMPRESSION:   1. Relatively extensive right basilar airspace opacities. Additional foci of gas   are present amongst this region some of which appears to be associated with a   suture line. No obvious obstructing endobronchial lesion is identified. 2. Mild additional patchy airspace opacities within the lingula and left lower   lobe. XR CHEST PORT   Final Result   IMPRESSION: Right pleural effusion and right lung atelectasis or pneumonia   unchanged. Assessment and Plan:      Active Hospital Problems    Diagnosis Date Noted    Volume overload 06/27/2019    Elevated troponin 06/27/2019    Elevated brain natriuretic peptide (BNP) level 06/27/2019    Chronic respiratory failure (HCC) 06/27/2019    Hypokalemia 06/27/2019    Pleural effusion, right 06/27/2019    COPD (chronic obstructive pulmonary disease) ([facility] [address] 05/24/2019       PLAN:    Zosyn+ Vanco  Steroids iv  IVF w/ caution  K replacement  Pulm following  Avoid meds affecting mentation  Full code, I spoke to son/ POA  Dispo: TBD    Signed By: Devorah Chappell MD     June 28, 2019

## 2019-06-28 NOTE — PROGRESS NOTES
Discharge plan discussed during IDT rounds. Patient's condition has worsened overnight. Continuing to await improvement in medical status in order to better assess discharge needs. Case Management will continue to follow. Care Management Interventions  PCP Verified by CM:  Yes  Transition of Care Consult (CM Consult): Discharge Planning  Discharge Durable Medical Equipment: No  Physical Therapy Consult: Yes  Occupational Therapy Consult: Yes  Speech Therapy Consult: No  Current Support Network: Lives with Spouse, Own Home  Confirm Follow Up Transport: Family  Plan discussed with Pt/Family/Caregiver: Yes  Freedom of Choice Offered: Yes  Discharge Location  Discharge Placement: Unable to determine at this time

## 2019-06-28 NOTE — PROGRESS NOTES
Assumed care of patient. Aasessment completed and documented, see docflow. Patient awake in bed. Alert to person, place. Intermittent confusion. Wound vac in place to right upper back; dressing CDI. NAD noted at present. Respirations even and non labored on 5LNC. Bed alarm in place. Call light within reach. Will continue to monitor.

## 2019-06-28 NOTE — WOUND CARE
Wound vac dressing to right lateral chest area changed. Medial wound is 2x5x0. 4cm with tunnel at 9 o'clock of 3cm, pink granular. Lateral wound is 0.8x3x0.5cm pink granular, edges are approximating, steri strips applied to each end of this wound with a small foam wick from vac bridged to vac foam in medial wound to one machine. Wound has small amount of serosanguinous drainage mild odor. Over all wounds are healing well. Will continue 3 times per week dressing changes. Will monitor.

## 2019-06-28 NOTE — CONSULTS
Progress NOTE    Kyra Hatch    6/28/2019    Date of Admission:  6/26/2019    The patient's chart is reviewed and the patient is discussed with the staff. Patient is a 78 y.o.  female seen and evaluated at the request of Dr. Efrain Perkins. She was diagnosed with NSCLC in May and she underwent an elective right thoracoscopy which was switched to right thoracotomy with extensive pneumonolysis,  right lower lobe lobectomy,  right upper lobe blebectomy x2,  diaphragm resection with primary repair,  chest wall resection,  mediastinal lymphadenectomy, intercostal nerve cryoablation. Post op, her wound became infected with strept for which she was treated with Vancomycin per ID. A wound vac remains. She also had problems with a fib, hypoxia, volume overload and debility. She was eventually discharged to rehab but has since been taken home by her daughter. She was getting PT at home. The patient is not responsive enough to provide a history so the chart was reviewed and her nurse interviewed. She was reportedly admitted with a 2 day history of weakness. Her echo shows an EF of 65%. She has no edema but her BNP is 421. She was not on home lasix but has been started on it here. She was sent home on oxygen. Her PFTs show mild obstruction. Her WBC is 12.3 with a PCT of 0.1. Subjective:           Low grade fevers noted, ct scan with bronchiectasis, emphysema and extensive RLL lnfiltrate with air bronchograms. Review of Systems  Review of systems not obtained due to patient factors.     Patient Active Problem List   Diagnosis Code    Personal history of tobacco use, presenting hazards to health Z87.891    Liver lesion K76.9    Centrilobular emphysema ([facility] [address] J43.2    HTN (hypertension) I10    Acquired hypothyroidism E03.9    Hypoxia R09.02    COPD (chronic obstructive pulmonary disease) (HCC) J44.9    Normochromic anemia D64.9    Non-small cell lung cancer (NSCLC) (HCC) C34.90    Debility R53.81    Gait difficulty R26.9    Small cell carcinoma (HCC) C80.1    History of thoracotomy Z98.890    Volume overload E87.70    Elevated troponin R74.8    Elevated brain natriuretic peptide (BNP) level R79.89    Chronic respiratory failure (HCC) J96.10    Hypokalemia E87.6    Pleural effusion, right J90         Prior to Admission Medications   Prescriptions Last Dose Informant Patient Reported? Taking? HYDROcodone-acetaminophen (NORCO) 5-325 mg per tablet 6/26/2019 at Unknown time  No Yes   Sig: Take 1 Tab by mouth every four (4) hours as needed (for pain) for up to 30 days. Max Daily Amount: 6 Tabs. VITAMIN B COMPLEX PO 6/20/2019 at Unknown time  Yes Yes   Sig: Take 1 Tab by mouth daily. albuterol (PROVENTIL HFA, VENTOLIN HFA, PROAIR HFA) 90 mcg/actuation inhaler 6/27/2019 at Unknown time  No Yes   Sig: Take 1 Puff by inhalation every six (6) hours as needed for Wheezing or Shortness of Breath. amiodarone (CORDARONE) 200 mg tablet 6/26/2019 at Unknown time  No Yes   Sig: Take 1 Tab by mouth daily. Indications: Prevention of Recurrent Atrial Fibrillation   ascorbic acid, vitamin C, (VITAMIN C) 500 mg tablet 6/26/2019 at Unknown time  Yes Yes   Sig: Take  by mouth. atorvastatin (LIPITOR) 10 mg tablet   No No   Sig: Take 1 Tab by mouth daily. benzonatate (TESSALON) 100 mg capsule   No No   Sig: Take 1 Cap by mouth three (3) times daily as needed for Cough. calc-D3-magnes-P4-Dd-Cz-jake 250 mg-400 unit -40 mg-5 mg tab 6/20/2019 at Unknown time  Yes Yes   Sig: Take 1 Tab by mouth daily. cholecalciferol (VITAMIN D3) 1,000 unit tablet 6/26/2019 at Unknown time  Yes Yes   Sig: Take 1,000 Units by mouth daily. gabapentin (NEURONTIN) 300 mg capsule 6/26/2019 at Unknown time  No Yes   Sig: Take 1 Cap by mouth three (3) times daily. gabapentin (NEURONTIN) 300 mg capsule 6/26/2019 at Unknown time  No Yes   Sig: Take 1 Cap by mouth three (3) times daily.  Indications: Neuropathic Pain levothyroxine (SYNTHROID) 75 mcg tablet 6/26/2019 at Unknown time  No Yes   Sig: Take 1 Tab by mouth daily. magnesium hydroxide (MILK OF MAGNESIA) 400 mg/5 mL suspension 6/20/2019 at Unknown time  Yes Yes   Sig: Take 30 mL by mouth daily. mirtazapine (REMERON) 15 mg tablet 6/26/2019 at Unknown time  No Yes   Sig: Take 1 Tab by mouth nightly. Indications: sleep   pantoprazole (PROTONIX) 40 mg tablet 6/26/2019 at Unknown time  No Yes   Sig: Take 1 Tab by mouth Daily (before breakfast) for 30 days. senna-docusate (PERICOLACE) 8.6-50 mg per tablet 6/26/2019 at Unknown time  Yes Yes   Sig: Take 2 Tabs by mouth two (2) times a day. therapeutic multivitamin (THERAGRAN) tablet 6/20/2019 at Unknown time  Yes Yes   Sig: Take 1 Tab by mouth daily. tiotropium (SPIRIVA) 18 mcg inhalation capsule 6/26/2019 at Unknown time  No Yes   Sig: Take 1 Cap by inhalation daily.       Facility-Administered Medications: None       Past Medical History:   Diagnosis Date    Aspiration pneumonia ([address] 4/12/2019    Atrial fibrillation with rapid ventricular response ([address] 5/26/2019    Atrial fibrillation with RVR ([address] 5/31/2019    Cancer ([address]     Lung cancer    Chronic obstructive pulmonary disease ([address]     Hypertension     Severe sepsis with acute organ dysfunction ([address] 4/12/2019    Subarachnoid hemorrhage (HCC)     Thyroid disease     UTI (urinary tract infection) 5/29/2019     Active Ambulatory Problems     Diagnosis Date Noted    Personal history of tobacco use, presenting hazards to health 03/18/2019    Liver lesion 03/18/2019    Centrilobular emphysema ([address] 03/18/2019    HTN (hypertension) 04/12/2019    Acquired hypothyroidism 04/12/2019    Hypoxia 05/24/2019    COPD (chronic obstructive pulmonary disease) ([address] 05/24/2019    Normochromic anemia 05/26/2019    Non-small cell lung cancer (NSCLC) ([address] 05/30/2019    Debility 05/31/2019    Gait difficulty 05/31/2019    Small cell carcinoma ([address] 05/31/2019  History of thoracotomy 05/31/2019     Resolved Ambulatory Problems     Diagnosis Date Noted    Acute respiratory failure with hypoxia ([address] 04/12/2019    Aftercare following surgery 05/22/2019    Right lower lobe lung mass 05/22/2019    Atrial fibrillation with RVR ([address] 05/26/2019    Pain 05/31/2019     Past Medical History:   Diagnosis Date    Aspiration pneumonia ([address] 4/12/2019    Atrial fibrillation with rapid ventricular response ([address] 5/26/2019    Atrial fibrillation with RVR ([address] 5/31/2019    Cancer (HCC)     Chronic obstructive pulmonary disease (HCC)     Hypertension     Severe sepsis with acute organ dysfunction ([address] 4/12/2019    Subarachnoid hemorrhage ([address]     Thyroid disease     UTI (urinary tract infection) 5/29/2019     Past Surgical History:   Procedure Laterality Date    HX OTHER SURGICAL      coil in brain    HX WRIST FRACTURE [place]      bilat wrist     Social History     Socioeconomic History    Marital status: SINGLE     Spouse name: Not on file    Number of children: Not on file    Years of education: Not on file    Highest education level: Not on file   Occupational History    Not on file   Social Needs    Financial resource strain: Not on file    Food insecurity:     Worry: Not on file     Inability: Not on file    Transportation needs:     Medical: Not on file     Non-medical: Not on file   Tobacco Use    Smoking status: Former Smoker     Packs/day: 1.50     Years: 45.00     Pack years: 67.50     Types: Cigarettes     Last attempt to quit: 2004     Years since quitting: 15.4    Smokeless tobacco: Never Used   Substance and Sexual Activity    Alcohol use: Not Currently    Drug use: Never    Sexual activity: Not on file   Lifestyle    Physical activity:     Days per week: Not on file     Minutes per session: Not on file    Stress: Not on file   Relationships    Social connections:     Talks on phone: Not on file     Gets together: Not on file     Attends Yazdanism service: Not on file     Active member of club or organization: Not on file     Attends meetings of clubs or organizations: Not on file     Relationship status: Not on file    Intimate partner violence:     Fear of current or ex partner: Not on file     Emotionally abused: Not on file     Physically abused: Not on file     Forced sexual activity: Not on file   Other Topics Concern    Not on file   Social History Narrative    Not on file     History reviewed. No pertinent family history. Allergies   Allergen Reactions    Penicillins Unknown (comments)     Hives.     Tolerated ceftriaxone May 2019    Percocet [Oxycodone-Acetaminophen] Hives    Prednisone Other (comments)     Tachycardia       Current Facility-Administered Medications   Medication Dose Route Frequency    sodium chloride 0.9 % bolus infusion 500 mL  500 mL IntraVENous ONCE    piperacillin-tazobactam (ZOSYN) 4.5 g in 0.9% sodium chloride (MBP/ADV) 100 mL  4.5 g IntraVENous Q8H    vancomycin (VANCOCIN) 1750 mg in  ml infusion  1,750 mg IntraVENous ONCE    0.9% sodium chloride with KCl 40 mEq/L infusion   IntraVENous CONTINUOUS    methylPREDNISolone (PF) (Solu-MEDROL) injection 60 mg  60 mg IntraVENous Q8H    albuterol (PROVENTIL VENTOLIN) nebulizer solution 2.5 mg  2.5 mg Nebulization Q4H PRN    amiodarone (CORDARONE) tablet 200 mg  200 mg Oral DAILY    benzonatate (TESSALON) capsule 100 mg  100 mg Oral TID PRN    HYDROcodone-acetaminophen (NORCO) 5-325 mg per tablet 1 Tab  1 Tab Oral Q4H PRN    levothyroxine (SYNTHROID) tablet 75 mcg  75 mcg Oral DAILY    mirtazapine (REMERON) tablet 15 mg  15 mg Oral QHS    pantoprazole (PROTONIX) tablet 40 mg  40 mg Oral ACB    sodium chloride (NS) flush 5-40 mL  5-40 mL IntraVENous Q8H    sodium chloride (NS) flush 5-40 mL  5-40 mL IntraVENous PRN    ondansetron (ZOFRAN ODT) tablet 4 mg  4 mg Oral Q4H PRN    bisacodyl (DULCOLAX) tablet 5 mg  5 mg Oral DAILY PRN    enoxaparin (LOVENOX) injection 40 mg  40 mg SubCUTAneous Q24H    aspirin tablet 325 mg  325 mg Oral DAILY    nitroglycerin (NITROSTAT) tablet 0.4 mg  0.4 mg SubLINGual PRN    tamsulosin (FLOMAX) capsule 0.4 mg  0.4 mg Oral DAILY         Objective:     Vitals:    06/28/19 0728 06/28/19 1020 06/28/19 1044 06/28/19 1141   BP: (!) 89/50  (!) 73/45 98/58   Pulse: 81  84 81   Resp: 19 18    Temp: 97.9 °F (36.6 °C)  97.7 °F (36.5 °C)    SpO2: 93% 93% 96%    Weight:       Height:           PHYSICAL EXAM     Constitutional:  the patient is thin, well developed and in no acute distress  HEENT:  Sclera clear, pupils equal, oral mucosa moist  Lungs:crackles on R. Respirations even and not labored. Oxygen at 4 liters  Cardiovascular:  RRR without M,G,R  Abd/GI: soft and non-tender; with positive bowel sounds. Ext: warm without cyanosis. There is no lower leg edema. Skin:  no jaundice or rashes, right chest wound with wound vac in place  Neuro: barely arousable. Mumbling but not able to actually speak. Calls out when repositioned  Musculoskeletal: moves all four extremities. No deformities.    Psychiatric: cannot assess due to lethargy  Chest X-ray:        Recent Labs     06/26/19  2314   WBC 12.3*   HGB 9.4*   HCT 30.4*        Recent Labs     06/28/19  0410 06/26/19  2314    140   K 2.9* 2.9*    99   * 113*   CO2 36* 33*   BUN 16 12   CREA 1.18* 0.87   MG 1.8 1.8   CA 8.1* 8.0*   ALB  --  1.8*   SGOT  --  14*       Assessment:  (Medical Decision Making)     Hospital Problems  Date Reviewed: 6/21/2019          Codes Class Noted POA    * (Principal) Volume overload ICD-10-CM: E87.70  ICD-9-CM: 276.69  6/27/2019 Yes        Elevated troponin ICD-10-CM: R74.8  ICD-9-CM: 790.6  6/27/2019 Yes        Elevated brain natriuretic peptide (BNP) level ICD-10-CM: R79.89  ICD-9-CM: 790.99  6/27/2019 Yes        Chronic respiratory failure (HCC) (Chronic) ICD-10-CM: J96.10  ICD-9-CM: 518.83  6/27/2019 Yes        Hypokalemia ICD-10-CM: E87.6  ICD-9-CM: 276.8  6/27/2019 Yes        Pleural effusion, right ICD-10-CM: J90  ICD-9-CM: 511.9  6/27/2019 Yes        COPD (chronic obstructive pulmonary disease) (HCC) (Chronic) ICD-10-CM: J44.9  ICD-9-CM: 496  5/24/2019 Yes              Plan:  (Medical Decision Making)   1. CT with PNA- patient with low grade temp and now hypotension- suspect sepsis and would Rx with ABX- agree with Vancomycin and Zosyn- discussed with Dr Gwen Nathan- stop diuretics. Obtain BC and sputum if able.     2. Reassess oxygen needs at discharge    Juan C Etienne MD

## 2019-06-28 NOTE — PROGRESS NOTES
Patient's daughter, Katelyn Boles called; [phone]. Requesting update on patient's mother. Privacy code confirmed. Update provided, Katelyn Boles requesting Dr to call with CT results. Dr Keenan Abraham informed.

## 2019-06-28 NOTE — PROGRESS NOTES
Patient potassium level below normal average (see MAR). Md orders:\" give IVPB Potassium Chloride 40 meq and order another magnesium lab\". Charge nurse notified.

## 2019-06-28 NOTE — PROGRESS NOTES
Pharmacokinetic Consult to Pharmacist    Oscar Fany is a 78 y.o. female being treated for pneumonia/sepsis with Zosyn and vancomycin. Height: 5' 10\" (177.8 cm)  Weight: 62.3 kg (137 lb 4.8 oz)  Lab Results   Component Value Date/Time    BUN 16 06/28/2019 04:10 AM    Creatinine 1.18 (H) 06/28/2019 04:10 AM    WBC 12.3 (H) 06/26/2019 11:14 PM    Procalcitonin 0.1 06/26/2019 11:14 PM    Lactic Acid (POC) 1.06 06/26/2019 11:20 PM      Estimated Creatinine Clearance: 38 mL/min (A) (based on SCr of 1.18 mg/dL (H)). Day 1 of vancomycin. Goal trough is 15-20. Vancomycin dose initiated at 1.75 g once, followed by 1.25 g q18h. Will continue to follow patient.       Thank you,    Ky Oliveros, PharmD, BCPS  Pharmacist  [phone]

## 2019-06-28 NOTE — PROGRESS NOTES
Patient resting in bed, no SOB noted, patient alert with periods of confusion, safety measures in place,call light within reach.

## 2019-06-28 NOTE — INTERDISCIPLINARY ROUNDS
Interdisciplinary team rounds were held 6/28/2019 with the following team members:Care Management, Physical Therapy, Physician and Clinical Coordinator and the patient. Plan of care discussed. See clinical pathway and/or care plan for interventions and desired outcomes.

## 2019-06-28 NOTE — PROGRESS NOTES
Problem: Mobility Impaired (Adult and Pediatric)  Goal: *Acute Goals and Plan of Care (Insert Text)  Description  STG:  (1.)Ms. Falguni Martinez will move from supine to sit and sit to supine  with MODERATE ASSIST within 3 treatment day(s). (2.)Ms. Falguni Martinez will transfer from bed to chair and chair to bed with MAXIMAL ASSIST using the least restrictive device within 3 treatment day(s). (3.)Ms. Falguni Martinez will demonstrate fair static sitting balance for 10 minutes with CONTACT GUARD within 3 treatment day(s). LTG:  (1.)Ms. Falguni Martinez will move from supine to sit and sit to supine  in bed with MINIMAL ASSIST within 7 treatment day(s). (2.)Ms. Falguni Martinez will transfer from bed to chair and chair to bed with MINIMAL ASSIST using the least restrictive device within 7 treatment day(s). (3.)Ms. Falguni Martinez will demonstrate good static/dynamic sitting balance for 15 minutes with STAND BY ASSIST within 7 treatment day(s). (4.)Ms. Falguni Martinez will ambulate with MODERATE ASSIST for 15 feet X 2 with the least restrictive device within 7 treatment day(s). ________________________________________________________________________________________________     Outcome: Progressing Towards Goal      PHYSICAL THERAPY: Daily Note and AM 6/28/2019  INPATIENT: PT Visit Days : 2  [place] / [place] / Product Type: Medicare /       NAME/AGE/GENDER: Yolis Almaraz is a 78 y.o. female   PRIMARY DIAGNOSIS: Volume overload [E87.70] Volume overload   Volume overload         ICD-10: Treatment Diagnosis:    · Generalized Muscle Weakness (M62.81)  · Difficulty in walking, Not elsewhere classified (R26.2)   Precaution/Allergies:  Penicillins; Percocet [oxycodone-acetaminophen]; and Prednisone      ASSESSMENT:     Ms. Falguni Martinez is supine in bed upon contact and agreeable to PT treatment this morning with encouragement. Pt is more confused this session.  Pt transitioned supine to sit EOB with max A requiring max cues for technique. Pt demonstrating poor static sitting balance EOB with continued L trunk lean. Provided TC, VC, and manual cues for improved upright midline sitting balance. Pt continues to require constant support in sitting to maintain balance but able to assist with shift to midline for a few seconds. Pt with improved tolerance for sitting EOB this session. Pt performed LAQ while sitting up requiring AA to L LE. Pt assisted into supine with max-total A x 2. Pt performed rolling R and L requiring max-total A and max cues for technique for positioning and changing of linens. Pt continued with exercises in supine requiring VC and TC. Pt left supine with all needs met and within reach. Pt is making slow progress towards goals. Pt is appropriate for co-treatment at this time. This section established at most recent assessment   PROBLEM LIST (Impairments causing functional limitations):  1. Decreased Strength  2. Decreased ADL/Functional Activities  3. Decreased Transfer Abilities  4. Decreased Ambulation Ability/Technique  5. Decreased Balance  6. Increased Pain  7. Decreased Activity Tolerance  8. Decreased Pacing Skills  9. Increased Fatigue  10. Increased Shortness of Breath  11. Decreased Carver with Home Exercise Program   INTERVENTIONS PLANNED: (Benefits and precautions of physical therapy have been discussed with the patient.)  1. Balance Exercise  2. Bed Mobility  3. Family Education  4. Gait Training  5. Home Exercise Program (HEP)  6. Neuromuscular Re-education/Strengthening  7. Range of Motion (ROM)  8. Therapeutic Activites  9. Therapeutic Exercise/Strengthening  10. Transfer Training     TREATMENT PLAN: Frequency/Duration: 3 times a week for duration of hospital stay  Rehabilitation Potential For Stated Goals: [facility] (at time of discharge pending progress):    Placement:   It is my opinion, based on this patient's performance to date, that Ms. Bereket Scanlon may benefit from intensive therapy at a [address] [place] [address] after discharge due to the functional deficits listed above that are likely to improve with skilled rehabilitation and concerns that he/she may be unsafe to be unsupervised at home due to decreased strength and balance putting pt at increased risk for falls. .  Equipment:    None at this time              HISTORY:   History of Present Injury/Illness (Reason for Referral):  See H&P below  Patient is a 71yo F with hx lung cancer recent RLL lobectomy on 5/22. She had a protracted hospitalization with hypoxia, overload, atelectasis, congestion. She completed inpatient rehab at [facility] on 6/12 and was discharged with 5L oxygen requirement to SNF. The patient left SNF AMA after one day. Today, she presents with generalized weakness, not feeling well, and shortness of breath. Has been having more difficulty getting around at home. Very sob with any movement. Had been getting up to bathroom with assistance but no longer able. CXR with effusion and atelectasis v pna, troponin elevated to 0.34, K 2.9, and     Past Medical History/Comorbidities:   Ms. Fuentes Moe  has a past medical history of Aspiration pneumonia ([address] (4/12/2019), Atrial fibrillation with rapid ventricular response ([address] (5/26/2019), Atrial fibrillation with RVR ([address] (5/31/2019), Cancer ([address] Chronic obstructive pulmonary disease ([address] Hypertension, Severe sepsis with acute organ dysfunction ([address] (4/12/2019), Subarachnoid hemorrhage ([address] Thyroid disease, and UTI (urinary tract infection) (5/29/2019).   Ms. Fuentes Moe  has a past surgical history that includes hx wrist fracture tx and hx other surgical.  Social History/Living Environment:   Home Environment: Private residence  # Steps to Enter: 0  One/Two Story Residence: One story  Living Alone: No  Support Systems: Child(carter)  Patient Expects to be Discharged to[de-identified] Rehabilitation facility  Current DME Used/Available at Home: [address], Kwasi Brittaney, Wheelchair, Shower chair  Tub or Shower Type: Shower  Prior Level of Function/Work/Activity:  Amb household distances with walker 2 weeks ago   Number of Personal Factors/Comorbidities that affect the Plan of Care: 3+: HIGH COMPLEXITY   EXAMINATION:   Most Recent Physical Functioning:   Gross Assessment:  Strength: Grossly decreased, non-functional  Coordination: Grossly decreased, non-functional               Posture:     Balance:  Sitting - Static: Poor (constant support) Bed Mobility:  Supine to Sit: Maximum assistance  Sit to Supine: Maximum assistance; Total assistance;Assist x2  Wheelchair Mobility:     Transfers:     Gait:            Body Structures Involved:  1. Lungs  2. Bones  3. Joints  4. Muscles  5. Ligaments Body Functions Affected:  1. Sensory/Pain  2. Cardio  3. Respiratory  4. Neuromusculoskeletal  5. Movement Related Activities and Participation Affected:  1. General Tasks and Demands  2. Mobility  3. Self Care  4. Domestic Life  5. Interpersonal Interactions and Relationships  6. Community, Social and [place]   Number of elements that affect the Plan of Care: 4+: HIGH COMPLEXITY   CLINICAL PRESENTATION:   Presentation: Evolving clinical presentation with changing clinical characteristics: MODERATE COMPLEXITY   CLINICAL DECISION MAKIN [facility] Short Form  How much difficulty does the patient currently have. .. Unable A Lot A Little None   1. Turning over in bed (including adjusting bedclothes, sheets and blankets)? ? 1   ? 2   ? 3   ? 4   2. Sitting down on and standing up from a chair with arms ( e.g., wheelchair, bedside commode, etc.)   ? 1   ? 2   ? 3   ? 4   3. Moving from lying on back to sitting on the side of the bed?   ? 1   ? 2   ? 3   ? 4   How much help from another person does the patient currently need. .. Total A Lot A Little None   4. Moving to and from a bed to a chair (including a wheelchair)? ? 1   ? 2   ? 3   ? 4   5.   Need to walk in hospital room? ? 1   ? 2   ? 3   ? 4   6. Climbing 3-5 steps with a railing? ? 1   ? 2   ? 3   ? 4   © 2007, Trustees of [address] [place] [address] [address] [address], under license to [employer]. All rights reserved      Score:  Initial: 8 Most Recent: X (Date: -- )    Interpretation of Tool:  Represents activities that are increasingly more difficult (i.e. Bed mobility, Transfers, Gait). Medical Necessity:     · Patient is expected to demonstrate progress in strength, balance, coordination and functional technique  ·  to decrease assistance required with gait, transfers, and functional mobility. · .  Reason for Services/Other Comments:  · Patient continues to require skilled intervention due to decreased strength, decreased balance, decreased functional tolerance, decreased cardiopulmonary endurance affecting participation in basic ADLs and functional tasks   · . Use of outcome tool(s) and clinical judgement create a POC that gives a: Questionable prediction of patient's progress: MODERATE COMPLEXITY            TREATMENT:   (In addition to Assessment/Re-Assessment sessions the following treatments were rendered)   Pre-treatment Symptoms/Complaints:  weakness  Pain: Initial:   Pain Intensity 1: 0  Post Session:  Increased with mobility 3/10     Therapeutic Activity: (    23 minutes): Therapeutic activities including Bed transfers and sitting balance EOB, improved static sitting posture to improve mobility, strength, balance, coordination and tolerance for functional mobility . Required maximal   to promote static balance in sitting and promote coordination of bilateral, upper extremity(s), lower extremity(s). Therapeutic Exercise: (15 Minutes):  Exercises per grid below to improve mobility and strength. Required minimal visual, verbal, manual and tactile cues to promote proper body alignment, promote proper body posture and promote proper body mechanics.   Progressed range, repetitions and complexity of movement as indicated. Date:  6/28/19 Date:   Date:     Activity/Exercise Parameters Parameters Parameters   LAQ 10 X B AA L     Ankle pumps 10 X B      Quad set 10 X B      Glute set 10 X      Heel slides 10 X B AA     Hip abduction 10 X B AA                 . Braces/Orthotics/Lines/Etc:   · IV  · goodman catheter  · wound vac  · O2 Device: Nasal cannula  Treatment/Session Assessment:    · Response to Treatment:  Max -max A X 2 for mobility  · Interdisciplinary Collaboration:   o Physical Therapist  o Registered Nurse  o Certified Nursing Assistant/Patient Care Technician  · After treatment position/precautions:   o Supine in bed  o Bed alarm/tab alert on  o Bed/Chair-wheels locked  o Bed in low position  o Call light within reach  o RN notified   · Compliance with Program/Exercises: Compliant most of the time  · Recommendations/Intent for next treatment session: \"Next visit will focus on advancements to more challenging activities and reduction in assistance provided\".   Total Treatment Duration:  PT Patient Time In/Time Out  Time In: 0933  Time Out: [address] [facility]

## 2019-06-29 LAB
ANION GAP SERPL CALC-SCNC: 7 MMOL/L (ref 7–16)
BUN SERPL-MCNC: 23 MG/DL (ref 8–23)
CALCIUM SERPL-MCNC: 7.8 MG/DL (ref 8.3–10.4)
CHLORIDE SERPL-SCNC: 107 MMOL/L (ref 98–107)
CO2 SERPL-SCNC: 31 MMOL/L (ref 21–32)
CREAT SERPL-MCNC: 1.07 MG/DL (ref 0.6–1)
GLUCOSE SERPL-MCNC: 160 MG/DL (ref 65–100)
POTASSIUM SERPL-SCNC: 4.1 MMOL/L (ref 3.5–5.1)
SODIUM SERPL-SCNC: 145 MMOL/L (ref 136–145)

## 2019-06-29 PROCEDURE — 77010033678 HC OXYGEN DAILY

## 2019-06-29 PROCEDURE — 94760 N-INVAS EAR/PLS OXIMETRY 1: CPT

## 2019-06-29 PROCEDURE — 36415 COLL VENOUS BLD VENIPUNCTURE: CPT

## 2019-06-29 PROCEDURE — 74750000023 HC WOUND THERAPY

## 2019-06-29 PROCEDURE — 74011250636 HC RX REV CODE- 250/636: Performed by: HOSPITALIST

## 2019-06-29 PROCEDURE — 77030020263 HC SOL INJ SOD CL0.9% LFCR 1000ML

## 2019-06-29 PROCEDURE — 65660000000 HC RM CCU STEPDOWN

## 2019-06-29 PROCEDURE — 74011250637 HC RX REV CODE- 250/637: Performed by: FAMILY MEDICINE

## 2019-06-29 PROCEDURE — 74011000258 HC RX REV CODE- 258: Performed by: HOSPITALIST

## 2019-06-29 PROCEDURE — 74011250637 HC RX REV CODE- 250/637: Performed by: HOSPITALIST

## 2019-06-29 PROCEDURE — 80048 BASIC METABOLIC PNL TOTAL CA: CPT

## 2019-06-29 PROCEDURE — 99232 SBSQ HOSP IP/OBS MODERATE 35: CPT | Performed by: INTERNAL MEDICINE

## 2019-06-29 RX ORDER — SODIUM CHLORIDE 9 MG/ML
65 INJECTION, SOLUTION INTRAVENOUS CONTINUOUS
Status: DISPENSED | OUTPATIENT
Start: 2019-06-29 | End: 2019-07-01

## 2019-06-29 RX ADMIN — Medication 10 ML: at 21:17

## 2019-06-29 RX ADMIN — Medication 10 ML: at 12:17

## 2019-06-29 RX ADMIN — PIPERACILLIN, TAZOBACTAM 4.5 G: 4; .5 INJECTION, POWDER, LYOPHILIZED, FOR SOLUTION INTRAVENOUS at 15:41

## 2019-06-29 RX ADMIN — PIPERACILLIN, TAZOBACTAM 4.5 G: 4; .5 INJECTION, POWDER, LYOPHILIZED, FOR SOLUTION INTRAVENOUS at 21:18

## 2019-06-29 RX ADMIN — PIPERACILLIN, TAZOBACTAM 4.5 G: 4; .5 INJECTION, POWDER, LYOPHILIZED, FOR SOLUTION INTRAVENOUS at 06:11

## 2019-06-29 RX ADMIN — ENOXAPARIN SODIUM 40 MG: 40 INJECTION SUBCUTANEOUS at 15:44

## 2019-06-29 RX ADMIN — Medication 10 ML: at 06:12

## 2019-06-29 RX ADMIN — VANCOMYCIN HYDROCHLORIDE 1250 MG: 10 INJECTION, POWDER, LYOPHILIZED, FOR SOLUTION INTRAVENOUS at 12:16

## 2019-06-29 RX ADMIN — MIRTAZAPINE 15 MG: 15 TABLET, FILM COATED ORAL at 21:17

## 2019-06-29 RX ADMIN — SODIUM CHLORIDE 65 ML/HR: 900 INJECTION, SOLUTION INTRAVENOUS at 11:00

## 2019-06-29 RX ADMIN — METHYLPREDNISOLONE SODIUM SUCCINATE 60 MG: 125 INJECTION, POWDER, FOR SOLUTION INTRAMUSCULAR; INTRAVENOUS at 15:44

## 2019-06-29 RX ADMIN — METHYLPREDNISOLONE SODIUM SUCCINATE 60 MG: 125 INJECTION, POWDER, FOR SOLUTION INTRAMUSCULAR; INTRAVENOUS at 06:12

## 2019-06-29 RX ADMIN — METHYLPREDNISOLONE SODIUM SUCCINATE 60 MG: 125 INJECTION, POWDER, FOR SOLUTION INTRAMUSCULAR; INTRAVENOUS at 21:17

## 2019-06-29 NOTE — PROGRESS NOTES
Patient's goodman catheter in place, patent and draining, yellow color urine,wound vac in place, patent and draining serosanguineous fluid.

## 2019-06-29 NOTE — PROGRESS NOTES
Patient awakens to voice, alert to person only at present. Command following limited. Attempted to give morning medications x3. Patient spit the medications out, despite being agreeable to take them; see MAR.

## 2019-06-29 NOTE — PROGRESS NOTES
Patient's daughter, Stew Karimi called for updates. Privacy code confirmed. Updated in regards to mental status and intake. All questions answered.

## 2019-06-29 NOTE — PROGRESS NOTES
Problem: Falls - Risk of  Goal: *Absence of Falls  Description  Document Meryl Larson Fall Risk and appropriate interventions in the flowsheet.   Outcome: Progressing Towards Goal

## 2019-06-29 NOTE — CONSULTS
Progress NOTE    Maicol Silveira    6/29/2019    Date of Admission:  6/26/2019    The patient's chart is reviewed and the patient is discussed with the staff. Patient is a 78 y.o.  female seen and evaluated at the request of Dr. Demetrius Norman. She was diagnosed with NSCLC in May and she underwent an elective right thoracoscopy which was switched to right thoracotomy with extensive pneumonolysis,  right lower lobe lobectomy,  right upper lobe blebectomy x2,  diaphragm resection with primary repair,  chest wall resection,  mediastinal lymphadenectomy, intercostal nerve cryoablation. Post op, her wound became infected with strept for which she was treated with Vancomycin per ID. A wound vac remains. She also had problems with a fib, hypoxia, volume overload and debility. She was eventually discharged to rehab but has since been taken home by her daughter. She was getting PT at home. The patient is not responsive enough to provide a history so the chart was reviewed and her nurse interviewed. She was reportedly admitted with a 2 day history of weakness. Her echo shows an EF of 65%. She has no edema but her BNP is 421. She was not on home lasix but has been started on it here. She was sent home on oxygen. Her PFTs show mild obstruction. Her WBC is 12.3 with a PCT of 0.1. Subjective:       Afebrile last 24h, no complaints    Review of Systems  Review of systems not obtained due to patient factors.     Patient Active Problem List   Diagnosis Code    Personal history of tobacco use, presenting hazards to health Z87.891    Liver lesion K76.9    Centrilobular emphysema ([address] J43.2    HTN (hypertension) I10    Acquired hypothyroidism E03.9    Hypoxia R09.02    COPD (chronic obstructive pulmonary disease) (HCC) J44.9    Normochromic anemia D64.9    Non-small cell lung cancer (NSCLC) (HCC) C34.90    Debility R53.81    Gait difficulty R26.9    Small cell carcinoma (HCC) C80.1    History of thoracotomy Z98.890    Volume overload E87.70    Elevated troponin R74.8    Elevated brain natriuretic peptide (BNP) level R79.89    Chronic respiratory failure (HCC) J96.10    Hypokalemia E87.6    Pleural effusion, right J90    HCAP (healthcare-associated pneumonia) J18.9         Prior to Admission Medications   Prescriptions Last Dose Informant Patient Reported? Taking? HYDROcodone-acetaminophen (NORCO) 5-325 mg per tablet 6/26/2019 at Unknown time  No Yes   Sig: Take 1 Tab by mouth every four (4) hours as needed (for pain) for up to 30 days. Max Daily Amount: 6 Tabs. VITAMIN B COMPLEX PO 6/20/2019 at Unknown time  Yes Yes   Sig: Take 1 Tab by mouth daily. albuterol (PROVENTIL HFA, VENTOLIN HFA, PROAIR HFA) 90 mcg/actuation inhaler 6/27/2019 at Unknown time  No Yes   Sig: Take 1 Puff by inhalation every six (6) hours as needed for Wheezing or Shortness of Breath. amiodarone (CORDARONE) 200 mg tablet 6/26/2019 at Unknown time  No Yes   Sig: Take 1 Tab by mouth daily. Indications: Prevention of Recurrent Atrial Fibrillation   ascorbic acid, vitamin C, (VITAMIN C) 500 mg tablet 6/26/2019 at Unknown time  Yes Yes   Sig: Take  by mouth. atorvastatin (LIPITOR) 10 mg tablet   No No   Sig: Take 1 Tab by mouth daily. benzonatate (TESSALON) 100 mg capsule   No No   Sig: Take 1 Cap by mouth three (3) times daily as needed for Cough. calc-D3-magnes-K7-Ad-Zm-jake 250 mg-400 unit -40 mg-5 mg tab 6/20/2019 at Unknown time  Yes Yes   Sig: Take 1 Tab by mouth daily. cholecalciferol (VITAMIN D3) 1,000 unit tablet 6/26/2019 at Unknown time  Yes Yes   Sig: Take 1,000 Units by mouth daily. gabapentin (NEURONTIN) 300 mg capsule 6/26/2019 at Unknown time  No Yes   Sig: Take 1 Cap by mouth three (3) times daily. gabapentin (NEURONTIN) 300 mg capsule 6/26/2019 at Unknown time  No Yes   Sig: Take 1 Cap by mouth three (3) times daily.  Indications: Neuropathic Pain   levothyroxine (SYNTHROID) 75 mcg tablet 6/26/2019 at Unknown time  No Yes   Sig: Take 1 Tab by mouth daily. magnesium hydroxide (MILK OF MAGNESIA) 400 mg/5 mL suspension 6/20/2019 at Unknown time  Yes Yes   Sig: Take 30 mL by mouth daily. mirtazapine (REMERON) 15 mg tablet 6/26/2019 at Unknown time  No Yes   Sig: Take 1 Tab by mouth nightly. Indications: sleep   pantoprazole (PROTONIX) 40 mg tablet 6/26/2019 at Unknown time  No Yes   Sig: Take 1 Tab by mouth Daily (before breakfast) for 30 days. senna-docusate (PERICOLACE) 8.6-50 mg per tablet 6/26/2019 at Unknown time  Yes Yes   Sig: Take 2 Tabs by mouth two (2) times a day. therapeutic multivitamin (THERAGRAN) tablet 6/20/2019 at Unknown time  Yes Yes   Sig: Take 1 Tab by mouth daily. tiotropium (SPIRIVA) 18 mcg inhalation capsule 6/26/2019 at Unknown time  No Yes   Sig: Take 1 Cap by inhalation daily.       Facility-Administered Medications: None       Past Medical History:   Diagnosis Date    Aspiration pneumonia ([address] 4/12/2019    Atrial fibrillation with rapid ventricular response ([address] 5/26/2019    Atrial fibrillation with RVR ([address] 5/31/2019    Cancer ([address]     Lung cancer    Chronic obstructive pulmonary disease ([address]     Hypertension     Severe sepsis with acute organ dysfunction ([address] 4/12/2019    Subarachnoid hemorrhage (HCC)     Thyroid disease     UTI (urinary tract infection) 5/29/2019     Active Ambulatory Problems     Diagnosis Date Noted    Personal history of tobacco use, presenting hazards to health 03/18/2019    Liver lesion 03/18/2019    Centrilobular emphysema ([address] 03/18/2019    HTN (hypertension) 04/12/2019    Acquired hypothyroidism 04/12/2019    Hypoxia 05/24/2019    COPD (chronic obstructive pulmonary disease) ([address] 05/24/2019    Normochromic anemia 05/26/2019    Non-small cell lung cancer (NSCLC) ([address] 05/30/2019    Debility 05/31/2019    Gait difficulty 05/31/2019    Small cell carcinoma ([address] 05/31/2019    History of thoracotomy 05/31/2019     Resolved Ambulatory Problems     Diagnosis Date Noted    Acute respiratory failure with hypoxia ([address] 04/12/2019    Aftercare following surgery 05/22/2019    Right lower lobe lung mass 05/22/2019    Atrial fibrillation with RVR ([address] 05/26/2019    Pain 05/31/2019     Past Medical History:   Diagnosis Date    Aspiration pneumonia ([address] 4/12/2019    Atrial fibrillation with rapid ventricular response ([address] 5/26/2019    Atrial fibrillation with RVR ([address] 5/31/2019    Cancer (HCC)     Chronic obstructive pulmonary disease (HCC)     Hypertension     Severe sepsis with acute organ dysfunction ([address] 4/12/2019    Subarachnoid hemorrhage ([address]     Thyroid disease     UTI (urinary tract infection) 5/29/2019     Past Surgical History:   Procedure Laterality Date    HX OTHER SURGICAL      coil in brain    HX WRIST FRACTURE TX      bilat wrist     Social History     Socioeconomic History    Marital status: SINGLE     Spouse name: Not on file    Number of children: Not on file    Years of education: Not on file    Highest education level: Not on file   Occupational History    Not on file   Social Needs    Financial resource strain: Not on file    Food insecurity:     Worry: Not on file     Inability: Not on file    Transportation needs:     Medical: Not on file     Non-medical: Not on file   Tobacco Use    Smoking status: Former Smoker     Packs/day: 1.50     Years: 45.00     Pack years: 67.50     Types: Cigarettes     Last attempt to quit: 2004     Years since quitting: 15.5    Smokeless tobacco: Never Used   Substance and Sexual Activity    Alcohol use: Not Currently    Drug use: Never    Sexual activity: Not on file   Lifestyle    Physical activity:     Days per week: Not on file     Minutes per session: Not on file    Stress: Not on file   Relationships    Social connections:     Talks on phone: Not on file     Gets together: Not on file     Attends Protestant service: Not on file     Active member of club or organization: Not on file     Attends meetings of clubs or organizations: Not on file     Relationship status: Not on file    Intimate partner violence:     Fear of current or ex partner: Not on file     Emotionally abused: Not on file     Physically abused: Not on file     Forced sexual activity: Not on file   Other Topics Concern    Not on file   Social History Narrative    Not on file     History reviewed. No pertinent family history. Allergies   Allergen Reactions    Penicillins Unknown (comments)     Hives.     Tolerated ceftriaxone May 2019    Percocet [Oxycodone-Acetaminophen] Hives    Prednisone Other (comments)     Tachycardia       Current Facility-Administered Medications   Medication Dose Route Frequency    0.9% sodium chloride infusion  65 mL/hr IntraVENous CONTINUOUS    piperacillin-tazobactam (ZOSYN) 4.5 g in 0.9% sodium chloride (MBP/ADV) 100 mL  4.5 g IntraVENous Q8H    methylPREDNISolone (PF) (Solu-MEDROL) injection 60 mg  60 mg IntraVENous Q8H    vancomycin (VANCOCIN) 1250 mg in  ml infusion  1,250 mg IntraVENous Q18H    albuterol (PROVENTIL VENTOLIN) nebulizer solution 2.5 mg  2.5 mg Nebulization Q4H PRN    amiodarone (CORDARONE) tablet 200 mg  200 mg Oral DAILY    benzonatate (TESSALON) capsule 100 mg  100 mg Oral TID PRN    HYDROcodone-acetaminophen (NORCO) 5-325 mg per tablet 1 Tab  1 Tab Oral Q4H PRN    levothyroxine (SYNTHROID) tablet 75 mcg  75 mcg Oral DAILY    mirtazapine (REMERON) tablet 15 mg  15 mg Oral QHS    sodium chloride (NS) flush 5-40 mL  5-40 mL IntraVENous Q8H    sodium chloride (NS) flush 5-40 mL  5-40 mL IntraVENous PRN    ondansetron (ZOFRAN ODT) tablet 4 mg  4 mg Oral Q4H PRN    bisacodyl (DULCOLAX) tablet 5 mg  5 mg Oral DAILY PRN    enoxaparin (LOVENOX) injection 40 mg  40 mg SubCUTAneous Q24H    aspirin tablet 325 mg  325 mg Oral DAILY    nitroglycerin (NITROSTAT) tablet 0.4 mg  0.4 mg SubLINGual PRN    tamsulosin (FLOMAX) capsule 0.4 mg  0.4 mg Oral DAILY         Objective:     Vitals:    06/28/19 1921 06/28/19 2314 06/29/19 0254 06/29/19 0726   BP: 90/72 115/61 105/64 98/57   Pulse: 80 74 71 77   Resp: 18 16 18 18   Temp: 98.6 °F (37 °C) 98 °F (36.7 °C) 97.8 °F (36.6 °C) 98 °F (36.7 °C)   SpO2: 91% 95% 97% 96%   Weight:   135 lb 3.2 oz (61.3 kg)    Height:           PHYSICAL EXAM     Constitutional:  the patient is thin, well developed and in no acute distress  HEENT:  Sclera clear, pupils equal, oral mucosa moist  Lungs:crackles on R. Respirations even and not labored. Oxygen at 4 liters  Cardiovascular:  RRR without M,G,R  Abd/GI: soft and non-tender; with positive bowel sounds. Ext: warm without cyanosis. There is no lower leg edema. Skin:  no jaundice or rashes, right chest wound with wound vac in place  Neuro: AAOx2. Musculoskeletal: moves all four extremities. No deformities.    Psychiatric:Appropriate mood and affect  Chest X-ray:        Recent Labs     06/26/19  2314   WBC 12.3*   HGB 9.4*   HCT 30.4*        Recent Labs     06/29/19  0600 06/28/19  0410 06/26/19  2314    143 140   K 4.1 2.9* 2.9*    101 99   * 104* 113*   CO2 31 36* 33*   BUN 23 16 12   CREA 1.07* 1.18* 0.87   MG  --  1.8 1.8   CA 7.8* 8.1* 8.0*   ALB  --   --  1.8*   SGOT  --   --  14*       Assessment:  (Medical Decision Making)     Hospital Problems  Date Reviewed: 6/21/2019          Codes Class Noted POA    HCAP (healthcare-associated pneumonia) ICD-10-CM: J18.9  ICD-9-CM: 260  6/28/2019 Unknown        * (Principal) Volume overload ICD-10-CM: E87.70  ICD-9-CM: 276.69  6/27/2019 Yes        Elevated troponin ICD-10-CM: R74.8  ICD-9-CM: 790.6  6/27/2019 Yes        Elevated brain natriuretic peptide (BNP) level ICD-10-CM: R79.89  ICD-9-CM: 790.99  6/27/2019 Yes        Chronic respiratory failure (HCC) (Chronic) ICD-10-CM: J96.10  ICD-9-CM: 518.83  6/27/2019 Yes        Hypokalemia ICD-10-CM: E87.6  ICD-9-CM: 276.8  6/27/2019 Yes        Pleural effusion, right ICD-10-CM: J90  ICD-9-CM: 511.9  6/27/2019 Yes        COPD (chronic obstructive pulmonary disease) (HCC) (Chronic) ICD-10-CM: J44.9  ICD-9-CM: 496  5/24/2019 Yes              Plan:  (Medical Decision Making)   1. CT with PNA- on  Vancomycin and Zosynday#2- discussed with Dr Carolina Neil- stoped diuretics. Obtain BC and sputum if able.     2. Reassess oxygen needs at discharge    Will see next on Monday    Adrianna Cordova MD

## 2019-06-29 NOTE — PROGRESS NOTES
Patient was resting wuietly  Room was dark  Staff was close by    Reviewed notes and noted that patient was A&O x self    Will follow up later when  Family is present    Chris Son, Ankit smith 54, [address] [facility]  /   [email or web address]

## 2019-06-29 NOTE — PROGRESS NOTES
Pt bedside report received from [facility], pt resting in bed  watching Tv, assessment completed ,  pt A/Ox1 bed on low and locked position with floor free of objects,  call light within reach, pt on oxygen  @5L, respirations even and non labored,  no c/o pain, no distress noted.

## 2019-06-29 NOTE — PROGRESS NOTES
Assumed care of patient. Assessment completed and documented, see docflow. Patient resting quietly in bed. NAD noted at present. Respirations even and non labored on 5LNCHF. Safety measures in place, bed alarm in place. Call light within reach. Will continue to monitor.

## 2019-06-29 NOTE — PROGRESS NOTES
HOSPITALIST  NAME:  Janelle Dukes   Age:  78 y.o.  :   1940   MRN:   [id number]  PCP: Edu Aguilar MD    subj   Patient is a 71yo F with hx lung cancer recent RLL lobectomy on . She had a protracted hospitalization with hypoxia, overload, atelectasis, congestion. She completed inpatient rehab at [facility] on  and was discharged with 5L oxygen requirement to SNF. The patient left SNF AMA after one day. Today, she presents with generalized weakness, not feeling well, and shortness of breath. Has been having more difficulty getting around at home. Very sob with any movement. Had been getting up to bathroom with assistance but no longer able. CXR with effusion and atelectasis v pna, troponin elevated to 0.34, K 2.9, and     Today, more drowzy today, on 5L, BP borderline, low po intake    Objective:     Visit Vitals  BP 98/57 (BP 1 Location: Right arm, BP Patient Position: At rest)   Pulse 77   Temp 98 °F (36.7 °C)   Resp 18   Ht 5' 10\" (1.778 m)   Wt 61.3 kg (135 lb 3.2 oz)   SpO2 96%   BMI 19.40 kg/m²      Temp (24hrs), Av °F (36.7 °C), Min:97.7 °F (36.5 °C), Max:98.6 °F (37 °C)    Oxygen Therapy  O2 Sat (%): 96 % (19 0726)  Pulse via Oximetry: 87 beats per minute (19 1020)  O2 Device: Nasal cannula (19 1020)  O2 Flow Rate (L/min): 4.5 l/min (19 1020)  Physical Exam:  General:    Elderly, thin, NAD  Lungs:   Mild crackles, wound vac R chest wall. Diminished bs bibasilar, more on R  Heart:   Regular rate and rhythm,  no murmur, rub or gallop. Abdomen:   Soft, non-tender. Not distended. Bowel sounds normal.   Extremities: No cyanosis. No edema. No clubbing  Skin:     Texture, turgor normal. No rashes or lesions.   Not Jaundiced  Neurologic: Alert and oriented x 3, no focal deficits     Data Review:   Recent Results (from the past 24 hour(s))   METABOLIC PANEL, BASIC    Collection Time: 19  6:00 AM   Result Value Ref Range    Sodium 145 136 - 145 mmol/L    Potassium 4.1 3.5 - 5.1 mmol/L    Chloride 107 98 - 107 mmol/L    CO2 31 21 - 32 mmol/L    Anion gap 7 7 - 16 mmol/L    Glucose 160 (H) 65 - 100 mg/dL    BUN 23 8 - 23 MG/DL    Creatinine 1.07 (H) 0.6 - 1.0 MG/DL    GFR est AA >60 >60 ml/min/1.73m2    GFR est non-AA 53 (L) >60 ml/min/1.73m2    Calcium 7.8 (L) 8.3 - 10.4 MG/DL     Imaging /Procedures /Studies   CT CHEST WO CONT   Final Result   IMPRESSION:   1. Relatively extensive right basilar airspace opacities. Additional foci of gas   are present amongst this region some of which appears to be associated with a   suture line. No obvious obstructing endobronchial lesion is identified. 2. Mild additional patchy airspace opacities within the lingula and left lower   lobe. XR CHEST PORT   Final Result   IMPRESSION: Right pleural effusion and right lung atelectasis or pneumonia   unchanged. Assessment and Plan:      Active Hospital Problems    Diagnosis Date Noted    HCAP (healthcare-associated pneumonia) 06/28/2019    Volume overload 06/27/2019    Elevated troponin 06/27/2019    Elevated brain natriuretic peptide (BNP) level 06/27/2019    Chronic respiratory failure (HCC) 06/27/2019    Hypokalemia 06/27/2019    Pleural effusion, right 06/27/2019    COPD (chronic obstructive pulmonary disease) ([facility] 05/24/2019       PLAN:    Zosyn+ Vanco  Steroids iv  IVF w/ caution  K replaced  Pulm following  Avoid meds affecting mentation  Full code as d/w son (poa)  Dispo: TBD    Signed By: Klaudia Samayoa MD     June 29, 2019

## 2019-06-30 LAB
MM INDURATION POC: 0 MM (ref 0–5)
PPD POC: NORMAL NEGATIVE
VANCOMYCIN TROUGH SERPL-MCNC: 7.6 UG/ML (ref 5–20)

## 2019-06-30 PROCEDURE — 74011250636 HC RX REV CODE- 250/636: Performed by: HOSPITALIST

## 2019-06-30 PROCEDURE — 80202 ASSAY OF VANCOMYCIN: CPT

## 2019-06-30 PROCEDURE — 74750000023 HC WOUND THERAPY

## 2019-06-30 PROCEDURE — 74011000258 HC RX REV CODE- 258: Performed by: HOSPITALIST

## 2019-06-30 PROCEDURE — 65660000000 HC RM CCU STEPDOWN

## 2019-06-30 PROCEDURE — 74011250637 HC RX REV CODE- 250/637: Performed by: HOSPITALIST

## 2019-06-30 PROCEDURE — 74011250637 HC RX REV CODE- 250/637: Performed by: FAMILY MEDICINE

## 2019-06-30 PROCEDURE — 36415 COLL VENOUS BLD VENIPUNCTURE: CPT

## 2019-06-30 PROCEDURE — 77010033678 HC OXYGEN DAILY

## 2019-06-30 PROCEDURE — 94760 N-INVAS EAR/PLS OXIMETRY 1: CPT

## 2019-06-30 RX ADMIN — MIRTAZAPINE 15 MG: 15 TABLET, FILM COATED ORAL at 21:52

## 2019-06-30 RX ADMIN — Medication 10 ML: at 18:04

## 2019-06-30 RX ADMIN — Medication 10 ML: at 21:52

## 2019-06-30 RX ADMIN — ENOXAPARIN SODIUM 40 MG: 40 INJECTION SUBCUTANEOUS at 18:02

## 2019-06-30 RX ADMIN — Medication 10 ML: at 05:53

## 2019-06-30 RX ADMIN — METHYLPREDNISOLONE SODIUM SUCCINATE 60 MG: 125 INJECTION, POWDER, FOR SOLUTION INTRAMUSCULAR; INTRAVENOUS at 18:03

## 2019-06-30 RX ADMIN — PIPERACILLIN, TAZOBACTAM 4.5 G: 4; .5 INJECTION, POWDER, LYOPHILIZED, FOR SOLUTION INTRAVENOUS at 06:00

## 2019-06-30 RX ADMIN — METHYLPREDNISOLONE SODIUM SUCCINATE 60 MG: 125 INJECTION, POWDER, FOR SOLUTION INTRAMUSCULAR; INTRAVENOUS at 05:53

## 2019-06-30 RX ADMIN — PIPERACILLIN, TAZOBACTAM 4.5 G: 4; .5 INJECTION, POWDER, LYOPHILIZED, FOR SOLUTION INTRAVENOUS at 18:02

## 2019-06-30 RX ADMIN — PIPERONYL BUTOXIDE, PYRETHRUM EXTRACT: 4; .33 SHAMPOO TOPICAL at 11:32

## 2019-06-30 RX ADMIN — VANCOMYCIN HYDROCHLORIDE 1250 MG: 10 INJECTION, POWDER, LYOPHILIZED, FOR SOLUTION INTRAVENOUS at 06:32

## 2019-06-30 RX ADMIN — HYDROCODONE BITARTRATE AND ACETAMINOPHEN 1 TABLET: 5; 325 TABLET ORAL at 00:44

## 2019-06-30 NOTE — PROGRESS NOTES
Assumed care of patient. Assessment completed and documented, see docflow. Patient resting quietly in bed. NAD noted at present. Respirations even and non labored on 5LNCHF. Per report, patient pulled out both IV access. New IV access wrapped. Safety measures in place, bed alarm in place. Call light within reach. Will continue to monitor.

## 2019-06-30 NOTE — PROGRESS NOTES
Problem: Falls - Risk of  Goal: *Absence of Falls  Description  Document Margrett Bernheim Fall Risk and appropriate interventions in the flowsheet.   6/30/2019 0930 by Sandie Ruiz RN  Outcome: Progressing Towards Goal  6/30/2019 0730 by Sandie Ruiz RN  Outcome: Progressing Towards Goal     Problem: Patient Education: Go to Patient Education Activity  Goal: Patient/Family Education  6/30/2019 0930 by Sandie Ruiz RN  Outcome: Progressing Towards Goal  6/30/2019 0730 by Sandie Ruiz RN  Outcome: Progressing Towards Goal     Problem: Patient Education: Go to Patient Education Activity  Goal: Patient/Family Education  6/30/2019 0930 by Sandie Ruiz RN  Outcome: Progressing Towards Goal  6/30/2019 0730 by Sandie Ruiz RN  Outcome: Progressing Towards Goal     Problem: Nutrition Deficit  Goal: *Optimize nutritional status  6/30/2019 0930 by Sandie Ruiz RN  Outcome: Progressing Towards Goal  6/30/2019 0730 by Sandie Ruiz RN  Outcome: Progressing Towards Goal

## 2019-06-30 NOTE — PROGRESS NOTES
Problem: Falls - Risk of  Goal: *Absence of Falls  Description  Document Edmund Binoel Fall Risk and appropriate interventions in the flowsheet.   Outcome: Progressing Towards Goal     Problem: Patient Education: Go to Patient Education Activity  Goal: Patient/Family Education  Outcome: Progressing Towards Goal     Problem: Patient Education: Go to Patient Education Activity  Goal: Patient/Family Education  Outcome: Progressing Towards Goal     Problem: Nutrition Deficit  Goal: *Optimize nutritional status  Outcome: Progressing Towards Goal

## 2019-06-30 NOTE — PROGRESS NOTES
Brief visit with family  Very nice    Kenny Lloyd, staff Ankit duncan 69, [address]  /   [email or web address]

## 2019-06-30 NOTE — PROGRESS NOTES
HOSPITALIST  NAME:  Keily Jeffers   Age:  78 y.o.  :   1940   MRN:   [id number]  PCP: Laly Cannon MD    subj   Patient is a 69yo F with hx lung cancer recent RLL lobectomy on . She had a protracted hospitalization with hypoxia, overload, atelectasis, congestion. She completed inpatient rehab at [facility] on  and was discharged with 5L oxygen requirement to SNF. The patient left SNF AMA after one day. Today, she presents with generalized weakness, not feeling well, and shortness of breath. Has been having more difficulty getting around at home. Very sob with any movement. Had been getting up to bathroom with assistance but no longer able. CXR with effusion and atelectasis v pna, troponin elevated to 0.34, K 2.9, and     Today, on O2, BP normalized, more alert,  Head lice detected by RN    Objective:     Visit Vitals  /65 (BP 1 Location: Right arm, BP Patient Position: At rest)   Pulse 65   Temp 97.9 °F (36.6 °C)   Resp 17   Ht 5' 10\" (1.778 m)   Wt 62.8 kg (138 lb 8 oz)   SpO2 98%   BMI 19.87 kg/m²      Temp (24hrs), Av.8 °F (36.6 °C), Min:97.6 °F (36.4 °C), Max:98 °F (36.7 °C)    Oxygen Therapy  O2 Sat (%): 98 % (19 1500)  Pulse via Oximetry: 81 beats per minute (19)  O2 Device: Nasal cannula (19)  O2 Flow Rate (L/min): 5 l/min (19)  Physical Exam:  General:    Elderly, thin, NAD  Lungs:   Mild crackles, wound vac R chest wall. Diminished bs bibasilar, more on R  Heart:   Regular rate and rhythm,  no murmur, rub or gallop. Abdomen:   Soft, non-tender. Not distended. Bowel sounds normal.   Extremities: No cyanosis. No edema. No clubbing  Skin:     Texture, turgor normal. No rashes or lesions.   Not Jaundiced  Neurologic: Alert and oriented x 3, no focal deficits     Data Review:   Recent Results (from the past 24 hour(s))   PLEASE READ & DOCUMENT PPD TEST IN 72 HRS    Collection Time: 19  4:00 AM   Result Value Ref Range    PPD  Negative    mm Induration 0 0 - 5 mm     Imaging /Procedures /Studies   CT CHEST WO CONT   Final Result   IMPRESSION:   1. Relatively extensive right basilar airspace opacities. Additional foci of gas   are present amongst this region some of which appears to be associated with a   suture line. No obvious obstructing endobronchial lesion is identified. 2. Mild additional patchy airspace opacities within the lingula and left lower   lobe. XR CHEST PORT   Final Result   IMPRESSION: Right pleural effusion and right lung atelectasis or pneumonia   unchanged. Assessment and Plan:      Active Hospital Problems    Diagnosis Date Noted    HCAP (healthcare-associated pneumonia) 06/28/2019    Volume overload 06/27/2019    Elevated troponin 06/27/2019    Elevated brain natriuretic peptide (BNP) level 06/27/2019    Chronic respiratory failure (HCC) 06/27/2019    Hypokalemia 06/27/2019    Pleural effusion, right 06/27/2019    COPD (chronic obstructive pulmonary disease) ([facility] 05/24/2019       PLAN:    Zosyn+ Vanco  Steroids iv  IVF w/ caution  K replaced  Pulm following  Avoid meds affecting mentation  Full code as d/w son (poa)  Dispo: likely rehab when available    Signed By: Devorah Chappell MD     June 30, 2019

## 2019-06-30 NOTE — PROGRESS NOTES
Patient resting in bed, confused, tele in place, goodman place, patent and draining yellow urine, wound vac in place, patent and draining serosanguineous fluid, safety measures in place, call light within reach.

## 2019-06-30 NOTE — PROGRESS NOTES
This patient bladder was scanned, 50 ml of urine noted in this patient bladder. Will continue monitoring this patient.

## 2019-07-01 ENCOUNTER — APPOINTMENT (OUTPATIENT)
Dept: GENERAL RADIOLOGY | Age: 79
DRG: 194 | End: 2019-07-01
Attending: INTERNAL MEDICINE
Payer: MEDICARE

## 2019-07-01 LAB
ALBUMIN SERPL-MCNC: 1.8 G/DL (ref 3.2–4.6)
ALBUMIN/GLOB SERPL: 0.4 {RATIO} (ref 1.2–3.5)
ALP SERPL-CCNC: 58 U/L (ref 50–136)
ALT SERPL-CCNC: 8 U/L (ref 12–65)
AMMONIA PLAS-SCNC: 30 UMOL/L (ref 11–32)
ANION GAP SERPL CALC-SCNC: 6 MMOL/L (ref 7–16)
ARTERIAL PATENCY WRIST A: YES
AST SERPL-CCNC: 9 U/L (ref 15–37)
BASE EXCESS BLD CALC-SCNC: 5 MMOL/L
BASOPHILS # BLD: 0 K/UL (ref 0–0.2)
BASOPHILS NFR BLD: 0 % (ref 0–2)
BDY SITE: ABNORMAL
BILIRUB SERPL-MCNC: 0.4 MG/DL (ref 0.2–1.1)
BODY TEMPERATURE: 98.6
BUN SERPL-MCNC: 29 MG/DL (ref 8–23)
CALCIUM SERPL-MCNC: 8.2 MG/DL (ref 8.3–10.4)
CHLORIDE SERPL-SCNC: 106 MMOL/L (ref 98–107)
CO2 BLD-SCNC: 31 MMOL/L
CO2 SERPL-SCNC: 29 MMOL/L (ref 21–32)
COLLECT TIME,HTIME: 916
CREAT SERPL-MCNC: 0.79 MG/DL (ref 0.6–1)
DIFFERENTIAL METHOD BLD: ABNORMAL
EOSINOPHIL # BLD: 0 K/UL (ref 0–0.8)
EOSINOPHIL NFR BLD: 0 % (ref 0.5–7.8)
ERYTHROCYTE [DISTWIDTH] IN BLOOD BY AUTOMATED COUNT: 16.2 % (ref 11.9–14.6)
FLOW RATE ISTAT,IFRATE: 2 L/MIN
GAS FLOW.O2 O2 DELIVERY SYS: ABNORMAL L/MIN
GLOBULIN SER CALC-MCNC: 4.1 G/DL (ref 2.3–3.5)
GLUCOSE SERPL-MCNC: 100 MG/DL (ref 65–100)
HCO3 BLD-SCNC: 29.6 MMOL/L (ref 22–26)
HCT VFR BLD AUTO: 28.8 % (ref 35.8–46.3)
HGB BLD-MCNC: 8.6 G/DL (ref 11.7–15.4)
IMM GRANULOCYTES # BLD AUTO: 0.1 K/UL (ref 0–0.5)
IMM GRANULOCYTES NFR BLD AUTO: 1 % (ref 0–5)
LYMPHOCYTES # BLD: 0.8 K/UL (ref 0.5–4.6)
LYMPHOCYTES NFR BLD: 8 % (ref 13–44)
MCH RBC QN AUTO: 27 PG (ref 26.1–32.9)
MCHC RBC AUTO-ENTMCNC: 29.9 G/DL (ref 31.4–35)
MCV RBC AUTO: 90.3 FL (ref 79.6–97.8)
MONOCYTES # BLD: 0.6 K/UL (ref 0.1–1.3)
MONOCYTES NFR BLD: 6 % (ref 4–12)
NEUTS SEG # BLD: 8.6 K/UL (ref 1.7–8.2)
NEUTS SEG NFR BLD: 86 % (ref 43–78)
NRBC # BLD: 0 K/UL (ref 0–0.2)
O2/TOTAL GAS SETTING VFR VENT: 28 %
PCO2 BLD: 44.6 MMHG (ref 35–45)
PH BLD: 7.43 [PH] (ref 7.35–7.45)
PLATELET # BLD AUTO: 346 K/UL (ref 150–450)
PMV BLD AUTO: 9 FL (ref 9.4–12.3)
PO2 BLD: 60 MMHG (ref 75–100)
POTASSIUM SERPL-SCNC: 3.4 MMOL/L (ref 3.5–5.1)
PROT SERPL-MCNC: 5.9 G/DL (ref 6.3–8.2)
RBC # BLD AUTO: 3.19 M/UL (ref 4.05–5.2)
SAO2 % BLD: 91 % (ref 95–98)
SERVICE CMNT-IMP: ABNORMAL
SODIUM SERPL-SCNC: 141 MMOL/L (ref 136–145)
SPECIMEN TYPE: ABNORMAL
WBC # BLD AUTO: 10 K/UL (ref 4.3–11.1)

## 2019-07-01 PROCEDURE — 77010033678 HC OXYGEN DAILY

## 2019-07-01 PROCEDURE — 74011250637 HC RX REV CODE- 250/637: Performed by: FAMILY MEDICINE

## 2019-07-01 PROCEDURE — 36415 COLL VENOUS BLD VENIPUNCTURE: CPT

## 2019-07-01 PROCEDURE — 74750000023 HC WOUND THERAPY

## 2019-07-01 PROCEDURE — 36600 WITHDRAWAL OF ARTERIAL BLOOD: CPT

## 2019-07-01 PROCEDURE — 80053 COMPREHEN METABOLIC PANEL: CPT

## 2019-07-01 PROCEDURE — 74011250636 HC RX REV CODE- 250/636: Performed by: HOSPITALIST

## 2019-07-01 PROCEDURE — 85025 COMPLETE CBC W/AUTO DIFF WBC: CPT

## 2019-07-01 PROCEDURE — 77030019934 HC DRSG VAC ASST KCON -B

## 2019-07-01 PROCEDURE — 74011636637 HC RX REV CODE- 636/637: Performed by: INTERNAL MEDICINE

## 2019-07-01 PROCEDURE — 97605 NEG PRS WND THER DME<=50SQCM: CPT

## 2019-07-01 PROCEDURE — 97530 THERAPEUTIC ACTIVITIES: CPT

## 2019-07-01 PROCEDURE — 74011000258 HC RX REV CODE- 258: Performed by: HOSPITALIST

## 2019-07-01 PROCEDURE — 74011250636 HC RX REV CODE- 250/636: Performed by: FAMILY MEDICINE

## 2019-07-01 PROCEDURE — 99232 SBSQ HOSP IP/OBS MODERATE 35: CPT | Performed by: INTERNAL MEDICINE

## 2019-07-01 PROCEDURE — 65270000029 HC RM PRIVATE

## 2019-07-01 PROCEDURE — 97110 THERAPEUTIC EXERCISES: CPT

## 2019-07-01 PROCEDURE — 94760 N-INVAS EAR/PLS OXIMETRY 1: CPT

## 2019-07-01 PROCEDURE — 82140 ASSAY OF AMMONIA: CPT

## 2019-07-01 PROCEDURE — 71045 X-RAY EXAM CHEST 1 VIEW: CPT

## 2019-07-01 PROCEDURE — 74011250637 HC RX REV CODE- 250/637: Performed by: HOSPITALIST

## 2019-07-01 PROCEDURE — 82803 BLOOD GASES ANY COMBINATION: CPT

## 2019-07-01 RX ORDER — PREDNISONE 10 MG/1
20 TABLET ORAL
Status: DISCONTINUED | OUTPATIENT
Start: 2019-07-01 | End: 2019-07-03

## 2019-07-01 RX ORDER — VANCOMYCIN HYDROCHLORIDE
1250 EVERY 12 HOURS
Status: COMPLETED | OUTPATIENT
Start: 2019-07-01 | End: 2019-07-04

## 2019-07-01 RX ORDER — PERMETHRIN 50 MG/G
CREAM TOPICAL
Status: COMPLETED | OUTPATIENT
Start: 2019-07-01 | End: 2019-07-01

## 2019-07-01 RX ADMIN — AMIODARONE HYDROCHLORIDE 200 MG: 200 TABLET ORAL at 09:14

## 2019-07-01 RX ADMIN — PIPERACILLIN, TAZOBACTAM 4.5 G: 4; .5 INJECTION, POWDER, LYOPHILIZED, FOR SOLUTION INTRAVENOUS at 02:37

## 2019-07-01 RX ADMIN — MIRTAZAPINE 15 MG: 15 TABLET, FILM COATED ORAL at 21:36

## 2019-07-01 RX ADMIN — PREDNISONE 20 MG: 10 TABLET ORAL at 09:13

## 2019-07-01 RX ADMIN — TAMSULOSIN HYDROCHLORIDE 0.4 MG: 0.4 CAPSULE ORAL at 09:14

## 2019-07-01 RX ADMIN — Medication 10 ML: at 21:36

## 2019-07-01 RX ADMIN — VANCOMYCIN HYDROCHLORIDE 1250 MG: 10 INJECTION, POWDER, LYOPHILIZED, FOR SOLUTION INTRAVENOUS at 13:21

## 2019-07-01 RX ADMIN — PIPERACILLIN, TAZOBACTAM 4.5 G: 4; .5 INJECTION, POWDER, LYOPHILIZED, FOR SOLUTION INTRAVENOUS at 17:11

## 2019-07-01 RX ADMIN — ENOXAPARIN SODIUM 40 MG: 40 INJECTION SUBCUTANEOUS at 13:21

## 2019-07-01 RX ADMIN — PERMETHRIN: 50 CREAM TOPICAL at 09:12

## 2019-07-01 RX ADMIN — PIPERACILLIN, TAZOBACTAM 4.5 G: 4; .5 INJECTION, POWDER, LYOPHILIZED, FOR SOLUTION INTRAVENOUS at 09:13

## 2019-07-01 RX ADMIN — Medication 10 ML: at 05:43

## 2019-07-01 RX ADMIN — ASPIRIN 325 MG ORAL TABLET 325 MG: 325 PILL ORAL at 09:13

## 2019-07-01 RX ADMIN — Medication 10 ML: at 13:21

## 2019-07-01 RX ADMIN — VANCOMYCIN HYDROCHLORIDE 1250 MG: 10 INJECTION, POWDER, LYOPHILIZED, FOR SOLUTION INTRAVENOUS at 00:33

## 2019-07-01 RX ADMIN — LEVOTHYROXINE SODIUM 75 MCG: 75 TABLET ORAL at 09:13

## 2019-07-01 RX ADMIN — METHYLPREDNISOLONE SODIUM SUCCINATE 60 MG: 125 INJECTION, POWDER, FOR SOLUTION INTRAMUSCULAR; INTRAVENOUS at 02:38

## 2019-07-01 NOTE — PROGRESS NOTES
HOSPITALIST  NAME:  Chip Caicedo   Age:  78 y.o.  :   1940   MRN:   [id number]  PCP: Kevin Rubio MD    subj   Patient is a 71yo F with hx lung cancer recent RLL lobectomy on . She had a protracted hospitalization with hypoxia, overload, atelectasis, congestion. She completed inpatient rehab at [facility] on  and was discharged with 5L oxygen requirement to SNF. The patient left SNF AMA after one day. Today, she presents with generalized weakness, not feeling well, and shortness of breath. Has been having more difficulty getting around at home. Very sob with any movement. Had been getting up to bathroom with assistance but no longer able. CXR with effusion and atelectasis v pna, troponin elevated to 0.34, K 2.9, and     Today, on O2 at 4L (baseline 5L), BP normal, alert but look tired,  Head lice detected by RN yesterday then today after Rx given    Objective:     Visit Vitals  /71   Pulse 72   Temp 98.4 °F (36.9 °C)   Resp 21   Ht 5' 10\" (1.778 m)   Wt 64.3 kg (141 lb 11.2 oz)   SpO2 96%   BMI 20.33 kg/m²      Temp (24hrs), Av.4 °F (36.9 °C), Min:97.9 °F (36.6 °C), Max:98.9 °F (37.2 °C)    Oxygen Therapy  O2 Sat (%): 96 % (19 1506)  Pulse via Oximetry: 81 beats per minute (19 1914)  O2 Device: Hi flow nasal cannula (19 1056)  O2 Flow Rate (L/min): 4 l/min (19 1056)  Physical Exam:  General:    Elderly, thin, NAD  Lungs:   Mild crackles, wound vac R chest wall. Diminished bs bibasilar, more on R  Heart:   Regular rate and rhythm,  no murmur, rub or gallop. Abdomen:   Soft, non-tender. Not distended. Bowel sounds normal.   Extremities: No cyanosis. No edema. No clubbing  Skin:     Texture, turgor normal. No rashes or lesions.   Not Jaundiced  Neurologic: Alert and oriented x 3, no focal deficits     Data Review:   Recent Results (from the past 24 hour(s))   Brad Glasgow    Collection Time: 19 10:45 PM   Result Value Ref Range Vancomycin,trough 7.6 5 - 20 ug/mL   POC G3    Collection Time: 07/01/19  9:21 AM   Result Value Ref Range    Device: NASAL CANNULA      FIO2 (POC) 28 %    pH (POC) 7.430 7.35 - 7.45      pCO2 (POC) 44.6 35 - 45 MMHG    pO2 (POC) 60 (L) 75 - 100 MMHG    HCO3 (POC) 29.6 (H) 22 - 26 MMOL/L    sO2 (POC) 91 (L) 95 - 98 %    Base excess (POC) 5 mmol/L    Allens test (POC) YES      Site LEFT RADIAL      Patient temp. 98.6      Specimen type (POC) ARTERIAL      Performed by [employer]     CO2, POC 31 MMOL/L    Flow rate (POC) 2.000 L/min    COLLECT TIME 916     CBC WITH AUTOMATED DIFF    Collection Time: 07/01/19 11:20 AM   Result Value Ref Range    WBC 10.0 4.3 - 11.1 K/uL    RBC 3.19 (L) 4.05 - 5.2 M/uL    HGB 8.6 (L) 11.7 - 15.4 g/dL    HCT 28.8 (L) 35.8 - 46.3 %    MCV 90.3 79.6 - 97.8 FL    MCH 27.0 26.1 - 32.9 PG    MCHC 29.9 (L) 31.4 - 35.0 g/dL    RDW 16.2 (H) 11.9 - 14.6 %    PLATELET 880 904 - 493 K/uL    MPV 9.0 (L) 9.4 - 12.3 FL    ABSOLUTE NRBC 0.00 0.0 - 0.2 K/uL    DF AUTOMATED      NEUTROPHILS 86 (H) 43 - 78 %    LYMPHOCYTES 8 (L) 13 - 44 %    MONOCYTES 6 4.0 - 12.0 %    EOSINOPHILS 0 (L) 0.5 - 7.8 %    BASOPHILS 0 0.0 - 2.0 %    IMMATURE GRANULOCYTES 1 0.0 - 5.0 %    ABS. NEUTROPHILS 8.6 (H) 1.7 - 8.2 K/UL    ABS. LYMPHOCYTES 0.8 0.5 - 4.6 K/UL    ABS. MONOCYTES 0.6 0.1 - 1.3 K/UL    ABS. EOSINOPHILS 0.0 0.0 - 0.8 K/UL    ABS. BASOPHILS 0.0 0.0 - 0.2 K/UL    ABS. IMM.  GRANS. 0.1 0.0 - 0.5 K/UL   METABOLIC PANEL, COMPREHENSIVE    Collection Time: 07/01/19 11:20 AM   Result Value Ref Range    Sodium 141 136 - 145 mmol/L    Potassium 3.4 (L) 3.5 - 5.1 mmol/L    Chloride 106 98 - 107 mmol/L    CO2 29 21 - 32 mmol/L    Anion gap 6 (L) 7 - 16 mmol/L    Glucose 100 65 - 100 mg/dL    BUN 29 (H) 8 - 23 MG/DL    Creatinine 0.79 0.6 - 1.0 MG/DL    GFR est AA >60 >60 ml/min/1.73m2    GFR est non-AA >60 >60 ml/min/1.73m2    Calcium 8.2 (L) 8.3 - 10.4 MG/DL    Bilirubin, total 0.4 0.2 - 1.1 MG/DL    ALT (SGPT) 8 (L) 12 - 65 U/L    AST (SGOT) 9 (L) 15 - 37 U/L    Alk. phosphatase 58 50 - 136 U/L    Protein, total 5.9 (L) 6.3 - 8.2 g/dL    Albumin 1.8 (L) 3.2 - 4.6 g/dL    Globulin 4.1 (H) 2.3 - 3.5 g/dL    A-G Ratio 0.4 (L) 1.2 - 3.5     AMMONIA    Collection Time: 07/01/19 11:20 AM   Result Value Ref Range    Ammonia 30 11 - 32 UMOL/L     Imaging /Procedures /Studies   XR CHEST SNGL V   Final Result   IMPRESSION: Improvement as described above. CT CHEST WO CONT   Final Result   IMPRESSION:   1. Relatively extensive right basilar airspace opacities. Additional foci of gas   are present amongst this region some of which appears to be associated with a   suture line. No obvious obstructing endobronchial lesion is identified. 2. Mild additional patchy airspace opacities within the lingula and left lower   lobe. XR CHEST PORT   Final Result   IMPRESSION: Right pleural effusion and right lung atelectasis or pneumonia   unchanged. Assessment and Plan: Active Hospital Problems    Diagnosis Date Noted    HCAP (healthcare-associated pneumonia) 06/28/2019    Volume overload 06/27/2019    Elevated troponin 06/27/2019    Elevated brain natriuretic peptide (BNP) level 06/27/2019    Chronic respiratory failure (HCC) 06/27/2019    Hypokalemia 06/27/2019    Pleural effusion, right 06/27/2019    COPD (chronic obstructive pulmonary disease) ([facility] [address] 05/24/2019       PLAN:    Zosyn+ Vanco- IMPROVING  Steroids   K replaced  Pulm following  Avoid meds affecting mentation  Full code as d/w son (poa)    Dispo: rehab in 2-3 days, pt agree  I spoke to daughter in length, update about improvement of her Mom, asked for clear decision now about destination on DC so  start working on it.     Signed By: Muriel Ruiz MD     July 1, 2019

## 2019-07-01 NOTE — PROGRESS NOTES
Spoke with pt's daughter, Stew Karimi on telephone. Security code obtained. Gave daughter updates on today's care. Opportunity for questions given.

## 2019-07-01 NOTE — PROGRESS NOTES
SBAR end of shift report given to oncoming RN, Yeny Sterling. Pt voices no complaints or concerns at this time.

## 2019-07-01 NOTE — PROGRESS NOTES
Met with pt and daughter this afternoon after daughter spoke with MD about pt care plan. Daughter was apologetic for her abruptness early today when LMSW approached by phone about discharge planning. She is now understanding of pt need for rehab and she has a list of SNF to review with her Mother and will follow up with LMSW tomorrow about preferences for referrals. Pt was at home with [facility] and [address] prior to this admissions and goal is for pt to improve so she can return to daughters home with these home services. Pt will need to be lice free before she can be placed in a rehab facility. Will follow up with pt/daughter tomorrow.

## 2019-07-01 NOTE — PROGRESS NOTES
Paged Dr. Mabel Clinton about pt's request to talk about pt's current health state and treatment plan.

## 2019-07-01 NOTE — PROGRESS NOTES
Problem: Mobility Impaired (Adult and Pediatric)  Goal: *Acute Goals and Plan of Care (Insert Text)  Description  STG:  (1.)Ms. Mary Carmen Gandhi will move from supine to sit and sit to supine  with MODERATE ASSIST within 3 treatment day(s). (2.)Ms. Mary Carmen Gandhi will transfer from bed to chair and chair to bed with MAXIMAL ASSIST using the least restrictive device within 3 treatment day(s). (3.)Ms. Mary Carmen Gandhi will demonstrate fair static sitting balance for 10 minutes with CONTACT GUARD within 3 treatment day(s). LTG:  (1.)Ms. Mary Carmen Gandhi will move from supine to sit and sit to supine  in bed with MINIMAL ASSIST within 7 treatment day(s). (2.)Ms. Mary Carmen Gandhi will transfer from bed to chair and chair to bed with MINIMAL ASSIST using the least restrictive device within 7 treatment day(s). (3.)Ms. Mary Carmen Gandhi will demonstrate good static/dynamic sitting balance for 15 minutes with STAND BY ASSIST within 7 treatment day(s). (4.)Ms. Mary Carmen Gandhi will ambulate with MODERATE ASSIST for 15 feet X 2 with the least restrictive device within 7 treatment day(s). ________________________________________________________________________________________________     Outcome: Progressing Towards Goal      PHYSICAL THERAPY: Daily Note and AM 7/1/2019  INPATIENT: PT Visit Days : 3  [place] / [place] / Product Type: Medicare /       NAME/AGE/GENDER: Esa Jiang is a 78 y.o. female   PRIMARY DIAGNOSIS: Volume overload [E87.70] Volume overload   Volume overload         ICD-10: Treatment Diagnosis:    · Generalized Muscle Weakness (M62.81)  · Difficulty in walking, Not elsewhere classified (R26.2)   Precaution/Allergies:  Penicillins; Percocet [oxycodone-acetaminophen]; and Prednisone      ASSESSMENT:     Ms. Mary Carmen Gandhi is supine in bed upon contact and agreeable to PT treatment this morning with encouragement.   She woke up fairly easily but was ornery and repeatedly said she was cold and mad at me for taking covers off. She followed commands to do some exercises and needed maximal assist to sit up to the EOB. Her sitting balance EOB was hunched over but she did not need assistance to stay seated. She attempted to scoot along the EOB seated and put forth some effort but still needed assist to be affective. Some progress noted but limited. Her eyes were closed and she appeared asleep before I left the room. Pt is appropriate for co-treatment at this time. This section established at most recent assessment   PROBLEM LIST (Impairments causing functional limitations):  1. Decreased Strength  2. Decreased ADL/Functional Activities  3. Decreased Transfer Abilities  4. Decreased Ambulation Ability/Technique  5. Decreased Balance  6. Increased Pain  7. Decreased Activity Tolerance  8. Decreased Pacing Skills  9. Increased Fatigue  10. Increased Shortness of Breath  11. Decreased [place] with Home Exercise Program   INTERVENTIONS PLANNED: (Benefits and precautions of physical therapy have been discussed with the patient.)  1. Balance Exercise  2. Bed Mobility  3. Family Education  4. Gait Training  5. Home Exercise Program (HEP)  6. Neuromuscular Re-education/Strengthening  7. Range of Motion (ROM)  8. Therapeutic Activites  9. Therapeutic Exercise/Strengthening  10. Transfer Training     TREATMENT PLAN: Frequency/Duration: 3 times a week for duration of hospital stay  Rehabilitation Potential For Stated Goals: [facility] (at time of discharge pending progress):    Placement: It is my opinion, based on this patient's performance to date, that Ms. Ann-Marie Alfaro may benefit from intensive therapy at a [address] [place] [address] after discharge due to the functional deficits listed above that are likely to improve with skilled rehabilitation and concerns that he/she may be unsafe to be unsupervised at home due to decreased strength and balance putting pt at increased risk for falls.  .  Equipment:  None at this time              HISTORY:   History of Present Injury/Illness (Reason for Referral):  See H&P below  Patient is a 69yo F with hx lung cancer recent RLL lobectomy on 5/22. She had a protracted hospitalization with hypoxia, overload, atelectasis, congestion. She completed inpatient rehab at [facility] on 6/12 and was discharged with 5L oxygen requirement to SNF. The patient left SNF AMA after one day. Today, she presents with generalized weakness, not feeling well, and shortness of breath. Has been having more difficulty getting around at home. Very sob with any movement. Had been getting up to bathroom with assistance but no longer able. CXR with effusion and atelectasis v pna, troponin elevated to 0.34, K 2.9, and     Past Medical History/Comorbidities:   Ms. Richard Walton  has a past medical history of Aspiration pneumonia ([address] (4/12/2019), Atrial fibrillation with rapid ventricular response ([address] (5/26/2019), Atrial fibrillation with RVR ([address] (5/31/2019), Cancer ([address] Chronic obstructive pulmonary disease ([address] Hypertension, Severe sepsis with acute organ dysfunction ([address] (4/12/2019), Subarachnoid hemorrhage ([address] Thyroid disease, and UTI (urinary tract infection) (5/29/2019).   Ms. Richard Walton  has a past surgical history that includes hx wrist fracture tx and hx other surgical.  Social History/Living Environment:   Home Environment: Private residence  # Steps to Enter: 0  One/Two Story Residence: One story  Living Alone: No  Support Systems: Child(carter)  Patient Expects to be Discharged to[de-identified] Rehabilitation facility  Current DME Used/Available at Home: Trupti Cochran, rolling, Wheelchair, [address] chair  Tub or Shower Type: Shower  Prior Level of Function/Work/Activity:  Amb household distances with walker 2 weeks ago   Number of Personal Factors/Comorbidities that affect the Plan of Care: 3+: HIGH COMPLEXITY   EXAMINATION:   Most Recent Physical Functioning:   Gross Assessment: Posture:     Balance:  Sitting - Static: Fair (occasional)  Sitting - Dynamic: Fair (occasional) Bed Mobility:  Supine to Sit: Maximum assistance  Sit to Supine: Maximum assistance  Wheelchair Mobility:     Transfers:     Gait:            Body Structures Involved:  1. Lungs  2. Bones  3. Joints  4. Muscles  5. Ligaments Body Functions Affected:  1. Sensory/Pain  2. Cardio  3. Respiratory  4. Neuromusculoskeletal  5. Movement Related Activities and Participation Affected:  1. General Tasks and Demands  2. Mobility  3. Self Care  4. Domestic Life  5. Interpersonal Interactions and Relationships  6. Community, Social and [place]   Number of elements that affect the Plan of Care: 4+: HIGH COMPLEXITY   CLINICAL PRESENTATION:   Presentation: Evolving clinical presentation with changing clinical characteristics: MODERATE COMPLEXITY   CLINICAL DECISION MAKIN [facility] Inpatient Short Form  How much difficulty does the patient currently have. .. Unable A Lot A Little None   1. Turning over in bed (including adjusting bedclothes, sheets and blankets)? ? 1   ? 2   ? 3   ? 4   2. Sitting down on and standing up from a chair with arms ( e.g., wheelchair, bedside commode, etc.)   ? 1   ? 2   ? 3   ? 4   3. Moving from lying on back to sitting on the side of the bed?   ? 1   ? 2   ? 3   ? 4   How much help from another person does the patient currently need. .. Total A Lot A Little None   4. Moving to and from a bed to a chair (including a wheelchair)? ? 1   ? 2   ? 3   ? 4   5. Need to walk in hospital room? ? 1   ? 2   ? 3   ? 4   6. Climbing 3-5 steps with a railing? ? 1   ? 2   ? 3   ? 4   © , Trustees of [address] [place] [address] [address] [address], under license to [employer]. All rights reserved      Score:  Initial: 8 Most Recent: X (Date: -- )    Interpretation of Tool:  Represents activities that are increasingly more difficult (i.e. Bed mobility, Transfers, Gait).     Medical Necessity:     · Patient is expected to demonstrate progress in strength, balance, coordination and functional technique  ·  to decrease assistance required with gait, transfers, and functional mobility. · .  Reason for Services/Other Comments:  · Patient continues to require skilled intervention due to decreased strength, decreased balance, decreased functional tolerance, decreased cardiopulmonary endurance affecting participation in basic ADLs and functional tasks   · . Use of outcome tool(s) and clinical judgement create a POC that gives a: Questionable prediction of patient's progress: MODERATE COMPLEXITY            TREATMENT:   (In addition to Assessment/Re-Assessment sessions the following treatments were rendered)   Pre-treatment Symptoms/Complaints:  weakness  Pain: Initial:   Pain Intensity 1: 0  Post Session:  Increased with mobility 3/10     Therapeutic Activity: (    15 minutes): Therapeutic activities including Bed transfers and sitting balance EOB, improved static sitting posture and scooting along EOB to improve mobility, strength, balance, coordination and tolerance for functional mobility . Required minimal assist and positioning to promote static and dynamic balance in sitting but maximal assist for most other mobility    Therapeutic Exercise: (10 Minutes):  Exercises per grid below to improve mobility and strength. Required minimal visual, verbal, manual and tactile cues to promote proper body alignment, promote proper body posture and promote proper body mechanics.        Date:  6/28/19 Date:  7/1/19 Date:     Activity/Exercise Parameters Parameters Parameters   LAQ 10 X B AA L 10x B    Ankle pumps 10 X B  20x B    Quad set 10 X B      Glute set 10 X      Heel slides 10 X B AA 10x B    Hip abduction 10 X B AA     SAQ  10x B        Braces/Orthotics/Lines/Etc:   · IV  · wound vac, pure wic  · O2 Device: Nasal cannula  Treatment/Session Assessment:    · Response to Treatment:  Max -max A X 2 for mobility  · Interdisciplinary Collaboration:   o Physical Therapy Assistant  o Registered Nurse  · After treatment position/precautions:   o Supine in bed  o Bed alarm/tab alert on  o Bed/Chair-wheels locked  o Bed in low position  o Call light within reach  o RN notified   · Compliance with Program/Exercises: Compliant most of the time  · Recommendations/Intent for next treatment session: \"Next visit will focus on advancements to more challenging activities and reduction in assistance provided\".   Total Treatment Duration:  PT Patient Time In/Time Out  Time In: 1005  Time Out: [address], [facility]

## 2019-07-01 NOTE — PROGRESS NOTES
Nutrition  Follow Up:  Assessment:   Diet: DIET CARDIAC Regular  DIET NUTRITIONAL SUPPLEMENTS All Meals; Ensure Enlive    Food/Nutrition Patient History:  The patient is very lethargic today. She does not respond to questions from RD. She is eating very minimal according to nursing staff. If aggressive care is warranted alternate means of nutrition may need to be explored if overall goal is to meet nutrition needs. Anthropometrics:Height: 5' 10\" (177.8 cm),  Weight: 64.3 kg (141 lb 11.2 oz), Weight Source: Bed. Macronutrient needs:  EER:  1190-3277 kcal /day (25-30 kcal/kg listed BW of 63.1 kg-pt noted to be bed bound)  EPR:  63-79 grams protein/day (1-1.25 grams/kg listed BW)  Intake/Comparative Standards: Average intake for past 4 days/14 recorded meal(s): 5%. This potentially meets ~<25% of kcal and ~<25% of protein needs. Intervention:  Meals and snacks: Continue current diet. Nutrition Supplement Therapy: Add Ensure Enlive TID (strawberry preference)  Enteral Nutrition Therapy: Consider tube feeding if the patient continues with poor po intakes and aggressive medical care. Nutrition Discharge Plan: Too soon to be determined. Suhas Troncoso Members, [address], [address] [place] [address], [phone]

## 2019-07-01 NOTE — WOUND CARE
Right chest wound vac dressing changed, Medial wound is 2x5x0. 4cm with tunnel to 9 o'clock 3cm wound vac sponge packed to wound lateral wound is 0.6x2x0.3cm pink granular, steri strips applied and vac sponge applied over in incisional style. Will monitor and continue to change 3 times per week.

## 2019-07-01 NOTE — PROGRESS NOTES
SBAR shift report received from Kel, [facility]. Pt stable. Assessment complete. Pt is lying in bed, resting quielty. Resp even, unlabored. Pt is alert, orient to person and time. Pt appears in no acute distress at this time. Pt is on 4L nasal cannula 02. Pt is on contact precautions for lice. Pt encouraged to call for assistance, call light in reach. Safety measures in place. Will continue to monitor.

## 2019-07-01 NOTE — PROGRESS NOTES
Cristal Santos  Admission Date: 6/26/2019             Daily Progress Note: 7/1/2019    The patient's chart is reviewed and the patient is discussed with the staff. The patient's chart is reviewed and the patient is discussed with the staff. Patient is a 78 y.o.  female seen and evaluated at the request of Dr. Ying English. She was diagnosed with NSCLC in May and she underwent an elective right thoracoscopy which was switched to right thoracotomy with extensive pneumonolysis,  right lower lobe lobectomy,  right upper lobe blebectomy x2,  diaphragm resection with primary repair,  chest wall resection,  mediastinal lymphadenectomy, intercostal nerve cryoablation. Post op, her wound became infected with strept for which she was treated with Vancomycin per ID. A wound vac remains. She also had problems with a fib, hypoxia, volume overload and debility. She was eventually discharged to rehab but has since been taken home by her daughter. She was getting PT at home. The patient is not responsive enough to provide a history so the chart was reviewed and her nurse interviewed. She was reportedly admitted with a 2 day history of weakness. Her echo shows an EF of 65%. She has no edema but her BNP is 421. She was not on home lasix but has been started on it here. She was sent home on oxygen. Her PFTs show mild obstruction. Her WBC is 12.3 with a PCT of 0.1. Subjective:     Afebrile. No complaints, but doesn't interact much. Her O2 has been weaned to 2L, but is lethargic and responds, but doesn't really open eyes much. Limited history.     Current Facility-Administered Medications   Medication Dose Route Frequency    vancomycin (VANCOCIN) 1250 mg in  ml infusion  1,250 mg IntraVENous Q12H    piperacillin-tazobactam (ZOSYN) 4.5 g in 0.9% sodium chloride (MBP/ADV) 100 mL  4.5 g IntraVENous Q8H    methylPREDNISolone (PF) (Solu-MEDROL) injection 60 mg  60 mg IntraVENous Q8H    albuterol (PROVENTIL VENTOLIN) nebulizer solution 2.5 mg  2.5 mg Nebulization Q4H PRN    amiodarone (CORDARONE) tablet 200 mg  200 mg Oral DAILY    benzonatate (TESSALON) capsule 100 mg  100 mg Oral TID PRN    HYDROcodone-acetaminophen (NORCO) 5-325 mg per tablet 1 Tab  1 Tab Oral Q4H PRN    levothyroxine (SYNTHROID) tablet 75 mcg  75 mcg Oral DAILY    mirtazapine (REMERON) tablet 15 mg  15 mg Oral QHS    sodium chloride (NS) flush 5-40 mL  5-40 mL IntraVENous Q8H    sodium chloride (NS) flush 5-40 mL  5-40 mL IntraVENous PRN    ondansetron (ZOFRAN ODT) tablet 4 mg  4 mg Oral Q4H PRN    bisacodyl (DULCOLAX) tablet 5 mg  5 mg Oral DAILY PRN    enoxaparin (LOVENOX) injection 40 mg  40 mg SubCUTAneous Q24H    aspirin tablet 325 mg  325 mg Oral DAILY    nitroglycerin (NITROSTAT) tablet 0.4 mg  0.4 mg SubLINGual PRN    tamsulosin (FLOMAX) capsule 0.4 mg  0.4 mg Oral DAILY     Review of Systems  Limited, but denies chest pain, dyspnea, cough, pain    Objective:     Vitals:    07/01/19 0334 07/01/19 0738 07/01/19 0815 07/01/19 0827   BP: 134/68 134/64     Pulse: 72 70     Resp: 18 20     Temp: 98.9 °F (37.2 °C) 98.2 °F (36.8 °C)     SpO2: 94% 99% 96% 95%   Weight:       Height:         Intake and Output:   06/29 1901 - 07/01 0700  In: 1240 [P.O.:560; I.V.:680]  Out: 380 [Urine:350; Drains:30]  No intake/output data recorded. Physical Exam:   Constitution:  the patient is well developed and in no acute distress  EENMT:  Sclera clear, pupils equal, oral mucosa moist  Respiratory: mild crackles on right, diminished throughout, no wheeze, O2 at 2L, sat 95-99%  Cardiovascular:  RRR without M,G,R  Gastrointestinal: soft and non-tender; with positive bowel sounds. Musculoskeletal: warm without cyanosis. There is no lower extremity edema.   Skin:  no jaundice or rashes, R wound vac to upper  chest   Neurologic: no gross neuro deficits     Psychiatric:  alert and oriented x 2    CXR: none new, ordered    LAB  No results for input(s): GLUCPOC in the last 72 hours. No lab exists for component: GLPOC   No results for input(s): WBC, HGB, HCT, PLT, INR, HGBEXT, HCTEXT, PLTEXT in the last 72 hours. No lab exists for component: INREXT  Recent Labs     06/29/19  0600      K 4.1      CO2 31   *   BUN 23   CREA 1.07*   CA 7.8*     No results for input(s): PH, PCO2, PO2, HCO3, PHI, PCO2I, PO2I, HCO3I in the last 72 hours. No results for input(s): LCAD, LAC in the last 72 hours. Assessment:  (Medical Decision Making)     Hospital Problems  Date Reviewed: 6/21/2019          Codes Class Noted POA    HCAP (healthcare-associated pneumonia) ICD-10-CM: J18.9  ICD-9-CM: 443  6/28/2019 Unknown        * (Principal) Volume overload ICD-10-CM: E87.70  ICD-9-CM: 276.69  6/27/2019 Yes        Elevated troponin ICD-10-CM: R74.8  ICD-9-CM: 790.6  6/27/2019 Yes        Elevated brain natriuretic peptide (BNP) level ICD-10-CM: R79.89  ICD-9-CM: 790.99  6/27/2019 Yes        Chronic respiratory failure (HCC) (Chronic) ICD-10-CM: J96.10  ICD-9-CM: 518.83  6/27/2019 Yes        Hypokalemia ICD-10-CM: E87.6  ICD-9-CM: 276.8  6/27/2019 Yes        Pleural effusion, right ICD-10-CM: J90  ICD-9-CM: 511.9  6/27/2019 Yes        COPD (chronic obstructive pulmonary disease) (HCC) (Chronic) ICD-10-CM: J44.9  ICD-9-CM: 496  5/24/2019 Yes              Plan:  (Medical Decision Making)     --continue Vanc, Zosyn day 4/7  --cut down IV steroids to PO, no wheezing, will likely taper quickly  --will re-evaluate with CBC, CMP, CXR, ABG and Ammonia given lethargy  --suspect lethargy not respiratory related given O2 requirement is improving and was not hypercarbic on admission. More than 50% of the time documented was spent in face-to-face contact with the patient and in the care of the patient on the floor/unit where the patient is located.     Demian Freeman MD

## 2019-07-01 NOTE — PROGRESS NOTES
Problem: Falls - Risk of  Goal: *Absence of Falls  Description  Document Kenney Chapin Fall Risk and appropriate interventions in the flowsheet. Outcome: Progressing Towards Goal     Problem: Risk for Spread of Infection  Goal: Prevent transmission of infectious organism to others  Description  Prevent the transmission of infectious organisms to other patients, staff members, and visitors.   Outcome: Progressing Towards Goal

## 2019-07-01 NOTE — PROGRESS NOTES
Patient resting in bed, alert and oriented, cooperative with care, no visual S/S of pain or distress, safety measures in place, call light within reach, patient remains under contact precautions.

## 2019-07-01 NOTE — PROGRESS NOTES
SALVADORSW placed call to pt's daughter, Traci Limb [phone] who is listed as pt contact after an attempted visit with pt today and she was sleeping. Open call by introducing myself as  covering for her Mother today and was reaching out pt her about care planning for her Mother as therapy was recommending additional rehab for pt when she is stable for discharge. She then asked who was I and why was I calling her because it is too soon to be talking about this. Introduced myself again as with my name and my role as casemananger to assist with care planning to give support for care planning. She again said she was not going to talk to me about this so I simply ended call stating that we would continue to follow pt care plan and assist further when needed.

## 2019-07-02 PROBLEM — E87.70 VOLUME OVERLOAD: Status: RESOLVED | Noted: 2019-06-27 | Resolved: 2019-07-02

## 2019-07-02 LAB — CREAT SERPL-MCNC: 0.75 MG/DL (ref 0.6–1)

## 2019-07-02 PROCEDURE — 74011250637 HC RX REV CODE- 250/637: Performed by: HOSPITALIST

## 2019-07-02 PROCEDURE — 74011250636 HC RX REV CODE- 250/636: Performed by: FAMILY MEDICINE

## 2019-07-02 PROCEDURE — 74011000258 HC RX REV CODE- 258: Performed by: HOSPITALIST

## 2019-07-02 PROCEDURE — 65270000029 HC RM PRIVATE

## 2019-07-02 PROCEDURE — 74011636637 HC RX REV CODE- 636/637: Performed by: INTERNAL MEDICINE

## 2019-07-02 PROCEDURE — 99232 SBSQ HOSP IP/OBS MODERATE 35: CPT | Performed by: INTERNAL MEDICINE

## 2019-07-02 PROCEDURE — 36415 COLL VENOUS BLD VENIPUNCTURE: CPT

## 2019-07-02 PROCEDURE — 74011250636 HC RX REV CODE- 250/636: Performed by: HOSPITALIST

## 2019-07-02 PROCEDURE — 77030019605

## 2019-07-02 PROCEDURE — 74750000023 HC WOUND THERAPY

## 2019-07-02 PROCEDURE — 97110 THERAPEUTIC EXERCISES: CPT

## 2019-07-02 PROCEDURE — 97530 THERAPEUTIC ACTIVITIES: CPT

## 2019-07-02 PROCEDURE — 82565 ASSAY OF CREATININE: CPT

## 2019-07-02 PROCEDURE — 74011250637 HC RX REV CODE- 250/637: Performed by: FAMILY MEDICINE

## 2019-07-02 RX ADMIN — TAMSULOSIN HYDROCHLORIDE 0.4 MG: 0.4 CAPSULE ORAL at 08:50

## 2019-07-02 RX ADMIN — LEVOTHYROXINE SODIUM 75 MCG: 75 TABLET ORAL at 08:50

## 2019-07-02 RX ADMIN — PREDNISONE 20 MG: 10 TABLET ORAL at 08:50

## 2019-07-02 RX ADMIN — MIRTAZAPINE 15 MG: 15 TABLET, FILM COATED ORAL at 21:30

## 2019-07-02 RX ADMIN — PIPERACILLIN, TAZOBACTAM 4.5 G: 4; .5 INJECTION, POWDER, LYOPHILIZED, FOR SOLUTION INTRAVENOUS at 01:43

## 2019-07-02 RX ADMIN — Medication 5 ML: at 21:30

## 2019-07-02 RX ADMIN — PIPERACILLIN, TAZOBACTAM 4.5 G: 4; .5 INJECTION, POWDER, LYOPHILIZED, FOR SOLUTION INTRAVENOUS at 08:52

## 2019-07-02 RX ADMIN — ENOXAPARIN SODIUM 40 MG: 40 INJECTION SUBCUTANEOUS at 14:18

## 2019-07-02 RX ADMIN — TETRAHYDROZOLINE HCL: 0.5 EYE DROP SOLUTION at 17:34

## 2019-07-02 RX ADMIN — AMIODARONE HYDROCHLORIDE 200 MG: 200 TABLET ORAL at 08:50

## 2019-07-02 RX ADMIN — PIPERACILLIN, TAZOBACTAM 4.5 G: 4; .5 INJECTION, POWDER, LYOPHILIZED, FOR SOLUTION INTRAVENOUS at 17:34

## 2019-07-02 RX ADMIN — Medication 10 ML: at 05:07

## 2019-07-02 RX ADMIN — VANCOMYCIN HYDROCHLORIDE 1250 MG: 10 INJECTION, POWDER, LYOPHILIZED, FOR SOLUTION INTRAVENOUS at 00:01

## 2019-07-02 RX ADMIN — Medication 10 ML: at 14:19

## 2019-07-02 RX ADMIN — ASPIRIN 325 MG ORAL TABLET 325 MG: 325 PILL ORAL at 08:50

## 2019-07-02 RX ADMIN — VANCOMYCIN HYDROCHLORIDE 1250 MG: 10 INJECTION, POWDER, LYOPHILIZED, FOR SOLUTION INTRAVENOUS at 14:18

## 2019-07-02 NOTE — PROGRESS NOTES
Problem: Mobility Impaired (Adult and Pediatric)  Goal: *Acute Goals and Plan of Care (Insert Text)  Description  STG:  (1.)Ms. Courtney Prado will move from supine to sit and sit to supine  with MODERATE ASSIST within 3 treatment day(s). (2.)Ms. Courtney Prado will transfer from bed to chair and chair to bed with MAXIMAL ASSIST using the least restrictive device within 3 treatment day(s). (3.)Ms. Courtney Prado will demonstrate fair static sitting balance for 10 minutes with CONTACT GUARD within 3 treatment day(s). LTG:  (1.)Ms. Courtney Prado will move from supine to sit and sit to supine  in bed with MINIMAL ASSIST within 7 treatment day(s). (2.)Ms. Courtney Prado will transfer from bed to chair and chair to bed with MINIMAL ASSIST using the least restrictive device within 7 treatment day(s). (3.)Ms. Courtney Prado will demonstrate good static/dynamic sitting balance for 15 minutes with STAND BY ASSIST within 7 treatment day(s). (4.)Ms. Courtney Prado will ambulate with MODERATE ASSIST for 15 feet X 2 with the least restrictive device within 7 treatment day(s). ________________________________________________________________________________________________     Outcome: Progressing Towards Goal      PHYSICAL THERAPY: Daily Note and PM 7/2/2019  INPATIENT: PT Visit Days : 4  [place] / [place] / Product Type: Medicare /       NAME/AGE/GENDER: Kathya Luke is a 78 y.o. female   PRIMARY DIAGNOSIS: Volume overload [E87.70] HCAP (healthcare-associated pneumonia)   HCAP (healthcare-associated pneumonia)         ICD-10: Treatment Diagnosis:    · Generalized Muscle Weakness (M62.81)  · Difficulty in walking, Not elsewhere classified (R26.2)   Precaution/Allergies:  Penicillins; Percocet [oxycodone-acetaminophen]; and Prednisone      ASSESSMENT:     Ms. Courtney Prado is supine with daughter present and very helpful in encouraging patient and explaining to her why it is important to try.   She performed supine exercises below then sat up with moderate assist.  She struggled to scoot to the edge and needed help eventually. She needed to have a bowel movement and was able to stand into the walker with moderate assist but had difficulty taking functional steps requiring more assist to transfer to the commode. She stood fairly from the commode long enough for me to clean her and did a stand pivot with the walker back to the edge of the bed. She stood again and attempted to take steps along the EOB with little success. She is quite weak but made progress today. She is a bit anxious and needs a lot of encouragement. This section established at most recent assessment   PROBLEM LIST (Impairments causing functional limitations):  1. Decreased Strength  2. Decreased ADL/Functional Activities  3. Decreased Transfer Abilities  4. Decreased Ambulation Ability/Technique  5. Decreased Balance  6. Increased Pain  7. Decreased Activity Tolerance  8. Decreased Pacing Skills  9. Increased Fatigue  10. Increased Shortness of Breath  11. Decreased [place] with Home Exercise Program   INTERVENTIONS PLANNED: (Benefits and precautions of physical therapy have been discussed with the patient.)  1. Balance Exercise  2. Bed Mobility  3. Family Education  4. Gait Training  5. Home Exercise Program (HEP)  6. Neuromuscular Re-education/Strengthening  7. Range of Motion (ROM)  8. Therapeutic Activites  9. Therapeutic Exercise/Strengthening  10. Transfer Training     TREATMENT PLAN: Frequency/Duration: 3 times a week for duration of hospital stay  Rehabilitation Potential For Stated Goals: [facility] (at time of discharge pending progress):    Placement:   It is my opinion, based on this patient's performance to date, that Ms. Fuentes Moe may benefit from intensive therapy at a [address] [place] [address] after discharge due to the functional deficits listed above that are likely to improve with skilled rehabilitation and concerns that he/she may be unsafe to be unsupervised at home due to decreased strength and balance putting pt at increased risk for falls. .  Equipment:    None at this time              HISTORY:   History of Present Injury/Illness (Reason for Referral):  See H&P below  Patient is a 71yo F with hx lung cancer recent RLL lobectomy on 5/22. She had a protracted hospitalization with hypoxia, overload, atelectasis, congestion. She completed inpatient rehab at [facility] on 6/12 and was discharged with 5L oxygen requirement to SNF. The patient left SNF AMA after one day. Today, she presents with generalized weakness, not feeling well, and shortness of breath. Has been having more difficulty getting around at home. Very sob with any movement. Had been getting up to bathroom with assistance but no longer able. CXR with effusion and atelectasis v pna, troponin elevated to 0.34, K 2.9, and     Past Medical History/Comorbidities:   Ms. Justyn Diaz  has a past medical history of Aspiration pneumonia ([address] (4/12/2019), Atrial fibrillation with rapid ventricular response ([address] (5/26/2019), Atrial fibrillation with RVR ([address] (5/31/2019), Cancer ([address] Chronic obstructive pulmonary disease ([address] Hypertension, Severe sepsis with acute organ dysfunction ([address] (4/12/2019), Subarachnoid hemorrhage ([address] Thyroid disease, and UTI (urinary tract infection) (5/29/2019).   Ms. Justyn Diaz  has a past surgical history that includes hx wrist fracture tx and hx other surgical.  Social History/Living Environment:   Home Environment: Private residence  # Steps to Enter: 0  One/Two Story Residence: One story  Living Alone: No  Support Systems: Child(carter)  Patient Expects to be Discharged to[de-identified] Rehabilitation facility  Current DME Used/Available at Home: Margaux Chaney, rolling, Wheelchair, [address] [employer] chair  Tub or Shower Type: Shower  Prior Level of Function/Work/Activity:  Amb household distances with walker 2 weeks ago   Number of Personal Factors/Comorbidities that affect the Plan of Care: 3+: HIGH COMPLEXITY   EXAMINATION:   Most Recent Physical Functioning:   Gross Assessment:                  Posture:     Balance:  Sitting - Static: Fair (occasional)  Sitting - Dynamic: Fair (occasional) Bed Mobility:  Supine to Sit: Moderate assistance  Sit to Supine: Moderate assistance  Wheelchair Mobility:     Transfers:  Sit to Stand: Moderate assistance  Stand to Sit: Moderate assistance  Bed to Chair: Moderate assistance;Maximum assistance  Gait:            Body Structures Involved:  1. Lungs  2. Bones  3. Joints  4. Muscles  5. Ligaments Body Functions Affected:  1. Sensory/Pain  2. Cardio  3. Respiratory  4. Neuromusculoskeletal  5. Movement Related Activities and Participation Affected:  1. General Tasks and Demands  2. Mobility  3. Self Care  4. Domestic Life  5. Interpersonal Interactions and Relationships  6. Community, Social and [place]   Number of elements that affect the Plan of Care: 4+: HIGH COMPLEXITY   CLINICAL PRESENTATION:   Presentation: Evolving clinical presentation with changing clinical characteristics: MODERATE COMPLEXITY   CLINICAL DECISION MAKIN [facility] Inpatient Short Form  How much difficulty does the patient currently have. .. Unable A Lot A Little None   1. Turning over in bed (including adjusting bedclothes, sheets and blankets)? ? 1   ? 2   ? 3   ? 4   2. Sitting down on and standing up from a chair with arms ( e.g., wheelchair, bedside commode, etc.)   ? 1   ? 2   ? 3   ? 4   3. Moving from lying on back to sitting on the side of the bed?   ? 1   ? 2   ? 3   ? 4   How much help from another person does the patient currently need. .. Total A Lot A Little None   4. Moving to and from a bed to a chair (including a wheelchair)? ? 1   ? 2   ? 3   ? 4   5. Need to walk in hospital room? ? 1   ? 2   ? 3   ? 4   6. Climbing 3-5 steps with a railing?    ? 1   ? 2   ? 3   ? 4 © 2007, Trustees of [address] [place] [address] [address] [id number], under license to [employer]. All rights reserved      Score:  Initial: 8 Most Recent: X (Date: -- )    Interpretation of Tool:  Represents activities that are increasingly more difficult (i.e. Bed mobility, Transfers, Gait). Medical Necessity:     · Patient is expected to demonstrate progress in strength, balance, coordination and functional technique  ·  to decrease assistance required with gait, transfers, and functional mobility. · .  Reason for Services/Other Comments:  · Patient continues to require skilled intervention due to decreased strength, decreased balance, decreased functional tolerance, decreased cardiopulmonary endurance affecting participation in basic ADLs and functional tasks   · . Use of outcome tool(s) and clinical judgement create a POC that gives a: Questionable prediction of patient's progress: MODERATE COMPLEXITY            TREATMENT:   (In addition to Assessment/Re-Assessment sessions the following treatments were rendered)   Pre-treatment Symptoms/Complaints:  weakness  Pain: Initial:   Pain Intensity 1: 0  Post Session:  comfortable     Therapeutic Activity: (    30 minutes): Therapeutic activities including Bed transfers and sitting balance EOB, improved static sitting posture, scooting sit to stand, and trying to take steps to improve mobility, strength, balance, coordination and tolerance for functional mobility . Required minimal assist and positioning to promote static and dynamic balance in sitting and moderate assist for transfers and standing. Therapeutic Exercise: (15 Minutes):  Exercises per grid below to improve mobility and strength. Required minimal visual, verbal, manual and tactile cues to promote proper body alignment, promote proper body posture and promote proper body mechanics.        Date:  6/28/19 Date:  7/1/19 Date:  7/2/19   Activity/Exercise Parameters Parameters Parameters   LAQ 10 X B AA L 10x B    Ankle pumps 10 X B  20x B 20x B   Quad set 10 X B      Glute set 10 X      Heel slides 10 X B AA 10x B 10xB   Hip abduction 10 X B AA     SAQ  10x B 10x B   bridging   5x       Braces/Orthotics/Lines/Etc:   · wound vac  Treatment/Session Assessment:    · Response to Treatment:  above  · Interdisciplinary Collaboration:   o Physical Therapy Assistant  o Registered Nurse  · After treatment position/precautions:   o Supine in bed  o Bed alarm/tab alert on  o Bed/Chair-wheels locked  o Bed in low position  o Call light within reach  o RN notified  o Family at bedside   · Compliance with Program/Exercises: Compliant most of the time  · Recommendations/Intent for next treatment session: \"Next visit will focus on advancements to more challenging activities and reduction in assistance provided\".   Total Treatment Duration:  PT Patient Time In/Time Out  Time In: 1310  Time Out: 1355    Pam Rodriguez, PTA

## 2019-07-02 NOTE — PROGRESS NOTES
HOSPITALIST  NAME:  Alexander Pappas   Age:  78 y.o.  :   1940   MRN:   [id number]  PCP: Luis Wei MD    subj     Patient is a 71yo F with hx lung cancer recent RLL lobectomy on . She had a protracted hospitalization with hypoxia, overload, atelectasis, congestion. She completed inpatient rehab at [facility] on  and was discharged with 5L oxygen requirement to SNF. The patient left SNF AMA after one day. Today, she presents with generalized weakness, not feeling well, and shortness of breath. Has been having more difficulty getting around at home. Very sob with any movement. Had been getting up to bathroom with assistance but no longer able. CXR with effusion and atelectasis v pna, troponin elevated to 0.34, K 2.9, and     Today, on O2 at 4L (baseline 5L), BP normal, alert but look tired,  Head lice still detected    Objective:     Visit Vitals  /74   Pulse 72   Temp 98.1 °F (36.7 °C)   Resp 18   Ht 5' 10\" (1.778 m)   Wt 63.6 kg (140 lb 4.8 oz)   SpO2 93%   BMI 20.13 kg/m²      Temp (24hrs), Av.9 °F (36.6 °C), Min:97.2 °F (36.2 °C), Max:98.9 °F (37.2 °C)    Oxygen Therapy  O2 Sat (%): 93 % (19 1104)  Pulse via Oximetry: 81 beats per minute (19 1914)  O2 Device: Hi flow nasal cannula (19 1138)  O2 Flow Rate (L/min): 4 l/min (19 1138)  Physical Exam:  General:    Elderly, thin, NAD  Lungs:   Mild crackles, wound vac R chest wall. Diminished bs bibasilar, more on R  Heart:   Regular rate and rhythm,  no murmur, rub or gallop. Abdomen:   Soft, non-tender. Not distended. Bowel sounds normal.   Extremities: No cyanosis. No edema. No clubbing  Skin:     Texture, turgor normal. No rashes or lesions.   Not Jaundiced  Neurologic: Alert and oriented x 3, no focal deficits     Data Review:   Recent Results (from the past 24 hour(s))   CREATININE    Collection Time: 19  6:55 AM   Result Value Ref Range    Creatinine 0.75 0.6 - 1.0 MG/DL     Imaging /Procedures /Studies   XR CHEST SNGL V   Final Result   IMPRESSION: Improvement as described above. CT CHEST WO CONT   Final Result   IMPRESSION:   1. Relatively extensive right basilar airspace opacities. Additional foci of gas   are present amongst this region some of which appears to be associated with a   suture line. No obvious obstructing endobronchial lesion is identified. 2. Mild additional patchy airspace opacities within the lingula and left lower   lobe. XR CHEST PORT   Final Result   IMPRESSION: Right pleural effusion and right lung atelectasis or pneumonia   unchanged. Assessment and Plan: Active Hospital Problems    Diagnosis Date Noted    HCAP (healthcare-associated pneumonia) 06/28/2019    Elevated troponin 06/27/2019    Elevated brain natriuretic peptide (BNP) level 06/27/2019    Chronic respiratory failure (HCC) 06/27/2019    Hypokalemia 06/27/2019    Pleural effusion, right 06/27/2019    COPD (chronic obstructive pulmonary disease) ([facility] 05/24/2019       PLAN:    Zosyn+ Vanco 5/7 days- IMPROVING  Steroids   K replaced  Pulm following  Avoid meds affecting mentation  Full code as d/w son (poa)    Pediculosis capitis: failed 1st application of permethrin. I spoke to Dermatology who recommends additional 2 applications in 2 successive days. Dispo: rehab in 2-3 days, pt agree  I spoke to daughter in length, update about improvement of her Mom, asked for clear decision now about destination on DC so  start working on it.     Signed By: Dejuan Mcdonald MD     July 2, 2019

## 2019-07-02 NOTE — PROGRESS NOTES
Patient awakens briefly, verbalizes very little. Encouraged her to drink or eat, she refuses and had very poor intake. Resting quietly, wound vac continues to function wnl. Continues on 4 liters n/c. Fall precautions continue.

## 2019-07-02 NOTE — PROGRESS NOTES
Spoke with Dr. Gwen Nathan about infection controls suggest to consult dermatology due to attempts with lice treatment being unsuccessful. MD stated he would talk with consulting group.

## 2019-07-02 NOTE — PROGRESS NOTES
Problem: Self Care Deficits Care Plan (Adult)  Goal: *Acute Goals and Plan of Care (Insert Text)  Description  1. Patient will complete total body bathing and dressing with moderate assistance and adaptive equipment as needed. 2. Patient will complete toileting with maximal assistance and adaptive equipment as needed. 3. Patient will tolerate 20 minutes of OT treatment with up to minimal rest breaks to increase activity tolerance for ADLs. PROGRESSING 7/2/19   4. GOAL MET 7/2/19   UPGRADED: Patient will complete bed mobility with contact guard assistance in preparation for functional transfers. 5. Patient will attempt to  preparation for functional transfers with adaptive equipment as needed. 6. Patient will demonstrate modified independence with therapeutic exercise AROM HEP to increase strength in BUEs for increased safety and independence with functional transfers. 7. GOAL MET 7/2/19    Timeframe: 7 visits      Outcome: Progressing Towards Goal     OCCUPATIONAL THERAPY: Daily Note and PM 7/2/2019  INPATIENT: OT Visit Days: 2  [place] / [place] / Product Type: Medicare /      NAME/AGE/GENDER: Josemanuel Bustillos is a 78 y.o. female   PRIMARY DIAGNOSIS:  Volume overload [E87.70] HCAP (healthcare-associated pneumonia)   HCAP (healthcare-associated pneumonia)         ICD-10: Treatment Diagnosis:    · Generalized Muscle Weakness (M62.81)  · Other lack of cordination (R27.8)   Precautions/Allergies:    Fall precautions   Ectoparasites   Penicillins; Percocet [oxycodone-acetaminophen]; and Prednisone      ASSESSMENT:   Per Initial Assessment:  Ms. Ann-Marie Alfaro is a 78 y.o. female admitted with volume overload. At baseline, pt lives with daughter and reports requiring assistance with ADLs and that she was using her RW for mobility a few weeks ago. PMH including lung CA, utilizes 5L supplemental O2.  Per chart, pt with recent RLL lobectomy and IRC stay, d/c to SNF from which pt left AMA. 7/2/2019: Pt found supine in bed upon arrival, alert and agreeable to OT treatment. Pt practiced supine to sit with Min-ModA, cueing for technique. Pt practiced sitting balance with SBA/verbal cues for posture for seated ADL with SBA. Pt returned to supine with ModA/cueing and left with call bell within reach. Pt is making progress towards goals and met two goals this date. See above. Continue OT POC. This section established at most recent assessment   PROBLEM LIST (Impairments causing functional limitations):  1. Decreased Strength  2. Decreased ADL/Functional Activities  3. Decreased Transfer Abilities  4. Decreased Ambulation Ability/Technique  5. Decreased Balance  6. Increased Pain  7. Decreased Activity Tolerance  8. Decreased Pacing Skills  9. Decreased Work Simplification/Energy Conservation Techniques  10. Increased Fatigue  11. Increased Shortness of Breath  12. Decreased Flexibility/Joint Mobility  13. Decreased [place] with Home Exercise Program   INTERVENTIONS PLANNED: (Benefits and precautions of occupational therapy have been discussed with the patient.)  1. Activities of daily living training  2. Adaptive equipment training  3. Balance training  4. Clothing management  5. Donning&doffing training  6. Hygiene training  7. Neuromuscular re-eduation  8. Re-evaluation  9. Therapeutic activity  10. Therapeutic exercise  11. Wheelchair management     TREATMENT PLAN: Frequency/Duration: Follow patient 3x/week to address above goals. Rehabilitation Potential For Stated Goals: [facility] (at time of discharge pending progress):    Placement:   It is my opinion, based on this patient's performance to date, that Ms. Maggi Heath may benefit from intensive therapy at a [address] [place] [address] after discharge due to the functional deficits listed above that are likely to improve with skilled rehabilitation and concerns that he/she may be unsafe to be unsupervised at home due to poor balance, activity tolerance, strength impacting ADLs and increasing risk for falls . Equipment:    TBD               OCCUPATIONAL PROFILE AND HISTORY:   History of Present Injury/Illness (Reason for Referral):  See H&P. Past Medical History/Comorbidities:   Ms. Mary Carmen Gandhi  has a past medical history of Aspiration pneumonia ([facility] (4/12/2019), Atrial fibrillation with rapid ventricular response ([facility] (5/26/2019), Atrial fibrillation with RVR ([address] (5/31/2019), Cancer ([address] Chronic obstructive pulmonary disease ([address] Hypertension, Severe sepsis with acute organ dysfunction ([facility] (4/12/2019), Subarachnoid hemorrhage ([facility] Thyroid disease, and UTI (urinary tract infection) (5/29/2019). Ms. Mary Carmen Gandhi  has a past surgical history that includes hx wrist fracture tx and hx other surgical.  Social History/Living Environment:   Home Environment: Private residence  # Steps to Enter: 0  One/Two Story Residence: One story  Living Alone: No  Support Systems: Child(cartre)  Patient Expects to be Discharged to[de-identified] Rehabilitation facility  Current DME Used/Available at Home: [employer], rolling, Wheelchair, [address] [employer] chair  Tub or Shower Type: Shower  Prior Level of Function/Work/Activity:  At baseline, pt lives with daughter and reports requiring assistance with ADLs and that she was using her RW for mobility a few weeks ago. PMH including lung CA, utilizes 5L supplemental O2. Per chart, pt with recent RLL lobectomy and IRC stay, d/c to SNF from which pt left AMA. Personal Factors:          Sex:  female        Age:  78 y.o.         Other factors that influence how disability is experienced by the patient:  Multiple co-morbidities     Number of Personal Factors/Comorbidities that affect the Plan of Care: Extensive review of physical, cognitive, and psychosocial performance (3+):  HIGH COMPLEXITY   ASSESSMENT OF OCCUPATIONAL PERFORMANCE[de-identified]   Activities of Daily Living:   Basic ADLs (From Assessment) Complex ADLs (From Assessment)   Feeding: Moderate assistance  Oral Facial Hygiene/Grooming: Maximum assistance  Bathing: Maximum assistance  Upper Body Dressing: Maximum assistance  Lower Body Dressing: Total assistance  Toileting: Total assistance Instrumental ADL  Meal Preparation: Total assistance  Homemaking: Total assistance  Medication Management: Total assistance  Financial Management: Total assistance   Grooming/Bathing/Dressing Activities of Daily Living   Grooming  Brushing Teeth: Stand-by assistance                         Bed/Mat Mobility  Supine to Sit: Minimum assistance; Moderate assistance  Sit to Supine: Moderate assistance  Sit to Stand: Moderate assistance  Stand to Sit: Moderate assistance  Bed to Chair: Moderate assistance;Maximum assistance     Most Recent Physical Functioning:   Gross Assessment:  AROM: Generally decreased, functional(BUEs)  Strength: Generally decreased, functional(BUEs)  Coordination: Generally decreased, functional(BUEs)               Posture:     Balance:  Sitting - Static: Fair (occasional)  Sitting - Dynamic: Fair (occasional) Bed Mobility:  Supine to Sit: Minimum assistance; Moderate assistance  Sit to Supine: Moderate assistance  Wheelchair Mobility:     Transfers:  Sit to Stand: Moderate assistance  Stand to Sit: Moderate assistance  Bed to Chair: Moderate assistance;Maximum assistance            Patient Vitals for the past 6 hrs:   BP SpO2 O2 Flow Rate (L/min) Pulse   07/02/19 1104 133/74 93 %  72   07/02/19 1138   4 l/min    07/02/19 1534   4 l/min        Mental Status  Neurologic State: Alert  Orientation Level: Oriented to person, Oriented to place, Oriented to time  Cognition: Follows commands  Perception: Appears intact  Perseveration: No perseveration noted  Safety/Judgement: Awareness of environment, Fall prevention                          Physical Skills Involved:  1. Range of Motion  2. Balance  3. Strength  4. Activity Tolerance  5. Fine Motor Control  6.  Gross Motor Control  7. Pain (acute)  8. Pain (Chronic) Cognitive Skills Affected (resulting in the inability to perform in a timely and safe manner):  1. None  Psychosocial Skills Affected:  1. Habits/Routines  2. Environmental Adaptation  3. Social Interaction  4. Emotional Regulation  5. Self-Awareness  6. Awareness of Others  7. Social Roles   Number of elements that affect the Plan of Care: 5+:  HIGH COMPLEXITY   CLINICAL DECISION MAKIN[address] [place], [place] [id number] AM-PAC 6 Clicks   Daily Activity Inpatient Short Form  How much help from another person does the patient currently need. .. Total A Lot A Little None   1. Putting on and taking off regular lower body clothing? ? 1   ? 2   ? 3   ? 4   2. Bathing (including washing, rinsing, drying)? ? 1   ? 2   ? 3   ? 4   3. Toileting, which includes using toilet, bedpan or urinal?   ? 1   ? 2   ? 3   ? 4   4. Putting on and taking off regular upper body clothing? ? 1   ? 2   ? 3   ? 4   5. Taking care of personal grooming such as brushing teeth? ? 1   ? 2   ? 3   ? 4   6. Eating meals? ? 1   ? 2   ? 3   ? 4   © , Trustees of [address] [place], [place] [id number], under license to [employer] All rights reserved      Score:  Initial: 10 19 Most Recent: X (Date: -- )    Interpretation of Tool:  Represents activities that are increasingly more difficult (i.e. Bed mobility, Transfers, Gait). Medical Necessity:     · Patient demonstrates fair  ·  rehab potential due to higher previous functional level. Reason for Services/Other Comments:  · Patient continues to require skilled intervention due to inability to complete ADLs at prior level of independence   · .    Use of outcome tool(s) and clinical judgement create a POC that gives a: MODERATE COMPLEXITY         TREATMENT:   (In addition to Assessment/Re-Assessment sessions the following treatments were rendered)     Pre-treatment Symptoms/Complaints:    Pain: Initial:   Pain Intensity 1: (Reports pain in R shoulder/back, does not quantify)  Pain Intervention(s) 1: Repositioned  Post Session:  same     Therapeutic Activity: (    18 minutes): Therapeutic activities including Bed transfers and sitting balance for seated ADL to improve mobility, strength, balance, coordination and activity tolerance. Required minimal   to promote static and dynamic balance in sitting. Pt practiced supine to sit with Min-ModA, cueing for technique. Pt practiced sitting balance with SBA/verbal cues for posture for seated ADL with SBA. O2 sats dropped to 88% on HFNC. Pt returned to supine with ModA/cueing    Braces/Orthotics/Lines/Etc:   · IV  · Wound VAC - R flank  · O2 Device: Nasal cannula HiFlow  · Purewick  Treatment/Session Assessment:    · Response to Treatment:  Better participation this date, still poor activity [place]  · Interdisciplinary Collaboration:   o Occupational Therapist  o Registered Nurse  o Certified Nursing Assistant/Patient Care Technician  · After treatment position/precautions:   o Supine in bed  o Bed alarm/tab alert on  o Bed/Chair-wheels locked  o Bed in low position  o Call light within reach  o CNA at bedside   o Sidelying/turned on L side   · Compliance with Program/Exercises: Compliant all of the time, Will assess as treatment progresses. · Recommendations/Intent for next treatment session: \"Next visit will focus on advancements to more challenging activities and reduction in assistance provided\".   Total Treatment Duration:  OT Patient Time In/Time Out  Time In: 1533  Time Out: [address], OTR/L

## 2019-07-02 NOTE — PROGRESS NOTES
Nutrition  Follow Up:  Assessment:   Diet: DIET CARDIAC Regular  DIET NUTRITIONAL SUPPLEMENTS All Meals; Ensure Enlive    Food/Nutrition Patient History:  The patient continues to remain lethargic. She does not seem interested in food and refuses to eat or drink. At this time goals of care need to be established. The patient is not consuming meals and drinking very minimal. She is not motivated to eat meals. At this point if aggressive care is warranted tube feeding should be considered at this time. Spoke with Marly kay RN regarding nutrition concerns. Anthropometrics:Height: 5' 10\" (177.8 cm),  Weight: 63.6 kg (140 lb 4.8 oz), Weight Source: Bed. Macronutrient needs:  EER:  2679-3156 kcal /day (25-30 kcal/kg listed BW of 63.1 kg-pt noted to be bed bound)  EPR:  63-79 grams protein/day (1-1.25 grams/kg listed BW)  Intake/Comparative Standards: Average intake for past 5 days/16 recorded meal(s): 8%. This potentially meets ~<25% of kcal and ~<25% of protein needs. Intervention:  Meals and snacks: Continue current diet. Nutrition Supplement Therapy: Continue Ensure Enlive TID (strawberry preference)  Enteral Nutrition Therapy: Consider tube feeding if the patient continues with poor po intakes and aggressive medical care. Please consult RD if TF is warranted. Nutrition Discharge Plan: Too soon to be determined. Shanita Rivera [address], [address],  [phone]

## 2019-07-02 NOTE — PROGRESS NOTES
Patient resting quietly in bed at this time. No c/o discomfort. Fall precautions in place, will monitor.

## 2019-07-02 NOTE — PROGRESS NOTES
Follow up visit with pt/daughter today. They have chosen referrals to [facility],  [facility] and [facility] with a preference in that order. Will proceed with referrals for placement.

## 2019-07-02 NOTE — PROGRESS NOTES
Treatment on hair completed. Pt is resting quietly in bed. Resp even, unlabored. Pt remains on 4L high flow nasal cannula. Pt voices no complaints or concerns at this time. Wound vac in place, C/D/I. IV antibiotic infusing at this time. SBAR end of shift report given to oncoming RN.

## 2019-07-02 NOTE — PROGRESS NOTES
Pharmacokinetic Consult to Pharmacist    Willis Christy is a 78 y.o. female being treated for HAP with vancomycin and pip/tazo. Height: 5' 10\" (177.8 cm)  Weight: 63.6 kg (140 lb 4.8 oz)  Lab Results   Component Value Date/Time    BUN 29 (H) 07/01/2019 11:20 AM    Creatinine 0.75 07/02/2019 06:55 AM    WBC 10.0 07/01/2019 11:20 AM    Procalcitonin 0.1 06/26/2019 11:14 PM    Lactic Acid (POC) 1.06 06/26/2019 11:20 PM      Estimated Creatinine Clearance: 61.1 mL/min (based on SCr of 0.75 mg/dL). All Micro Results     None        Lab Results   Component Value Date/Time    Vancomycin,trough 7.6 06/30/2019 10:45 PM    Vancomycin, random 11.7 06/05/2019 06:12 AM       Day 5 of vancomycin. Goal trough is 15-20. Vancomycin dose was increased to 1250 mg IV q12h given subtherapeutic trough result. Further levels will be ordered as clinically indicated. EOT is 7/4/19. Pharmacy will continue to follow. Please call with any questions.     Thank you,    Samantha Anderson, PharmD  Clinical Pharmacist  [phone]

## 2019-07-02 NOTE — PROGRESS NOTES
Mariano Marcelle, daughter of patient, called and was provided an update on pt's current status. Opportunity for questions provided.

## 2019-07-02 NOTE — PROGRESS NOTES
Spoke with Dr. Kaylene Miranda and Dr. Chiquita Lesches about concerns for pt's poor oral intake and lack of willingness to participate. Discussed if patient was candidate for palliative care. MD's stated pt does not have medical diagnosis for treatment.

## 2019-07-02 NOTE — PROGRESS NOTES
Isra Santos  Admission Date: 6/26/2019             Daily Progress Note: 7/2/2019    The patient's chart is reviewed and the patient is discussed with the staff. The patient's chart is reviewed and the patient is discussed with the staff. Patient is a 78 y.o.  female seen and evaluated at the request of Dr. Joselin Waite. She was diagnosed with NSCLC in May and she underwent an elective right thoracoscopy which was switched to right thoracotomy with extensive pneumonolysis,  right lower lobe lobectomy,  right upper lobe blebectomy x2,  diaphragm resection with primary repair,  chest wall resection,  mediastinal lymphadenectomy, intercostal nerve cryoablation. Post op, her wound became infected with strept for which she was treated with Vancomycin per ID. A wound vac remains. She also had problems with a fib, hypoxia, volume overload and debility. She was eventually discharged to rehab but has since been taken home by her daughter. She was getting PT at home. The patient is not responsive enough to provide a history so the chart was reviewed and her nurse interviewed. She was reportedly admitted with a 2 day history of weakness. Her echo shows an EF of 65%. She has no edema but her BNP is 421. She was not on home lasix but has been started on it here. She was sent home on oxygen. Her PFTs show mild obstruction. Her WBC is 12.3 with a PCT of 0.1. Subjective:     Sleepy, but waked up and responds appropriately. Has not been out of bed. O2 remains at 4L. Was borderline on 2L yesterday and increased back to 4 following ABG. She was discharged from SNF on [facility]. Evaluation yesterday revealed no acute findings for AMS.     Current Facility-Administered Medications   Medication Dose Route Frequency    vancomycin (VANCOCIN) 1250 mg in  ml infusion  1,250 mg IntraVENous Q12H    predniSONE (DELTASONE) tablet 20 mg  20 mg Oral DAILY WITH BREAKFAST    piperacillin-tazobactam (ZOSYN) 4.5 g in 0.9% sodium chloride (MBP/ADV) 100 mL  4.5 g IntraVENous Q8H    albuterol (PROVENTIL VENTOLIN) nebulizer solution 2.5 mg  2.5 mg Nebulization Q4H PRN    amiodarone (CORDARONE) tablet 200 mg  200 mg Oral DAILY    benzonatate (TESSALON) capsule 100 mg  100 mg Oral TID PRN    HYDROcodone-acetaminophen (NORCO) 5-325 mg per tablet 1 Tab  1 Tab Oral Q4H PRN    levothyroxine (SYNTHROID) tablet 75 mcg  75 mcg Oral DAILY    mirtazapine (REMERON) tablet 15 mg  15 mg Oral QHS    sodium chloride (NS) flush 5-40 mL  5-40 mL IntraVENous Q8H    sodium chloride (NS) flush 5-40 mL  5-40 mL IntraVENous PRN    ondansetron (ZOFRAN ODT) tablet 4 mg  4 mg Oral Q4H PRN    bisacodyl (DULCOLAX) tablet 5 mg  5 mg Oral DAILY PRN    enoxaparin (LOVENOX) injection 40 mg  40 mg SubCUTAneous Q24H    aspirin tablet 325 mg  325 mg Oral DAILY    nitroglycerin (NITROSTAT) tablet 0.4 mg  0.4 mg SubLINGual PRN    tamsulosin (FLOMAX) capsule 0.4 mg  0.4 mg Oral DAILY     Review of Systems  Denies fevers, chest pain, dyspnea, cough, pain    Objective:     Vitals:    07/01/19 2000 07/01/19 2326 07/02/19 0327 07/02/19 0740   BP: 125/64 149/76 152/70 138/70   Pulse: 73 69 65 64   Resp: 20 18 18 18   Temp: 97.3 °F (36.3 °C) 98.9 °F (37.2 °C) 97.2 °F (36.2 °C) 97.6 °F (36.4 °C)   SpO2: 98% 96% 98% 95%   Weight:   140 lb 4.8 oz (63.6 kg)    Height:         Intake and Output:   06/30 1901 - 07/02 0700  In: 1175 [P.O.:720; I.V.:455]  Out: 560 [Urine:550; Drains:10]  No intake/output data recorded. Physical Exam:   Constitution:  the patient is well developed and in no acute distress  EENMT:  Sclera clear, pupils equal, oral mucosa moist  Respiratory: mild crackles on right, diminished throughout, no wheeze, O2 at 4L, sat 95-99%  Cardiovascular:  RRR without M,G,R  Gastrointestinal: soft and non-tender; with positive bowel sounds. Musculoskeletal: warm without cyanosis. There is no lower extremity edema.   Skin:  no jaundice or rashes, R wound vac to upper  chest   Neurologic: no gross neuro deficits     Psychiatric:  alert and oriented x 3, short with responses    CXR: 7/1: improved right lower infiltrate      LAB  No results for input(s): GLUCPOC in the last 72 hours. No lab exists for component: GLPOC   Recent Labs     07/01/19  1120   WBC 10.0   HGB 8.6*   HCT 28.8*        Recent Labs     07/02/19  0655 07/01/19  1120   NA  --  141   K  --  3.4*   CL  --  106   CO2  --  29   GLU  --  100   BUN  --  29*   CREA 0.75 0.79   CA  --  8.2*   ALB  --  1.8*   TBILI  --  0.4   ALT  --  8*   SGOT  --  9*     Recent Labs     07/01/19  0921   PHI 7.430   PCO2I 44.6   PO2I 60*   HCO3I 29.6*     No results for input(s): LCAD, LAC in the last 72 hours.     Assessment:  (Medical Decision Making)     Hospital Problems  Date Reviewed: 6/21/2019          Codes Class Noted POA    * (Principal) HCAP (healthcare-associated pneumonia) ICD-10-CM: J18.9  ICD-9-CM: 486  6/28/2019 Unknown        Elevated troponin ICD-10-CM: R74.8  ICD-9-CM: 790.6  6/27/2019 Yes        Elevated brain natriuretic peptide (BNP) level ICD-10-CM: R79.89  ICD-9-CM: 790.99  6/27/2019 Yes        Chronic respiratory failure (HCC) (Chronic) ICD-10-CM: J96.10  ICD-9-CM: 518.83  6/27/2019 Yes        Hypokalemia ICD-10-CM: E87.6  ICD-9-CM: 276.8  6/27/2019 Yes        Pleural effusion, right ICD-10-CM: J90  ICD-9-CM: 511.9  6/27/2019 Yes        COPD (chronic obstructive pulmonary disease) (HCC) (Chronic) ICD-10-CM: J44.9  ICD-9-CM: 496  5/24/2019 Yes              Plan:  (Medical Decision Making)     --continue Vanc, Zosyn day 5/7 (no cultures so would just complete 7 days and stop)  --stop steroids today  --seems to have a general failure to thrive, encouraged to work with PT and complete SNF/rehab to overall improve  --respiratory status is improving with treatment of pneumonia  --if she is going to refuse therapy and skilled nursing/PT palliative care should be discussed with them    More than 50% of the time documented was spent in face-to-face contact with the patient and in the care of the patient on the floor/unit where the patient is located.     Temo Coffman MD

## 2019-07-03 PROCEDURE — 94760 N-INVAS EAR/PLS OXIMETRY 1: CPT

## 2019-07-03 PROCEDURE — 77010033678 HC OXYGEN DAILY

## 2019-07-03 PROCEDURE — 77030019934 HC DRSG VAC ASST KCON -B

## 2019-07-03 PROCEDURE — 74011250637 HC RX REV CODE- 250/637: Performed by: NURSE PRACTITIONER

## 2019-07-03 PROCEDURE — 99232 SBSQ HOSP IP/OBS MODERATE 35: CPT | Performed by: INTERNAL MEDICINE

## 2019-07-03 PROCEDURE — 65270000029 HC RM PRIVATE

## 2019-07-03 PROCEDURE — 74011250637 HC RX REV CODE- 250/637: Performed by: HOSPITALIST

## 2019-07-03 PROCEDURE — 99223 1ST HOSP IP/OBS HIGH 75: CPT | Performed by: NURSE PRACTITIONER

## 2019-07-03 PROCEDURE — 77030018846 HC SOL IRR STRL H20 ICUM -A

## 2019-07-03 PROCEDURE — 97605 NEG PRS WND THER DME<=50SQCM: CPT

## 2019-07-03 PROCEDURE — 74011250636 HC RX REV CODE- 250/636: Performed by: FAMILY MEDICINE

## 2019-07-03 PROCEDURE — 74011250636 HC RX REV CODE- 250/636: Performed by: HOSPITALIST

## 2019-07-03 PROCEDURE — 74011250637 HC RX REV CODE- 250/637: Performed by: FAMILY MEDICINE

## 2019-07-03 PROCEDURE — 74011000258 HC RX REV CODE- 258: Performed by: HOSPITALIST

## 2019-07-03 RX ORDER — ACETAMINOPHEN 500 MG
500 TABLET ORAL
Status: DISCONTINUED | OUTPATIENT
Start: 2019-07-03 | End: 2019-07-06 | Stop reason: HOSPADM

## 2019-07-03 RX ORDER — HYDROCODONE BITARTRATE AND ACETAMINOPHEN 5; 325 MG/1; MG/1
1 TABLET ORAL
Status: DISCONTINUED | OUTPATIENT
Start: 2019-07-03 | End: 2019-07-06 | Stop reason: HOSPADM

## 2019-07-03 RX ADMIN — VANCOMYCIN HYDROCHLORIDE 1250 MG: 10 INJECTION, POWDER, LYOPHILIZED, FOR SOLUTION INTRAVENOUS at 13:39

## 2019-07-03 RX ADMIN — ACETAMINOPHEN 500 MG: 500 TABLET, FILM COATED ORAL at 17:21

## 2019-07-03 RX ADMIN — Medication 10 ML: at 21:05

## 2019-07-03 RX ADMIN — Medication 5 ML: at 06:03

## 2019-07-03 RX ADMIN — Medication 5 ML: at 13:40

## 2019-07-03 RX ADMIN — LEVOTHYROXINE SODIUM 75 MCG: 75 TABLET ORAL at 08:56

## 2019-07-03 RX ADMIN — ASPIRIN 325 MG ORAL TABLET 325 MG: 325 PILL ORAL at 08:56

## 2019-07-03 RX ADMIN — VANCOMYCIN HYDROCHLORIDE 1250 MG: 10 INJECTION, POWDER, LYOPHILIZED, FOR SOLUTION INTRAVENOUS at 01:28

## 2019-07-03 RX ADMIN — MIRTAZAPINE 15 MG: 15 TABLET, FILM COATED ORAL at 21:04

## 2019-07-03 RX ADMIN — TAMSULOSIN HYDROCHLORIDE 0.4 MG: 0.4 CAPSULE ORAL at 08:56

## 2019-07-03 RX ADMIN — PIPERACILLIN, TAZOBACTAM 4.5 G: 4; .5 INJECTION, POWDER, LYOPHILIZED, FOR SOLUTION INTRAVENOUS at 08:56

## 2019-07-03 RX ADMIN — PIPERACILLIN, TAZOBACTAM 4.5 G: 4; .5 INJECTION, POWDER, LYOPHILIZED, FOR SOLUTION INTRAVENOUS at 17:21

## 2019-07-03 RX ADMIN — ENOXAPARIN SODIUM 40 MG: 40 INJECTION SUBCUTANEOUS at 13:39

## 2019-07-03 RX ADMIN — PIPERACILLIN, TAZOBACTAM 4.5 G: 4; .5 INJECTION, POWDER, LYOPHILIZED, FOR SOLUTION INTRAVENOUS at 02:14

## 2019-07-03 RX ADMIN — PIPERONYL BUTOXIDE, PYRETHRUM EXTRACT: 4; .33 SHAMPOO TOPICAL at 17:21

## 2019-07-03 RX ADMIN — AMIODARONE HYDROCHLORIDE 200 MG: 200 TABLET ORAL at 08:56

## 2019-07-03 NOTE — PROGRESS NOTES
SBAR shift report received from Denise, [facility]. Pt stable. Assessment complete. Pt is lying in bed, resting quietly. Resp even, unlabored. Pt is alert, orient X 4. Pt appears in no acute distress at this time. Pt is on 4L high flow nasal cannula 02. Pt voices no complaints or concerns at this time. No lice noted to be moving around in head, but knits are still visibly detected. Pt encouraged to call for assistance, call light in reach. Safety measures in place.  Will continue to monitor

## 2019-07-03 NOTE — PROGRESS NOTES
Patient resting in bed quietly. Respirations even and unlabored on 4 L via nasal cannula. Bed low and locked. Bedside table, personal belongings and call light all within reach. Denies needs at this time. Door open for visualization.

## 2019-07-03 NOTE — CONSULTS
Palliative Care    Patient: Domenic Thakkar MRN: [id number]  SSN: [id number]    YOB: 1940  Age: 78 y.o. Sex: female       Date of Request: 7/3/2019  Date of Consult:  7/3/2019  Reason for Consult:  goals of care  Requesting Physician: Dr. Teodoro Larse     Assessment/Plan:     Principal Diagnosis:    Pain, back  M54.9  Additional Diagnoses:   · Debility, Unspecified  R53.81  · Fatigue, Lethargy  R53.83  · Counseling, Encounter for Medical Advice  Z71.9  · Encounter for Palliative Care  Z51.5    Palliative Performance Scale (PPS):  PPS: 50    Medical Decision Making:   Reviewed and summarized chart from admission to present. Discussed case with appropriate providers. Reviewed laboratory and x-ray data. Patient resting in bed, no family present. Introduced the role of palliative care. Patient difficult to engage in conversation, usually replying with short, one or two word responses. Patient leaning over in the bed, reports back pain. Patient repositioned for comfort. RN states that pain medication (Norco) has been avoided due to patient being sleepy over previous days. I asked patient that if she hurt less, would she be motivated to move more, and patient agreed with this statement. Order for Tylenol provided. Lena Stall remains available prn. Assisted patient in eating some chicken salad. She denies abdominal pain or nausea; reports she has not been eating because she is thirsty. Drinks present on bedside table, and they are within patient's reach, but like other aspects of her care, it appears she relies on her daughter and staff to do these things for her. Ensured that bedside table was within reach and encouraged patient to eat and drink more after my visit. Adjusted diet order to provide Ensure Clear with meals instead of Ensure Enlive, as patient is not drinking this because it \"goes right through me\". Patient states that her goal is to \"get out of here on my feet\".   She would like to get strong enough to return to her daughter, Prakash colon. She is agreeable to STR at SNF beforehand in order to meet this goal.  Reviewed the past month and a half, and how serious her illness is. Validated the normalcy of feeling overwhelmed by this illness. I asked if it was depressing. She stated \"not depressing. I'm under good care of people here, and I know what's coming (rehab) is ok\". Confirmed that she wants to participate with therapies here and STR in order to improve. Per Neema RN, patient is more awake and engaged during my visit than previously. We will continue to follow to discuss goals of care. Will discuss findings with members of the interdisciplinary team.      Thank you for this referral.          .    Subjective:     History obtained from:  Patient, Care Provider and Chart    Chief Complaint: Back pain  History of Present Illness:  Mr. Bereket Scanlon is a 79 yo female with PMH of lung cancer (RLL lobectomy 5/22- with infected and non healing wound with wound vac), COPD (mild obstructive disease per PFTs), a fib, HTN, SAH, and other history as listed below. She presented to [facility] ER on 6/26 with generalized weakness and shortness of breath. CXR showed right effusion and atelectasis vs pneumonia. Labs showed hypokalemia and elevated BNP. She was admitted for further management of volume overload. CT chest confirmed infiltrate and she was started on treatment for HCAP as well. Patient has not been engaged with her care, and PC has been consulted to assist with FTT and goals of care.      Advance Directive: No       Code Status:  Full Code            Health Care Power of : Unknown    Past Medical History:   Diagnosis Date    Aspiration pneumonia ([address] 4/12/2019    Atrial fibrillation with rapid ventricular response ([address] 5/26/2019    Atrial fibrillation with RVR ([address] 5/31/2019    Cancer ([address]     Lung cancer    Chronic obstructive pulmonary disease ([address]     Hypertension     Severe sepsis with acute organ dysfunction [facility] 4/12/2019    Subarachnoid hemorrhage (HCC)     Thyroid disease     UTI (urinary tract infection) 5/29/2019      Past Surgical History:   Procedure Laterality Date    HX OTHER SURGICAL      coil in brain    HX WRIST FRACTURE [place]      bilat wrist     History reviewed. No pertinent family history. Social History     Tobacco Use    Smoking status: Former Smoker     Packs/day: 1.50     Years: 45.00     Pack years: 67.50     Types: Cigarettes     Last attempt to quit: 2004     Years since quitting: 15.5    Smokeless tobacco: Never Used   Substance Use Topics    Alcohol use: Not Currently     Prior to Admission medications    Medication Sig Start Date End Date Taking? Authorizing Provider   amiodarone (CORDARONE) 200 mg tablet Take 1 Tab by mouth daily. Indications: Prevention of Recurrent Atrial Fibrillation 6/13/19  Yes Lucille Miller MD   levothyroxine (SYNTHROID) 75 mcg tablet Take 1 Tab by mouth daily. 6/13/19  Yes Lucille Miller MD   pantoprazole (PROTONIX) 40 mg tablet Take 1 Tab by mouth Daily (before breakfast) for 30 days. 6/13/19 7/13/19 Yes Lucille Miller MD   tiotropium (SPIRIVA) 18 mcg inhalation capsule Take 1 Cap by inhalation daily. 6/13/19  Yes Lucille Miller MD   gabapentin (NEURONTIN) 300 mg capsule Take 1 Cap by mouth three (3) times daily. Indications: Neuropathic Pain 6/13/19  Yes Lucille Miller MD   HYDROcodone-acetaminophen [facility]) 5-325 mg per tablet Take 1 Tab by mouth every four (4) hours as needed (for pain) for up to 30 days. Max Daily Amount: 6 Tabs. 6/12/19 7/12/19 Yes Lucille Miller MD   mirtazapine (REMERON) 15 mg tablet Take 1 Tab by mouth nightly. Indications: sleep 6/12/19  Yes Lucille Miller MD   senna-docusate (PERICOLACE) 8.6-50 mg per tablet Take 2 Tabs by mouth two (2) times a day. 5/31/19  Yes Manuel Tristan MD   magnesium hydroxide (MILK OF MAGNESIA) 400 mg/5 mL suspension Take 30 mL by mouth daily.  6/1/19 Yes Elinor Licea MD   gabapentin (NEURONTIN) 300 mg capsule Take 1 Cap by mouth three (3) times daily. 5/31/19  Yes Elinor Licea MD   albuterol (PROVENTIL HFA, VENTOLIN HFA, PROAIR HFA) 90 mcg/actuation inhaler Take 1 Puff by inhalation every six (6) hours as needed for Wheezing or Shortness of Breath. 4/16/19  Yes Anthony Miller PA   ascorbic acid, vitamin C, (VITAMIN C) 500 mg tablet Take  by mouth. Yes Provider, Historical   calc-D3-magnes-E2-Kf-Na-jake 250 mg-400 unit -40 mg-5 mg tab Take 1 Tab by mouth daily. Yes Provider, Historical   VITAMIN B COMPLEX PO Take 1 Tab by mouth daily. Yes Provider, Historical   cholecalciferol (VITAMIN D3) 1,000 unit tablet Take 1,000 Units by mouth daily. Yes Provider, Historical   therapeutic multivitamin (THERAGRAN) tablet Take 1 Tab by mouth daily. Yes Provider, Historical   atorvastatin (LIPITOR) 10 mg tablet Take 1 Tab by mouth daily. 6/13/19   Maulik Hyman MD   benzonatate (TESSALON) 100 mg capsule Take 1 Cap by mouth three (3) times daily as needed for Cough. 5/6/19   Isael Soler MD       Allergies   Allergen Reactions    Penicillins Unknown (comments)     Hives. Tolerated ceftriaxone May 2019    Percocet [Oxycodone-Acetaminophen] Hives    Prednisone Other (comments)     Tachycardia        Review of Systems:  A comprehensive review of systems was negative except for:   Constitutional: Positive for fatigue. Musculoskeletal: Positive for back pain. Objective:     Visit Vitals  /67   Pulse 67   Temp 98.3 °F (36.8 °C)   Resp 18   Ht 5' 10\" (1.778 m)   Wt 142 lb 4.8 oz (64.5 kg)   SpO2 94%   BMI 20.42 kg/m²        Physical Exam:    General:  Cooperative. No acute distress. Wound vac to right chest.   Eyes:  Conjunctivae/corneas clear. Nose: Nares normal. Septum midline. O2 via NC. Neck: Supple, symmetrical, trachea midline. Lungs:   Clear to auscultation bilaterally, unlabored. Heart:  Regular rate and rhythm.    Abdomen:   Soft, non-tender, non-distended. Extremities: Normal, atraumatic, no cyanosis or edema. Skin: Skin color, texture, turgor normal. No rash. Neurologic: Nonfocal.   Psych: Alert and oriented. Flat affect.       Assessment:     Hospital Problems  Date Reviewed: 7/3/2019          Codes Class Noted POA    * (Principal) HCAP (healthcare-associated pneumonia) ICD-10-CM: J18.9  ICD-9-CM: 646  6/28/2019 Unknown        Elevated troponin ICD-10-CM: R74.8  ICD-9-CM: 790.6  6/27/2019 Yes        Elevated brain natriuretic peptide (BNP) level ICD-10-CM: R79.89  ICD-9-CM: 790.99  6/27/2019 Yes        Chronic respiratory failure (HCC) (Chronic) ICD-10-CM: J96.10  ICD-9-CM: 518.83  6/27/2019 Yes        Hypokalemia ICD-10-CM: E87.6  ICD-9-CM: 276.8  6/27/2019 Yes        Pleural effusion, right ICD-10-CM: J90  ICD-9-CM: 511.9  6/27/2019 Yes        COPD (chronic obstructive pulmonary disease) (HCC) (Chronic) ICD-10-CM: J44.9  ICD-9-CM: 496  5/24/2019 Yes              Signed By: Anne Haddad NP     July 3, 2019

## 2019-07-03 NOTE — PROGRESS NOTES
Bed request for STR forwarded to [facility], [facility], and [facility]. Case Management will continue to follow. Care Management Interventions  PCP Verified by CM:  Yes  Transition of Care Consult (CM Consult): Discharge Planning  Discharge Durable Medical Equipment: No  Physical Therapy Consult: Yes  Occupational Therapy Consult: Yes  Speech Therapy Consult: No  Current Support Network: Lives with Spouse, Own Home  Confirm Follow Up Transport: Family  Plan discussed with Pt/Family/Caregiver: Yes  Freedom of Choice Offered: Yes  Discharge Location  Discharge Placement: Skilled nursing facility

## 2019-07-03 NOTE — PROGRESS NOTES
[facility] resting quietly in bed at this time, awake, respirations even and unlabored on 3L hi flow nasal cannula. Denies needs at this time. Bed low and locked. Bedside table, personal belongings and call light within reach.

## 2019-07-03 NOTE — PROGRESS NOTES
Received bedside shift report from oncoming nurse, Neema. Patient resting in bed quietly, respirations even and unlabored on 4 L via nasal cannula. Door open for visualization.

## 2019-07-03 NOTE — PROGRESS NOTES
Pt is lying in bed, resting quietly. Pt more alert today and oriented to name, place, and year. Pt appears in no acute distress at this time. Pt voices no complaints or concerns at this time. Pt is on 3L high flow nasal cannula. SBAR end of shift report given to oncoming RN.

## 2019-07-03 NOTE — PROGRESS NOTES
HOSPITALIST  NAME:  Kyra Hatch   Age:  78 y.o.  :   1940   MRN:   [id number]  PCP: Araseli Vicente MD    subj     Patient is a 69yo F with hx lung cancer recent RLL lobectomy on . She had a protracted hospitalization with hypoxia, overload, atelectasis, congestion. She completed inpatient rehab at [facility] on  and was discharged with 5L oxygen requirement to SNF. The patient left SNF AMA after one day. Today, she presents with generalized weakness, not feeling well, and shortness of breath. Has been having more difficulty getting around at home. Very sob with any movement. Had been getting up to bathroom with assistance but no longer able. CXR with effusion and atelectasis v pna, troponin elevated to 0.34, K 2.9, and     Today, on O2 at 4L (baseline 5L), BP normal, alert but look tired,  Head lice still detected, no ac events    Objective:     Visit Vitals  BP (!) 118/92   Pulse 63   Temp 98.1 °F (36.7 °C)   Resp 18   Ht 5' 10\" (1.778 m)   Wt 64.5 kg (142 lb 4.8 oz)   SpO2 95%   BMI 20.42 kg/m²      Temp (24hrs), Av.8 °F (36.6 °C), Min:97.3 °F (36.3 °C), Max:98.1 °F (36.7 °C)    Oxygen Therapy  O2 Sat (%): 95 % (19 1143)  Pulse via Oximetry: 81 beats per minute (19 1914)  O2 Device: Hi flow nasal cannula (19 1133)  O2 Flow Rate (L/min): 3 l/min (19 1133)  Physical Exam:  General:    Elderly, thin, NAD  Lungs:   Mild crackles, wound vac R chest wall. Diminished bs bibasilar, more on R  Heart:   Regular rate and rhythm,  no murmur, rub or gallop. Abdomen:   Soft, non-tender. Not distended. Bowel sounds normal.   Extremities: No cyanosis. No edema. No clubbing  Skin:     Texture, turgor normal. No rashes or lesions. Not Jaundiced  Neurologic: Alert and oriented x 3, no focal deficits     Data Review:   No results found for this or any previous visit (from the past 24 hour(s)).   Imaging /Procedures /Studies   XR CHEST SNGL V   Final Result IMPRESSION: Improvement as described above. CT CHEST WO CONT   Final Result   IMPRESSION:   1. Relatively extensive right basilar airspace opacities. Additional foci of gas   are present amongst this region some of which appears to be associated with a   suture line. No obvious obstructing endobronchial lesion is identified. 2. Mild additional patchy airspace opacities within the lingula and left lower   lobe. XR CHEST PORT   Final Result   IMPRESSION: Right pleural effusion and right lung atelectasis or pneumonia   unchanged. Assessment and Plan: Active Hospital Problems    Diagnosis Date Noted    HCAP (healthcare-associated pneumonia) 06/28/2019    Elevated troponin 06/27/2019    Elevated brain natriuretic peptide (BNP) level 06/27/2019    Chronic respiratory failure (HCC) 06/27/2019    Hypokalemia 06/27/2019    Pleural effusion, right 06/27/2019    COPD (chronic obstructive pulmonary disease) ([facility] 05/24/2019       PLAN:    Zosyn+ Vanco 5/7 days- IMPROVING  Steroids   K replaced  Pulm following  Avoid meds affecting mentation  Full code as d/w son (poa)  Palliative care consulted for FTT    Pediculosis capitis: failed 1st application of permethrin. I spoke to Dermatology who recommends additional 2 applications in 2 successive days. Dispo: rehab in 2-3 days, pt agree  I spoke to daughter in length, update about improvement of her Mom, asked for clear decision now about destination on DC so  start working on it.     Signed By: Josep Sterling MD     July 3, 2019

## 2019-07-03 NOTE — PROGRESS NOTES
Pt's daughter, Makeda Nance called to get an update. Security code obtained. Opportunity for question provided.

## 2019-07-03 NOTE — PROGRESS NOTES
Chema Santos  Admission Date: 6/26/2019             Daily Progress Note: 7/3/2019    The patient's chart is reviewed and the patient is discussed with the staff.     78 y.o.  female seen PMH atrial fibrillation, COPD, HTN, and subarachnodi hemorrhage, here for HCAP. She was diagnosed with NSCLC in May and she underwent an elective right thoracoscopy which was switched to right thoracotomy with extensive pneumonolysis,  right lower lobe lobectomy,  right upper lobe blebectomy x2,  diaphragm resection with primary repair,  chest wall resection,  mediastinal lymphadenectomy, intercostal nerve cryoablation. Post op, her wound became infected with strep for which she was treated with Vancomycin per ID. A wound vac remains. She also had problems with a fib, hypoxia, volume overload and debility. She was eventually discharged to rehab but has since been taken home by her daughter. She was getting PT at home. The patient is not responsive enough to provide a history so the chart was reviewed and her nurse interviewed. She was reportedly admitted with a 2 day history of weakness. Her echo shows an EF of 65%. She has no edema but her BNP is 421. She was not on home lasix but has been started on it here. She was sent home on oxygen. Her PFTs show mild obstruction. Her WBC is 12.3 with a PCT of 0.1. Subjective:     Patient seen resting in bed, resistant to talk or participate in care. Caregiver at bedside. Has not participated in PT.       Current Facility-Administered Medications   Medication Dose Route Frequency    pyrethrins-piperonyl butoxide (LICE TREATMENT) 1.90-1 % shampoo   Topical ONCE    vancomycin (VANCOCIN) 1250 mg in  ml infusion  1,250 mg IntraVENous Q12H    piperacillin-tazobactam (ZOSYN) 4.5 g in 0.9% sodium chloride (MBP/ADV) 100 mL  4.5 g IntraVENous Q8H    albuterol (PROVENTIL VENTOLIN) nebulizer solution 2.5 mg  2.5 mg Nebulization Q4H PRN    amiodarone (CORDARONE) tablet 200 mg  200 mg Oral DAILY    benzonatate (TESSALON) capsule 100 mg  100 mg Oral TID PRN    HYDROcodone-acetaminophen (NORCO) 5-325 mg per tablet 1 Tab  1 Tab Oral Q4H PRN    levothyroxine (SYNTHROID) tablet 75 mcg  75 mcg Oral DAILY    mirtazapine (REMERON) tablet 15 mg  15 mg Oral QHS    sodium chloride (NS) flush 5-40 mL  5-40 mL IntraVENous Q8H    sodium chloride (NS) flush 5-40 mL  5-40 mL IntraVENous PRN    ondansetron (ZOFRAN ODT) tablet 4 mg  4 mg Oral Q4H PRN    bisacodyl (DULCOLAX) tablet 5 mg  5 mg Oral DAILY PRN    enoxaparin (LOVENOX) injection 40 mg  40 mg SubCUTAneous Q24H    aspirin tablet 325 mg  325 mg Oral DAILY    nitroglycerin (NITROSTAT) tablet 0.4 mg  0.4 mg SubLINGual PRN    tamsulosin (FLOMAX) capsule 0.4 mg  0.4 mg Oral DAILY       Review of Systems  Denies fevers, chest pain, dyspnea, cough, pain    Objective:     Vitals:    07/02/19 2317 07/03/19 0328 07/03/19 0601 07/03/19 0736   BP: 148/75 144/73  141/71   Pulse: 65 63  61   Resp: 18 18  18   Temp: 97.3 °F (36.3 °C) 98 °F (36.7 °C)  97.5 °F (36.4 °C)   SpO2: 98% 96%  94%   Weight:   142 lb 4.8 oz (64.5 kg)    Height:         Intake and Output:   07/01 1901 - 07/03 0700  In: 970 [P.O.:720; I.V.:250]  Out: 750 [Urine:750]  No intake/output data recorded. Physical Exam:   Constitution:  the patient is well developed and in no acute distress  EENMT:  Sclera clear, pupils equal, oral mucosa moist  Respiratory: mild crackles on right, diminished throughout, no wheeze, O2 remains at 4L NC, sat 94-98%  Cardiovascular:  RRR without M,G,R  Gastrointestinal: soft and non-tender; with positive bowel sounds. Musculoskeletal: warm without cyanosis. There is no lower extremity edema.   Skin:  no jaundice or rashes, R wound vac to upper  chest   Neurologic: no gross neuro deficits     Psychiatric:  alert and oriented x 3, short with responses    CXR: 7/1: improved right lower infiltrate      LAB  No results for input(s): GLUCPOC in the last 72 hours. No lab exists for component: GLPOC   Recent Labs     07/01/19  1120   WBC 10.0   HGB 8.6*   HCT 28.8*        Recent Labs     07/02/19  0655 07/01/19  1120   NA  --  141   K  --  3.4*   CL  --  106   CO2  --  29   GLU  --  100   BUN  --  29*   CREA 0.75 0.79   CA  --  8.2*   ALB  --  1.8*   TBILI  --  0.4   ALT  --  8*   SGOT  --  9*     Recent Labs     07/01/19  0921   PHI 7.430   PCO2I 44.6   PO2I 60*   HCO3I 29.6*     No results for input(s): LCAD, LAC in the last 72 hours.     Assessment:  (Medical Decision Making)     Hospital Problems  Date Reviewed: 7/3/2019          Codes Class Noted POA    * (Principal) HCAP (healthcare-associated pneumonia) ICD-10-CM: J18.9  ICD-9-CM: 486  6/28/2019 Unknown    Completing antibiotics    Elevated troponin ICD-10-CM: R74.8  ICD-9-CM: 790.6  6/27/2019 Yes        Elevated brain natriuretic peptide (BNP) level ICD-10-CM: R79.89  ICD-9-CM: 790.99  6/27/2019 Yes        Chronic respiratory failure (HCC) (Chronic) ICD-10-CM: J96.10  ICD-9-CM: 518.83  6/27/2019 Yes    On 4L O2 NC    Hypokalemia ICD-10-CM: E87.6  ICD-9-CM: 276.8  6/27/2019 Yes        Pleural effusion, right ICD-10-CM: J90  ICD-9-CM: 511.9  6/27/2019 Yes    Improvement per last CXR    COPD (chronic obstructive pulmonary disease) (HCC) (Chronic) ICD-10-CM: J44.9  ICD-9-CM: 496  5/24/2019 Yes    Chronic, on nebulizer treatments          Plan:  (Medical Decision Making)       --continue Vanc, Zosyn day 6/7 (no cultures so would just complete 7 days and stop)  --Steroids discontinued  --Failure to thrive, encouraged to work with PT and complete SNF/rehab to overall improve; however, if she is going to continue to refuse therapy and skilled nursing/PT palliative care should be discussed with patient and family      More than 50% of the time documented was spent in face-to-face contact with the patient and in the care of the patient on the floor/unit where the patient is located. Saritha Mai NP    Lungs:  Clear, diminished in bases  Heart:  RRR with no Murmur/Rubs/Gallops    Additional Comments:  Initially refused to talk though nodded appropriately. She seems to be giving up with failure to thrive. I confronted her with this opinion and asked what we we could do to help her, but told her she was going to have to work with PT and eat if she wanted to get better. She told me to [facility] her alone. \" I would complete antibiotics, but strongly feel palliative care should be involved and discuss options for ongoing care with family. I will sign off, nothing else to add. Please call with questions. I have spoken with and examined the patient. I agree with the above assessment and plan as documented.     Becka David MD

## 2019-07-04 LAB
MM INDURATION POC: NORMAL 0 MM (ref 0–5)
PPD POC: NORMAL NEGATIVE

## 2019-07-04 PROCEDURE — 94760 N-INVAS EAR/PLS OXIMETRY 1: CPT

## 2019-07-04 PROCEDURE — 74011250637 HC RX REV CODE- 250/637: Performed by: FAMILY MEDICINE

## 2019-07-04 PROCEDURE — 74011250636 HC RX REV CODE- 250/636: Performed by: HOSPITALIST

## 2019-07-04 PROCEDURE — 74011250637 HC RX REV CODE- 250/637: Performed by: HOSPITALIST

## 2019-07-04 PROCEDURE — 74750000023 HC WOUND THERAPY

## 2019-07-04 PROCEDURE — 74011000258 HC RX REV CODE- 258: Performed by: HOSPITALIST

## 2019-07-04 PROCEDURE — 97530 THERAPEUTIC ACTIVITIES: CPT

## 2019-07-04 PROCEDURE — 74011250636 HC RX REV CODE- 250/636: Performed by: FAMILY MEDICINE

## 2019-07-04 PROCEDURE — 65270000029 HC RM PRIVATE

## 2019-07-04 PROCEDURE — 74011250637 HC RX REV CODE- 250/637: Performed by: NURSE PRACTITIONER

## 2019-07-04 PROCEDURE — 77010033678 HC OXYGEN DAILY

## 2019-07-04 RX ADMIN — MIRTAZAPINE 15 MG: 15 TABLET, FILM COATED ORAL at 21:45

## 2019-07-04 RX ADMIN — LEVOTHYROXINE SODIUM 75 MCG: 75 TABLET ORAL at 09:18

## 2019-07-04 RX ADMIN — PIPERACILLIN, TAZOBACTAM 4.5 G: 4; .5 INJECTION, POWDER, LYOPHILIZED, FOR SOLUTION INTRAVENOUS at 19:42

## 2019-07-04 RX ADMIN — ACETAMINOPHEN 500 MG: 500 TABLET, FILM COATED ORAL at 15:13

## 2019-07-04 RX ADMIN — TAMSULOSIN HYDROCHLORIDE 0.4 MG: 0.4 CAPSULE ORAL at 09:18

## 2019-07-04 RX ADMIN — Medication 10 ML: at 21:45

## 2019-07-04 RX ADMIN — ENOXAPARIN SODIUM 40 MG: 40 INJECTION SUBCUTANEOUS at 15:14

## 2019-07-04 RX ADMIN — Medication 10 ML: at 06:04

## 2019-07-04 RX ADMIN — PIPERACILLIN, TAZOBACTAM 4.5 G: 4; .5 INJECTION, POWDER, LYOPHILIZED, FOR SOLUTION INTRAVENOUS at 09:18

## 2019-07-04 RX ADMIN — ASPIRIN 325 MG ORAL TABLET 325 MG: 325 PILL ORAL at 09:18

## 2019-07-04 RX ADMIN — AMIODARONE HYDROCHLORIDE 200 MG: 200 TABLET ORAL at 09:18

## 2019-07-04 RX ADMIN — Medication 10 ML: at 09:19

## 2019-07-04 RX ADMIN — PIPERACILLIN, TAZOBACTAM 4.5 G: 4; .5 INJECTION, POWDER, LYOPHILIZED, FOR SOLUTION INTRAVENOUS at 01:44

## 2019-07-04 RX ADMIN — VANCOMYCIN HYDROCHLORIDE 1250 MG: 10 INJECTION, POWDER, LYOPHILIZED, FOR SOLUTION INTRAVENOUS at 01:44

## 2019-07-04 NOTE — PROGRESS NOTES
Assumed care of patient. Assessment completed and documented, see docflow. Patient resting quietly in bed. Awakens to voice, A/Ox3. NAD noted at present. wound vac in place to R upper back. Respirations even and non labored. Call light within reach. Will continue to monitor.

## 2019-07-04 NOTE — PROGRESS NOTES
Tylenol 500mg given PO for c/o 03/10 buttocks pain. Patient repositioned for comfort. Will continue to monitor.

## 2019-07-04 NOTE — PROGRESS NOTES
End of shift    PM handoff RN: [facility], RN    Pain: Patient received Tylenol 500mg PO for c/o 03/10 butt pain. Neuro: A/Ox3, with intermittent confusion. No c/o numbness or tingling    Cardio:  S1/S2  WNL for specified parameters. Resp: 3LNC. Crackles bilaterally. Symmetric chest wall excursion. GI/: Purewick. Urine is patricia tinged. Last BM [id number]     Diet: Cardiac regular with supplements. Patient with poor appetite. Will drink Ensure and eat ice cream. See I&O's for details. Ambulation:  Patient worked with PT this shift. Up in bedside recliner for several hours. Patient Max assist to get up. No falls during shift. Linen change: [id number]    Additional info: Wound vac to right upper back, CDI. To be changed Friday, per order.     EOS handoff RN: [address] [facility] [address]

## 2019-07-04 NOTE — PROGRESS NOTES
Received bedside shift report from  Selene Ramírez. Patient resting quietly in bed at this time, eyes closed, respirations even and unlabored on 3L hi flow nasal cannula. No needs stated. Bed low and locked. Bedside table, personal belongings and call light within reach.

## 2019-07-04 NOTE — PROGRESS NOTES
Bedside shift report completed with oncoming nurse, Dhaval Rodriguez. Patient resting quietly in bed at this time, eyes closed, respirations even and unlabored on 3L hi flow nasal cannula. No needs stated. Bed low and locked. Bedside table, personal belongings and call light within reach.

## 2019-07-04 NOTE — PROGRESS NOTES
HOSPITALIST  NAME:  Aleyda Duncan   Age:  78 y.o.  :   1940   MRN:   [id number]  PCP: Michael Fierro MD    subj     Patient is a 69yo F with hx lung cancer recent RLL lobectomy on . She had a protracted hospitalization with hypoxia, overload, atelectasis, congestion. She completed inpatient rehab at [facility] on  and was discharged with 5L oxygen requirement to SNF. The patient left SNF AMA after one day. Today, she presents with generalized weakness, not feeling well, and shortness of breath. Has been having more difficulty getting around at home. Very sob with any movement. Had been getting up to bathroom with assistance but no longer able. CXR with effusion and atelectasis v pna, troponin elevated to 0.34, K 2.9, and       : day  abx. STR pending. Pt without complaints. Resting this morning, confirms that she desires to go to STR prior to home. Objective:     Visit Vitals  /80 (BP 1 Location: Left arm, BP Patient Position: At rest)   Pulse 62   Temp 98 °F (36.7 °C)   Resp 18   Ht 5' 10\" (1.778 m)   Wt 66.7 kg (147 lb)   SpO2 91%   BMI 21.09 kg/m²      Temp (24hrs), Av °F (36.7 °C), Min:97.6 °F (36.4 °C), Max:98.3 °F (36.8 °C)    Oxygen Therapy  O2 Sat (%): 91 % (19)  Pulse via Oximetry: 80 beats per minute (19)  O2 Device: Hi flow nasal cannula (19)  O2 Flow Rate (L/min): 3 l/min (19)  Physical Exam:  General:    Elderly, thin, NAD  Lungs:   Mild crackles, wound vac R chest wall. Diminished bs bibasilar, more on R  Heart:   Regular rate and rhythm,  no murmur, rub or gallop. Abdomen:   Soft, non-tender. Not distended. Bowel sounds normal.   Extremities: No cyanosis. No edema. No clubbing  Skin:     Texture, turgor normal. No rashes or lesions.   Not Jaundiced  Neurologic: Alert and oriented x 3, no focal deficits     Data Review:   No results found for this or any previous visit (from the past 24 hour(s)). Imaging /Procedures /Studies   XR CHEST SNGL V   Final Result   IMPRESSION: Improvement as described above. CT CHEST WO CONT   Final Result   IMPRESSION:   1. Relatively extensive right basilar airspace opacities. Additional foci of gas   are present amongst this region some of which appears to be associated with a   suture line. No obvious obstructing endobronchial lesion is identified. 2. Mild additional patchy airspace opacities within the lingula and left lower   lobe. XR CHEST PORT   Final Result   IMPRESSION: Right pleural effusion and right lung atelectasis or pneumonia   unchanged. Assessment and Plan: Active Hospital Problems    Diagnosis Date Noted    HCAP (healthcare-associated pneumonia) 06/28/2019    Elevated troponin 06/27/2019    Elevated brain natriuretic peptide (BNP) level 06/27/2019    Chronic respiratory failure (HCC) 06/27/2019    Hypokalemia 06/27/2019    Pleural effusion, right 06/27/2019    COPD (chronic obstructive pulmonary disease) ([facility] [address] 05/24/2019       PLAN:    Zosyn+ Vanco last dose tonight  Steroids   K replaced  Pulm following  Avoid meds affecting mentation  Full code as d/w son (poa)  Palliative care following    Pediculosis capitis: failed first application permethrin, s/p repeat x2 per derm recs. Dispo: rehab in 1-2 days, pt agrees, needs to work with pt/ot. CM following.       Signed By: Hans Wong MD     July 4, 2019

## 2019-07-04 NOTE — PROGRESS NOTES
Nutrition  Follow Up:  Assessment:   Diet: Regular diet with Ensure Clear (was refusing Enlive- so changed to Ensure Clear yesterday). Food/Nutrition Patient History:  Patient is more motivated to participate in PT and spoke to RD regarding supplement. She states she does not enjoy Ensure Clear or Enlive, but would try a \"protein ice cream\". Patient would like to try Magic Cup chocolate. If aggressive care is warranted tube feeding should be considered at this time. Anthropometrics:Height: 5' 10\" (177.8 cm),  Weight: 66.7 kg (147 lb), Weight Source: Bed. Macronutrient needs:  EER:  8958-6048 kcal /day (25-30 kcal/kg listed BW of 63.1 kg-pt noted to be bed bound)  EPR:  63-79 grams protein/day (1-1.25 grams/kg listed BW)  Intake/Comparative Standards: Average intake for past 3 days/5 recorded meal(s): 6% (less that previous 5 days). This meets ~<25% of kcal and ~<25% of protein needs. Intervention:  Meals and snacks: Continue current diet. Nutrition Supplement Therapy: change to magic cup (offer chocolate)  Enteral Nutrition Therapy: Consider tube feeding if the patient continues with poor po intakes and aggressive medical care. Please consult RD if TF is warranted. Nutrition Discharge Plan: STR- would benefit from continued nutrition supplementation and/or fortified foods interventions and weight monitoring in coordination with wound healing efforts.      Samantha Garcia, [address], [address]

## 2019-07-04 NOTE — PROGRESS NOTES
Problem: Mobility Impaired (Adult and Pediatric)  Goal: *Acute Goals and Plan of Care (Insert Text)  Description  STG:  (1.)Ms. Justyn Diaz will move from supine to sit and sit to supine  with MODERATE ASSIST within 3 treatment day(s). (2.)Ms. Justyn Diaz will transfer from bed to chair and chair to bed with MAXIMAL ASSIST using the least restrictive device within 3 treatment day(s). (3.)Ms. Justyn Diaz will demonstrate fair static sitting balance for 10 minutes with CONTACT GUARD within 3 treatment day(s). LTG:  (1.)Ms. Justyn Diaz will move from supine to sit and sit to supine  in bed with MINIMAL ASSIST within 7 treatment day(s). (2.)Ms. Justyn Diaz will transfer from bed to chair and chair to bed with MINIMAL ASSIST using the least restrictive device within 7 treatment day(s). (3.)Ms. Justyn Diaz will demonstrate good static/dynamic sitting balance for 15 minutes with STAND BY ASSIST within 7 treatment day(s). (4.)Ms. Justyn Diaz will ambulate with MODERATE ASSIST for 15 feet X 2 with the least restrictive device within 7 treatment day(s). ________________________________________________________________________________________________     Outcome: Progressing Towards Goal      PHYSICAL THERAPY: Daily Note and AM 7/4/2019  INPATIENT: PT Visit Days : 5  [place] / [place] / Product Type: Medicare /       NAME/AGE/GENDER: Princess Lawton is a 78 y.o. female   PRIMARY DIAGNOSIS: Volume overload [E87.70] HCAP (healthcare-associated pneumonia)   HCAP (healthcare-associated pneumonia)         ICD-10: Treatment Diagnosis:    · Generalized Muscle Weakness (M62.81)  · Difficulty in walking, Not elsewhere classified (R26.2)   Precaution/Allergies:  Penicillins; Percocet [oxycodone-acetaminophen]; and Prednisone      ASSESSMENT:     Ms. Justyn Diaz is supine and agreeable to work with therapy. She sat up with moderate assist x 2. Worked on sitting balance.  Sitting balance is fair once she sits a minute. She stood to rolling walker and was able to take several steps to turn and sit in chair requiring max assist x 2. Sat in chair and rested a few minutes. Then performed bilateral LE ex as listed below. She is quite weak. She is a bit anxious and needs a lot of encouragement. This section established at most recent assessment   PROBLEM LIST (Impairments causing functional limitations):  1. Decreased Strength  2. Decreased ADL/Functional Activities  3. Decreased Transfer Abilities  4. Decreased Ambulation Ability/Technique  5. Decreased Balance  6. Increased Pain  7. Decreased Activity Tolerance  8. Decreased Pacing Skills  9. Increased Fatigue  10. Increased Shortness of Breath  11. Decreased [place] with Home Exercise Program   INTERVENTIONS PLANNED: (Benefits and precautions of physical therapy have been discussed with the patient.)  1. Balance Exercise  2. Bed Mobility  3. Family Education  4. Gait Training  5. Home Exercise Program (HEP)  6. Neuromuscular Re-education/Strengthening  7. Range of Motion (ROM)  8. Therapeutic Activites  9. Therapeutic Exercise/Strengthening  10. Transfer Training     TREATMENT PLAN: Frequency/Duration: 3 times a week for duration of hospital stay  Rehabilitation Potential For Stated Goals: [facility] (at time of discharge pending progress):    Placement: It is my opinion, based on this patient's performance to date, that Ms. Bereket Scanlon may benefit from intensive therapy at a [address] [place] [address] after discharge due to the functional deficits listed above that are likely to improve with skilled rehabilitation and concerns that he/she may be unsafe to be unsupervised at home due to decreased strength and balance putting pt at increased risk for falls.  .  Equipment:    None at this time              HISTORY:   History of Present Injury/Illness (Reason for Referral):  See H&P below  Patient is a 69yo F with hx lung cancer recent RLL lobectomy on 5/22.  She had a protracted hospitalization with hypoxia, overload, atelectasis, congestion. She completed inpatient rehab at [facility] on 6/12 and was discharged with 5L oxygen requirement to SNF. The patient left SNF AMA after one day. Today, she presents with generalized weakness, not feeling well, and shortness of breath. Has been having more difficulty getting around at home. Very sob with any movement. Had been getting up to bathroom with assistance but no longer able. CXR with effusion and atelectasis v pna, troponin elevated to 0.34, K 2.9, and     Past Medical History/Comorbidities:   Ms. Richard Walton  has a past medical history of Aspiration pneumonia ([facility] [address] (4/12/2019), Atrial fibrillation with rapid ventricular response ([facility] [address] (5/26/2019), Atrial fibrillation with RVR ([address] (5/31/2019), Cancer ([facility] [address] Chronic obstructive pulmonary disease ([address] Hypertension, Severe sepsis with acute organ dysfunction ([address] (4/12/2019), Subarachnoid hemorrhage ([address] Thyroid disease, and UTI (urinary tract infection) (5/29/2019).   Ms. Richard Walton  has a past surgical history that includes hx wrist fracture tx and hx other surgical.  Social History/Living Environment:   Home Environment: Private residence  # Steps to Enter: 0  One/Two Story Residence: One story  Living Alone: No  Support Systems: Child(carter)  Patient Expects to be Discharged to[de-identified] Rehabilitation facility  Current DME Used/Available at Home: Walker, rolling, Wheelchair, [address] [employer] [address] chair  Tub or Shower Type: Shower  Prior Level of Function/Work/Activity:  Amb household distances with walker 2 weeks ago   Number of Personal Factors/Comorbidities that affect the Plan of Care: 3+: HIGH COMPLEXITY   EXAMINATION:   Most Recent Physical Functioning:   Gross Assessment:                  Posture:     Balance:  Sitting - Static: Fair (occasional)  Sitting - Dynamic: Fair (occasional)  Standing: Impaired  Standing - Static: Poor  Standing - Dynamic : Poor Bed Mobility:  Supine to Sit: Moderate assistance;Assist x2  Wheelchair Mobility:     Transfers:  Sit to Stand: Moderate assistance;Assist x2  Stand to Sit: Moderate assistance;Assist x2  Gait:            Body Structures Involved:  1. Lungs  2. Bones  3. Joints  4. Muscles  5. Ligaments Body Functions Affected:  1. Sensory/Pain  2. Cardio  3. Respiratory  4. Neuromusculoskeletal  5. Movement Related Activities and Participation Affected:  1. General Tasks and Demands  2. Mobility  3. Self Care  4. Domestic Life  5. Interpersonal Interactions and Relationships  6. Community, Social and [facility]   Number of elements that affect the Plan of Care: 4+: HIGH COMPLEXITY   CLINICAL PRESENTATION:   Presentation: Evolving clinical presentation with changing clinical characteristics: MODERATE COMPLEXITY   CLINICAL DECISION MAKIN [facility] Mobility Inpatient Short Form  How much difficulty does the patient currently have. .. Unable A Lot A Little None   1. Turning over in bed (including adjusting bedclothes, sheets and blankets)? ? 1   ? 2   ? 3   ? 4   2. Sitting down on and standing up from a chair with arms ( e.g., wheelchair, bedside commode, etc.)   ? 1   ? 2   ? 3   ? 4   3. Moving from lying on back to sitting on the side of the bed?   ? 1   ? 2   ? 3   ? 4   How much help from another person does the patient currently need. .. Total A Lot A Little None   4. Moving to and from a bed to a chair (including a wheelchair)? ? 1   ? 2   ? 3   ? 4   5. Need to walk in hospital room? ? 1   ? 2   ? 3   ? 4   6. Climbing 3-5 steps with a railing? ? 1   ? 2   ? 3   ? 4   © , Trustees of [address] [place] [address] [address] [id number], under license to [employer]. All rights reserved      Score:  Initial: 8 Most Recent: X (Date: -- )    Interpretation of Tool:  Represents activities that are increasingly more difficult (i.e. Bed mobility, Transfers, Gait).     Medical Necessity:     · Patient is expected to demonstrate progress in strength, balance, coordination and functional technique  ·  to decrease assistance required with gait, transfers, and functional mobility. · .  Reason for Services/Other Comments:  · Patient continues to require skilled intervention due to decreased strength, decreased balance, decreased functional tolerance, decreased cardiopulmonary endurance affecting participation in basic ADLs and functional tasks   · . Use of outcome tool(s) and clinical judgement create a POC that gives a: Questionable prediction of patient's progress: MODERATE COMPLEXITY            TREATMENT:   (In addition to Assessment/Re-Assessment sessions the following treatments were rendered)   Pre-treatment Symptoms/Complaints:  weakness  Pain: Initial:      Post Session:  comfortable     Therapeutic Activity: (    25 minutes): Therapeutic activities including Bed transfers and sitting balance EOB, improved static sitting posture, scooting sit to stand, and trying to take steps to improve mobility, strength, balance, coordination and tolerance for functional mobility . Required minimal assist and positioning to promote static and dynamic balance in sitting and moderate assist for transfers and standing.        Date:  6/28/19 Date:  7/1/19 Date:  7/2/19   Activity/Exercise Parameters Parameters Parameters   LAQ 10 X B AA L 10x B    Ankle pumps 10 X B  20x B 20x B   Quad set 10 X B      Glute set 10 X      Heel slides 10 X B AA 10x B 10xB   Hip abduction 10 X B AA     SAQ  10x B 10x B   bridging   5x       Braces/Orthotics/Lines/Etc:   · wound vac  Treatment/Session Assessment:    · Response to Treatment:  above  · Interdisciplinary Collaboration:   o Physical Therapy Assistant  o Registered Nurse  o Rehabilitation Attendant  · After treatment position/precautions:   o Supine in bed  o Bed alarm/tab alert on  o Bed/Chair-wheels locked  o Bed in low position  o Call light within reach  o RN notified   · Compliance with Program/Exercises: Compliant most of the time  · Recommendations/Intent for next treatment session: \"Next visit will focus on advancements to more challenging activities and reduction in assistance provided\".   Total Treatment Duration:  PT Patient Time In/Time Out  Time In: 1003  Time Out: [address], PTA

## 2019-07-05 LAB
ANION GAP SERPL CALC-SCNC: 7 MMOL/L (ref 7–16)
BUN SERPL-MCNC: 13 MG/DL (ref 8–23)
CALCIUM SERPL-MCNC: 8.1 MG/DL (ref 8.3–10.4)
CHLORIDE SERPL-SCNC: 106 MMOL/L (ref 98–107)
CO2 SERPL-SCNC: 33 MMOL/L (ref 21–32)
CREAT SERPL-MCNC: 0.66 MG/DL (ref 0.6–1)
ERYTHROCYTE [DISTWIDTH] IN BLOOD BY AUTOMATED COUNT: 16.2 % (ref 11.9–14.6)
GLUCOSE SERPL-MCNC: 97 MG/DL (ref 65–100)
HCT VFR BLD AUTO: 29.3 % (ref 35.8–46.3)
HGB BLD-MCNC: 9 G/DL (ref 11.7–15.4)
MCH RBC QN AUTO: 27.6 PG (ref 26.1–32.9)
MCHC RBC AUTO-ENTMCNC: 30.7 G/DL (ref 31.4–35)
MCV RBC AUTO: 89.9 FL (ref 79.6–97.8)
NRBC # BLD: 0 K/UL (ref 0–0.2)
PLATELET # BLD AUTO: 269 K/UL (ref 150–450)
PMV BLD AUTO: 9.1 FL (ref 9.4–12.3)
POTASSIUM SERPL-SCNC: 2.6 MMOL/L (ref 3.5–5.1)
RBC # BLD AUTO: 3.26 M/UL (ref 4.05–5.2)
SODIUM SERPL-SCNC: 146 MMOL/L (ref 136–145)
WBC # BLD AUTO: 10.6 K/UL (ref 4.3–11.1)

## 2019-07-05 PROCEDURE — 94760 N-INVAS EAR/PLS OXIMETRY 1: CPT

## 2019-07-05 PROCEDURE — 74750000023 HC WOUND THERAPY

## 2019-07-05 PROCEDURE — 74011250637 HC RX REV CODE- 250/637: Performed by: FAMILY MEDICINE

## 2019-07-05 PROCEDURE — 36415 COLL VENOUS BLD VENIPUNCTURE: CPT

## 2019-07-05 PROCEDURE — 77030019934 HC DRSG VAC ASST KCON -B

## 2019-07-05 PROCEDURE — 51798 US URINE CAPACITY MEASURE: CPT

## 2019-07-05 PROCEDURE — 74011250637 HC RX REV CODE- 250/637: Performed by: NURSE PRACTITIONER

## 2019-07-05 PROCEDURE — 74011250636 HC RX REV CODE- 250/636: Performed by: INTERNAL MEDICINE

## 2019-07-05 PROCEDURE — 65270000029 HC RM PRIVATE

## 2019-07-05 PROCEDURE — 80048 BASIC METABOLIC PNL TOTAL CA: CPT

## 2019-07-05 PROCEDURE — 74011250637 HC RX REV CODE- 250/637: Performed by: HOSPITALIST

## 2019-07-05 PROCEDURE — 85027 COMPLETE CBC AUTOMATED: CPT

## 2019-07-05 PROCEDURE — 74011250636 HC RX REV CODE- 250/636: Performed by: HOSPITALIST

## 2019-07-05 PROCEDURE — 97605 NEG PRS WND THER DME<=50SQCM: CPT

## 2019-07-05 PROCEDURE — 77010033678 HC OXYGEN DAILY

## 2019-07-05 PROCEDURE — 74011000258 HC RX REV CODE- 258: Performed by: HOSPITALIST

## 2019-07-05 PROCEDURE — 99231 SBSQ HOSP IP/OBS SF/LOW 25: CPT | Performed by: NURSE PRACTITIONER

## 2019-07-05 RX ORDER — POTASSIUM CHLORIDE 20 MEQ/1
40 TABLET, EXTENDED RELEASE ORAL
Status: COMPLETED | OUTPATIENT
Start: 2019-07-05 | End: 2019-07-05

## 2019-07-05 RX ORDER — POTASSIUM CHLORIDE 14.9 MG/ML
20 INJECTION INTRAVENOUS
Status: DISCONTINUED | OUTPATIENT
Start: 2019-07-05 | End: 2019-07-05

## 2019-07-05 RX ORDER — LANOLIN ALCOHOL/MO/W.PET/CERES
400 CREAM (GRAM) TOPICAL 2 TIMES DAILY
Status: DISCONTINUED | OUTPATIENT
Start: 2019-07-05 | End: 2019-07-06 | Stop reason: HOSPADM

## 2019-07-05 RX ADMIN — HYDROCODONE BITARTRATE AND ACETAMINOPHEN 1 TABLET: 5; 325 TABLET ORAL at 03:15

## 2019-07-05 RX ADMIN — Medication 10 ML: at 09:13

## 2019-07-05 RX ADMIN — POTASSIUM CHLORIDE 40 MEQ: 20 TABLET, EXTENDED RELEASE ORAL at 15:05

## 2019-07-05 RX ADMIN — TAMSULOSIN HYDROCHLORIDE 0.4 MG: 0.4 CAPSULE ORAL at 09:12

## 2019-07-05 RX ADMIN — MIRTAZAPINE 15 MG: 15 TABLET, FILM COATED ORAL at 20:03

## 2019-07-05 RX ADMIN — POTASSIUM CHLORIDE 40 MEQ: 20 TABLET, EXTENDED RELEASE ORAL at 09:11

## 2019-07-05 RX ADMIN — HYDROCODONE BITARTRATE AND ACETAMINOPHEN 1 TABLET: 5; 325 TABLET ORAL at 20:03

## 2019-07-05 RX ADMIN — MAGNESIUM GLUCONATE 500 MG ORAL TABLET 400 MG: 500 TABLET ORAL at 15:05

## 2019-07-05 RX ADMIN — POTASSIUM CHLORIDE 20 MEQ: 200 INJECTION, SOLUTION INTRAVENOUS at 09:11

## 2019-07-05 RX ADMIN — MAGNESIUM GLUCONATE 500 MG ORAL TABLET 400 MG: 500 TABLET ORAL at 09:12

## 2019-07-05 RX ADMIN — ENOXAPARIN SODIUM 40 MG: 40 INJECTION SUBCUTANEOUS at 15:04

## 2019-07-05 RX ADMIN — Medication 10 ML: at 20:04

## 2019-07-05 RX ADMIN — ASPIRIN 325 MG ORAL TABLET 325 MG: 325 PILL ORAL at 09:11

## 2019-07-05 RX ADMIN — AMIODARONE HYDROCHLORIDE 200 MG: 200 TABLET ORAL at 09:12

## 2019-07-05 RX ADMIN — HYDROCODONE BITARTRATE AND ACETAMINOPHEN 1 TABLET: 5; 325 TABLET ORAL at 10:36

## 2019-07-05 RX ADMIN — Medication 10 ML: at 05:19

## 2019-07-05 RX ADMIN — LEVOTHYROXINE SODIUM 75 MCG: 75 TABLET ORAL at 09:11

## 2019-07-05 RX ADMIN — PIPERACILLIN, TAZOBACTAM 4.5 G: 4; .5 INJECTION, POWDER, LYOPHILIZED, FOR SOLUTION INTRAVENOUS at 03:17

## 2019-07-05 NOTE — PROGRESS NOTES
Bedside shift report completed with oncoming nurse, Gabby Taylor. Patient resting quietly in bed at this time, awake, respirations even and unlabored. Denies needs at this time. Bed low and locked. Bedside table, personal belongings and call light within reach.

## 2019-07-05 NOTE — PROGRESS NOTES
Patient not voided since this a.m. Bladder scan indicated >690cc  Dr Kaylene Miranda notified in regards, verbal received for In/Out. Received call from Bernardino Bridges, patient's daughter; verbally abusive during call, refusing to allow RN to in/out patient. Informed patient, who is not willing to use BSC. Instructed patient to use BSC d/t to irate daughter cussing at staff and primary RN.

## 2019-07-05 NOTE — PROGRESS NOTES
Patient yelling. RN called to bedside. Patient with c/o IV access burning from IV K infusing. Dr Cirilo Subramanian notified. New orders received.

## 2019-07-05 NOTE — WOUND CARE
Right chest wound vac dressing change. Medial wound is fully healed and has some epithelization nearly fully covered, used dry 2x2 and transparent film over this area, will change 3 times per week with wound vac change. Lateral/ back wound has granulation, has improved since last change tunnel at 9 o'clock is 2.8cm and undermining from 2-9 o'clock is still present however granulation is seen in this area. Will continue 3 times per week changes to this area.

## 2019-07-05 NOTE — PROGRESS NOTES
Palliative Care Progress Note    Patient: Enid Sanchez MRN: [id number]  SSN: [id number]    YOB: 1940  Age: 78 y.o. Sex: female       Assessment/Plan:     Chief Complaint/Interval History: alert, reports back pain       Principal Diagnosis:    · Pain, back  M54.9    Additional Diagnoses:   · Debility, Unspecified  R53.81  · Fatigue, Lethargy  R53.83  · Frailty  R54  · Encounter for Palliative Care  Z51.5    Palliative Performance Scale (PPS)  PPS: 50    Medical Decision Making:   Reviewed and summarized notes over last 48 hours   Discussed case with appropriate providers  Reviewed laboratory and x-ray data- CBC, BMP    Pt resting in bed, no distress noted. Daughter at bedside. Pt states she is doing okay. She reports back pain at present, and has Norco and Tylenol available PRN. CM is assisting with STR placement. Pt voiced hope that she would get better and be able to return to her daughters home soon. When asked, she states she is okay with current medical interventions to improve her health, and she would like to continue current level of care. No further PC needs- we will sign off. Thank you for allowing us to participate in Ms Kermit Devine care. Will discuss findings with members of the interdisciplinary team.         More than 50% of this 15 minute visit was spent counseling and coordination of care as outlined above. Subjective:     Review of Systems:  A comprehensive review of systems was negative except for: Constitutional: Positive for fatigue. Musculoskeletal: Positive for back pain      Objective:     Visit Vitals  /71   Pulse 66   Temp 97.6 °F (36.4 °C)   Resp 18   Ht 5' 10\" (1.778 m)   Wt 132 lb 14.4 oz (60.3 kg)   SpO2 96%   BMI 19.07 kg/m²       Physical Exam:    General:  Cooperative. Debilitated. No acute distress. Eyes:  Conjunctivae/corneas clear    Nose: Nares normal. Septum midline.    Neck: Supple, symmetrical, trachea midline   Lungs:   Decreased bilaterally, unlabored   Heart:  Regular rate and rhythm   Abdomen:   Soft, non-tender, non-distended   Extremities: Normal, atraumatic, no cyanosis or edema   Skin: Skin color, texture, turgor normal. Wound vac to right chest    Neurologic: Nonfocal   Psych: Alert and oriented     Signed By: Vida Gabriel NP     July 5, 2019

## 2019-07-05 NOTE — PROGRESS NOTES
Spoke with patient's daughter, Antonio Manzo ([phone]), by phone. Informed her that patient was declined at all three of the facilities that she had requested. Informed her that I had contacted [facility] and they do have beds available. She requested that I place a bed request with them. She will be here shortly and let me know if there are any other facilities with whom she wants me to check. Case Management will continue to follow. Care Management Interventions  PCP Verified by CM:  Yes  Transition of Care Consult (CM Consult): Discharge Planning  Discharge Durable Medical Equipment: No  Physical Therapy Consult: Yes  Occupational Therapy Consult: Yes  Speech Therapy Consult: No  Current Support Network: Lives with Spouse, Own Home  Confirm Follow Up Transport: Family  Plan discussed with Pt/Family/Caregiver: Yes  Freedom of Choice Offered: Yes  Discharge Location  Discharge Placement: Skilled nursing facility

## 2019-07-05 NOTE — PROGRESS NOTES
Spoke with Dr. Shannan Fisher regarding patient's critical potassium level of 2.6. Results read back for verification. Dr. Shannan Fisher gave verbal orders via telephone for 20mEq potassium IV x2 doses to total 40mEq potassium IV. Order read back for verification.

## 2019-07-05 NOTE — PROGRESS NOTES
HOSPITALIST  NAME:  Rico Butler   Age:  78 y.o.  :   1940   MRN:   [id number]  PCP: Shaheen Gatica MD    subj     Patient is a 69yo F with hx lung cancer recent RLL lobectomy on . She had a protracted hospitalization with hypoxia, overload, atelectasis, congestion. She completed inpatient rehab at [facility] on  and was discharged with 5L oxygen requirement to SNF. The patient left SNF AMA after one day. Today, she presents with generalized weakness, not feeling well, and shortness of breath. Has been having more difficulty getting around at home. Very sob with any movement. Had been getting up to bathroom with assistance but no longer able. CXR with effusion and atelectasis v pna, troponin elevated to 0.34, K 2.9, and     Today, off abx, no fever, minor resp spx if any    Objective:     Visit Vitals  /64 (BP 1 Location: Left arm)   Pulse 66   Temp 98 °F (36.7 °C)   Resp 18   Ht 5' 10\" (1.778 m)   Wt 60.3 kg (132 lb 14.4 oz)   SpO2 98%   BMI 19.07 kg/m²      Temp (24hrs), Av.6 °F (36.4 °C), Min:97.4 °F (36.3 °C), Max:98 °F (36.7 °C)    Oxygen Therapy  O2 Sat (%): 98 % (19 1506)  Pulse via Oximetry: 80 beats per minute (19 0727)  O2 Device: Nasal cannula (19 1116)  O2 Flow Rate (L/min): 2 l/min (19 1116)  Physical Exam:  General:    Elderly, thin, NAD  Lungs:   No  crackles, wound vac R chest wall. Diminished bs bibasilar, more on R  Heart:   Regular rate and rhythm,  no murmur, rub or gallop. Abdomen:   Soft, non-tender. Not distended. Bowel sounds normal.   Extremities: No cyanosis. No edema. No clubbing  Skin:     Texture, turgor normal. No rashes or lesions.   Not Jaundiced  Neurologic: Alert and oriented x 3, no focal deficits     Data Review:   Recent Results (from the past 24 hour(s))   CBC W/O DIFF    Collection Time: 19  5:20 AM   Result Value Ref Range    WBC 10.6 4.3 - 11.1 K/uL    RBC 3.26 (L) 4.05 - 5.2 M/uL    HGB 9.0 (L) 11.7 - 15.4 g/dL    HCT 29.3 (L) 35.8 - 46.3 %    MCV 89.9 79.6 - 97.8 FL    MCH 27.6 26.1 - 32.9 PG    MCHC 30.7 (L) 31.4 - 35.0 g/dL    RDW 16.2 (H) 11.9 - 14.6 %    PLATELET 094 241 - 578 K/uL    MPV 9.1 (L) 9.4 - 12.3 FL    ABSOLUTE NRBC 0.00 0.0 - 0.2 K/uL   METABOLIC PANEL, BASIC    Collection Time: 07/05/19  5:20 AM   Result Value Ref Range    Sodium 146 (H) 136 - 145 mmol/L    Potassium 2.6 (LL) 3.5 - 5.1 mmol/L    Chloride 106 98 - 107 mmol/L    CO2 33 (H) 21 - 32 mmol/L    Anion gap 7 7 - 16 mmol/L    Glucose 97 65 - 100 mg/dL    BUN 13 8 - 23 MG/DL    Creatinine 0.66 0.6 - 1.0 MG/DL    GFR est AA >60 >60 ml/min/1.73m2    GFR est non-AA >60 >60 ml/min/1.73m2    Calcium 8.1 (L) 8.3 - 10.4 MG/DL     Imaging /Procedures /Studies   XR CHEST SNGL V   Final Result   IMPRESSION: Improvement as described above. CT CHEST WO CONT   Final Result   IMPRESSION:   1. Relatively extensive right basilar airspace opacities. Additional foci of gas   are present amongst this region some of which appears to be associated with a   suture line. No obvious obstructing endobronchial lesion is identified. 2. Mild additional patchy airspace opacities within the lingula and left lower   lobe. XR CHEST PORT   Final Result   IMPRESSION: Right pleural effusion and right lung atelectasis or pneumonia   unchanged. Assessment and Plan:      Active Hospital Problems    Diagnosis Date Noted    HCAP (healthcare-associated pneumonia) 06/28/2019    Elevated troponin 06/27/2019    Elevated brain natriuretic peptide (BNP) level 06/27/2019    Chronic respiratory failure (HCC) 06/27/2019    Hypokalemia 06/27/2019    Pleural effusion, right 06/27/2019    COPD (chronic obstructive pulmonary disease) ([facility] [address] 05/24/2019       PLAN:    Zosyn+ Vanco last dose tonight  Steroids   K replaced  Pulm following  Avoid meds affecting mentation  Full code as d/w son (poa)  Palliative care following    Pediculosis capitis: received 3 applications of permethrin, no lice noted.     Dispo: rehab when available      Signed By: Florentino Nguyen MD     July 5, 2019

## 2019-07-05 NOTE — PROGRESS NOTES
Assumed care of patient. Assessment completed and documented, see docflow. Patient resting quietly in bed. Awakens to voice, returns to sleep. NAD noted at present. Wound vac in place to R upper back, dsg CDI. Due to be changed today, per order. Respirations even and non labored. Call light within reach. Will continue to monitor.

## 2019-07-05 NOTE — PROGRESS NOTES
Received a call from [facility] in the lab at [facility] notifying of a critical potassium result of 2.6. Results read back for verification. Paged Dr. Ej Murguia at [phone]. Awaiting call back.

## 2019-07-05 NOTE — PROGRESS NOTES
Norco 5mg given PO for c/o 08/10 right upper back pain. Family's personal sitter at bedside. Will continue to monitor.

## 2019-07-05 NOTE — WOUND CARE
Sacral wound present on admission, patient states she has had a few weeks 1.8x1.8x0.2cm with pink , slough base, allevyn in use would continue with continued frequent turning. Noted posterior heels are erythematous, blanchable, will add heel boots. Will monitor.

## 2019-07-06 VITALS
HEIGHT: 70 IN | HEART RATE: 67 BPM | OXYGEN SATURATION: 99 % | DIASTOLIC BLOOD PRESSURE: 78 MMHG | TEMPERATURE: 97.4 F | RESPIRATION RATE: 19 BRPM | SYSTOLIC BLOOD PRESSURE: 137 MMHG | WEIGHT: 136.6 LBS | BODY MASS INDEX: 19.56 KG/M2

## 2019-07-06 LAB — POTASSIUM SERPL-SCNC: 3.3 MMOL/L (ref 3.5–5.1)

## 2019-07-06 PROCEDURE — 36415 COLL VENOUS BLD VENIPUNCTURE: CPT

## 2019-07-06 PROCEDURE — 74011250637 HC RX REV CODE- 250/637: Performed by: HOSPITALIST

## 2019-07-06 PROCEDURE — 74011250637 HC RX REV CODE- 250/637: Performed by: FAMILY MEDICINE

## 2019-07-06 PROCEDURE — 74011250637 HC RX REV CODE- 250/637: Performed by: NURSE PRACTITIONER

## 2019-07-06 PROCEDURE — 84132 ASSAY OF SERUM POTASSIUM: CPT

## 2019-07-06 RX ORDER — ASPIRIN 81 MG/1
81 TABLET ORAL DAILY
Qty: 90 TAB | Refills: 0 | Status: SHIPPED
Start: 2019-07-06 | End: 2019-08-23

## 2019-07-06 RX ORDER — TAMSULOSIN HYDROCHLORIDE 0.4 MG/1
0.4 CAPSULE ORAL DAILY
Qty: 90 CAP | Refills: 0 | Status: SHIPPED | OUTPATIENT
Start: 2019-07-06 | End: 2019-08-23

## 2019-07-06 RX ORDER — LANOLIN ALCOHOL/MO/W.PET/CERES
400 CREAM (GRAM) TOPICAL 2 TIMES DAILY
Qty: 20 TAB | Refills: 0 | Status: SHIPPED
Start: 2019-07-06 | End: 2019-08-23

## 2019-07-06 RX ORDER — POTASSIUM CHLORIDE 20 MEQ/1
40 TABLET, EXTENDED RELEASE ORAL 2 TIMES DAILY
Status: DISCONTINUED | OUTPATIENT
Start: 2019-07-06 | End: 2019-07-06 | Stop reason: HOSPADM

## 2019-07-06 RX ORDER — HYDROCODONE BITARTRATE AND ACETAMINOPHEN 5; 325 MG/1; MG/1
1 TABLET ORAL
Qty: 15 TAB | Refills: 0 | Status: SHIPPED | OUTPATIENT
Start: 2019-07-06 | End: 2019-07-20

## 2019-07-06 RX ADMIN — POTASSIUM CHLORIDE 40 MEQ: 20 TABLET, EXTENDED RELEASE ORAL at 09:34

## 2019-07-06 RX ADMIN — LEVOTHYROXINE SODIUM 75 MCG: 75 TABLET ORAL at 09:35

## 2019-07-06 RX ADMIN — ASPIRIN 325 MG ORAL TABLET 325 MG: 325 PILL ORAL at 09:34

## 2019-07-06 RX ADMIN — TAMSULOSIN HYDROCHLORIDE 0.4 MG: 0.4 CAPSULE ORAL at 09:34

## 2019-07-06 RX ADMIN — MAGNESIUM GLUCONATE 500 MG ORAL TABLET 400 MG: 500 TABLET ORAL at 09:34

## 2019-07-06 RX ADMIN — AMIODARONE HYDROCHLORIDE 200 MG: 200 TABLET ORAL at 09:34

## 2019-07-06 RX ADMIN — Medication 5 ML: at 05:10

## 2019-07-06 RX ADMIN — HYDROCODONE BITARTRATE AND ACETAMINOPHEN 1 TABLET: 5; 325 TABLET ORAL at 03:36

## 2019-07-06 NOTE — PROGRESS NOTES
Patient up to bedside commode with 2 person assist, requiring encouragement. Voided 200, dark yellow, cloudy and foul smelling urine. Took 100 cc water after much encouragement. Resting quietly in bed, fall precautions in place. Will monitor.

## 2019-07-06 NOTE — PROGRESS NOTES
Patient requested pain medication for right upper back pain. Norco 1 tab given per prn order. Repositioned, resting quietly in bed, will monitor.

## 2019-07-06 NOTE — PROGRESS NOTES
Patient c/o pain to her right upper back. Repositioned, wound vac intact. Norco 1 tab given for pain per prn order. Patient asking questions concerning discharge. Fall precautions in place, will monitor.

## 2019-07-06 NOTE — PROGRESS NOTES
Problem: Falls - Risk of  Goal: *Absence of Falls  Description  Document Marta Beth Fall Risk and appropriate interventions in the flowsheet. 7/6/2019 1142 by Pradip Frank RN  Outcome: Resolved/Met  7/6/2019 0804 by Pradip Frank RN  Outcome: Progressing Towards Goal     Problem: Patient Education: Go to Patient Education Activity  Goal: Patient/Family Education  7/6/2019 1142 by Pradip Frank RN  Outcome: Resolved/Met  7/6/2019 0804 by Pradip Frank RN  Outcome: Progressing Towards Goal     Problem: Patient Education: Go to Patient Education Activity  Goal: Patient/Family Education  7/6/2019 1142 by Pradip Frank RN  Outcome: Resolved/Met  7/6/2019 0804 by Pradip Frank RN  Outcome: Progressing Towards Goal     Problem: Nutrition Deficit  Goal: *Optimize nutritional status  7/6/2019 1142 by Pradip Frank RN  Outcome: Resolved/Met  7/6/2019 0804 by Pradip Frank RN  Outcome: Progressing Towards Goal     Problem: Risk for Spread of Infection  Goal: Prevent transmission of infectious organism to others  Description  Prevent the transmission of infectious organisms to other patients, staff members, and visitors. 7/6/2019 1142 by Pradip Frank RN  Outcome: Resolved/Met  7/6/2019 0804 by Pradip Frank RN  Outcome: Progressing Towards Goal     Problem: Patient Education:  Go to Education Activity  Goal: Patient/Family Education  7/6/2019 1142 by Pradip Frank RN  Outcome: Resolved/Met  7/6/2019 0804 by Pradip Frank RN  Outcome: Progressing Towards Goal     Problem: Pressure Injury - Risk of  Goal: *Prevention of pressure injury  Description  Document Nir Scale and appropriate interventions in the flowsheet.   7/6/2019 1142 by Pradip Frank RN  Outcome: Resolved/Met  7/6/2019 0804 by Pradip Frank RN  Outcome: Progressing Towards Goal     Problem: Patient Education: Go to Patient Education Activity  Goal: Patient/Family Education  7/6/2019 1142 by Juani Devine RN  Outcome: Resolved/Met  7/6/2019 0804 by Juani Devine RN  Outcome: Progressing Towards Goal

## 2019-07-06 NOTE — PROGRESS NOTES
Patient resting quietly at this time, no s/s of discomfort. Eyes closed, respirations easy and unlabored. Fall precautions in place.

## 2019-07-06 NOTE — PROGRESS NOTES
Pt is for discharge to [facility] today. Made contact with daughter Lula Calle) and informed her of d/c today and daughter Lula Calle) was agreeable. Patient will be transported by [facility]. Patient daughter has been informed to bring patient wound vac from home to [facility] and daughter Lula Calle) was agreeable. Patient has met all treatment goals / milestones. CM will continue to monitor. Care Management Interventions  PCP Verified by CM:  Yes  Mode of Transport at Discharge: BLS(Patient being transported by [facility] )  Transition of Care Consult (CM Consult): Discharge Planning, SNF(Patient has been accepted at [facility])  Discharge Durable Medical Equipment: No  Physical Therapy Consult: Yes  Occupational Therapy Consult: Yes  Speech Therapy Consult: No  Current Support Network: Lives with Spouse, Own Home  Confirm Follow Up Transport: Other (see comment)([facility] )  Plan discussed with Pt/Family/Caregiver: Yes  Freedom of Choice Offered: Yes  [address] Provided?: No  Discharge Location  Discharge Placement: Skilled nursing facility(Patient has been accepted at [facility])

## 2019-07-06 NOTE — PROGRESS NOTES
Called supervisor at [facility], informed that patient's daughter has portable wound vac. Daughter will bring vac to facility.

## 2019-07-06 NOTE — DISCHARGE SUMMARY
Physician Discharge Summary     Patient ID:  Valentin Gamino  [id number]  61 y.o.  1940    Admit date: 6/26/2019    Discharge date: 7-6-2019    Diagnosis:  1- Right side health care acquired pneumonia, treated with Vancomycin and Zosyn IV and steroids. Seen by Pulmonology. Improved. 2- COPD, stable. 3- Non small cell carcinoma, had recent resection of left lower lobe on May 22, 2019, follow with Surgery as set up. Seen by palliative care and patient with daughter wanted rehab.  4- Hypertension, controlled  5- Hx of atrial fibrillation, on aspirin  6- Chronic hypoxic respiratory failure due to #2/3, on 4-5L oxygen at all times. Judithe Josephine 7- Urinary retention, started on Flomax, watch urine output. CT Chest:  There is extensive airspace opacity within the right lower lung zone  posteriorly with several air bronchograms. There are foci of gas which are not  associated with bronchi also within this region presumably postsurgical. This  includes gas close to what appears to be a staple line on images 24 through 29. Additional mild patchy airspace opacities are present within the left lower lobe  and lingular. There is a probable small right pleural effusion. Numerous  low-density lesions within the liver are once again noted. No definite  adenopathy is appreciated on this noncontrast study. Biapical pleural  parenchymal opacities are unchanged in addition to emphysema. IMPRESSION:  1. Relatively extensive right basilar airspace opacities. Additional foci of gas  are present amongst this region some of which appears to be associated with a  suture line. No obvious obstructing endobronchial lesion is identified. 2. Mild additional patchy airspace opacities within the lingula and left lower  lobe. Hospital course:   78 y.o.  female seen and evaluated at the request of Dr. Consuelo Rdz.   She was diagnosed with NSCLC in May and she underwent an elective right thoracoscopy which was switched to right thoracotomy with extensive pneumonolysis,  right lower lobe lobectomy,  right upper lobe blebectomy x2,  diaphragm resection with primary repair,  chest wall resection,  mediastinal lymphadenectomy, intercostal nerve cryoablation. Post op, her wound became infected with strept for which she was treated with Vancomycin per ID. A wound vac remains. She also had problems with a fib, hypoxia, volume overload and debility. She was eventually discharged to rehab but has since been taken home by her daughter. She was getting PT at home. The patient is not responsive enough to provide a history so the chart was reviewed and her nurse interviewed. She was reportedly admitted with a 2 day history of weakness. Her echo shows an EF of 65%. She has no edema but her BNP is 421. She was not on home lasix but has been started on it here. She was sent home on oxygen. Her PFTs show mild obstruction. Her WBC is 12.3 with a PCT of 0.1. Patient admitted and treated as above. On day of discharge, patient exam showed diminished breath sounds bibasilar with no rales or wheezing. PCP: Cholo Salinas MD    Patient Instructions:   Current Discharge Medication List      START taking these medications    Details   magnesium oxide (MAG-OX) 400 mg tablet Take 1 Tab by mouth two (2) times a day. Qty: 20 Tab, Refills: 0      tamsulosin (FLOMAX) 0.4 mg capsule    Aspirin 81 mg po daily Take 1 Cap by mouth daily. Qty: 90 Cap, Refills: 0         CONTINUE these medications which have CHANGED    Details   HYDROcodone-acetaminophen (NORCO) 5-325 mg per tablet Take 1 Tab by mouth every eight (8) hours as needed (for pain) for up to 14 days. Max Daily Amount: 3 Tabs. Qty: 15 Tab, Refills: 0    Associated Diagnoses: History of thoracotomy         CONTINUE these medications which have NOT CHANGED    Details   amiodarone (CORDARONE) 200 mg tablet Take 1 Tab by mouth daily.  Indications: Prevention of Recurrent Atrial Fibrillation  Qty: 30 Tab, Refills: 1      levothyroxine (SYNTHROID) 75 mcg tablet Take 1 Tab by mouth daily. Qty: 30 Tab, Refills: 1      tiotropium (SPIRIVA) 18 mcg inhalation capsule Take 1 Cap by inhalation daily. Qty: 30 Cap, Refills: 1      gabapentin (NEURONTIN) 300 mg capsule Take 1 Cap by mouth three (3) times daily. Qty: 90 Cap, Refills: 0      albuterol (PROVENTIL HFA, VENTOLIN HFA, PROAIR HFA) 90 mcg/actuation inhaler Take 1 Puff by inhalation every six (6) hours as needed for Wheezing or Shortness of Breath. Qty: 1 Inhaler, Refills: 0      cholecalciferol (VITAMIN D3) 1,000 unit tablet Take 1,000 Units by mouth daily. atorvastatin (LIPITOR) 10 mg tablet Take 1 Tab by mouth daily. Qty: 30 Tab, Refills: 1      benzonatate (TESSALON) 100 mg capsule Take 1 Cap by mouth three (3) times daily as needed for Cough. Qty: 90 Cap, Refills: 1    Associated Diagnoses: Malignant neoplasm of lower lobe of right lung ([address]         STOP taking these medications       pantoprazole (PROTONIX) 40 mg tablet Comments:   Reason for Stopping:         mirtazapine (REMERON) 15 mg tablet Comments:   Reason for Stopping:         senna-docusate (PERICOLACE) 8.6-50 mg per tablet Comments:   Reason for Stopping:         magnesium hydroxide (MILK OF MAGNESIA) 400 mg/5 mL suspension Comments:   Reason for Stopping:         ascorbic acid, vitamin C, (VITAMIN C) 500 mg tablet Comments:   Reason for Stopping:         calc-D3-magnes-O4-Je-Jz-jake 250 mg-400 unit -40 mg-5 mg tab Comments:   Reason for Stopping:         VITAMIN B COMPLEX PO Comments:   Reason for Stopping:         therapeutic multivitamin (THERAGRAN) tablet Comments:   Reason for Stopping:               Instructions:     Rehab MD in 1-3 days, check BMP, Mg and CBC in 1 week, diet and activity as tolerated, PT recommendations, watch urine output. Surgery follow up. Surgical wound care per nursing instructions. Time 35 min  Please send copy to primary physician.     Signed:  Chip Rios MD Elizabet  7/6/2019  9:30 AM

## 2019-07-06 NOTE — PROGRESS NOTES
Patient going to [facility], room 11. Called report [phone]. Spoke with Hyun Brown RN.

## 2019-07-06 NOTE — PROGRESS NOTES
Patient resting quietly in bed, eyes closed, respirations easy and unlabored. Continues on 2.5 liters oxygen. No s/s of distress. Daughter called this evening requesting that staff not use any urinary catheters or purewick, wants mother to use bedside commode only. Will continue to monitor.

## 2019-07-06 NOTE — PROGRESS NOTES
Discharge instructions/prescriptions given in packet to Darryl Alberto. Patient A/Ox2 with intermittent confusion. Patient denies pain or shortness of breath. IV access removed, catheter intact. Wound vac removed. Wound cannister, CDI at patient's side with 350cc in cannister. Patient discharged via stretcher to [facility].

## 2019-07-08 ENCOUNTER — PATIENT OUTREACH (OUTPATIENT)
Dept: CASE MANAGEMENT | Age: 79
End: 2019-07-08

## 2019-07-08 NOTE — PROGRESS NOTES
Patient identified as High Risk for Readmission  RRAT 26:  Admitted 6/26-7/6/2019 for Volume Overload, Pneumonia.     Patient discharged to [facility] for [address] following long LOS  [address] during admission  · Non small cell carcinoma - resection of left lower lobe 5/2019  · Chronic hypoxic respiratory failure - oxygen dependent  · Urinary retention    Plan - route chart to SNF Coordinator - Nury Felipe - for assignment to [facility]

## 2019-07-25 ENCOUNTER — PATIENT OUTREACH (OUTPATIENT)
Dept: CASE MANAGEMENT | Age: 79
End: 2019-07-25

## 2019-07-25 NOTE — PROGRESS NOTES
Community Care Team documentation for patient in [facility]    The information below provided by:[facility]    PT Update: Standing Balance @ P+ Static: Requires handheld support and Mod A to maintain position for 2-3  minutes. Bed Mobility Supine<> Sit @ Min A. Ambulation @ 30 feet on level surfaces requiring Mod A with FWW  for safety. Transfer @ Min A. LB Dressing @ FWW with balance grade of F- dynamic (able to maintain static balance with UE support, min assist to reach ipsilateral side and unable to weight shift) requiring Min A. Clothing Mgt @ utilizing grab bars while using toilet/commode requiring Mod A;  Transfer Toilet @ Mod A; Ashlinn@[employer] accuracy with mod verbal cuing  for safety. Orientation @ mental awareness of person and time (x2) with 50% accuracy. Sequencing  @ verbal 3 to 4 sequences of simple daily living tasks given mod verbal instruction/cues and displays severe impairment level. Nursing Update:d/c 7/26- [facility]. Medically stable.  Daughter is a wound care RN    Discharge Date:7/26/19      Assign to 2012  [place]

## 2019-07-26 ENCOUNTER — PATIENT OUTREACH (OUTPATIENT)
Dept: CASE MANAGEMENT | Age: 79
End: 2019-07-26

## 2019-07-26 NOTE — PROGRESS NOTES
Patient scheduled to be discharged from [facility] today with [facility] [place]  Patient's daughter is a wound care nurse  PLAN:  Will outreach to patient next week for MARIELA   This note will not be viewable in [address]

## 2019-07-29 ENCOUNTER — PATIENT OUTREACH (OUTPATIENT)
Dept: CASE MANAGEMENT | Age: 79
End: 2019-07-29

## 2019-07-29 NOTE — PROGRESS NOTES
Initial outreach for Transitions of Care - no answer, left message requesting return call. Subsequent call to [facility] - they will resume care today. RN Case Manager is Craig Cardona - requested she call after her assessment of patient. Plan - follow up tomorrow pending return call.

## 2019-07-31 ENCOUNTER — PATIENT OUTREACH (OUTPATIENT)
Dept: CASE MANAGEMENT | Age: 79
End: 2019-07-31

## 2019-07-31 RX ORDER — LOSARTAN POTASSIUM 50 MG/1
25 TABLET ORAL DAILY
COMMUNITY
End: 2019-08-23 | Stop reason: ALTCHOICE

## 2019-07-31 RX ORDER — ZOLPIDEM TARTRATE 5 MG/1
5 TABLET ORAL
COMMUNITY
End: 2019-10-25

## 2019-07-31 NOTE — PROGRESS NOTES
Transitions of Care Call completed subsequent to discharge from [facility]  Spoke with Daughter - Letitia Molina    Hospitalized 6/26-7/6:  HCAP - discharged to [facility]  Hospitalized 5/31-6/11:  RLL lung resection - discharged to 9th [address]   From 9th floor patient discharged to [facility]    (PCP is Estuardo Armas at [facility])  Medications Reviewed; since PCP Hospital Follow Up Visit, meds are now:  · Zolpidem  · Amiodarone  · Losartan  · Atorvastatin  · Spiriva  · Colace prn    At baseline patient independent of all ADLs, but daughter is now assisting with everything. Patient living with daughter.   · Inter[facility] in place  · Private care-giving being explored by daughter  · Daughter agreeable to support by this RN [facility]

## 2019-08-12 ENCOUNTER — PATIENT OUTREACH (OUTPATIENT)
Dept: CASE MANAGEMENT | Age: 79
End: 2019-08-12

## 2019-08-12 NOTE — PROGRESS NOTES
Follow up Transitions of Care Call - Unable to Reach  Left a message requesting a return call    Plan - make another attempt in 2 days.

## 2019-08-14 ENCOUNTER — PATIENT OUTREACH (OUTPATIENT)
Dept: CASE MANAGEMENT | Age: 79
End: 2019-08-14

## 2019-08-14 NOTE — PROGRESS NOTES
Outreach for High Risk/Transitions of Care/Patient Relations  Unable to reach.     Per Chart Review  Patient has support via Oncology/[facility]  Appointment with Dr. Zunilda Mathias re-scheduled from yesterday to 8/27  Appointment with Cardiology scheduled for 8/23    Episode resolved at this time  Removed self from Care Team

## 2019-08-26 ENCOUNTER — HOSPITAL ENCOUNTER (OUTPATIENT)
Dept: LAB | Age: 79
Discharge: HOME | End: 2019-08-26
Payer: MEDICARE

## 2019-08-26 DIAGNOSIS — D50.0 IRON DEFICIENCY ANEMIA DUE TO CHRONIC BLOOD LOSS: ICD-10-CM

## 2019-08-26 LAB
ANION GAP SERPL CALC-SCNC: 6 MMOL/L (ref 7–16)
BASOPHILS # BLD: 0 K/UL (ref 0–0.2)
BASOPHILS NFR BLD: 1 % (ref 0–2)
BUN SERPL-MCNC: 20 MG/DL (ref 8–23)
CALCIUM SERPL-MCNC: 8.9 MG/DL (ref 8.3–10.4)
CHLORIDE SERPL-SCNC: 106 MMOL/L (ref 98–107)
CO2 SERPL-SCNC: 30 MMOL/L (ref 21–32)
CREAT SERPL-MCNC: 0.7 MG/DL (ref 0.6–1)
DIFFERENTIAL METHOD BLD: ABNORMAL
EOSINOPHIL # BLD: 0.1 K/UL (ref 0–0.8)
EOSINOPHIL NFR BLD: 2 % (ref 0.5–7.8)
ERYTHROCYTE [DISTWIDTH] IN BLOOD BY AUTOMATED COUNT: 16.3 % (ref 11.9–14.6)
GLUCOSE SERPL-MCNC: 99 MG/DL (ref 65–100)
HCT VFR BLD AUTO: 35.5 % (ref 35.8–46.3)
HGB BLD-MCNC: 10.8 G/DL (ref 11.7–15.4)
IMM GRANULOCYTES # BLD AUTO: 0 K/UL (ref 0–0.5)
IMM GRANULOCYTES NFR BLD AUTO: 0 % (ref 0–5)
LYMPHOCYTES # BLD: 1.2 K/UL (ref 0.5–4.6)
LYMPHOCYTES NFR BLD: 19 % (ref 13–44)
MCH RBC QN AUTO: 29.2 PG (ref 26.1–32.9)
MCHC RBC AUTO-ENTMCNC: 30.4 G/DL (ref 31.4–35)
MCV RBC AUTO: 95.9 FL (ref 79.6–97.8)
MONOCYTES # BLD: 0.6 K/UL (ref 0.1–1.3)
MONOCYTES NFR BLD: 9 % (ref 4–12)
NEUTS SEG # BLD: 4.5 K/UL (ref 1.7–8.2)
NEUTS SEG NFR BLD: 70 % (ref 43–78)
NRBC # BLD: 0 K/UL (ref 0–0.2)
PLATELET # BLD AUTO: 308 K/UL (ref 150–450)
PMV BLD AUTO: 9.6 FL (ref 9.4–12.3)
POTASSIUM SERPL-SCNC: 4.1 MMOL/L (ref 3.5–5.1)
RBC # BLD AUTO: 3.7 M/UL (ref 4.05–5.2)
SODIUM SERPL-SCNC: 142 MMOL/L (ref 136–145)
WBC # BLD AUTO: 6.5 K/UL (ref 4.3–11.1)

## 2019-08-26 PROCEDURE — 36415 COLL VENOUS BLD VENIPUNCTURE: CPT

## 2019-08-26 PROCEDURE — 85025 COMPLETE CBC W/AUTO DIFF WBC: CPT

## 2019-08-26 PROCEDURE — 80048 BASIC METABOLIC PNL TOTAL CA: CPT

## 2019-09-10 ENCOUNTER — HOSPITAL ENCOUNTER (OUTPATIENT)
Dept: MRI IMAGING | Age: 79
Discharge: HOME | End: 2019-09-10
Attending: INTERNAL MEDICINE

## 2019-09-10 DIAGNOSIS — I69.392 CVA, OLD, FACIAL WEAKNESS: ICD-10-CM

## 2019-09-21 ENCOUNTER — HOSPITAL ENCOUNTER (EMERGENCY)
Age: 79
Discharge: HOME | End: 2019-09-21
Attending: STUDENT IN AN ORGANIZED HEALTH CARE EDUCATION/TRAINING PROGRAM
Payer: MEDICARE

## 2019-09-21 ENCOUNTER — APPOINTMENT (OUTPATIENT)
Dept: GENERAL RADIOLOGY | Age: 79
End: 2019-09-21
Attending: STUDENT IN AN ORGANIZED HEALTH CARE EDUCATION/TRAINING PROGRAM
Payer: MEDICARE

## 2019-09-21 ENCOUNTER — APPOINTMENT (OUTPATIENT)
Dept: ULTRASOUND IMAGING | Age: 79
End: 2019-09-21
Attending: STUDENT IN AN ORGANIZED HEALTH CARE EDUCATION/TRAINING PROGRAM
Payer: MEDICARE

## 2019-09-21 VITALS
RESPIRATION RATE: 18 BRPM | HEIGHT: 70 IN | WEIGHT: 121 LBS | BODY MASS INDEX: 17.32 KG/M2 | OXYGEN SATURATION: 95 % | HEART RATE: 72 BPM | SYSTOLIC BLOOD PRESSURE: 152 MMHG | DIASTOLIC BLOOD PRESSURE: 83 MMHG | TEMPERATURE: 98.7 F

## 2019-09-21 DIAGNOSIS — R16.0 LIVER MASSES: Primary | ICD-10-CM

## 2019-09-21 LAB
ALBUMIN SERPL-MCNC: 3.1 G/DL (ref 3.2–4.6)
ALBUMIN/GLOB SERPL: 0.7 {RATIO} (ref 1.2–3.5)
ALP SERPL-CCNC: 241 U/L (ref 50–136)
ALT SERPL-CCNC: 28 U/L (ref 12–65)
ANION GAP SERPL CALC-SCNC: 7 MMOL/L (ref 7–16)
AST SERPL-CCNC: 56 U/L (ref 15–37)
BASOPHILS # BLD: 0.1 K/UL (ref 0–0.2)
BASOPHILS NFR BLD: 1 % (ref 0–2)
BILIRUB SERPL-MCNC: 0.7 MG/DL (ref 0.2–1.1)
BUN SERPL-MCNC: 14 MG/DL (ref 8–23)
CALCIUM SERPL-MCNC: 8.7 MG/DL (ref 8.3–10.4)
CHLORIDE SERPL-SCNC: 105 MMOL/L (ref 98–107)
CO2 SERPL-SCNC: 29 MMOL/L (ref 21–32)
CREAT SERPL-MCNC: 0.87 MG/DL (ref 0.6–1)
DIFFERENTIAL METHOD BLD: ABNORMAL
EOSINOPHIL # BLD: 0.1 K/UL (ref 0–0.8)
EOSINOPHIL NFR BLD: 1 % (ref 0.5–7.8)
ERYTHROCYTE [DISTWIDTH] IN BLOOD BY AUTOMATED COUNT: 13.4 % (ref 11.9–14.6)
GLOBULIN SER CALC-MCNC: 4.5 G/DL (ref 2.3–3.5)
GLUCOSE SERPL-MCNC: 132 MG/DL (ref 65–100)
HCT VFR BLD AUTO: 32.7 % (ref 35.8–46.3)
HGB BLD-MCNC: 11.1 G/DL (ref 11.7–15.4)
IMM GRANULOCYTES # BLD AUTO: 0 K/UL (ref 0–0.5)
IMM GRANULOCYTES NFR BLD AUTO: 0 % (ref 0–5)
LIPASE SERPL-CCNC: 173 U/L (ref 73–393)
LYMPHOCYTES # BLD: 1.5 K/UL (ref 0.5–4.6)
LYMPHOCYTES NFR BLD: 20 % (ref 13–44)
MCH RBC QN AUTO: 29.4 PG (ref 26.1–32.9)
MCHC RBC AUTO-ENTMCNC: 33.9 G/DL (ref 31.4–35)
MCV RBC AUTO: 86.7 FL (ref 79.6–97.8)
MONOCYTES # BLD: 0.7 K/UL (ref 0.1–1.3)
MONOCYTES NFR BLD: 10 % (ref 4–12)
NEUTS SEG # BLD: 4.8 K/UL (ref 1.7–8.2)
NEUTS SEG NFR BLD: 68 % (ref 43–78)
NRBC # BLD: 0 K/UL (ref 0–0.2)
PLATELET # BLD AUTO: 261 K/UL (ref 150–450)
PMV BLD AUTO: 9.7 FL (ref 9.4–12.3)
POTASSIUM SERPL-SCNC: 3.3 MMOL/L (ref 3.5–5.1)
PROT SERPL-MCNC: 7.6 G/DL (ref 6.3–8.2)
RBC # BLD AUTO: 3.77 M/UL (ref 4.05–5.2)
SODIUM SERPL-SCNC: 141 MMOL/L (ref 136–145)
WBC # BLD AUTO: 7.1 K/UL (ref 4.3–11.1)

## 2019-09-21 PROCEDURE — 76705 ECHO EXAM OF ABDOMEN: CPT

## 2019-09-21 PROCEDURE — 71046 X-RAY EXAM CHEST 2 VIEWS: CPT

## 2019-09-21 PROCEDURE — 99284 EMERGENCY DEPT VISIT MOD MDM: CPT | Performed by: STUDENT IN AN ORGANIZED HEALTH CARE EDUCATION/TRAINING PROGRAM

## 2019-09-21 PROCEDURE — 80053 COMPREHEN METABOLIC PANEL: CPT

## 2019-09-21 PROCEDURE — 74018 RADEX ABDOMEN 1 VIEW: CPT

## 2019-09-21 PROCEDURE — 85025 COMPLETE CBC W/AUTO DIFF WBC: CPT

## 2019-09-21 PROCEDURE — 83690 ASSAY OF LIPASE: CPT

## 2019-09-21 RX ORDER — TRAMADOL HYDROCHLORIDE 50 MG/1
50 TABLET ORAL
Qty: 15 TAB | Refills: 0 | Status: SHIPPED | OUTPATIENT
Start: 2019-09-21 | End: 2019-09-25 | Stop reason: ALTCHOICE

## 2019-09-21 RX ORDER — ONDANSETRON 4 MG/1
4 TABLET, ORALLY DISINTEGRATING ORAL
Qty: 8 TAB | Refills: 2 | Status: SHIPPED | OUTPATIENT
Start: 2019-09-21 | End: 2019-09-25 | Stop reason: ALTCHOICE

## 2019-09-21 NOTE — ED NOTES
Patient daughter at bedside. Patient daughter inquires as to when MD is going to see patient. Informed daughter that patient is waiting for MD to assign to patient. Patient states she has to go to the bathroom. Hands on assisted patient to transfer to  and to the bathroom. Patient unable to urinate at this time. Patient states she thought she had to go but does not have the urge to go now. Assisted patient back to bed. Daughter again asked is patient has been assigned an MD yet. I told patient I would have to login to computer and recheck that I was unable to while assisting patient to the bathroom due to focusing on patient care at that time. Notified charge nurse.

## 2019-09-21 NOTE — ED TRIAGE NOTES
Pt states she has had intermittent right flank pain for the last week. Denies hematuria or burning with urination.

## 2019-09-22 NOTE — ED NOTES
I have reviewed discharge instructions with the patient. The patient verbalized understanding. Patient left ED via Discharge Method: ambulatory to Home with family    Opportunity for questions and clarification provided. Patient given 2 scripts. Pt in no acute distress at time of d/c        To continue your aftercare when you leave the hospital, you may receive an automated call from our care team to check in on how you are doing. This is a free service and part of our promise to provide the best care and service to meet your aftercare needs.  If you have questions, or wish to unsubscribe from this service please call [phone]. Thank you for Choosing our [employer] Emergency Department.

## 2019-09-22 NOTE — ED PROVIDER NOTES
70-year-old female patient presents to the emergency department with reports of intermittent, right upper quadrant abdominal pain. Symptoms have been present for approximately 1 week. She describes intermittent sharp stabbing pain in the right upper quadrant that does not radiate. Denies any obvious alleviating or exacerbating factors. No associated nausea, vomiting, fever or chills. Denies chest pain or shortness of breath. She denies falls or trauma to the area. She does report a history of lung cancer requiring lobectomy earlier this year. She continues to have normal urinary habits but does endorse some constipation intermittently. The history is provided by the patient and a relative. No  was used. Flank Pain    This is a new problem. The current episode started more than 2 days ago. The problem has not changed since onset. The problem occurs constantly. Patient reports not work related injury. The pain is associated with no known injury. The quality of the pain is described as stabbing. The pain does not radiate. The pain is moderate. Associated symptoms include abdominal pain. Pertinent negatives include no chest pain, no fever, no numbness, no weight loss, no headaches, no abdominal swelling, no bowel incontinence, no perianal numbness, no bladder incontinence, no dysuria, no pelvic pain, no leg pain, no paresthesias, no paresis, no tingling and no weakness. She has tried nothing for the symptoms.         Past Medical History:   Diagnosis Date    Aspiration pneumonia ([address] 4/12/2019    Atrial fibrillation with rapid ventricular response ([address] 5/26/2019    Atrial fibrillation with RVR ([address] 5/31/2019    Cancer (HCC)     Lung cancer    Chronic obstructive pulmonary disease ([address]     Hypertension     Severe sepsis with acute organ dysfunction ([address] 4/12/2019    Subarachnoid hemorrhage ([address]     Thyroid disease     UTI (urinary tract infection) 5/29/2019       Past Surgical History:   Procedure Laterality Date    HX OTHER SURGICAL      coil in brain    HX WRIST FRACTURE TX      bilat wrist         History reviewed. No pertinent family history. Social History     Socioeconomic History    Marital status: SINGLE     Spouse name: Not on file    Number of children: Not on file    Years of education: Not on file    Highest education level: Not on file   Occupational History    Not on file   Social Needs    Financial resource strain: Not on file    Food insecurity:     Worry: Not on file     Inability: Not on file    Transportation needs:     Medical: Not on file     Non-medical: Not on file   Tobacco Use    Smoking status: Former Smoker     Packs/day: 1.50     Years: 45.00     Pack years: 67.50     Types: Cigarettes     Last attempt to quit: 2004     Years since quitting: 15.7    Smokeless tobacco: Never Used   Substance and Sexual Activity    Alcohol use: Not Currently    Drug use: Never    Sexual activity: Not on file   Lifestyle    Physical activity:     Days per week: Not on file     Minutes per session: Not on file    Stress: Not on file   Relationships    Social connections:     Talks on phone: Not on file     Gets together: Not on file     Attends Jew service: Not on file     Active member of club or organization: Not on file     Attends meetings of clubs or organizations: Not on file     Relationship status: Not on file    Intimate partner violence:     Fear of current or ex partner: Not on file     Emotionally abused: Not on file     Physically abused: Not on file     Forced sexual activity: Not on file   Other Topics Concern    Not on file   Social History Narrative    Not on file         ALLERGIES: Penicillins; Percocet [oxycodone-acetaminophen]; and Prednisone    Review of Systems   Constitutional: Negative for chills, diaphoresis, fever and weight loss. HENT: Negative for congestion, sneezing and sore throat.     Eyes: Negative for visual disturbance. Respiratory: Negative for cough, chest tightness, shortness of breath and wheezing. Cardiovascular: Negative for chest pain and leg swelling. Gastrointestinal: Positive for abdominal pain. Negative for blood in stool, bowel incontinence, diarrhea, nausea and vomiting. Endocrine: Negative for polyuria. Genitourinary: Positive for flank pain. Negative for bladder incontinence, difficulty urinating, dysuria, hematuria, pelvic pain and urgency. Musculoskeletal: Negative for back pain, myalgias, neck pain and neck stiffness. Skin: Negative for color change and rash. Neurological: Negative for dizziness, tingling, syncope, speech difficulty, weakness, light-headedness, numbness, headaches and paresthesias. Psychiatric/Behavioral: Negative for behavioral problems. All other systems reviewed and are negative. Vitals:    09/21/19 1628 09/21/19 2018 09/21/19 2018   BP: 127/79 154/75    Pulse: 80 72    Resp: 18     Temp: 98.7 °F (37.1 °C)     SpO2: 94% 95% 95%   Weight: 54.9 kg (121 lb)     Height: 5' 10\" (1.778 m)              Physical Exam   Constitutional: She is oriented to person, place, and time. She appears well-developed and well-nourished. No distress. Alert and oriented to person place and time. No acute distress, speaks in clear, fluid sentences. HENT:   Head: Normocephalic and atraumatic. Right Ear: External ear normal.   Left Ear: External ear normal.   Nose: Nose normal.   Eyes: Pupils are equal, round, and reactive to light. EOM are normal.   Neck: Normal range of motion. Cardiovascular: Normal rate, regular rhythm, normal heart sounds and intact distal pulses. Exam reveals no gallop and no friction rub. No murmur heard. Pulmonary/Chest: Effort normal and breath sounds normal. No stridor. No respiratory distress. She has no decreased breath sounds. She has no wheezes. She has no rhonchi. She has no rales. She exhibits no tenderness.    Diminished in the right lower lobe, no other focal findings. Abdominal: Soft. She exhibits no distension and no mass. There is no tenderness. There is no rebound and no guarding. No hernia. No focal findings on exam, negative Yo sign. No distention rebound or guarding, no CVA tenderness. Musculoskeletal: Normal range of motion. She exhibits no edema, tenderness or deformity. Neurological: She is alert and oriented to person, place, and time. No cranial nerve deficit. Skin: Skin is warm and dry. She is not diaphoretic. Nursing note and vitals reviewed. MDM  Number of Diagnoses or Management Options  Diagnosis management comments: X-ray imaging shows new left-sided rib fracture. This is inconsistent with patient's pain on exam.  Ultrasound imaging however shows enlarged liver masses that are new from previous PET scan performed in July. Return for metastatic disease. Laboratory evaluation is unremarkable.        Amount and/or Complexity of Data Reviewed  Clinical lab tests: ordered and reviewed  Tests in the radiology section of CPT®: ordered and reviewed  Tests in the medicine section of CPT®: ordered and reviewed  Independent visualization of images, tracings, or specimens: yes    Risk of Complications, Morbidity, and/or Mortality  Presenting problems: moderate  Diagnostic procedures: low  Management options: moderate    Patient Progress  Patient progress: stable         Procedures

## 2019-09-22 NOTE — DISCHARGE INSTRUCTIONS
Call the cancer center on Monday to arrange follow-up care and further evaluation. Return for worsening symptoms, concerns or questions. Take the medication provided as needed for pain.

## 2019-09-27 ENCOUNTER — HOSPITAL ENCOUNTER (OUTPATIENT)
Dept: MRI IMAGING | Age: 79
Discharge: HOME | End: 2019-09-27
Attending: INTERNAL MEDICINE
Payer: MEDICARE

## 2019-09-27 PROCEDURE — 70551 MRI BRAIN STEM W/O DYE: CPT

## 2019-10-03 ENCOUNTER — HOSPITAL ENCOUNTER (OUTPATIENT)
Dept: ULTRASOUND IMAGING | Age: 79
Discharge: HOME | End: 2019-10-03
Attending: INTERNAL MEDICINE
Payer: MEDICARE

## 2019-10-03 VITALS
WEIGHT: 117 LBS | OXYGEN SATURATION: 94 % | TEMPERATURE: 97.8 F | SYSTOLIC BLOOD PRESSURE: 168 MMHG | RESPIRATION RATE: 17 BRPM | DIASTOLIC BLOOD PRESSURE: 94 MMHG | BODY MASS INDEX: 16.79 KG/M2 | HEART RATE: 66 BPM

## 2019-10-03 DIAGNOSIS — R93.2 ABNORMAL ULTRASOUND OF LIVER: ICD-10-CM

## 2019-10-03 LAB
BUN BLD-MCNC: 12 MG/DL (ref 8–26)
CA-I BLD-MCNC: 1.24 MMOL/L (ref 1.12–1.32)
CHLORIDE BLD-SCNC: 102 MMOL/L (ref 98–107)
CO2 BLD-SCNC: 26 MMOL/L (ref 21–32)
CREAT BLD-MCNC: 0.7 MG/DL (ref 0.8–1.5)
GLUCOSE BLD-MCNC: 91 MG/DL (ref 65–100)
POTASSIUM BLD-SCNC: 4 MMOL/L (ref 3.5–5.1)
SODIUM BLD-SCNC: 142 MMOL/L (ref 136–145)

## 2019-10-03 PROCEDURE — 74011250636 HC RX REV CODE- 250/636: Performed by: RADIOLOGY

## 2019-10-03 PROCEDURE — 88342 IMHCHEM/IMCYTCHM 1ST ANTB: CPT

## 2019-10-03 PROCEDURE — 80047 BASIC METABLC PNL IONIZED CA: CPT

## 2019-10-03 PROCEDURE — 47000 NEEDLE BIOPSY OF LIVER PERQ: CPT

## 2019-10-03 PROCEDURE — 88341 IMHCHEM/IMCYTCHM EA ADD ANTB: CPT

## 2019-10-03 PROCEDURE — 74011000250 HC RX REV CODE- 250: Performed by: RADIOLOGY

## 2019-10-03 PROCEDURE — 99153 MOD SED SAME PHYS/QHP EA: CPT

## 2019-10-03 PROCEDURE — 99152 MOD SED SAME PHYS/QHP 5/>YRS: CPT

## 2019-10-03 PROCEDURE — 88323 CONSLTJ&REPRT MATRL PREP SLD: CPT

## 2019-10-03 PROCEDURE — 88307 TISSUE EXAM BY PATHOLOGIST: CPT

## 2019-10-03 RX ORDER — SODIUM CHLORIDE 9 MG/ML
25 INJECTION, SOLUTION INTRAVENOUS ONCE
Status: ACTIVE | OUTPATIENT
Start: 2019-10-03 | End: 2019-10-03

## 2019-10-03 RX ORDER — SODIUM CHLORIDE 0.9 % (FLUSH) 0.9 %
5-40 SYRINGE (ML) INJECTION AS NEEDED
Status: CANCELLED | OUTPATIENT
Start: 2019-10-03

## 2019-10-03 RX ORDER — DIPHENHYDRAMINE HYDROCHLORIDE 50 MG/ML
12.5-5 INJECTION, SOLUTION INTRAMUSCULAR; INTRAVENOUS ONCE
Status: ACTIVE | OUTPATIENT
Start: 2019-10-03 | End: 2019-10-03

## 2019-10-03 RX ORDER — SODIUM CHLORIDE 9 MG/ML
150 INJECTION, SOLUTION INTRAVENOUS CONTINUOUS
Status: DISCONTINUED | OUTPATIENT
Start: 2019-10-03 | End: 2019-10-04 | Stop reason: HOSPADM

## 2019-10-03 RX ORDER — MIDAZOLAM HYDROCHLORIDE 1 MG/ML
.5-2 INJECTION, SOLUTION INTRAMUSCULAR; INTRAVENOUS
Status: DISCONTINUED | OUTPATIENT
Start: 2019-10-03 | End: 2019-10-07 | Stop reason: HOSPADM

## 2019-10-03 RX ORDER — SODIUM CHLORIDE 0.9 % (FLUSH) 0.9 %
5-40 SYRINGE (ML) INJECTION EVERY 8 HOURS
Status: CANCELLED | OUTPATIENT
Start: 2019-10-03

## 2019-10-03 RX ORDER — LIDOCAINE HYDROCHLORIDE 20 MG/ML
2-20 INJECTION, SOLUTION INFILTRATION; PERINEURAL ONCE
Status: COMPLETED | OUTPATIENT
Start: 2019-10-03 | End: 2019-10-03

## 2019-10-03 RX ORDER — HYDROCODONE BITARTRATE AND ACETAMINOPHEN 10; 325 MG/1; MG/1
1 TABLET ORAL
Status: DISCONTINUED | OUTPATIENT
Start: 2019-10-03 | End: 2019-10-07 | Stop reason: HOSPADM

## 2019-10-03 RX ORDER — IBUPROFEN 200 MG
600 TABLET ORAL
Status: DISCONTINUED | OUTPATIENT
Start: 2019-10-03 | End: 2019-10-07 | Stop reason: HOSPADM

## 2019-10-03 RX ORDER — FENTANYL CITRATE 50 UG/ML
25-50 INJECTION, SOLUTION INTRAMUSCULAR; INTRAVENOUS
Status: DISCONTINUED | OUTPATIENT
Start: 2019-10-03 | End: 2019-10-07 | Stop reason: HOSPADM

## 2019-10-03 RX ADMIN — LIDOCAINE HYDROCHLORIDE 200 MG: 20 INJECTION, SOLUTION INFILTRATION; PERINEURAL at 10:38

## 2019-10-03 RX ADMIN — MIDAZOLAM 1 MG: 1 INJECTION INTRAMUSCULAR; INTRAVENOUS at 10:16

## 2019-10-03 RX ADMIN — FENTANYL CITRATE 25 MCG: 50 INJECTION INTRAMUSCULAR; INTRAVENOUS at 10:26

## 2019-10-03 RX ADMIN — FENTANYL CITRATE 50 MCG: 50 INJECTION INTRAMUSCULAR; INTRAVENOUS at 10:16

## 2019-10-03 RX ADMIN — MIDAZOLAM 0.5 MG: 1 INJECTION INTRAMUSCULAR; INTRAVENOUS at 10:26

## 2019-10-03 NOTE — PROCEDURES
Department of Interventional Radiology  [phone]        Interventional Radiology Brief Procedure Note    Patient: Jamie Camarillo MRN: [id number]  SSN: [id number]    YOB: 1940  Age: 78 y.o. Sex: female      Date of Procedure: 10/3/2019    Pre-Procedure Diagnosis: Liver masses. Lung cancer. Post-Procedure Diagnosis: SAME    Procedure(s): Image Guided Biopsy    Brief Description of Procedure: Left lobe liver lesion. Performed By: Kareen Martínez MD     Assistants: None    Anesthesia:Moderate Sedation    Estimated Blood Loss: Less than 10ml    Specimens:  Pathology    Implants:  None    Findings: Multiple, large, rounded masses in the liver.      Complications: None    Recommendations: 1 hour bedrest.       Follow Up: Dr Suhas Alberto    Signed By: Kareen Martínez MD     October 3, 2019

## 2019-10-03 NOTE — DISCHARGE INSTRUCTIONS
[address]  Department of Interventional Radiology  [facility]  [phone] Office  [phone] Fax    BIOPSY DISCHARGE INSTRUCTIONS    General Instructions:     A biopsy is the removal of a small piece of tissue for microscopic examination or testing. Healthy tissue can be obtained for the purpose of tissue-type matching for transplants. Unhealthy tissues are more commonly biopsied to diagnose disease. Liver Biopsy: This test helps detect cancer, infections, and the cause of an enlargement of the liver or elevated liver enzymes. It also helps to diagnose a number of liver diseases. The pain associated with the procedure may be felt in the shoulder. The risks include internal bleeding, pneumothorax, and injury to the surrounding organs. General Biopsy:     A mass can grow in any area of the body, and we are taking a specimen as ordered by your doctor. The risks are the same. They include bleeding, pain, and infection. Home Care Instructions: You may resume your regular diet and medication regimen. Do not drink alcohol, drive, or make any important legal decisions in the next 24 hours. Do not lift anything heavier than a gallon of milk until the soreness goes away. You may use over the counter acetaminophen or ibuprofen for the soreness. You may apply an ice pack to the affected area for 20-30 minutes at time for the first 24 hours. After that, you may apply a heat pack. Call If: You should call your Physician and/or the Radiology Nurse if you have any questions or concerns about the biopsy site. Call if you should have increased pain, fever, redness, drainage, or bleeding more than a small spot on the bandage. Follow-Up Instructions: Please see your ordering doctor as he/she has requested. To Reach Us:  If you have any questions about your procedure, please call the Interventional Radiology department at [phone]. After business hours (5pm) and weekends, call the answering service at [phone] and ask for the Radiologist on call to be paged. Interventional Radiology General Nurse Discharge    After general anesthesia or intravenous sedation, for 24 hours or while taking prescription Narcotics:  · Limit your activities  · Do not drive and operate hazardous machinery  · Do not make important personal or business decisions  · Do  not drink alcoholic beverages  · If you have not urinated within 8 hours after discharge, please contact your surgeon on call. * Please give a list of your current medications to your Primary Care Provider. * Please update this list whenever your medications are discontinued, doses are     changed, or new medications (including over-the-counter products) are added. * Please carry medication information at all times in case of emergency situations. These are general instructions for a healthy lifestyle:    No smoking/ No tobacco products/ Avoid exposure to second hand smoke  Surgeon General's Warning:  Quitting smoking now greatly reduces serious risk to your health. Obesity, smoking, and sedentary lifestyle greatly increases your risk for illness  A healthy diet, regular physical exercise & weight monitoring are important for maintaining a healthy lifestyle    You may be retaining fluid if you have a history of heart failure or if you experience any of the following symptoms:  Weight gain of 3 pounds or more overnight or 5 pounds in a week, increased swelling in our hands or feet or shortness of breath while lying flat in bed. Please call your doctor as soon as you notice any of these symptoms; do not wait until your next office visit.     Recognize signs and symptoms of STROKE:  F-face looks uneven    A-arms unable to move or move unevenly    S-speech slurred or non-existent    T-time-call 911 as soon as signs and symptoms begin-DO NOT go       Back to bed or wait to see if you get better-TIME IS BRAIN.            Patient Signature:  Date: 10/3/2019  Discharging Nurse: Katlin Carrera

## 2019-10-03 NOTE — PROGRESS NOTES
Recovery period without difficulty. Pt alert and oriented and denies pain. Dressing is clean, dry, and intact. Reviewed discharge instructions with patient and daughter, both verbalized understanding. Pt escorted to lobby discharge area via wheelchair. Vital signs and Lisa score completed.

## 2019-10-09 ENCOUNTER — TELEPHONE (OUTPATIENT)
Dept: CASE MANAGEMENT | Age: 79
End: 2019-10-09

## 2019-10-11 ENCOUNTER — PATIENT OUTREACH (OUTPATIENT)
Dept: CASE MANAGEMENT | Age: 79
End: 2019-10-11

## 2019-10-11 NOTE — PROGRESS NOTES
Called pathology department to check on IHC. Specimen came back and [facility]. Notified patient's daughter. Will check with Dr Rubina Dumont next week. Navigation will continue to follow.

## 2019-10-25 ENCOUNTER — PATIENT OUTREACH (OUTPATIENT)
Dept: CASE MANAGEMENT | Age: 79
End: 2019-10-25

## 2019-10-25 NOTE — PROGRESS NOTES
Saw patient with Dr Darnell Collet for lung cancer follow up. Daughter was present for visit. Liver biopsy came back SCLC. Manuel Mehta Recommending doublet therapy for patient. Pt does not desire Chemo and is adamant about her wishes. Pt is seen by [facility] and they can transition the patient to Hospice. Pt has already discussed hospice services and her desire to transition with [facility] and family will respect her wishes. Call placed to [facility]. Navigation will sign off.

## 2023-11-11 NOTE — PROGRESS NOTES
Patients daughter called nursing station and stated, \"my mother is choking to death\" this primary RN and Shirin Wells RN (charge) went to patients room and found patient coughing mildly with sputum return. Patient had no signs or symptoms of distress, VSS. Patient able to talk to myself and Shirin Wells. Green emesis bag given to patient to cough up secretions in. Patient left in bed, with HOB elevated in no distress. Will continue to monitor. Unknown if ever smoked

## 2024-10-09 NOTE — PROGRESS NOTES
..RESPIRATORY END OF SHIFT NOTE      AIRWAY  An advanced tracheostomy airway 6.0  shiley is present.    SETTINGS  The patient's current support device is a Ventilator, with the following settings:    Mode Pressure Support   Tidal Volume    Respiratory Rate    PEEP 8   FiO2 30   Pressure Support 8   PEAK PRESSURE    PLATEAU PRESSURE            Secretion production is small, thick and creamy. All emergency equipment remain at the bedside.    WEANING  An awakening not done, patient isn't sedated. Patient following commands.    An spontaneous breathing trial is ongoing.    Spontaneous minute ventilation     Respiratory rate    Spontaneous Tidal Volume     RSBI         SKIN INTEGRITY  A skin assessment related to invasive and non-invasive respiratory devices was done Q4 hours and documented appropriately. The skin assessment was performed to evaluate for any pressure injury with the interface of a Tracheostomy tube.  The patient's skin was assessed, and proper skin protection was present, including Trach site.    FINDINGS  Upon observation of all RT device related pressure points, no skin breakdown was noted around trach site.    INTERVENTIONS  The skin assessment findings were documented in the patient's medical record and any concerns were brought to nursing's attention.    ADDITIONAL COMMENTS OR CONCERNS  Trach care performed, inner cannula changed and drain sponge in place. Patient continue to have foul smelling drainage around stoma site.   OT Daily Note  Time In 1355   Time Out 1429     Pain: Pt reported R shoulder hurting, but was able to participate in tx and transfer. Functional Mobility   Pt went supine to sit with CGA. Pt transferred with min A to w/c. Activity Tolerance   Pt worked on lacing and unlacing a board figure using RUE to improve shoulder mobility. Pt reported unlacing was harder. Education   Infection in chest drainage, tx for it, and various incision on back; HH therapy and nursing     Interdisciplinary Communication: ID doctor on tx. Plan: Continue with POC. Pt was left awaiting PT Elizabeth Loss.      Juan Jose Rodriguez OTR/L  6/5/2019

## 2025-03-13 NOTE — PROGRESS NOTES
"Daily Note     Today's date: 3/13/2025  Patient name: Colette Sweeney  : 1966  MRN: [id number]  Referring provider: Trey Fountain DO  Dx:   Encounter Diagnosis     ICD-10-CM    1. Chronic pain of left knee  M25.562     G89.29       2. Primary osteoarthritis of left knee  M17.12                         Subjective: Patient saw Dr. Fountain who is pleased with progress.       Objective: See treatment diary below    Warmup NuStep 10' prior to session   Circuit 1, 3 rounds, 30 seconds on, 30 seconds off  TRX backwards lunges   Wall sits with med ball twist  Step ups with alt hip drive on bosu   Circuit 2, 3 rounds, 30 seconds on, 30 seconds off  Hamm's carries  TRX lateral lunges  Squat with OH press  Circuit 3, 3 rounds, 30 seconds on, 30 seconds off  SL RDL  Iso squat hold with paloff press  Backwards walking    Assessment: Patient tolerated all activities well today with improved depth during squat and lunge variations throughout session. Will continue to progress as tolerated. Patient would benefit from continued PT to continue with return to activity demands.        Plan: Continue per plan of care.        Insurance Eval/ Re-eval POC expires Auth Status Total visits  Start date  Expiration date Misc   [employer] 12/20 3/14     $0                 Precautions: S/P L TKA 24  Past Medical History:   Diagnosis Date    Allergic rhinitis     Asthma     Fibroids     Hypertension     Physiological ovarian cysts     Sleep apnea     dental device worn         Date 25   Visit Number [id number] ([facility])   Auth        ROM        Knee Flexion        Knee Extension                TUG                Manual        Patellar mobs        STM                Neuro Re-ed        Quad set         TKE ball on wall Hip burners 2x10x2\"                       TherEx        Knee/Hip PROM        Active w/u NS 10' NS 10' NS 10' NS 10'    Ankle pumps        SLR        LAQ        Heel slides    Walking " SFD PROGRESS NOTE    Clara Morales  Admit Date: 5/31/2019  Admit Diagnosis: Lung cancer ([facility] [C34.90]; History of thoracotomy [Z98.890]; Hypoxia [R09.02]; Pain [R52]; Debility [R53.81]; Small cell carcinoma (HCC) [C80.1]; Atrial fibrillation with RVR (HCC) [I48.91];COPD (chronic obstructive pulmonary disease) ([facility] [J44.9]; Gait difficulty [R26.9]    Subjective     Afebrile, vss. Has had hypoxia, unable to improve O2 sats to 90s, despite being on high flow O2 at 10L/min. Respiratory states there was a leak in the adaptor to wall, fixed the leak. Patient subsequently return to 5-6L/min. SaO2 > 95%. PT/OT was held due to hypoxia, but patient should be ok to resume therapies. Discharge plans discussed again with daughter and pt in the room. Patient expresses desire to go home. Daughter wants to make sure patient will be medically stable if so. Will continue to assess and plan what is best for pt and what they desire.      Objective:     Current Facility-Administered Medications   Medication Dose Route Frequency    LORazepam (ATIVAN) tablet 1 mg  1 mg Oral Q8H PRN    zinc oxide-cod liver oil (DESITIN) 40 % paste   Topical PRN    vancomycin (VANCOCIN) 1,000 mg in 0.9% sodium chloride (MBP/ADV) 250 mL  1 g IntraVENous Q18H    acetaminophen (TYLENOL) tablet 650 mg  650 mg Oral Q6H PRN    phenol throat spray (CHLORASEPTIC) 1 Spray  1 Spray Oral PRN    HYDROmorphone (DILAUDID) tablet 2 mg  2 mg Oral Q6H PRN    potassium chloride (K-DUR, KLOR-CON) SR tablet 40 mEq  40 mEq Oral DAILY    naloxone (NARCAN) injection 0.4 mg  0.4 mg IntraVENous PRN    ondansetron (ZOFRAN) injection 4 mg  4 mg IntraVENous Q4H PRN    sodium chloride (NS) flush 5-40 mL  5-40 mL IntraVENous Q8H    sodium chloride (NS) flush 5-40 mL  5-40 mL IntraVENous PRN    albuterol (PROVENTIL VENTOLIN) nebulizer solution 2.5 mg  2.5 mg Nebulization QID RT    amiodarone (CORDARONE) tablet 200 mg  200 mg Oral DAILY    budesonide (PULMICORT) 500 "lunges with 10# TT 4x10'     SAQ   SL RDL with foam 2x10 B      Step flexion stretch   KB squat to 12\" 2x10 9# KB squat to 12\" 2x10 16# KB squat to 12\" 2x10 16#    Mini squat TRX full depth squat 2x10 TRX full depth squat 2x10 TRX full depth squat 2x10 TRX full depth squat 2x10     2x10 lateral lunges BLE 2x10 lateral lunges BLE 2x10 lateral lunges BLE 2x10 lateral lunges BLE    Gastroc stretch  Sliders lateral/back 2x10 ea. BLE Sliders lateral/back 2x10 ea. BLE Sliders lateral/back 2x10 ea. BLE Sliders lateral/back 2x10 ea. BLE    Side stepping Suitcase carry #26 lb FB 4x50'       Step ups  Bosu lateral step up and over 2x10 Bosu lateral step up and over 2x10    Step ups 8\" 2x10 Bosu lateral step up and over 2x10 Bosu lateral step up and over 2x10    Step downs 8\" lateral step downs 2x10    6\" anterior step downs 2x10 8\" lateral step downs 2x10    6\" anterior step downs 2x10      Leg press  2x10 105#      TherAct        Patient education        Stair negotiation        Car transfer                        Gait Training        With AD        No AD                       " mcg/2 ml nebulizer suspension  500 mcg Nebulization BID RT    enoxaparin (LOVENOX) injection 40 mg  40 mg SubCUTAneous Q24H    furosemide (LASIX) tablet 40 mg  40 mg Oral DAILY    gabapentin (NEURONTIN) capsule 300 mg  300 mg Oral TID    HYDROcodone-acetaminophen (NORCO) 5-325 mg per tablet 1 Tab  1 Tab Oral Q4H PRN    levothyroxine (SYNTHROID) tablet 75 mcg  75 mcg Oral DAILY    magnesium hydroxide (MILK OF MAGNESIA) 400 mg/5 mL oral suspension 30 mL  30 mL Oral DAILY PRN    magnesium hydroxide (MILK OF MAGNESIA) 400 mg/5 mL oral suspension 30 mL  30 mL Oral DAILY    pantoprazole (PROTONIX) tablet 40 mg  40 mg Oral ACB    senna-docusate (PERICOLACE) 8.6-50 mg per tablet 2 Tab  2 Tab Oral BID    tiotropium (SPIRIVA) inhalation capsule 18 mcg  1 Cap Inhalation DAILY     Review of Systems: + chest wall pain. Denies chest pain, shortness of breath, cough, headache, visual problems, abdominal pain, dysurea, calf pain. Pertinent positives are as noted in the medical records and unremarkable otherwise. Visit Vitals  /56 (BP Patient Position: At rest)   Pulse 90   Temp 98.5 °F (36.9 °C)   Resp 18   SpO2 96%      Physical Exam:   Psych: Patient was oriented to person, place, and time. NAD on 4-5L O2 via NC. General:   Alert, appears stated age, No acute distress. HEENT:  Normocephalic, EOMI, facial symmetry  Intact. no JVD   Lungs:  + crackles thoughout R base. Respiration even and unlabored   No apparent use of accessory muscles for respiration. Heart:  Regular rate and rhythm, S1, S2, No obvious murmur    Genitourinary: defered   Abdomen:  Soft, non-tender to palpation in all four quadrants. Neuromuscular:  PERRL, EOMI  Follows simple commands consistently.    + generalized weakness   Sensory - intact   Skin:  edema: none   Incision:  Calf non tender BLE. Right chest wall with wound vac, good seal. Functional Assessment:  Gross Assessment  AROM: Generally decreased, functional (06/03/19 1000)  Strength: Generally decreased, functional (06/03/19 1000)       Balance  Sitting - Static: Good (unsupported) (06/08/19 1700)  Sitting - Dynamic: Good (unsupported) (06/08/19 1700)  Standing - Static: Poor;Constant support(with RW) (06/08/19 1700)  Standing - Dynamic : Impaired (06/08/19 1700)           Toileting  Cues: Physical assistance for pants up;Physical assistance for pants down (06/07/19 1358)         [facility] President Fall Risk Assessment:  [place] President Fall Risk  Mobility: Ambulates or transfers with assist devices or assistance (06/10/19 0700)  Mobility Interventions: Communicate number of staff needed for ambulation/transfer;OT consult for ADLs; Patient to call before getting OOB;PT Consult for mobility concerns (06/10/19 0700)  Mentation: Alert, oriented x 3 (06/10/19 0700)  Medication: Patient receiving anticonvulsants, sedatives(tranquilizers), psychotropics or hypnotics, hypoglycemics, narcotics, sleep aids, antihypertensives, laxatives, or diuretics (06/10/19 0700)  Medication Interventions: Evaluate medications/consider consulting pharmacy; Patient to call before getting OOB; Teach patient to arise slowly (06/10/19 0700)  Elimination: Needs assistance with toileting (06/10/19 0700)  Elimination Interventions: Call light in reach; Patient to call for help with toileting needs (06/10/19 0700)  Prior Fall History: Before admission in past 12 months _home or previous inpatient care) (06/10/19 0700)  History of Falls Interventions: Door open when patient unattended;Evaluate medications/consider consulting pharmacy (06/10/19 0700)  Total Score: 4 (06/10/19 0700)  Standard Fall Precautions: Yes (06/08/19 0111)  High Fall Risk: Yes (06/10/19 0700)     Speech Assessment:  Aspiration Signs/Symptoms: None (06/05/19 1516)      Ambulation:  Gait  Speed/Aracelis: Slow;Shuffled (06/07/19 1000)  Step Length: Left shortened;Right shortened (06/07/19 1000)  Distance (ft): 20 Feet (ft) (06/08/19 1700)  Assistive Device: Walker, rolling (06/08/19 1700)     Labs/Studies:  Recent Results (from the past 72 hour(s))   VANCOMYCIN, TROUGH    Collection Time: 06/08/19  7:08 AM   Result Value Ref Range    Vancomycin,trough 13.6 5 - 20 ug/mL   PLEASE READ & DOCUMENT PPD TEST IN 24 HRS    Collection Time: 06/09/19  9:15 AM   Result Value Ref Range    PPD  Negative    mm Induration 0 0 - 5 mm   POC G3    Collection Time: 06/10/19  2:44 AM   Result Value Ref Range    Device: High Flow Nasal Cannula      pH (POC) 7.445 7.35 - 7.45      pCO2 (POC) 44.3 35 - 45 MMHG    pO2 (POC) 72 (L) 75 - 100 MMHG    HCO3 (POC) 30.5 (H) 22 - 26 MMOL/L    sO2 (POC) 95 95 - 98 %    Base excess (POC) 6 mmol/L    Allens test (POC) YES      Site RIGHT RADIAL      Patient temp. 98.6      Specimen type (POC) ARTERIAL      Performed by Annie     CO2, POC 32 MMOL/L    Flow rate (POC) 10.000 L/min    COLLECT TIME 238     CBC WITH AUTOMATED DIFF    Collection Time: 06/10/19  6:32 AM   Result Value Ref Range    WBC 14.9 (H) 4.3 - 11.1 K/uL    RBC 3.33 (L) 4.05 - 5.2 M/uL    HGB 9.1 (L) 11.7 - 15.4 g/dL    HCT 31.4 (L) 35.8 - 46.3 %    MCV 94.3 79.6 - 97.8 FL    MCH 27.3 26.1 - 32.9 PG    MCHC 29.0 (L) 31.4 - 35.0 g/dL    RDW 15.9 (H) 11.9 - 14.6 %    PLATELET 421 652 - 402 K/uL    MPV 9.1 (L) 9.4 - 12.3 FL    ABSOLUTE NRBC 0.00 0.0 - 0.2 K/uL    DF AUTOMATED      NEUTROPHILS 79 (H) 43 - 78 %    LYMPHOCYTES 11 (L) 13 - 44 %    MONOCYTES 7 4.0 - 12.0 %    EOSINOPHILS 1 0.5 - 7.8 %    BASOPHILS 1 0.0 - 2.0 %    IMMATURE GRANULOCYTES 1 0.0 - 5.0 %    ABS. NEUTROPHILS 11.7 (H) 1.7 - 8.2 K/UL    ABS. LYMPHOCYTES 1.6 0.5 - 4.6 K/UL    ABS. MONOCYTES 1.1 0.1 - 1.3 K/UL    ABS. EOSINOPHILS 0.2 0.0 - 0.8 K/UL    ABS. BASOPHILS 0.1 0.0 - 0.2 K/UL    ABS. IMM.  GRANS. 0.2 0.0 - 0.5 K/UL   METABOLIC PANEL, BASIC    Collection Time: 06/10/19  6:32 AM   Result Value Ref Range    Sodium 139 136 - 145 mmol/L    Potassium 3.6 3.5 - 5.1 mmol/L    Chloride 102 98 - 107 mmol/L    CO2 29 21 - 32 mmol/L    Anion gap 8 7 - 16 mmol/L    Glucose 100 65 - 100 mg/dL    BUN 10 8 - 23 MG/DL    Creatinine 0.79 0.6 - 1.0 MG/DL    GFR est AA >60 >60 ml/min/1.73m2    GFR est non-AA >60 >60 ml/min/1.73m2    Calcium 8.0 (L) 8.3 - 10.4 MG/DL   PLEASE READ & DOCUMENT PPD TEST IN 48 HRS    Collection Time: 06/10/19  9:35 AM   Result Value Ref Range    PPD  Negative    mm Induration 0 0 - 5 mm       Assessment:     Problem List as of 6/10/2019 Date Reviewed: 5/31/2019          Codes Class Noted - Resolved    Debility ICD-10-CM: R53.81  ICD-9-CM: 799.3  5/31/2019 - Present        Lung cancer ([facility] ICD-10-CM: C34.90  ICD-9-CM: 162.9  5/31/2019 - Present        Gait difficulty ICD-10-CM: R26.9  ICD-9-CM: 781.2  5/31/2019 - Present        Pain ICD-10-CM: R52  ICD-9-CM: 780.96  5/31/2019 - Present        Small cell carcinoma (HCC) ICD-10-CM: C80.1  ICD-9-CM: 199.1  5/31/2019 - Present        Atrial fibrillation with RVR (HCC) ICD-10-CM: I48.91  ICD-9-CM: 427.31  5/31/2019 - Present        History of thoracotomy ICD-10-CM: Z98.890  ICD-9-CM: V45.89  5/31/2019 - Present        Non-small cell lung cancer (NSCLC) ([facility] ICD-10-CM: C34.90  ICD-9-CM: 162.9  5/30/2019 - Present        UTI (urinary tract infection) ICD-10-CM: N39.0  ICD-9-CM: 599.0  5/29/2019 - Present        Atrial fibrillation with rapid ventricular response (HCC) ICD-10-CM: I48.91  ICD-9-CM: 427.31  5/26/2019 - Present        Normochromic anemia (Chronic) ICD-10-CM: D64.9  ICD-9-CM: 285.9  5/26/2019 - Present        Hypoxia ICD-10-CM: R09.02  ICD-9-CM: 799.02  5/24/2019 - Present        COPD (chronic obstructive pulmonary disease) (HCC) (Chronic) ICD-10-CM: J44.9  ICD-9-CM: 496  5/24/2019 - Present        Aftercare following surgery ICD-10-CM: Z48.89  ICD-9-CM: V58.89  5/22/2019 - Present        Right lower lobe lung mass ICD-10-CM: R91.8  ICD-9-CM: 786.6  5/22/2019 - Present        HTN (hypertension) (Chronic) ICD-10-CM: I10  ICD-9-CM: 401.9  4/12/2019 - Present        Acquired hypothyroidism (Chronic) ICD-10-CM: E03.9  ICD-9-CM: 244.9  4/12/2019 - Present        Personal history of tobacco use, presenting hazards to health (Chronic) ICD-10-CM: H86.697  ICD-9-CM: V15.82  3/18/2019 - Present        Liver lesion ICD-10-CM: K76.9  ICD-9-CM: 573.8  3/18/2019 - Present    Overview Signed 5/30/2019 11:14 AM by WILLIAN Ellis. No uptake per PET             Centrilobular emphysema (HCC) (Chronic) ICD-10-CM: J43.2  ICD-9-CM: 492.8  3/18/2019 - Present        RESOLVED: Atrial fibrillation with RVR (HCC) ICD-10-CM: I48.91  ICD-9-CM: 427.31  5/26/2019 - 5/29/2019        RESOLVED: Acute respiratory failure with hypoxia [facility]) ICD-10-CM: J96.01  ICD-9-CM: 518.81  4/12/2019 - 5/30/2019              Plan:   intensive Physical Therapy for a minimum of 1.5 hours a day, at least 5 out of 7 days per week to address bed mobility, transfers, ambulation, strengthening, balance, and endurance. intensive Occupational Therapy for a minimum of 1.5 hours a day, at least 5 out of 7 days per week to address ADL ( bathing, LE dressing, toileting) and adaptive equipment as needed. - patient will be limited due to pain, hypoxia, fatigue.   - focus on endurance, strength, energy conservation techniques.     The patient will also require 24-hour skilled rehabilitation nursing for bowel and bladder management, skin care for decubitus ulcer prevention , pain management and ongoing medication administration      The patient may benefit from a psychology consult for depression, anxiety and adjustment disorder.     Continue daily physician medical management:  POSTOPERATIVE DIAGNOSIS:  Right lung cancer.     PROCEDURE PERFORMED:  1.  Right thoracoscopy switched to right thoracotomy with extensive pneumonolysis. 2.  Right lower lobe lobectomy.   3.  Right upper lobe blebectomy x2.  4.  Diaphragm resection with primary repair. 5.  Chest wall resection. 6.  Mediastinal lymphadenectomy. 7.  Intercostal nerve cryoablation.     - Non-small cell lung cancer (NSCLC) ([facility] (5/30/2019)  - Aftercare following surgery -Wound Care: Monitor wound status daily per staff and physician. At risk for failure. Will require 24/7 rehab nursing. Surgery to direct, follow. Keep incisions clean and dry, may remain uncovered. - Wet to dry dressing changes to chest wound daily. - 6/1 stable wound. 6/2  Continued malodorous drainage. Will send for cx. 6/3 - follow cx, stain. Wound vac to be placed. Wound care to follow. 6/4 - wound vac placed. + fever, started on vanc, cefepime( pen allergy) . ID consult. 6/5 -fever down. Continues to drain from wound vac. Patient more comfortable with wound vac on.   - ID following. Per ID -  Continue Vancomycin x 7 days (est EOT 06/10/19). Continue Cipro for now; if 06/02 UC remains negative would discontinue  6/6 - no fever. Continue wound vac. Pharmacy dose vanco.   6/7 - continue wound care/ wound vac. Continue vanco. Responding to abx.   6/10 - surgery following. Continue wound vac. WBC, mildly elevated, unchanged.        Hypoxia (5/24/2019)/COPD (chronic obstructive pulmonary disease) ([facility] (5/24/2019)  - proventil scheduled 4x/day  - spiriva  - monitor SaO2. O2 a via NC to keep SaO2 >90%. - continue O2 as needed. 6/7 - on 4-5L/min. desats with activity and talking. 6/10 - continue O2 as needed. Requires 4-6L/min via NC. Likely at least temporarily she will continue O2 needs, following d/c.         Atrial fibrillation with rapid ventricular response ([facility] (5/26/2019)  - amiodarone. Will reduce dose as scheduled. - lasix 40 mg daily. Monitor renal function. 6/1 -HR in control. Continue amiodarone 400 bid, wean to 200 daily in 2 days. 6/3 - BP borderline low. No antihypertensives. 6/4 - now on amiodarone 200 daily. Appears on NSR.   6/5 -NSR. 6/7 - continue amiodarone. 6/10 - HR 90s. -110s. Will reduce lasix 20mg daily. Ppt clinically euvolemic.         Normochromic anemia (5/26/2019)- s/p transfusions. - monitor. Transfuse as needed. 6/1- hgb 8.3  6/3 - hgb 8.3  6/4 - hgb 8.7  6/7 - hgb 8.5  6/10 - hgb - 9.3     Leukocytosis/UTI (urinary tract infection) (5/29/2019) - Complete a 5 day course of Rocephin for UTI.  - 6/1 - still mild leukocytosis. Rocephin until 6/3  6/2 - continued on Rocephin for UTI, however still R thoracotomy concern for conurrent infection. cx sent. May need reconsideration for antibiotic therapy for thoracotomy, surgical bed.6/7 still mild leukocytosis on cipro.      Pneumonia prophylaxis- Insentive spirometer every hour while awake     DVT risk / DVT Prophylaxis-    - Lovenox subq daily.      Pain Control: no current joint or muscle symptoms, essentially pain-free. Will require regular pain assessment and comprenhensive pain management. - pain due to chest wound, and during dressing changes, packing.   - norco prn. Dilaudid iv if needed. - gabapentin 300mg tid  6/4 - ativan added for anxiety. Pt tolerated well. 6/5 responded well to ativan prn, especially at bedtime. 6/10 - reduce prn ativan 0.5. concern for confusion. bowel program - erica-colace. MOM prn     GERD - resume PPI. At times may need additional antacids, Maalox prn. Time spent was 25 minutes with over 1/2 in direct patient care/examination, consultation and coordination of care.      Signed By: Vadim Olson MD     April 10, 2019

## (undated) DEVICE — SINGLE USE ASPIRATION NEEDLE: Brand: SINGLE USE ASPIRATION NEEDLE

## (undated) DEVICE — Device

## (undated) DEVICE — STAPLER SKIN L320MM 35MM VASC TISS 12 FIRING B FRM PWR

## (undated) DEVICE — KENDALL RADIOLUCENT FOAM MONITORING ELECTRODE RECTANGULAR SHAPE: Brand: KENDALL

## (undated) DEVICE — 3M™ IOBAN™ 2 ANTIMICROBIAL INCISE DRAPE 6650EZ: Brand: IOBAN™ 2

## (undated) DEVICE — RELOAD STPL L60MM H1.5-3.6MM REG TISS BLU GRIPPING SURF B

## (undated) DEVICE — SUT PROL 0 30IN CT1 BLU --

## (undated) DEVICE — SUT SLK 0 30IN CT1 BLK --

## (undated) DEVICE — REM POLYHESIVE ADULT PATIENT RETURN ELECTRODE: Brand: VALLEYLAB

## (undated) DEVICE — 2000CC GUARDIAN II: Brand: GUARDIAN

## (undated) DEVICE — SLIM BODY SKIN STAPLER: Brand: APPOSE ULC

## (undated) DEVICE — VINYL URETHRAL CATHETER: Brand: DOVER

## (undated) DEVICE — CATHETER THOR 32FR L23IN PVC 5 EYELET STR ATRAUM

## (undated) DEVICE — LOGICUT SCISSOR LENGTH 320MM: Brand: LOGI - LAPAROSCOPIC INSTRUMENT SYSTEM

## (undated) DEVICE — UNIVERSAL FIXATION CANNULA: Brand: VERSAONE

## (undated) DEVICE — PROBE CRYO CRYOSPHERE 11IN -- ARTRICURE

## (undated) DEVICE — TROCAR: Brand: THORACOPORT

## (undated) DEVICE — CATHETER THOR 32FR L23IN PVC 6 EYELET STR ATRAUM

## (undated) DEVICE — GOWN,REINF,POLY,ECL,PP SLV,XL: Brand: MEDLINE

## (undated) DEVICE — SOLUTION IRRIG 3000ML H2O STRL BAG

## (undated) DEVICE — SUTURE VCRL SZ 2 L54IN ABSRB UD L65MM TP-1 1/2 CIR J880T

## (undated) DEVICE — ADAPTER BRONCHSCP FOR USE W/ OLY EDGE

## (undated) DEVICE — MEDI-VAC NON-CONDUCTIVE SUCTION TUBING: Brand: CARDINAL HEALTH

## (undated) DEVICE — TRAY CATH 16F URIN MTR LTX -- CONVERT TO ITEM 363111

## (undated) DEVICE — SET EXTN L6IN W/ S STL CLMP

## (undated) DEVICE — RELOAD STPL L60MM H2.3-4.2MM VERY THCK TISS BLK 6 ROW

## (undated) DEVICE — (D)PREP SKN CHLRAPRP APPL 26ML -- CONVERT TO ITEM 371833

## (undated) DEVICE — 2, DISPOSABLE SUCTION/IRRIGATOR WITHOUT DISPOSABLE TIP: Brand: STRYKEFLOW

## (undated) DEVICE — BLADE ELECTRODE: Brand: EDGE

## (undated) DEVICE — VISUALIZATION SYSTEM: Brand: CLEARIFY

## (undated) DEVICE — ENDOPATH ECHELON VASCULAR  RELOADS, WHITE, 35MM: Brand: ECHELON ENDOPATH

## (undated) DEVICE — SOL INJ SOD CL0.9% 10ML PREFIL --

## (undated) DEVICE — SUTURE PDS II SZ 1 L96IN ABSRB VLT TP-1 L65MM 1/2 CIR Z880G

## (undated) DEVICE — MOUTHPIECE ENDOSCP 20X27MM --

## (undated) DEVICE — CONTAINER,SPECIMEN,O.R.STRL,4.5OZ: Brand: MEDLINE

## (undated) DEVICE — DRAIN SURG 19FR SIL RND HUBLESS RADPQ W/O TRCR BLAK

## (undated) DEVICE — BRONCHOSCOPY PACK: Brand: MEDLINE INDUSTRIES, INC.

## (undated) DEVICE — SINGLE USE SUCTION VALVE MAJ-209: Brand: SINGLE USE SUCTION VALVE (STERILE)

## (undated) DEVICE — Device: Brand: BALLOON

## (undated) DEVICE — SUTURE SZ 0 27IN 5/8 CIR UR-6  TAPER PT VIOLET ABSRB VICRYL J603H

## (undated) DEVICE — STAPLER INT L34CM 60MM LNG ENDOSCP ARTC PWR + ECHELON FLX

## (undated) DEVICE — BUTTON SWITCH PENCIL BLADE ELECTRODE, HOLSTER: Brand: EDGE

## (undated) DEVICE — DRAPE SHT 3 QTR PROXIMA 53X77 --

## (undated) DEVICE — SUTURE VCRL SZ 4-0 L18IN ABSRB UD L19MM PS-2 3/8 CIR PRIM J496H

## (undated) DEVICE — (D)SYR 10ML 1/5ML GRAD NSAF -- PKGING CHANGE USE ITEM 338027

## (undated) DEVICE — KIT CATH 45DEG FIRM TIP EXT WRK CHN LOCATABLE GUID EDGE [SDK3450OLYMPUS] [SUPERDIMENSION INC]

## (undated) DEVICE — DRAIN CHEST ADL/PED DRY/WET -- PLEUR-EVAC

## (undated) DEVICE — LAP CHOLE: Brand: MEDLINE INDUSTRIES, INC.

## (undated) DEVICE — BLADELESS OPTICAL TROCAR WITH FIXATION CANNULA: Brand: VERSAPORT

## (undated) DEVICE — UNIVERSAL DRAPES: Brand: MEDLINE INDUSTRIES, INC.

## (undated) DEVICE — CANNULA NSL ORAL AD FOR CAPNOFLEX CO2 O2 AIRLFE

## (undated) DEVICE — DRSG GZ PETROLATM CURAD 1/2X72 --

## (undated) DEVICE — MEDI-VAC YANKAUER SUCTION HANDLE W/BULBOUS TIP: Brand: CARDINAL HEALTH

## (undated) DEVICE — SUTURE PERMAHAND SZ 0 L30IN NONABSORBABLE BLK L30MM PSL 3/8 590H

## (undated) DEVICE — SPONGE LAP 18X18IN STRL -- 5/PK

## (undated) DEVICE — TELFA NON-ADHERENT ABSORBENT DRESSING: Brand: TELFA

## (undated) DEVICE — KENDALL SCD EXPRESS SLEEVES, KNEE LENGTH, MEDIUM: Brand: KENDALL SCD

## (undated) DEVICE — PATCH SENS PT FOR ELECTROMAGNETIC NAVIGATION BRONCHSCP SYS